# Patient Record
Sex: FEMALE | Race: WHITE | NOT HISPANIC OR LATINO | URBAN - METROPOLITAN AREA
[De-identification: names, ages, dates, MRNs, and addresses within clinical notes are randomized per-mention and may not be internally consistent; named-entity substitution may affect disease eponyms.]

---

## 2017-05-16 ENCOUNTER — OUTPATIENT (OUTPATIENT)
Dept: OUTPATIENT SERVICES | Facility: HOSPITAL | Age: 72
LOS: 1 days | Discharge: HOME | End: 2017-05-16

## 2017-06-28 DIAGNOSIS — K05.4 PERIODONTOSIS: ICD-10-CM

## 2018-03-17 ENCOUNTER — INPATIENT (INPATIENT)
Facility: HOSPITAL | Age: 73
LOS: 2 days | Discharge: HOME HEALTH | End: 2018-03-20
Attending: INTERNAL MEDICINE

## 2018-03-17 VITALS
DIASTOLIC BLOOD PRESSURE: 79 MMHG | HEART RATE: 72 BPM | RESPIRATION RATE: 20 BRPM | OXYGEN SATURATION: 98 % | SYSTOLIC BLOOD PRESSURE: 189 MMHG | TEMPERATURE: 97 F

## 2018-03-17 DIAGNOSIS — Z98.890 OTHER SPECIFIED POSTPROCEDURAL STATES: Chronic | ICD-10-CM

## 2018-03-17 LAB
ALBUMIN SERPL ELPH-MCNC: 4.1 G/DL — SIGNIFICANT CHANGE UP (ref 3.5–5.2)
ALP SERPL-CCNC: 87 U/L — SIGNIFICANT CHANGE UP (ref 30–115)
ALT FLD-CCNC: 16 U/L — SIGNIFICANT CHANGE UP (ref 0–41)
ANION GAP SERPL CALC-SCNC: 14 MMOL/L — SIGNIFICANT CHANGE UP (ref 7–14)
APPEARANCE UR: CLEAR — SIGNIFICANT CHANGE UP
AST SERPL-CCNC: 29 U/L — SIGNIFICANT CHANGE UP (ref 0–41)
BILIRUB SERPL-MCNC: 0.2 MG/DL — SIGNIFICANT CHANGE UP (ref 0.2–1.2)
BILIRUB UR-MCNC: NEGATIVE — SIGNIFICANT CHANGE UP
BUN SERPL-MCNC: 7 MG/DL — LOW (ref 10–20)
CALCIUM SERPL-MCNC: 9.2 MG/DL — SIGNIFICANT CHANGE UP (ref 8.5–10.1)
CHLORIDE SERPL-SCNC: 87 MMOL/L — LOW (ref 98–110)
CK SERPL-CCNC: 183 U/L — SIGNIFICANT CHANGE UP (ref 0–225)
CO2 SERPL-SCNC: 23 MMOL/L — SIGNIFICANT CHANGE UP (ref 17–32)
COLOR SPEC: YELLOW — SIGNIFICANT CHANGE UP
CREAT SERPL-MCNC: 0.5 MG/DL — LOW (ref 0.7–1.5)
DIFF PNL FLD: NEGATIVE — SIGNIFICANT CHANGE UP
GLUCOSE SERPL-MCNC: 109 MG/DL — SIGNIFICANT CHANGE UP (ref 70–110)
GLUCOSE UR QL: NEGATIVE MG/DL — SIGNIFICANT CHANGE UP
HCT VFR BLD CALC: 36.8 % — LOW (ref 37–47)
HGB BLD-MCNC: 12.9 G/DL — SIGNIFICANT CHANGE UP (ref 12–16)
INR BLD: 0.87 RATIO — SIGNIFICANT CHANGE UP (ref 0.65–1.3)
KETONES UR-MCNC: NEGATIVE — SIGNIFICANT CHANGE UP
LEUKOCYTE ESTERASE UR-ACNC: (no result)
MAGNESIUM SERPL-MCNC: 1.8 MG/DL — SIGNIFICANT CHANGE UP (ref 1.8–2.4)
MCHC RBC-ENTMCNC: 31.5 PG — HIGH (ref 27–31)
MCHC RBC-ENTMCNC: 35.1 G/DL — SIGNIFICANT CHANGE UP (ref 32–37)
MCV RBC AUTO: 89.8 FL — SIGNIFICANT CHANGE UP (ref 81–99)
NITRITE UR-MCNC: POSITIVE
NRBC # BLD: 0 /100 WBCS — SIGNIFICANT CHANGE UP (ref 0–0)
PH UR: 7 — SIGNIFICANT CHANGE UP (ref 5–8)
PLATELET # BLD AUTO: 218 K/UL — SIGNIFICANT CHANGE UP (ref 130–400)
POTASSIUM SERPL-MCNC: 5.6 MMOL/L — HIGH (ref 3.5–5)
POTASSIUM SERPL-SCNC: 5.6 MMOL/L — HIGH (ref 3.5–5)
PROT SERPL-MCNC: 6.9 G/DL — SIGNIFICANT CHANGE UP (ref 6–8)
PROT UR-MCNC: NEGATIVE MG/DL — SIGNIFICANT CHANGE UP
PROTHROM AB SERPL-ACNC: 9.4 SEC — LOW (ref 9.95–12.87)
RBC # BLD: 4.1 M/UL — LOW (ref 4.2–5.4)
RBC # FLD: 11.9 % — SIGNIFICANT CHANGE UP (ref 11.5–14.5)
SODIUM SERPL-SCNC: 124 MMOL/L — LOW (ref 135–146)
SP GR SPEC: 1.01 — SIGNIFICANT CHANGE UP (ref 1.01–1.03)
TROPONIN T SERPL-MCNC: <0.01 NG/ML — SIGNIFICANT CHANGE UP
UROBILINOGEN FLD QL: 0.2 MG/DL — SIGNIFICANT CHANGE UP (ref 0.2–0.2)
WBC # BLD: 6.36 K/UL — SIGNIFICANT CHANGE UP (ref 4.8–10.8)
WBC # FLD AUTO: 6.36 K/UL — SIGNIFICANT CHANGE UP (ref 4.8–10.8)
WBC UR QL: (no result) /HPF

## 2018-03-17 RX ORDER — VALSARTAN 80 MG/1
160 TABLET ORAL DAILY
Qty: 0 | Refills: 0 | Status: DISCONTINUED | OUTPATIENT
Start: 2018-03-17 | End: 2018-03-20

## 2018-03-17 RX ORDER — TRAZODONE HCL 50 MG
150 TABLET ORAL AT BEDTIME
Qty: 0 | Refills: 0 | Status: DISCONTINUED | OUTPATIENT
Start: 2018-03-17 | End: 2018-03-19

## 2018-03-17 RX ORDER — METOCLOPRAMIDE HCL 10 MG
10 TABLET ORAL ONCE
Qty: 0 | Refills: 0 | Status: COMPLETED | OUTPATIENT
Start: 2018-03-17 | End: 2018-03-17

## 2018-03-17 RX ORDER — ASPIRIN/CALCIUM CARB/MAGNESIUM 324 MG
81 TABLET ORAL DAILY
Qty: 0 | Refills: 0 | Status: DISCONTINUED | OUTPATIENT
Start: 2018-03-17 | End: 2018-03-20

## 2018-03-17 RX ORDER — KETOROLAC TROMETHAMINE 30 MG/ML
30 SYRINGE (ML) INJECTION ONCE
Qty: 0 | Refills: 0 | Status: DISCONTINUED | OUTPATIENT
Start: 2018-03-17 | End: 2018-03-17

## 2018-03-17 RX ORDER — CEVIMELINE 30 MG/1
1 CAPSULE ORAL
Qty: 0 | Refills: 0 | COMMUNITY

## 2018-03-17 RX ORDER — CEFTRIAXONE 500 MG/1
1 INJECTION, POWDER, FOR SOLUTION INTRAMUSCULAR; INTRAVENOUS EVERY 24 HOURS
Qty: 0 | Refills: 0 | Status: DISCONTINUED | OUTPATIENT
Start: 2018-03-17 | End: 2018-03-20

## 2018-03-17 RX ORDER — SULFASALAZINE 500 MG
500 TABLET ORAL
Qty: 0 | Refills: 0 | Status: DISCONTINUED | OUTPATIENT
Start: 2018-03-17 | End: 2018-03-20

## 2018-03-17 RX ORDER — AMLODIPINE BESYLATE 2.5 MG/1
10 TABLET ORAL DAILY
Qty: 0 | Refills: 0 | Status: DISCONTINUED | OUTPATIENT
Start: 2018-03-17 | End: 2018-03-20

## 2018-03-17 RX ORDER — ALPRAZOLAM 0.25 MG
1 TABLET ORAL
Qty: 0 | Refills: 0 | Status: DISCONTINUED | OUTPATIENT
Start: 2018-03-17 | End: 2018-03-19

## 2018-03-17 RX ORDER — ATORVASTATIN CALCIUM 80 MG/1
80 TABLET, FILM COATED ORAL AT BEDTIME
Qty: 0 | Refills: 0 | Status: DISCONTINUED | OUTPATIENT
Start: 2018-03-17 | End: 2018-03-20

## 2018-03-17 RX ORDER — TRAZODONE HCL 50 MG
0 TABLET ORAL
Qty: 0 | Refills: 0 | COMMUNITY

## 2018-03-17 RX ORDER — ACETAMINOPHEN 500 MG
650 TABLET ORAL ONCE
Qty: 0 | Refills: 0 | Status: DISCONTINUED | OUTPATIENT
Start: 2018-03-17 | End: 2018-03-17

## 2018-03-17 RX ORDER — MORPHINE SULFATE 50 MG/1
2 CAPSULE, EXTENDED RELEASE ORAL ONCE
Qty: 0 | Refills: 0 | Status: DISCONTINUED | OUTPATIENT
Start: 2018-03-17 | End: 2018-03-17

## 2018-03-17 RX ORDER — ENOXAPARIN SODIUM 100 MG/ML
40 INJECTION SUBCUTANEOUS EVERY 24 HOURS
Qty: 0 | Refills: 0 | Status: DISCONTINUED | OUTPATIENT
Start: 2018-03-17 | End: 2018-03-20

## 2018-03-17 RX ORDER — SODIUM CHLORIDE 9 MG/ML
1000 INJECTION INTRAMUSCULAR; INTRAVENOUS; SUBCUTANEOUS ONCE
Qty: 0 | Refills: 0 | Status: COMPLETED | OUTPATIENT
Start: 2018-03-17 | End: 2018-03-17

## 2018-03-17 RX ORDER — SULFASALAZINE 500 MG
0 TABLET ORAL
Qty: 0 | Refills: 0 | COMMUNITY

## 2018-03-17 RX ORDER — MONTELUKAST 4 MG/1
10 TABLET, CHEWABLE ORAL DAILY
Qty: 0 | Refills: 0 | Status: DISCONTINUED | OUTPATIENT
Start: 2018-03-17 | End: 2018-03-20

## 2018-03-17 RX ORDER — PANTOPRAZOLE SODIUM 20 MG/1
40 TABLET, DELAYED RELEASE ORAL
Qty: 0 | Refills: 0 | Status: DISCONTINUED | OUTPATIENT
Start: 2018-03-17 | End: 2018-03-20

## 2018-03-17 RX ORDER — CIPROFLOXACIN LACTATE 400MG/40ML
400 VIAL (ML) INTRAVENOUS ONCE
Qty: 0 | Refills: 0 | Status: COMPLETED | OUTPATIENT
Start: 2018-03-17 | End: 2018-03-17

## 2018-03-17 RX ORDER — TRAMADOL HYDROCHLORIDE 50 MG/1
50 TABLET ORAL
Qty: 0 | Refills: 0 | Status: DISCONTINUED | OUTPATIENT
Start: 2018-03-17 | End: 2018-03-20

## 2018-03-17 RX ORDER — HYDROXYCHLOROQUINE SULFATE 200 MG
200 TABLET ORAL
Qty: 0 | Refills: 0 | Status: DISCONTINUED | OUTPATIENT
Start: 2018-03-17 | End: 2018-03-20

## 2018-03-17 RX ADMIN — MORPHINE SULFATE 2 MILLIGRAM(S): 50 CAPSULE, EXTENDED RELEASE ORAL at 11:11

## 2018-03-17 RX ADMIN — Medication 200 MILLIGRAM(S): at 14:56

## 2018-03-17 RX ADMIN — Medication 150 MILLIGRAM(S): at 22:12

## 2018-03-17 RX ADMIN — PANTOPRAZOLE SODIUM 40 MILLIGRAM(S): 20 TABLET, DELAYED RELEASE ORAL at 18:17

## 2018-03-17 RX ADMIN — Medication 1 MILLIGRAM(S): at 18:36

## 2018-03-17 RX ADMIN — Medication 30 MILLIGRAM(S): at 14:56

## 2018-03-17 RX ADMIN — CEFTRIAXONE 100 GRAM(S): 500 INJECTION, POWDER, FOR SOLUTION INTRAMUSCULAR; INTRAVENOUS at 18:20

## 2018-03-17 RX ADMIN — Medication 81 MILLIGRAM(S): at 18:17

## 2018-03-17 RX ADMIN — ENOXAPARIN SODIUM 40 MILLIGRAM(S): 100 INJECTION SUBCUTANEOUS at 18:18

## 2018-03-17 RX ADMIN — Medication 200 MILLIGRAM(S): at 18:18

## 2018-03-17 RX ADMIN — AMLODIPINE BESYLATE 10 MILLIGRAM(S): 2.5 TABLET ORAL at 18:15

## 2018-03-17 RX ADMIN — MORPHINE SULFATE 2 MILLIGRAM(S): 50 CAPSULE, EXTENDED RELEASE ORAL at 10:37

## 2018-03-17 RX ADMIN — Medication 500 MILLIGRAM(S): at 18:14

## 2018-03-17 RX ADMIN — Medication 10 MILLIGRAM(S): at 14:56

## 2018-03-17 RX ADMIN — ATORVASTATIN CALCIUM 80 MILLIGRAM(S): 80 TABLET, FILM COATED ORAL at 22:11

## 2018-03-17 RX ADMIN — SODIUM CHLORIDE 1000 MILLILITER(S): 9 INJECTION INTRAMUSCULAR; INTRAVENOUS; SUBCUTANEOUS at 13:23

## 2018-03-17 NOTE — ED PROVIDER NOTE - MEDICAL DECISION MAKING DETAILS
pt found to be hyponatremia likely cause of dizziness, unsteady gait. Also w UTI, abx given. ivf given. CT scans negative. pt admitted, needs pt/rehab possible hha or walker at home.

## 2018-03-17 NOTE — H&P ADULT - HISTORY OF PRESENT ILLNESS
72 y f with pmh of sjogrens, lupus, trigeminal neuralgia,  breast cancer s/p b/l mastectomy, rectal prolapse s/p partial colectomy, htn presented with fall. As per patient, she has been complaining of dizziness since last 7 months. Today, she was walking to kitchen when she felt lightheadness along with headaache and fell on her back but denies any loc but she hit her rt side of head. Since then she has been complaining of headache along with neck pain and back pain. she denies any weakness, numbness, blurry vision, tinnitus. she also c/o polyuria and polydipsia. she denies any burning sensation during urination. she denies any other symptoms

## 2018-03-17 NOTE — ED PROVIDER NOTE - PLAN OF CARE
Correct electrolytes Diet limited to soft food. Patient given 1L NS in ED.  Treat UTI, f/u cultures.  Evaluate for cause of falls.

## 2018-03-17 NOTE — H&P ADULT - NSHPPHYSICALEXAM_GEN_ALL_CORE
PHYSICAL EXAM:  Constitutional: well developed and well nourished    Respiratory: lungs B/L clear on auscultation, b/l mastectomy    Cardiovascular: S1S2 normal, no murmur heard    Gastrointestinal: Abd soft, non distended, non tender, BS+, midline scar in lower abdomen    Extremities: No pedal edema    Neurological: AAOX3, No FND, fnt-nl, hst- nl, sensory- nl, cn 2-12 intact, gait -unable to assess due to dizziness

## 2018-03-17 NOTE — ED ADULT NURSE NOTE - NSELOPED_ED_ALL_ED
Physician notified/Patient's chart was referred to charge nurse/supervisor for disposition of prescription and/or discharge instructions.

## 2018-03-17 NOTE — ED PROVIDER NOTE - PMH
Cardiac abnormality    Constipation, unspecified constipation type    GERD without esophagitis    Hypertension, unspecified type    Malignant neoplasm of lower-outer quadrant of left breast of female, estrogen receptor positive    Other systemic lupus erythematosus with endocarditis    Rectal prolapse    Sjogren's syndrome, with unspecified organ involvement    Vitamin D deficiency

## 2018-03-17 NOTE — ED PROVIDER NOTE - CONSTITUTIONAL, MLM
normal... Mild distress. Well appearing, well nourished, awake, alert, oriented to person, place, time/situation.

## 2018-03-17 NOTE — ED ADULT NURSE NOTE - PMH
Cardiac abnormality    Hypertension, unspecified type    Malignant neoplasm of lower-outer quadrant of left breast of female, estrogen receptor positive    Other systemic lupus erythematosus with endocarditis

## 2018-03-17 NOTE — ED PROVIDER NOTE - ATTENDING CONTRIBUTION TO CARE
72F PMH Sjogren's, anx/dep, htn, gerd, breast ca s/p bilat mastectomy, lupus, s/p colectomy, p/w fall at home. states was standing in kitchen, felt "off balance", fell and hit R head and R flank/upper abd. No loc. +ha. no neck pain, bp. +flank pain R side. no cp, sob, abd pain, nvdc. no fever, cough. no dysuria, freq, hematuria. no cough, uri. stopped taking lupus and sjorgrens meds x 1 mo because thinks it made her fall and dizzy. +freq falls. lives home alone. takes asa daily. on exam, AFVSS, well isabel nad, ncat- no sign of trauma noted to head, no midline c/t/l spine ttp or step off, eomi, perrla, mmm, lctab, rrr nl s1s2 no mrg, abd soft ntnd well healed abd scars, R flank scratch like echymosis, skin intact, +RCVAT, no crepitus, aaox3, cn 2-12 intact, no facial droop or slurred speech, no nystagmus, no pronator drift, 5/5 motor x 4 ext, silt x 4 ext, normal rapid alt movement bilat, unable to test gait, too unsteady, no le edema or calf ttp, a/p; Freq falls, unsteady gait, CHI, R flank injury, will do labs, ekg/trop, cxr/urine r/o pna/uti, CT scans r/o ich, rib fx, acute traumatic injury, ivf, check cbc/lytes, re-eval

## 2018-03-17 NOTE — ED ADULT TRIAGE NOTE - CHIEF COMPLAINT QUOTE
Pt hrom home state was dizzy fall on her back hit head on the flloor no LOC ,C/O nec anmd back pain ,staste  she is taking ASA 81 mg po .

## 2018-03-17 NOTE — H&P ADULT - ATTENDING COMMENTS
Patient seen and examined independently. Agree with resident note with exceptions. Case discussed with housestaff, nursing and patient    T(F): , Max: 96.8 (03-18-18 @ 14:09)  HR: 71 (03-18-18 @ 14:09) (71 - 81)  BP: 128/58 (03-18-18 @ 14:09)  RR: 18 (03-18-18 @ 14:09)  SpO2: 96% (03-17-18 @ 23:40)  General: No apparent distress  Cardiovascular: S1, S2  Gastrointestinal: Soft, Non-tender, Non-distended  Respiratory: Good air entry bilaterally  Musculoskeletal: Moves all extremities  Lymphatic: No edema  Neurologic: No gross motor deficit, anxious appearing  Dermatologic: Skin dry  PT/INR - ( 17 Mar 2018 09:24 )   PT: 9.40 sec;   INR: 0.87 ratio                                 12.9   6.36  )-----------( 218      ( 17 Mar 2018 09:24 )             36.8     03-18    136  |  97<L>  |  8<L>  ----------------------------<  97  4.4   |  26  |  0.5<L>    Ca    8.7      18 Mar 2018 07:37  Mg     1.8     03-17    TPro  6.9  /  Alb  4.1  /  TBili  0.2  /  DBili  x   /  AST  29  /  ALT  16  /  AlkPhos  87  03-17      Culture - Urine (collected 17 Mar 2018 09:36)  Source: .Urine Clean Catch (Midstream)  Preliminary Report (18 Mar 2018 17:55):    >100,000 CFU/ml Citrobacter freundii  A/P    Fall/dizziness - possibly UTI related vs medication side effect    UTI - on rocephin, f/u sensitivities    Hyponatremia - SSRI held, f/u nephrology and psych    Anxiety - f/u psych, cont xanax. SSRI held    Sjogrens/lupus/trigeminal neuralgia - on home meds, will decrease lyrica for now, f/u neuro

## 2018-03-17 NOTE — H&P ADULT - NSHPLABSRESULTS_GEN_ALL_CORE
T(C): 36.2 (18 @ 16:24), Max: 36.2 (18 @ 16:24)  HR: 62 (18 @ 16:24) (62 - 72)  BP: 150/66 (18 @ 16:24) (150/66 - 189/79)  RR: 18 (18 @ 16:24) (18 - 20)  SpO2: 98% (18 @ 08:36) (98% - 98%)                        12.9   6.36  )-----------( 218      ( 17 Mar 2018 09:24 )             36.8     124<L>  |  87<L>  |  7<L>  ----------------------------<  109  5.6<H>   |  23  |  0.5<L>    Ca    9.2      17 Mar 2018 09:24  Mg     1.8         TPro  6.9  /  Alb  4.1  /  TBili  0.2  /  DBili  x   /  AST  29  /  ALT  16  /  AlkPhos  87      Urinalysis Basic - ( 17 Mar 2018 12:56 )  Color: Yellow / Appearance: Clear / S.010 / pH: x  Gluc: x / Ketone: Negative  / Bili: Negative / Urobili: 0.2 mg/dL   Blood: x / Protein: Negative mg/dL / Nitrite: Positive   Leuk Esterase: Small / RBC: x / WBC 6-10 /HPF   Sq Epi: x / Non Sq Epi: x / Bacteria: x    PT/INR - ( 17 Mar 2018 09:24 )   PT: 9.40 sec;   INR: 0.87 ratio      CARDIAC MARKERS ( 17 Mar 2018 09:36 )  x     / <0.01 ng/mL / 183 U/L / x     / 1.8 ng/mL    < from: CT Abdomen and Pelvis w/ IV Cont (18 @ 12:15) >    Since 2014:  1.  No evidence of acute intra-abdominal or pelvic pathology.  2.  Diffuse bilateral emphysematous changes.    < end of copied text >    < from: CT Cervical Spine No Cont (18 @ 12:04) >    IMPRESSION:    1.  No evidenceof acute cervical spine fracture or subluxation.    2.  Multilevel degenerative changes as described.    3.  Mild chronic compression deformity of the C7 vertebral body, stable   since an MRI from .    < end of copied text >    < from: CT Head No Cont (18 @ 11:54) >      IMPRESSION:      1.  No evidence of acute intracranial pathology. Stable exam since   14.    2.  Stable moderate chronic microvascular changes.    < end of copied text >    < from: Xray Pelvis AP only (18 @ 11:06) >    impression:    No evidence of acute displaced fracture or dislocation.    Mild to moderate degenerative changes are present in the bilateral hips.    Degenerative changes are seen in the bilateral sacral iliac joints and   imaged lumbar spine.    < end of copied text >    < from: Xray Chest 1 View AP/PA (18 @ 11:05) >      Impression:      Increased interstitial markings at the bilateral lung bases, unchanged.    < end of copied text >

## 2018-03-17 NOTE — H&P ADULT - ASSESSMENT
72 y f with pmh of sjogrens, lupus, breast cancer s/p mastectomy b/l  presented with  fall due to dizziness    1) fall- trauma workup neg  admit to medicine    2) dizziness- likely drug induced vs orthostatics   hold lyrica  neuro eval  check orthostatics  cth- neg    3) hyponatremia- likely 2/2 polydipsia due to sjogrens vs siadh    water restriction to 1.5 ltrs  repeat bmp  check fena  nephrology eval    4) uti- start rocephin  check ucx    5) sjogrens- c/w cevilemine, will let pt know to bring her own medications    6) lupus- c/w sulfasalazine and hydroxychloroquine    7) trigeminal neuralgia- hold lyrica  neuro eval    8) htn - c/w home meds except hctz    9) hyperkalemia- hemolyzed , will repeat bmp    10 ) diet- regular  dvt ppx/ gi ppx  dispo- needs pt rehab eval

## 2018-03-17 NOTE — H&P ADULT - NSHPREVIEWOFSYSTEMS_GEN_ALL_CORE
REVIEW OF SYSTEMS:    CONSTITUTIONAL: No weakness, fevers or chills  EYES/ENT: No visual changes;  No vertigo or throat pain   NECK: see hpi  RESPIRATORY:   CARDIOVASCULAR: No chest pain or palpitations  GASTROINTESTINAL: No abdominal or epigastric pain. No nausea, vomiting, or hematemesis; + constipation. No melena or hematochezia.  GENITOURINARY: No dysuria or hematuria  NEUROLOGICAL:see hpi  SKIN: No itching, rashes

## 2018-03-17 NOTE — ED PROVIDER NOTE - PROGRESS NOTE DETAILS
Patient received 2mg morphine for pain and reported improvement. Patient returned from imaging, prelim CT cervical spine reports no acute fractures or subluxation. Continues to endorse headache. C-spine collar removed. Fluids started.

## 2018-03-17 NOTE — H&P ADULT - PMH
Constipation, unspecified constipation type    GERD without esophagitis    Hypertension, unspecified type    Lupus (systemic lupus erythematosus)    Malignant neoplasm of lower-outer quadrant of left breast of female, estrogen receptor positive    Rectal prolapse    Sjogren's syndrome, with unspecified organ involvement    Trigeminal neuralgia    Vitamin D deficiency

## 2018-03-17 NOTE — ED PROVIDER NOTE - MUSCULOSKELETAL, MLM
Tender to palpation in right lumbar region. Spine appears normal, range of motion is not limited, no joint tenderness

## 2018-03-17 NOTE — ED PROVIDER NOTE - OBJECTIVE STATEMENT
73yo female with pmh of sjrogens, lupus, constipation, hemorrhoids, breast ca s/p dbl mastectomy, GERD, HTN, cogenital agammaglobulinemia, bipolar disorder and generalized anxiety disorder. presenting for head and neck pain s/p fall. Patient reports that she's been feeling off-balance for over 7 months, limiting her to her home. lost of balance associated with ongoing frontal pressure headaches that increased today after she fell. Patient reports that she fell and hit the right side of her head and back on the floor. Reports that now she has pain in the right temporal region, with pain in her neck. She reports that back pain in right lumbar region. Patient states that she hasn't taken her medications for lupus in over 7 months due to difficulty finding a new rheumatologist with her new insurance. She states she has been consistent with other medications. 73yo female with pmh of sjrogens, lupus, constipation, hemorrhoids, breast ca s/p dbl mastectomy, GERD, HTN, cogenital agammaglobulinemia, bipolar disorder and generalized anxiety disorder. presenting for head and neck pain s/p fall. Patient reports that she's been feeling off-balance for over 7 months, limiting her to her home. lost of balance associated with ongoing frontal pressure headaches that increased today after she fell. Patient reports that she fell and hit the right side of her head and back on the floor. Reports that now she has pain in the right temporal region, with pain in her neck. She reports that back pain in right lumbar region. Patient states that she hasn't taken her medications for lupus in over 7 months due to difficulty finding a new rheumatologist with her new insurance. She states she has been consistent with other medications. Pt also endorses frequent urination, with pelvic pain. 71yo female with pmh of sjrogens, lupus, constipation, hemorrhoids, breast ca s/p dbl mastectomy, GERD, HTN, cogenital agammaglobulinemia, bipolar disorder and generalized anxiety disorder. presenting for head and neck pain s/p fall. Patient reports that she's been feeling off-balance for over 7 months, limiting her to her home. lost of balance associated with ongoing frontal pressure headaches that increased today after she fell. Patient reports that she fell and hit the right side of her head and back on the floor. Reports that now she has pain in the right temporal region, with pain in her neck. She reports that back pain in right lumbar region. Patient states that she hasn't taken her medications for lupus in over 7 months due to difficulty finding a new rheumatologist with her new insurance. She states she has been consistent with other medications. Pt also endorses frequent urination, with pelvic pain.  PMD: Celia  Cards: Coco  Rheum: Shaun  Neuro: Jackie

## 2018-03-17 NOTE — ED PROVIDER NOTE - CARE PLAN
Principal Discharge DX:	Hyponatremia  Goal:	Correct electrolytes  Assessment and plan of treatment:	Diet limited to soft food. Patient given 1L NS in ED.  Treat UTI, f/u cultures.  Evaluate for cause of falls.  Secondary Diagnosis:	Urinary tract infection without hematuria, site unspecified  Secondary Diagnosis:	Falls, subsequent encounter

## 2018-03-18 DIAGNOSIS — F33.1 MAJOR DEPRESSIVE DISORDER, RECURRENT, MODERATE: ICD-10-CM

## 2018-03-18 LAB
ANION GAP SERPL CALC-SCNC: 13 MMOL/L — SIGNIFICANT CHANGE UP (ref 7–14)
ANION GAP SERPL CALC-SCNC: 15 MMOL/L — HIGH (ref 7–14)
BUN SERPL-MCNC: 10 MG/DL — SIGNIFICANT CHANGE UP (ref 10–20)
BUN SERPL-MCNC: 8 MG/DL — LOW (ref 10–20)
CALCIUM SERPL-MCNC: 8.5 MG/DL — SIGNIFICANT CHANGE UP (ref 8.5–10.1)
CALCIUM SERPL-MCNC: 8.7 MG/DL — SIGNIFICANT CHANGE UP (ref 8.5–10.1)
CHLORIDE SERPL-SCNC: 93 MMOL/L — LOW (ref 98–110)
CHLORIDE SERPL-SCNC: 97 MMOL/L — LOW (ref 98–110)
CO2 SERPL-SCNC: 24 MMOL/L — SIGNIFICANT CHANGE UP (ref 17–32)
CO2 SERPL-SCNC: 26 MMOL/L — SIGNIFICANT CHANGE UP (ref 17–32)
CREAT SERPL-MCNC: 0.5 MG/DL — LOW (ref 0.7–1.5)
CREAT SERPL-MCNC: 0.6 MG/DL — LOW (ref 0.7–1.5)
GLUCOSE SERPL-MCNC: 90 MG/DL — SIGNIFICANT CHANGE UP (ref 70–110)
GLUCOSE SERPL-MCNC: 97 MG/DL — SIGNIFICANT CHANGE UP (ref 70–110)
POTASSIUM SERPL-MCNC: 4 MMOL/L — SIGNIFICANT CHANGE UP (ref 3.5–5)
POTASSIUM SERPL-MCNC: 4.4 MMOL/L — SIGNIFICANT CHANGE UP (ref 3.5–5)
POTASSIUM SERPL-SCNC: 4 MMOL/L — SIGNIFICANT CHANGE UP (ref 3.5–5)
POTASSIUM SERPL-SCNC: 4.4 MMOL/L — SIGNIFICANT CHANGE UP (ref 3.5–5)
SODIUM SERPL-SCNC: 132 MMOL/L — LOW (ref 135–146)
SODIUM SERPL-SCNC: 136 MMOL/L — SIGNIFICANT CHANGE UP (ref 135–146)

## 2018-03-18 RX ADMIN — Medication 500 MILLIGRAM(S): at 17:35

## 2018-03-18 RX ADMIN — ATORVASTATIN CALCIUM 80 MILLIGRAM(S): 80 TABLET, FILM COATED ORAL at 22:18

## 2018-03-18 RX ADMIN — Medication 1 MILLIGRAM(S): at 05:51

## 2018-03-18 RX ADMIN — TRAMADOL HYDROCHLORIDE 50 MILLIGRAM(S): 50 TABLET ORAL at 14:30

## 2018-03-18 RX ADMIN — TRAMADOL HYDROCHLORIDE 50 MILLIGRAM(S): 50 TABLET ORAL at 18:41

## 2018-03-18 RX ADMIN — VALSARTAN 160 MILLIGRAM(S): 80 TABLET ORAL at 05:51

## 2018-03-18 RX ADMIN — Medication 25 MILLIGRAM(S): at 14:29

## 2018-03-18 RX ADMIN — Medication 81 MILLIGRAM(S): at 11:38

## 2018-03-18 RX ADMIN — Medication 150 MILLIGRAM(S): at 22:18

## 2018-03-18 RX ADMIN — Medication 200 MILLIGRAM(S): at 05:51

## 2018-03-18 RX ADMIN — TRAMADOL HYDROCHLORIDE 50 MILLIGRAM(S): 50 TABLET ORAL at 11:40

## 2018-03-18 RX ADMIN — CEFTRIAXONE 100 GRAM(S): 500 INJECTION, POWDER, FOR SOLUTION INTRAMUSCULAR; INTRAVENOUS at 17:38

## 2018-03-18 RX ADMIN — PANTOPRAZOLE SODIUM 40 MILLIGRAM(S): 20 TABLET, DELAYED RELEASE ORAL at 08:38

## 2018-03-18 RX ADMIN — Medication 1 MILLIGRAM(S): at 17:35

## 2018-03-18 RX ADMIN — MONTELUKAST 10 MILLIGRAM(S): 4 TABLET, CHEWABLE ORAL at 11:38

## 2018-03-18 RX ADMIN — Medication 500 MILLIGRAM(S): at 05:51

## 2018-03-18 RX ADMIN — Medication 1 DROP(S): at 14:25

## 2018-03-18 RX ADMIN — TRAMADOL HYDROCHLORIDE 50 MILLIGRAM(S): 50 TABLET ORAL at 18:08

## 2018-03-18 RX ADMIN — AMLODIPINE BESYLATE 10 MILLIGRAM(S): 2.5 TABLET ORAL at 05:51

## 2018-03-18 RX ADMIN — Medication 200 MILLIGRAM(S): at 17:36

## 2018-03-18 NOTE — BEHAVIORAL HEALTH ASSESSMENT NOTE - OTHER PAST PSYCHIATRIC HISTORY (INCLUDE DETAILS REGARDING ONSET, COURSE OF ILLNESS, INPATIENT/OUTPATIENT TREATMENT)
3 [facility] admissions "many years ago" for SI/SA during a time of relatioship conflict with spouse and children, overdosed on medictions, cut self

## 2018-03-18 NOTE — CONSULT NOTE ADULT - SUBJECTIVE AND OBJECTIVE BOX
NEPHROLOGY CONSULTATION NOTE    A 73 yo F with PMH listed below presented to hospital because she fell down. she has been complaining of dizziness since last 7 months. Today, she was walking to kitchen when she felt lightheadness along with headaache and fell on her back. Renal consult called for hyponatremia. Her serum Na was 132, her baseline Na was   72 y f with pmh of sjogrens, lupus, trigeminal neuralgia,  breast cancer s/p b/l mastectomy, rectal prolapse s/p partial colectomy, htn presented with fall. As per patient, she has been complaining of dizziness since last 7 months. Today, she was walking to kitchen when she felt lightheadness along with headaache and fell on her back but denies any loc but she hit her rt side of head. Since then she has been complaining of headache along with neck pain and back pain. she denies any weakness, numbness, blurry vision, tinnitus. she also c/o polyuria and polydipsia. she denies any burning sensation during urination. she denies any other symptoms  PAST MEDICAL & SURGICAL HISTORY:  Lupus (systemic lupus erythematosus)  Trigeminal neuralgia  Sjogren's syndrome, with unspecified organ involvement  GERD without esophagitis  Vitamin D deficiency  Constipation, unspecified constipation type  Rectal prolapse  Hypertension, unspecified type  Malignant neoplasm of lower-outer quadrant of left breast of female, estrogen receptor positive  H/O bilateral mastectomy  History of partial colectomy    Allergies:  Naprosyn (Stomach Upset)  Vicodin (Stomach Upset)    Home Medications:  ALPRAZolam 1 mg oral tablet: 1 tab(s) orally 2 times a day (17 Mar 2018 12:57)  amLODIPine 10 mg oral tablet: 1 tab(s) orally once a day (17 Mar 2018 12:57)  cevimeline 30 mg oral capsule: 3 cap(s) orally once a day (17 Mar 2018 12:57)  Fioricet oral capsule: 2 cap(s) orally once a day (17 Mar 2018 12:57)  hydroxychloroquine 200 mg oral tablet: 1 tab(s) orally 2 times a day (17 Mar 2018 12:57)  Low Dose ASA 81 mg oral tablet: 1 tab(s) orally once a day (17 Mar 2018 12:57)  Lyrica 150 mg oral capsule: 1 cap(s) orally 2 times a day (17 Mar 2018 12:57)  montelukast 10 mg oral tablet: 1 tab(s) orally once a day (17 Mar 2018 12:57)  omeprazole 40 mg oral delayed release capsule: 1 cap(s) orally once a day (17 Mar 2018 12:57)  rosuvastatin 40 mg oral tablet: 1 tab(s) orally once a day (at bedtime) (17 Mar 2018 12:57)  sulfaSALAzine 500 mg oral tablet: 3 tab(s) orally 2 times a day (17 Mar 2018 12:57)  traZODone 150 mg oral tablet: 1 tab(s) orally once a day (at bedtime) (17 Mar 2018 12:57)  valsartan-hydrochlorothiazide 160mg-12.5mg oral tablet: 1 tab(s) orally once a day (17 Mar 2018 12:57)    Hospital Medications:   MEDICATIONS  (STANDING):  ALPRAZolam 1 milliGRAM(s) Oral two times a day  amLODIPine   Tablet 10 milliGRAM(s) Oral daily  aspirin  chewable 81 milliGRAM(s) Oral daily  atorvastatin 80 milliGRAM(s) Oral at bedtime  cefTRIAXone   IVPB 1 Gram(s) IV Intermittent every 24 hours  enoxaparin Injectable 40 milliGRAM(s) SubCutaneous every 24 hours  hydroxychloroquine 200 milliGRAM(s) Oral two times a day  montelukast 10 milliGRAM(s) Oral daily  pantoprazole    Tablet 40 milliGRAM(s) Oral before breakfast  sulfaSALAzine 500 milliGRAM(s) Oral two times a day  traZODone 150 milliGRAM(s) Oral at bedtime  valsartan 160 milliGRAM(s) Oral daily      SOCIAL HISTORY:  Denies ETOH,Smoking,   FAMILY HISTORY:  No pertinent family history in first degree relatives        REVIEW OF SYSTEMS:  CONSTITUTIONAL: No weakness, fevers or chills  EYES/ENT: No visual changes;  No vertigo or throat pain   NECK: No pain or stiffness  RESPIRATORY: No cough, wheezing, hemoptysis; No shortness of breath  CARDIOVASCULAR: No chest pain or palpitations.  GASTROINTESTINAL: No abdominal or epigastric pain. No nausea, vomiting, or hematemesis; No diarrhea or constipation. No melena or hematochezia.  GENITOURINARY: No dysuria, frequency, foamy urine, urinary urgency, incontinence or hematuria  NEUROLOGICAL: No numbness or weakness  SKIN: No itching, burning, rashes, or lesions   VASCULAR: No bilateral lower extremity edema.   All other review of systems is negative unless indicated above.    VITALS:  T(F): 96.5 (18 @ 05:20), Max: 97.2 (18 @ 16:24)  HR: 73 (18 @ 05:20)  BP: 170/65 (18 @ 05:20)  RR: 18 (18 @ 05:20)  SpO2: 96% (18 @ 23:40)        I&O's Detail    Creatine Kinase, Serum: 183 U/L (18 @ 09:36)      PHYSICAL EXAM:  Constitutional: NAD  HEENT: anicteric sclera, oropharynx clear, MMM  Neck: No JVD  Respiratory: CTAB, no wheezes, rales or rhonchi  Cardiovascular: S1, S2, RRR  Gastrointestinal: BS+, soft, NT/ND  Extremities: No cyanosis or clubbing. No peripheral edema  Neurological: A/O x 3, no focal deficits  Psychiatric: Normal mood, normal affect  : No CVA tenderness. No matias.   Skin: No rashes  Vascular Access:    LABS:      132<L>  |  93<L>  |  10  ----------------------------<  90  4.0   |  24  |  0.6<L>    Ca    8.5      18 Mar 2018 00:53  Mg     1.8         TPro  6.9  /  Alb  4.1  /  TBili  0.2  /  DBili      /  AST  29  /  ALT  16  /  AlkPhos  87      Creatinine Trend: 0.6 <--, 0.5 <--                        12.9   6.36  )-----------( 218      ( 17 Mar 2018 09:24 )             36.8     Urine Studies:  Urinalysis Basic - ( 17 Mar 2018 12:56 )    Color: Yellow / Appearance: Clear / S.010 / pH:   Gluc:  / Ketone: Negative  / Bili: Negative / Urobili: 0.2 mg/dL   Blood:  / Protein: Negative mg/dL / Nitrite: Positive   Leuk Esterase: Small / RBC:  / WBC 6-10 /HPF   Sq Epi:  / Non Sq Epi:  / Bacteria:                 RADIOLOGY & ADDITIONAL STUDIES: NEPHROLOGY CONSULTATION NOTE    A 73 yo F with PMH listed below presented to hospital because she fell down. she has been complaining of dizziness since last 7 months. Today, she was walking to kitchen when she felt lightheadness along with headaache and fell on her back. Renal consult called for hyponatremia. Her serum Na was 132, her baseline Na was 133-134 last year.     no weakness, numbness, blurry vision, tinnitus  (+) polyuria and polydipsia   PAST MEDICAL & SURGICAL HISTORY:  Lupus (systemic lupus erythematosus)  Trigeminal neuralgia  Sjogren's syndrome, with unspecified organ involvement  GERD without esophagitis  Vitamin D deficiency  Constipation, unspecified constipation type  Rectal prolapse  Hypertension, unspecified type  Malignant neoplasm of lower-outer quadrant of left breast of female, estrogen receptor positive  H/O bilateral mastectomy  History of partial colectomy    Allergies:  Naprosyn (Stomach Upset)  Vicodin (Stomach Upset)    Home Medications:  ALPRAZolam 1 mg oral tablet: 1 tab(s) orally 2 times a day (17 Mar 2018 12:57)  amLODIPine 10 mg oral tablet: 1 tab(s) orally once a day (17 Mar 2018 12:57)  cevimeline 30 mg oral capsule: 3 cap(s) orally once a day (17 Mar 2018 12:57)  Fioricet oral capsule: 2 cap(s) orally once a day (17 Mar 2018 12:57)  hydroxychloroquine 200 mg oral tablet: 1 tab(s) orally 2 times a day (17 Mar 2018 12:57)  Low Dose ASA 81 mg oral tablet: 1 tab(s) orally once a day (17 Mar 2018 12:57)  Lyrica 150 mg oral capsule: 1 cap(s) orally 2 times a day (17 Mar 2018 12:57)  montelukast 10 mg oral tablet: 1 tab(s) orally once a day (17 Mar 2018 12:57)  omeprazole 40 mg oral delayed release capsule: 1 cap(s) orally once a day (17 Mar 2018 12:57)  rosuvastatin 40 mg oral tablet: 1 tab(s) orally once a day (at bedtime) (17 Mar 2018 12:57)  sulfaSALAzine 500 mg oral tablet: 3 tab(s) orally 2 times a day (17 Mar 2018 12:57)  traZODone 150 mg oral tablet: 1 tab(s) orally once a day (at bedtime) (17 Mar 2018 12:57)  valsartan-hydrochlorothiazide 160mg-12.5mg oral tablet: 1 tab(s) orally once a day (17 Mar 2018 12:57)    Hospital Medications:   MEDICATIONS  (STANDING):  ALPRAZolam 1 milliGRAM(s) Oral two times a day  amLODIPine   Tablet 10 milliGRAM(s) Oral daily  aspirin  chewable 81 milliGRAM(s) Oral daily  atorvastatin 80 milliGRAM(s) Oral at bedtime  cefTRIAXone   IVPB 1 Gram(s) IV Intermittent every 24 hours  enoxaparin Injectable 40 milliGRAM(s) SubCutaneous every 24 hours  hydroxychloroquine 200 milliGRAM(s) Oral two times a day  montelukast 10 milliGRAM(s) Oral daily  pantoprazole    Tablet 40 milliGRAM(s) Oral before breakfast  sulfaSALAzine 500 milliGRAM(s) Oral two times a day  traZODone 150 milliGRAM(s) Oral at bedtime  valsartan 160 milliGRAM(s) Oral daily      SOCIAL HISTORY:  Denies ETOH,Smoking,   FAMILY HISTORY:  No pertinent family history in first degree relatives      REVIEW OF SYSTEMS:  CONSTITUTIONAL: No weakness, fevers or chills  EYES/ENT: No visual changes;  No vertigo or throat pain   NECK: No pain or stiffness  RESPIRATORY: No cough, wheezing, hemoptysis; No shortness of breath  CARDIOVASCULAR: No chest pain or palpitations.  GASTROINTESTINAL: No abdominal or epigastric pain. No nausea, vomiting, or hematemesis; No diarrhea or constipation. No melena or hematochezia.  GENITOURINARY: No dysuria, frequency, foamy urine, urinary urgency, incontinence or hematuria  NEUROLOGICAL: No numbness or weakness  SKIN: No itching, burning, rashes, or lesions   VASCULAR: No bilateral lower extremity edema.   All other review of systems is negative unless indicated above.    VITALS:  T(F): 96.5 (18 @ 05:20), Max: 97.2 (18 @ 16:24)  HR: 73 (18 @ 05:20)  BP: 170/65 (18 @ 05:20)Vital Signs Last 24 Hrs  BP: 170/65 (18 Mar 2018 05:20) (121/58 - 178/68)  RR: 18 (18 @ 05:20)  SpO2: 96% (18 @ 23:40)    I&O's Detail    Creatine Kinase, Serum: 183 U/L (18 @ 09:36)      PHYSICAL EXAM:  Constitutional: NAD  HEENT: anicteric sclera, oropharynx clear, MMM  Neck: No JVD  Respiratory: CTAB, no wheezes, rales or rhonchi  Cardiovascular: S1, S2, RRR  Gastrointestinal: BS+, soft, NT/ND  Extremities: No cyanosis or clubbing. No peripheral edema  Neurological: A/O x 3, no focal deficits  Psychiatric: Normal mood, normal affect  : No CVA tenderness. No matias.   Skin: No rashes    LABS:      132<L>  |  93<L>  |  10  ----------------------------<  90  4.0   |  24  |  0.6<L>  Creatinine Trend: 0.6 <--, 0.5 <--  SODIUM TREND:  Sodium 132 [ @ 00:53]  Sodium 124 [ @ 09:24]    Ca    8.5      18 Mar 2018 00:53  Mg     1.8         TPro  6.9  /  Alb  4.1  /  TBili  0.2  /  DBili      /  AST  29  /  ALT  16  /  AlkPhos  87                  12.9   6.36  )-----------( 218      ( 17 Mar 2018 09:24 )             36.8     Urine Studies:  Urinalysis Basic - ( 17 Mar 2018 12:56 )    Color: Yellow / Appearance: Clear / S.010 / pH:   Gluc:  / Ketone: Negative  / Bili: Negative / Urobili: 0.2 mg/dL   Blood:  / Protein: Negative mg/dL / Nitrite: Positive   Leuk Esterase: Small / RBC:  / WBC 6-10 /HPF   Sq Epi:  / Non Sq Epi:  / Bacteria:     RADIOLOGY & ADDITIONAL STUDIES:    < from: CT Chest w/ IV Cont (18 @ 12:15) >  IMPRESSION:   Since 2014:  1.  No evidence of acute intra-abdominal or pelvic pathology.  2.  Diffuse bilateral emphysematous changes.    < end of copied text >  < from: CT Cervical Spine No Cont (03.17.18 @ 12:04) >  IMPRESSION:    1.  No evidenceof acute cervical spine fracture or subluxation.    2.  Multilevel degenerative changes as described.    3.  Mild chronic compression deformity of the C7 vertebral body, stable   since an MRI from .    < end of copied text > NEPHROLOGY CONSULTATION NOTE    A 73 yo F with PMH listed below presented to hospital because she fell down. she has been complaining of dizziness since last 7 months. Today, she was walking to kitchen when she felt lightheadness along with headaache and fell on her back. Renal consult called for hyponatremia. Her serum Na was 132, her baseline Na was 133-134 last year.     no weakness, numbness, blurry vision, tinnitus  (+) polyuria and polydipsia   PAST MEDICAL & SURGICAL HISTORY:  Lupus (systemic lupus erythematosus)  Trigeminal neuralgia  Sjogren's syndrome, with unspecified organ involvement  GERD without esophagitis  Vitamin D deficiency  Constipation, unspecified constipation type  Rectal prolapse  Hypertension, unspecified type  Malignant neoplasm of lower-outer quadrant of left breast of female, estrogen receptor positive  H/O bilateral mastectomy  History of partial colectomy  breast cancer s/p b/l mastectomy    Allergies:  Naprosyn (Stomach Upset)  Vicodin (Stomach Upset)    Home Medications:  ALPRAZolam 1 mg oral tablet: 1 tab(s) orally 2 times a day (17 Mar 2018 12:57)  amLODIPine 10 mg oral tablet: 1 tab(s) orally once a day (17 Mar 2018 12:57)  cevimeline 30 mg oral capsule: 3 cap(s) orally once a day (17 Mar 2018 12:57)  Fioricet oral capsule: 2 cap(s) orally once a day (17 Mar 2018 12:57)  hydroxychloroquine 200 mg oral tablet: 1 tab(s) orally 2 times a day (17 Mar 2018 12:57)  Low Dose ASA 81 mg oral tablet: 1 tab(s) orally once a day (17 Mar 2018 12:57)  Lyrica 150 mg oral capsule: 1 cap(s) orally 2 times a day (17 Mar 2018 12:57)  montelukast 10 mg oral tablet: 1 tab(s) orally once a day (17 Mar 2018 12:57)  omeprazole 40 mg oral delayed release capsule: 1 cap(s) orally once a day (17 Mar 2018 12:57)  rosuvastatin 40 mg oral tablet: 1 tab(s) orally once a day (at bedtime) (17 Mar 2018 12:57)  sulfaSALAzine 500 mg oral tablet: 3 tab(s) orally 2 times a day (17 Mar 2018 12:57)  traZODone 150 mg oral tablet: 1 tab(s) orally once a day (at bedtime) (17 Mar 2018 12:57)  valsartan-hydrochlorothiazide 160mg-12.5mg oral tablet: 1 tab(s) orally once a day (17 Mar 2018 12:57)    Hospital Medications:   MEDICATIONS  (STANDING):  ALPRAZolam 1 milliGRAM(s) Oral two times a day  amLODIPine   Tablet 10 milliGRAM(s) Oral daily  aspirin  chewable 81 milliGRAM(s) Oral daily  atorvastatin 80 milliGRAM(s) Oral at bedtime  cefTRIAXone   IVPB 1 Gram(s) IV Intermittent every 24 hours  enoxaparin Injectable 40 milliGRAM(s) SubCutaneous every 24 hours  hydroxychloroquine 200 milliGRAM(s) Oral two times a day  montelukast 10 milliGRAM(s) Oral daily  pantoprazole    Tablet 40 milliGRAM(s) Oral before breakfast  sulfaSALAzine 500 milliGRAM(s) Oral two times a day  traZODone 150 milliGRAM(s) Oral at bedtime  valsartan 160 milliGRAM(s) Oral daily      SOCIAL HISTORY:  Denies ETOH,Smoking,   FAMILY HISTORY:  No pertinent family history in first degree relatives      REVIEW OF SYSTEMS:  CONSTITUTIONAL: No weakness, fevers or chills  EYES/ENT: No visual changes;  No vertigo or throat pain   NECK: No pain or stiffness  RESPIRATORY: No cough, wheezing, hemoptysis; No shortness of breath  CARDIOVASCULAR: No chest pain or palpitations.  GASTROINTESTINAL: No abdominal or epigastric pain. No nausea, vomiting, or hematemesis; No diarrhea or constipation. No melena or hematochezia.  GENITOURINARY: No dysuria, frequency, foamy urine, urinary urgency, incontinence or hematuria  NEUROLOGICAL: No numbness or weakness  SKIN: No itching, burning, rashes, or lesions   VASCULAR: No bilateral lower extremity edema.   All other review of systems is negative unless indicated above.    VITALS:  T(F): 96.5 (18 @ 05:20), Max: 97.2 (18 @ 16:24)  HR: 73 (18 @ 05:20)  BP: 170/65 (18 @ 05:20)Vital Signs Last 24 Hrs  BP: 170/65 (18 Mar 2018 05:20) (121/58 - 178/68)  RR: 18 (18 @ 05:20)  SpO2: 96% (18 @ 23:40)    I&O's Detail    Creatine Kinase, Serum: 183 U/L (18 @ 09:36)      PHYSICAL EXAM:  Constitutional: NAD  HEENT: anicteric sclera, oropharynx clear, MMM  Neck: No JVD  Respiratory: CTAB, no wheezes, rales or rhonchi  Cardiovascular: S1, S2, RRR  Gastrointestinal: BS+, soft, NT/ND  Extremities: No cyanosis or clubbing. No peripheral edema  Neurological: A/O x 3, no focal deficits  Psychiatric: Normal mood, normal affect  : No CVA tenderness. No matias.   Skin: No rashes    LABS:      132<L>  |  93<L>  |  10  ----------------------------<  90  4.0   |  24  |  0.6<L>  Creatinine Trend: 0.6 <--, 0.5 <--  SODIUM TREND:  Sodium 132 [ @ 00:53]  Sodium 124 [ @ 09:24]    Ca    8.5      18 Mar 2018 00:53  Mg     1.8         TPro  6.9  /  Alb  4.1  /  TBili  0.2  /  DBili      /  AST  29  /  ALT  16  /  AlkPhos  87                  12.9   6.36  )-----------( 218      ( 17 Mar 2018 09:24 )             36.8     Urine Studies:  Urinalysis Basic - ( 17 Mar 2018 12:56 )    Color: Yellow / Appearance: Clear / S.010 / pH:   Gluc:  / Ketone: Negative  / Bili: Negative / Urobili: 0.2 mg/dL   Blood:  / Protein: Negative mg/dL / Nitrite: Positive   Leuk Esterase: Small / RBC:  / WBC 6-10 /HPF   Sq Epi:  / Non Sq Epi:  / Bacteria:     RADIOLOGY & ADDITIONAL STUDIES:    < from: CT Chest w/ IV Cont (18 @ 12:15) >  IMPRESSION:   Since 2014:  1.  No evidence of acute intra-abdominal or pelvic pathology.  2.  Diffuse bilateral emphysematous changes.    < end of copied text >  < from: CT Cervical Spine No Cont (18 @ 12:04) >  IMPRESSION:    1.  No evidenceof acute cervical spine fracture or subluxation.    2.  Multilevel degenerative changes as described.    3.  Mild chronic compression deformity of the C7 vertebral body, stable   since an MRI from .    < end of copied text > NEPHROLOGY CONSULTATION NOTE    A 71 yo F with PMH listed below presented to hospital because she fell down. she has been complaining of dizziness since last 7 months. Today, she was walking to kitchen when she felt lightheadness along with headaache and fell on her back. Renal consult called for hyponatremia. Her serum Na was 132, her baseline Na was 133-134 last year.     no weakness, numbness, blurry vision, tinnitus, headache  (+) polyuria and polydipsia   PAST MEDICAL & SURGICAL HISTORY:  Lupus (systemic lupus erythematosus)  Trigeminal neuralgia  Sjogren's syndrome, with unspecified organ involvement  GERD without esophagitis  Vitamin D deficiency  Constipation, unspecified constipation type  Rectal prolapse  Hypertension, unspecified type  Malignant neoplasm of lower-outer quadrant of left breast of female, estrogen receptor positive  H/O bilateral mastectomy  History of partial colectomy  breast cancer s/p b/l mastectomy    Allergies:  Naprosyn (Stomach Upset)  Vicodin (Stomach Upset)    Home Medications:  ALPRAZolam 1 mg oral tablet: 1 tab(s) orally 2 times a day (17 Mar 2018 12:57)  amLODIPine 10 mg oral tablet: 1 tab(s) orally once a day (17 Mar 2018 12:57)  cevimeline 30 mg oral capsule: 3 cap(s) orally once a day (17 Mar 2018 12:57)  Fioricet oral capsule: 2 cap(s) orally once a day (17 Mar 2018 12:57)  hydroxychloroquine 200 mg oral tablet: 1 tab(s) orally 2 times a day (17 Mar 2018 12:57)  Low Dose ASA 81 mg oral tablet: 1 tab(s) orally once a day (17 Mar 2018 12:57)  Lyrica 150 mg oral capsule: 1 cap(s) orally 2 times a day (17 Mar 2018 12:57)  montelukast 10 mg oral tablet: 1 tab(s) orally once a day (17 Mar 2018 12:57)  omeprazole 40 mg oral delayed release capsule: 1 cap(s) orally once a day (17 Mar 2018 12:57)  rosuvastatin 40 mg oral tablet: 1 tab(s) orally once a day (at bedtime) (17 Mar 2018 12:57)  sulfaSALAzine 500 mg oral tablet: 3 tab(s) orally 2 times a day (17 Mar 2018 12:57)  traZODone 150 mg oral tablet: 1 tab(s) orally once a day (at bedtime) (17 Mar 2018 12:57)  valsartan-hydrochlorothiazide 160mg-12.5mg oral tablet: 1 tab(s) orally once a day (17 Mar 2018 12:57)    Hospital Medications:   MEDICATIONS  (STANDING):  ALPRAZolam 1 milliGRAM(s) Oral two times a day  amLODIPine   Tablet 10 milliGRAM(s) Oral daily  aspirin  chewable 81 milliGRAM(s) Oral daily  atorvastatin 80 milliGRAM(s) Oral at bedtime  cefTRIAXone   IVPB 1 Gram(s) IV Intermittent every 24 hours  enoxaparin Injectable 40 milliGRAM(s) SubCutaneous every 24 hours  hydroxychloroquine 200 milliGRAM(s) Oral two times a day  montelukast 10 milliGRAM(s) Oral daily  pantoprazole    Tablet 40 milliGRAM(s) Oral before breakfast  sulfaSALAzine 500 milliGRAM(s) Oral two times a day  traZODone 150 milliGRAM(s) Oral at bedtime  valsartan 160 milliGRAM(s) Oral daily      SOCIAL HISTORY:  Denies ETOH,Smoking,   FAMILY HISTORY:  No pertinent family history in first degree relatives      REVIEW OF SYSTEMS:  CONSTITUTIONAL: No weakness, fevers or chills  EYES/ENT: No visual changes;  No vertigo or throat pain   NECK: No pain or stiffness  RESPIRATORY: No cough, wheezing, hemoptysis; No shortness of breath  CARDIOVASCULAR: No chest pain or palpitations.  GASTROINTESTINAL: No abdominal or epigastric pain. No nausea, vomiting, or hematemesis; No diarrhea or constipation. No melena or hematochezia.  GENITOURINARY: No dysuria, frequency, foamy urine, urinary urgency, incontinence or hematuria  NEUROLOGICAL: No numbness or weakness  SKIN: No itching, burning, rashes, or lesions   VASCULAR: No bilateral lower extremity edema.   All other review of systems is negative unless indicated above.    VITALS:  T(F): 96.5 (18 @ 05:20), Max: 97.2 (18 @ 16:24)  HR: 73 (18 @ 05:20)  BP: 170/65 (18 @ 05:20)Vital Signs Last 24 Hrs  BP: 170/65 (18 Mar 2018 05:20) (121/58 - 178/68)  RR: 18 (18 @ 05:20)  SpO2: 96% (18 @ 23:40)    I&O's Detail    Creatine Kinase, Serum: 183 U/L (18 @ 09:36)      PHYSICAL EXAM:  Constitutional: NAD  HEENT: anicteric sclera, oropharynx clear, MMM  Neck: No JVD  Respiratory: CTAB, no wheezes, rales or rhonchi  Cardiovascular: S1, S2, RRR  Gastrointestinal: BS+, soft, NT/ND  Extremities: No cyanosis or clubbing. No peripheral edema  Neurological: A/O x 3, no focal deficits  Psychiatric: Normal mood, normal affect  : No CVA tenderness. No matias.   Skin: No rashes    LABS:      132<L>  |  93<L>  |  10  ----------------------------<  90  4.0   |  24  |  0.6<L>  Creatinine Trend: 0.6 <--, 0.5 <--  SODIUM TREND:  Sodium 132 [ @ 00:53]  Sodium 124 [ @ 09:24]    Ca    8.5      18 Mar 2018 00:53  Mg     1.8         TPro  6.9  /  Alb  4.1  /  TBili  0.2  /  DBili      /  AST  29  /  ALT  16  /  AlkPhos  87                  12.9   6.36  )-----------( 218      ( 17 Mar 2018 09:24 )             36.8     Urine Studies:  Urinalysis Basic - ( 17 Mar 2018 12:56 )    Color: Yellow / Appearance: Clear / S.010 / pH:   Gluc:  / Ketone: Negative  / Bili: Negative / Urobili: 0.2 mg/dL   Blood:  / Protein: Negative mg/dL / Nitrite: Positive   Leuk Esterase: Small / RBC:  / WBC 6-10 /HPF   Sq Epi:  / Non Sq Epi:  / Bacteria:     RADIOLOGY & ADDITIONAL STUDIES:    < from: CT Chest w/ IV Cont (18 @ 12:15) >  IMPRESSION:   Since 2014:  1.  No evidence of acute intra-abdominal or pelvic pathology.  2.  Diffuse bilateral emphysematous changes.    < end of copied text >  < from: CT Cervical Spine No Cont (18 @ 12:04) >  IMPRESSION:    1.  No evidenceof acute cervical spine fracture or subluxation.    2.  Multilevel degenerative changes as described.    3.  Mild chronic compression deformity of the C7 vertebral body, stable   since an MRI from .    < end of copied text > NEPHROLOGY CONSULTATION NOTE    A 71 yo F with PMH listed below presented to hospital because she fell down. she has been complaining of dizziness since last 7 months. Today, she was walking to kitchen when she felt lightheadness along with headaache and fell on her back. Renal consult called for hyponatremia. Her serum Na was 132, her baseline Na was 133-134 last year.     no weakness, numbness, blurry vision, tinnitus, headache  (+) polyuria and polydipsia   PAST MEDICAL & SURGICAL HISTORY:  Lupus (systemic lupus erythematosus)  Trigeminal neuralgia  Sjogren's syndrome, with unspecified organ involvement  GERD without esophagitis  Vitamin D deficiency  Constipation, unspecified constipation type  Rectal prolapse  Hypertension, unspecified type  Malignant neoplasm of lower-outer quadrant of left breast of female, estrogen receptor positive  H/O bilateral mastectomy  History of partial colectomy      Allergies:  Naprosyn (Stomach Upset)  Vicodin (Stomach Upset)    Home Medications:  ALPRAZolam 1 mg oral tablet: 1 tab(s) orally 2 times a day (17 Mar 2018 12:57)  amLODIPine 10 mg oral tablet: 1 tab(s) orally once a day (17 Mar 2018 12:57)  cevimeline 30 mg oral capsule: 3 cap(s) orally once a day (17 Mar 2018 12:57)  Fioricet oral capsule: 2 cap(s) orally once a day (17 Mar 2018 12:57)  hydroxychloroquine 200 mg oral tablet: 1 tab(s) orally 2 times a day (17 Mar 2018 12:57)  Low Dose ASA 81 mg oral tablet: 1 tab(s) orally once a day (17 Mar 2018 12:57)  Lyrica 150 mg oral capsule: 1 cap(s) orally 2 times a day (17 Mar 2018 12:57)  montelukast 10 mg oral tablet: 1 tab(s) orally once a day (17 Mar 2018 12:57)  omeprazole 40 mg oral delayed release capsule: 1 cap(s) orally once a day (17 Mar 2018 12:57)  rosuvastatin 40 mg oral tablet: 1 tab(s) orally once a day (at bedtime) (17 Mar 2018 12:57)  sulfaSALAzine 500 mg oral tablet: 3 tab(s) orally 2 times a day (17 Mar 2018 12:57)  traZODone 150 mg oral tablet: 1 tab(s) orally once a day (at bedtime) (17 Mar 2018 12:57)  valsartan-hydrochlorothiazide 160mg-12.5mg oral tablet: 1 tab(s) orally once a day (17 Mar 2018 12:57)    Hospital Medications:   MEDICATIONS  (STANDING):  ALPRAZolam 1 milliGRAM(s) Oral two times a day  amLODIPine   Tablet 10 milliGRAM(s) Oral daily  aspirin  chewable 81 milliGRAM(s) Oral daily  atorvastatin 80 milliGRAM(s) Oral at bedtime  cefTRIAXone   IVPB 1 Gram(s) IV Intermittent every 24 hours  enoxaparin Injectable 40 milliGRAM(s) SubCutaneous every 24 hours  hydroxychloroquine 200 milliGRAM(s) Oral two times a day  montelukast 10 milliGRAM(s) Oral daily  pantoprazole    Tablet 40 milliGRAM(s) Oral before breakfast  sulfaSALAzine 500 milliGRAM(s) Oral two times a day  traZODone 150 milliGRAM(s) Oral at bedtime  valsartan 160 milliGRAM(s) Oral daily      SOCIAL HISTORY:  Denies ETOH,Smoking,   FAMILY HISTORY:  No pertinent family history in first degree relatives      REVIEW OF SYSTEMS:  CONSTITUTIONAL: No weakness, fevers or chills  EYES/ENT: No visual changes;  No vertigo or throat pain   NECK: No pain or stiffness  RESPIRATORY: No cough, wheezing, hemoptysis; No shortness of breath  CARDIOVASCULAR: No chest pain or palpitations.  GASTROINTESTINAL: No abdominal or epigastric pain. No nausea, vomiting, or hematemesis; No diarrhea or constipation. No melena or hematochezia.  GENITOURINARY: No dysuria, frequency, foamy urine, urinary urgency, incontinence or hematuria  NEUROLOGICAL: No numbness or weakness  SKIN: No itching, burning, rashes, or lesions   VASCULAR: No bilateral lower extremity edema.   All other review of systems is negative unless indicated above.    VITALS:  T(F): 96.5 (18 @ 05:20), Max: 97.2 (18 @ 16:24)  HR: 73 (18 @ 05:20)  BP: 170/65 (18 @ 05:20)Vital Signs Last 24 Hrs  BP: 170/65 (18 Mar 2018 05:20) (121/58 - 178/68)  RR: 18 (18 @ 05:20)  SpO2: 96% (18 @ 23:40)    I&O's Detail    Creatine Kinase, Serum: 183 U/L (18 @ 09:36)      PHYSICAL EXAM:  Constitutional: NAD  HEENT: anicteric sclera, oropharynx clear, MMM  Neck: No JVD  Respiratory: CTAB, no wheezes, rales or rhonchi  Cardiovascular: S1, S2, RRR  Gastrointestinal: BS+, soft, NT/ND  Extremities: No cyanosis or clubbing. No peripheral edema  Neurological: A/O x 3, no focal deficits  Psychiatric: Normal mood, normal affect  : No CVA tenderness. No matias.   Skin: No rashes    LABS:      132<L>  |  93<L>  |  10  ----------------------------<  90  4.0   |  24  |  0.6<L>  Creatinine Trend: 0.6 <--, 0.5 <--  SODIUM TREND:  Sodium 132 [ @ 00:53]  Sodium 124 [ @ 09:24]    Ca    8.5      18 Mar 2018 00:53  Mg     1.8         TPro  6.9  /  Alb  4.1  /  TBili  0.2  /  DBili      /  AST  29  /  ALT  16  /  AlkPhos  87                  12.9   6.36  )-----------( 218      ( 17 Mar 2018 09:24 )             36.8     Urine Studies:  Urinalysis Basic - ( 17 Mar 2018 12:56 )    Color: Yellow / Appearance: Clear / S.010 / pH:   Gluc:  / Ketone: Negative  / Bili: Negative / Urobili: 0.2 mg/dL   Blood:  / Protein: Negative mg/dL / Nitrite: Positive   Leuk Esterase: Small / RBC:  / WBC 6-10 /HPF   Sq Epi:  / Non Sq Epi:  / Bacteria:     RADIOLOGY & ADDITIONAL STUDIES:    < from: CT Chest w/ IV Cont (18 @ 12:15) >  IMPRESSION:   Since 2014:  1.  No evidence of acute intra-abdominal or pelvic pathology.  2.  Diffuse bilateral emphysematous changes.    < end of copied text >  < from: CT Cervical Spine No Cont (18 @ 12:04) >  IMPRESSION:    1.  No evidenceof acute cervical spine fracture or subluxation.    2.  Multilevel degenerative changes as described.    3.  Mild chronic compression deformity of the C7 vertebral body, stable   since an MRI from .    < end of copied text >

## 2018-03-18 NOTE — BEHAVIORAL HEALTH ASSESSMENT NOTE - SUMMARY
Patient is a 72 year old female with history of depression/anxiety who comes to the hospital s/p fall and was found with hyponatremia. Patient receives zoloft 150mg po q24, xanax 1mg po q12, trazodone 75mg po qHS (prescribed 150mg but patient states she takes 75mg) from outpatient PMD. SSRI medications have been found to increase risk of hyponatremia in the elderly. Her symptoms of depression/anxiety/insomnia can be more adquately treated with Remron 15mg po qHs with less risk of hyponatremia. In addition, benzodiazepines have been found to significantly increase the risk of fall in the elderly. This medication should be tapered and vistaril 25mg po q12 PRN can be used to treat anxiety. Recommend discontinue trazodone as patient states that she receives little benefit from this medication. Patient understands this information and agrees with this plan.

## 2018-03-18 NOTE — CONSULT NOTE ADULT - ASSESSMENT
A 73 yo F with PMH listed below presented to hospital because she fell down. she has been complaining of dizziness since last 7 months    # Hyponatremia   - check serum osm, urine osm, urine electrolytes.  - no IVF, check BMP tonight, if Na still increasing, will start D5W   # Dizziness appears to be related to Lyrica: neurology on board;     will follow A 71 yo F with PMH listed below presented to hospital because she fell down. she has been complaining of dizziness since last 7 months    # Hyponatremia   - check serum osm, urine osm, urine electrolytes, TSH  - no IVF, check BMP tonight, if Na still increasing, will start D5W   - free water restriction 1 L/day.  # Dizziness appears to be related to Lyrica: neurology on board;     will follow

## 2018-03-18 NOTE — CONSULT NOTE ADULT - SUBJECTIVE AND OBJECTIVE BOX
CLAYTON VELAZQUEZ     72y     Female    [id number]                                                           CC:Patient is a 72y old  Female who presents with a chief complaint of fall (17 Mar 2018 16:22)      HPI:  72 y f with pmh of sjogrens, lupus, trigeminal neuralgia,  breast cancer s/p b/l mastectomy, rectal prolapse s/p partial colectomy, htn presented with fall. As per patient, she has been complaining of dizziness since last 7 months. Today, she was walking to kitchen when she felt lightheadness along with headaache and fell on her back but denies any loc but she hit her rt side of head. Since then she has been complaining of headache along with neck pain and back pain. she denies any weakness, numbness, blurry vision, tinnitus. she also c/o polyuria and polydipsia. she denies any burning sensation during urination. she denies any other symptoms (17 Mar 2018 16:22)    Patient seen and examined and history reviewed by me.  She was diagnosed with trigeminal neuralgia in 2014 and underwent 5 surgeries trying to treat her TGN.  She also underwent many changes in medications to get it under control.  The medicine that finally worked was the lyrica however since starting it she has been getting dizzy.  The problem has been then when she misses the lyrica she begins to have the symptoms of TGN returning.  She got lyrica yesterday at 3am but hasnt received it today yet.  She has switched from lyrica to other medications and the dizziness improved but the TGN returned.      FAMILY HISTORY:  No pertinent family history in first degree relatives      Vital Signs Last 24 Hrs  T(C): 35.8 (18 Mar 2018 05:20), Max: 36.2 (17 Mar 2018 16:24)  T(F): 96.5 (18 Mar 2018 05:20), Max: 97.2 (17 Mar 2018 16:24)  HR: 73 (18 Mar 2018 05:20) (60 - 81)  BP: 170/65 (18 Mar 2018 05:20) (121/58 - 178/68)  BP(mean): --  RR: 18 (18 Mar 2018 05:20) (18 - 18)  SpO2: 96% (17 Mar 2018 23:40) (96% - 96%)    Physical Exam:  Constitutional: alert and in no acute distress.  Eyes: the sclera and conjunctiva were normal, pupils were equal in size, round, reactive to light, with normal accommodation and extraocular movements were intact.   Back: no costovertebral angle tenderness and no spinal tenderness.      Neuro Exam:  Orientation: oriented to person, oriented to place and oriented to time.   Attention: normal concentrating ability and visual attention was not decreased.   Language: no difficulty naming common objects, no difficulty repeating a phrase, no difficulty writing a sentence, fluency intact, comprehension intact   Fund of knowledge: displays adequate knowledge of personal past history.   Cranial Nerves: visual acuity intact bilaterally, visual fields full to confrontation, pupils equal round and reactive to light, extraocular motion intact, facial sensation slightly decreased on right, face symmetrical, hearing was intact bilaterally, tongue and palate midline, head turning and shoulder shrug symmetric and there was no tongue deviation with protrusion.   Motor: muscle tone was normal in all four extremities, muscle strength was normal in all four extremities and normal bulk in all four extremities.   Sensory exam: light touch was intact.   Coordination:. normal gait. balance was intact. there was no past-pointing. no tremor present.   Deep tendon reflexes:   Biceps right 2+. Biceps left 2+.    Triceps right 2+. Triceps left 2+.  LOC  Brachioradialis right 2+. Brachioradialis left 2+.    Patella right 1+. Patella left 1+.    Ankle jerk right 0. Ankle jerk left 0.   Plantar responses normal on the right, normal on the left.      NIHSS: 1    Allergies    Naprosyn (Stomach Upset)  Vicodin (Stomach Upset)    Intolerances       Home Medications:  ALPRAZolam 1 mg oral tablet: 1 tab(s) orally 2 times a day (17 Mar 2018 12:57)  amLODIPine 10 mg oral tablet: 1 tab(s) orally once a day (17 Mar 2018 12:57)  cevimeline 30 mg oral capsule: 3 cap(s) orally once a day (17 Mar 2018 12:57)  Fioricet oral capsule: 2 cap(s) orally once a day (17 Mar 2018 12:57)  hydroxychloroquine 200 mg oral tablet: 1 tab(s) orally 2 times a day (17 Mar 2018 12:57)  Low Dose ASA 81 mg oral tablet: 1 tab(s) orally once a day (17 Mar 2018 12:57)  Lyrica 150 mg oral capsule: 1 cap(s) orally 2 times a day (17 Mar 2018 12:57)  montelukast 10 mg oral tablet: 1 tab(s) orally once a day (17 Mar 2018 12:57)  omeprazole 40 mg oral delayed release capsule: 1 cap(s) orally once a day (17 Mar 2018 12:57)  rosuvastatin 40 mg oral tablet: 1 tab(s) orally once a day (at bedtime) (17 Mar 2018 12:57)  sulfaSALAzine 500 mg oral tablet: 3 tab(s) orally 2 times a day (17 Mar 2018 12:57)  traZODone 150 mg oral tablet: 1 tab(s) orally once a day (at bedtime) (17 Mar 2018 12:57)  valsartan-hydrochlorothiazide 160mg-12.5mg oral tablet: 1 tab(s) orally once a day (17 Mar 2018 12:57)      MEDICATIONS  (STANDING):  ALPRAZolam 1 milliGRAM(s) Oral two times a day  amLODIPine   Tablet 10 milliGRAM(s) Oral daily  aspirin  chewable 81 milliGRAM(s) Oral daily  atorvastatin 80 milliGRAM(s) Oral at bedtime  cefTRIAXone   IVPB 1 Gram(s) IV Intermittent every 24 hours  enoxaparin Injectable 40 milliGRAM(s) SubCutaneous every 24 hours  hydroxychloroquine 200 milliGRAM(s) Oral two times a day  montelukast 10 milliGRAM(s) Oral daily  pantoprazole    Tablet 40 milliGRAM(s) Oral before breakfast  sulfaSALAzine 500 milliGRAM(s) Oral two times a day  traZODone 150 milliGRAM(s) Oral at bedtime  valsartan 160 milliGRAM(s) Oral daily    MEDICATIONS  (PRN):  traMADol 50 milliGRAM(s) Oral four times a day PRN Moderate Pain (4 - 6)      LABS:                        12.9   6.36  )-----------( 218      ( 17 Mar 2018 09:24 )             36.8     03-18    132<L>  |  93<L>  |  10  ----------------------------<  90  4.0   |  24  |  0.6<L>    Ca    8.5      18 Mar 2018 00:53  Mg     1.8     03-17    TPro  6.9  /  Alb  4.1  /  TBili  0.2  /  DBili  x   /  AST  29  /  ALT  16  /  AlkPhos  87  03-17    PT/INR - ( 17 Mar 2018 09:24 )   PT: 9.40 sec;   INR: 0.87 ratio                 Neuro Imaging:  < from: CT Head No Cont (03.17.18 @ 11:54) >    Findings:    The ventricles and cortical sulci are within normal limits for age.     Evaluation of the brain parenchyma demonstrates normal gray-white   differentiation throughout.  There is no evidence for mass effect,   midline shift or intracranial hemorrhage.      There are scattered patchy and punctate foci of hypodensities in the   bilateral frontal and parietal periventricular and subcortical white   matter which are most consistent with chronic small vessel ischemic   changes in a patient of this stated age.     Intracranial vascular calcifications are noted.    There is no evidence of depressed skull fracture. Chronic right nasal   bone fracture present in 6/2014    The visualized paranasal sinuses are unremarkable.     IMPRESSION:      1.  No evidence of acute intracranial pathology. Stable exam since   11/23/14.    2.  Stable moderate chronic microvascular changes.        < end of copied text >  < from: CT Cervical Spine No Cont (03.17.18 @ 12:04) >  FINDINGS:    There is no evidence of acute fracture or facet subluxation.    Mild chronic compression deformity of the C7 vertebral body, stable when   correlated with the cervical spine MRI from 2013.    There is straightening of the normal cervical lordosis.    At C2-3 there is facet hypertrophy without significant canal or foraminal   stenosis.    At C3-4 there is small central disc osteophyte indenting the thecal sac   and uncinate and facet hypertrophy with minimal left foraminal narrowing.    At C4-5 there is no significant disc herniation, canal or foraminal   stenosis.    At C5-6 there is disc osteophyte indenting the ventral thecal sac and   uncinate and facet hypertrophy with moderate right and mild left   foraminal stenosis.    At C6-7 there is no significant disc herniation, canal or foraminal   stenosis.     IMPRESSION:    1.  No evidenceof acute cervical spine fracture or subluxation.    2.  Multilevel degenerative changes as described.    3.  Mild chronic compression deformity of the C7 vertebral body, stable   since an MRI from 2013.      < end of copied text >        Assessment / Plan:  Patient with known TGN and few months of dizziness.  Dizziness appears to be related to Lyrica use as it was tested before.  However stopping the Lyrica brings back severe trigeminal pain so it has been kept.  1. Can try lowering the nighttime dose of Lyrica to 100mg and continue 150mg in day  2. Followup with neurologist and surgeon as out patient  3. Call with further questions

## 2018-03-18 NOTE — BEHAVIORAL HEALTH ASSESSMENT NOTE - PROBLEM SELECTOR PLAN 1
1. Recommend start remron 15mg po qHs for depression  2. Recommend tapering xanax and starting vistaril 25mg po q12 PRN for anxiety  3. Refer to [facility] outpatient ([phone]) as patient has difficulty following with her outpatient psychiatrist
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
## 2018-03-19 ENCOUNTER — TRANSCRIPTION ENCOUNTER (OUTPATIENT)
Age: 73
End: 2018-03-19

## 2018-03-19 LAB
-  AMIKACIN: SIGNIFICANT CHANGE UP
-  AMPICILLIN/SULBACTAM: SIGNIFICANT CHANGE UP
-  AMPICILLIN: SIGNIFICANT CHANGE UP
-  AZTREONAM: SIGNIFICANT CHANGE UP
-  CEFAZOLIN: SIGNIFICANT CHANGE UP
-  CEFEPIME: SIGNIFICANT CHANGE UP
-  CEFOXITIN: SIGNIFICANT CHANGE UP
-  CEFTAZIDIME: SIGNIFICANT CHANGE UP
-  CEFTRIAXONE: SIGNIFICANT CHANGE UP
-  CIPROFLOXACIN: SIGNIFICANT CHANGE UP
-  ERTAPENEM: SIGNIFICANT CHANGE UP
-  GENTAMICIN: SIGNIFICANT CHANGE UP
-  IMIPENEM: SIGNIFICANT CHANGE UP
-  LEVOFLOXACIN: SIGNIFICANT CHANGE UP
-  MEROPENEM: SIGNIFICANT CHANGE UP
-  NITROFURANTOIN: SIGNIFICANT CHANGE UP
-  PIPERACILLIN/TAZOBACTAM: SIGNIFICANT CHANGE UP
-  TOBRAMYCIN: SIGNIFICANT CHANGE UP
-  TRIMETHOPRIM/SULFAMETHOXAZOLE: SIGNIFICANT CHANGE UP
ANION GAP SERPL CALC-SCNC: 15 MMOL/L — HIGH (ref 7–14)
BASOPHILS # BLD AUTO: 0.07 K/UL — SIGNIFICANT CHANGE UP (ref 0–0.2)
BASOPHILS NFR BLD AUTO: 1 % — SIGNIFICANT CHANGE UP (ref 0–1)
BUN SERPL-MCNC: 11 MG/DL — SIGNIFICANT CHANGE UP (ref 10–20)
CALCIUM SERPL-MCNC: 9.3 MG/DL — SIGNIFICANT CHANGE UP (ref 8.5–10.1)
CHLORIDE SERPL-SCNC: 92 MMOL/L — LOW (ref 98–110)
CO2 SERPL-SCNC: 25 MMOL/L — SIGNIFICANT CHANGE UP (ref 17–32)
CORTIS AM PEAK SERPL-MCNC: 13.7 UG/DL — SIGNIFICANT CHANGE UP (ref 6–18.4)
CREAT SERPL-MCNC: 0.6 MG/DL — LOW (ref 0.7–1.5)
CULTURE RESULTS: SIGNIFICANT CHANGE UP
EOSINOPHIL # BLD AUTO: 0.06 K/UL — SIGNIFICANT CHANGE UP (ref 0–0.7)
EOSINOPHIL NFR BLD AUTO: 0.8 % — SIGNIFICANT CHANGE UP (ref 0–8)
GLUCOSE SERPL-MCNC: 103 MG/DL — SIGNIFICANT CHANGE UP (ref 70–110)
HCT VFR BLD CALC: 38.3 % — SIGNIFICANT CHANGE UP (ref 37–47)
HGB BLD-MCNC: 13 G/DL — SIGNIFICANT CHANGE UP (ref 12–16)
IMM GRANULOCYTES NFR BLD AUTO: 0.4 % — HIGH (ref 0.1–0.3)
LYMPHOCYTES # BLD AUTO: 1.24 K/UL — SIGNIFICANT CHANGE UP (ref 1.2–3.4)
LYMPHOCYTES # BLD AUTO: 17.5 % — LOW (ref 20.5–51.1)
MCHC RBC-ENTMCNC: 31.4 PG — HIGH (ref 27–31)
MCHC RBC-ENTMCNC: 33.9 G/DL — SIGNIFICANT CHANGE UP (ref 32–37)
MCV RBC AUTO: 92.5 FL — SIGNIFICANT CHANGE UP (ref 81–99)
METHOD TYPE: SIGNIFICANT CHANGE UP
MONOCYTES # BLD AUTO: 0.7 K/UL — HIGH (ref 0.1–0.6)
MONOCYTES NFR BLD AUTO: 9.9 % — HIGH (ref 1.7–9.3)
NEUTROPHILS # BLD AUTO: 5 K/UL — SIGNIFICANT CHANGE UP (ref 1.4–6.5)
NEUTROPHILS NFR BLD AUTO: 70.4 % — SIGNIFICANT CHANGE UP (ref 42.2–75.2)
NRBC # BLD: 0 /100 WBCS — SIGNIFICANT CHANGE UP (ref 0–0)
ORGANISM # SPEC MICROSCOPIC CNT: SIGNIFICANT CHANGE UP
ORGANISM # SPEC MICROSCOPIC CNT: SIGNIFICANT CHANGE UP
OSMOLALITY SERPL: 283 MOS/KG — LOW (ref 289–308)
OSMOLALITY UR: 238 MOS/KG — SIGNIFICANT CHANGE UP (ref 50–1400)
PLATELET # BLD AUTO: 307 K/UL — SIGNIFICANT CHANGE UP (ref 130–400)
POTASSIUM SERPL-MCNC: 4.3 MMOL/L — SIGNIFICANT CHANGE UP (ref 3.5–5)
POTASSIUM SERPL-SCNC: 4.3 MMOL/L — SIGNIFICANT CHANGE UP (ref 3.5–5)
RBC # BLD: 4.14 M/UL — LOW (ref 4.2–5.4)
RBC # FLD: 12.2 % — SIGNIFICANT CHANGE UP (ref 11.5–14.5)
SODIUM SERPL-SCNC: 132 MMOL/L — LOW (ref 135–146)
SODIUM UR-SCNC: 56 MMOL/L — SIGNIFICANT CHANGE UP
SPECIMEN SOURCE: SIGNIFICANT CHANGE UP
TSH SERPL-MCNC: 0.72 UIU/ML — SIGNIFICANT CHANGE UP (ref 0.27–4.2)
WBC # BLD: 7.1 K/UL — SIGNIFICANT CHANGE UP (ref 4.8–10.8)
WBC # FLD AUTO: 7.1 K/UL — SIGNIFICANT CHANGE UP (ref 4.8–10.8)

## 2018-03-19 RX ORDER — LACTULOSE 10 G/15ML
10 SOLUTION ORAL ONCE
Qty: 0 | Refills: 0 | Status: COMPLETED | OUTPATIENT
Start: 2018-03-19 | End: 2018-03-19

## 2018-03-19 RX ORDER — DOCUSATE SODIUM 100 MG
100 CAPSULE ORAL
Qty: 0 | Refills: 0 | Status: DISCONTINUED | OUTPATIENT
Start: 2018-03-19 | End: 2018-03-20

## 2018-03-19 RX ORDER — HYDROXYZINE HCL 10 MG
50 TABLET ORAL EVERY 6 HOURS
Qty: 0 | Refills: 0 | Status: DISCONTINUED | OUTPATIENT
Start: 2018-03-19 | End: 2018-03-20

## 2018-03-19 RX ORDER — PETROLATUM,WHITE
1 JELLY (GRAM) TOPICAL EVERY 6 HOURS
Qty: 0 | Refills: 0 | Status: DISCONTINUED | OUTPATIENT
Start: 2018-03-19 | End: 2018-03-20

## 2018-03-19 RX ORDER — SENNA PLUS 8.6 MG/1
2 TABLET ORAL AT BEDTIME
Qty: 0 | Refills: 0 | Status: DISCONTINUED | OUTPATIENT
Start: 2018-03-19 | End: 2018-03-20

## 2018-03-19 RX ORDER — ALPRAZOLAM 0.25 MG
0.5 TABLET ORAL EVERY 12 HOURS
Qty: 0 | Refills: 0 | Status: DISCONTINUED | OUTPATIENT
Start: 2018-03-19 | End: 2018-03-20

## 2018-03-19 RX ADMIN — SENNA PLUS 2 TABLET(S): 8.6 TABLET ORAL at 21:25

## 2018-03-19 RX ADMIN — MONTELUKAST 10 MILLIGRAM(S): 4 TABLET, CHEWABLE ORAL at 11:28

## 2018-03-19 RX ADMIN — Medication 200 MILLIGRAM(S): at 17:14

## 2018-03-19 RX ADMIN — Medication 50 MILLIGRAM(S): at 16:26

## 2018-03-19 RX ADMIN — ATORVASTATIN CALCIUM 80 MILLIGRAM(S): 80 TABLET, FILM COATED ORAL at 21:25

## 2018-03-19 RX ADMIN — VALSARTAN 160 MILLIGRAM(S): 80 TABLET ORAL at 05:15

## 2018-03-19 RX ADMIN — Medication 500 MILLIGRAM(S): at 05:15

## 2018-03-19 RX ADMIN — Medication 25 MILLIGRAM(S): at 11:28

## 2018-03-19 RX ADMIN — Medication 81 MILLIGRAM(S): at 11:28

## 2018-03-19 RX ADMIN — AMLODIPINE BESYLATE 10 MILLIGRAM(S): 2.5 TABLET ORAL at 05:15

## 2018-03-19 RX ADMIN — CEFTRIAXONE 100 GRAM(S): 500 INJECTION, POWDER, FOR SOLUTION INTRAMUSCULAR; INTRAVENOUS at 17:13

## 2018-03-19 RX ADMIN — Medication 500 MILLIGRAM(S): at 17:14

## 2018-03-19 RX ADMIN — TRAMADOL HYDROCHLORIDE 50 MILLIGRAM(S): 50 TABLET ORAL at 09:01

## 2018-03-19 RX ADMIN — Medication 200 MILLIGRAM(S): at 05:15

## 2018-03-19 RX ADMIN — Medication 1 DROP(S): at 11:27

## 2018-03-19 RX ADMIN — Medication 0.5 MILLIGRAM(S): at 17:14

## 2018-03-19 RX ADMIN — Medication 100 MILLIGRAM(S): at 09:39

## 2018-03-19 RX ADMIN — Medication 100 MILLIGRAM(S): at 17:13

## 2018-03-19 RX ADMIN — LACTULOSE 10 GRAM(S): 10 SOLUTION ORAL at 11:29

## 2018-03-19 RX ADMIN — PANTOPRAZOLE SODIUM 40 MILLIGRAM(S): 20 TABLET, DELAYED RELEASE ORAL at 05:15

## 2018-03-19 RX ADMIN — Medication 1 MILLIGRAM(S): at 05:15

## 2018-03-19 NOTE — CONSULT NOTE ADULT - SUBJECTIVE AND OBJECTIVE BOX
Patient is a 72y old  Female who presents with a chief complaint of fall (19 Mar 2018 11:33)    HPI:  72 y f with pmh of sjogrens, lupus, trigeminal neuralgia,  breast cancer s/p b/l mastectomy, rectal prolapse s/p partial colectomy, htn presented with fall. As per patient, she has been complaining of dizziness since last 7 months. Today, she was walking to kitchen when she felt lightheadness along with headaache and fell on her back but denies any loc but she hit her rt side of head. Since then she has been complaining of headache along with neck pain and back pain. she denies any weakness, numbness, blurry vision, tinnitus. she also c/o polyuria and polydipsia. she denies any burning sensation during urination. she denies any other symptoms (17 Mar 2018 16:22)      PAST MEDICAL & SURGICAL HISTORY:  Lupus (systemic lupus erythematosus)  Trigeminal neuralgia  Sjogren's syndrome, with unspecified organ involvement  GERD without esophagitis  Vitamin D deficiency  Constipation, unspecified constipation type  Rectal prolapse  Hypertension, unspecified type  Malignant neoplasm of lower-outer quadrant of left breast of female, estrogen receptor positive  H/O bilateral mastectomy  History of partial colectomy      Hospital Course:Hyponatremia treated with fluid restriction- NA better, HCTZ held as well on lyrica @nd Trigeminal Neuralgoia- PM dose lowered? Head CT with Gen Microvasc ischemia  On IV antibotics secondary to UTI    TODAY'S SUBJECTIVE & REVIEW OF SYMPTOMS:     Constitutional WNL   Cardio WNL   Resp WNL   GI WNL  Heme WNL  Endo WNL  Skin WNL  MSK WNL  Neuro back pain, headaches  Cognitive WNL  Psych WNL  Chronic Facial Pain     MEDICATIONS  (STANDING):  ALPRAZolam 0.5 milliGRAM(s) Oral every 12 hours  amLODIPine   Tablet 10 milliGRAM(s) Oral daily  aspirin  chewable 81 milliGRAM(s) Oral daily  atorvastatin 80 milliGRAM(s) Oral at bedtime  cefTRIAXone   IVPB 1 Gram(s) IV Intermittent every 24 hours  docusate sodium 100 milliGRAM(s) Oral two times a day  enoxaparin Injectable 40 milliGRAM(s) SubCutaneous every 24 hours  hydroxychloroquine 200 milliGRAM(s) Oral two times a day  montelukast 10 milliGRAM(s) Oral daily  pantoprazole    Tablet 40 milliGRAM(s) Oral before breakfast  pregabalin 25 milliGRAM(s) Oral daily  senna 2 Tablet(s) Oral at bedtime  sulfaSALAzine 500 milliGRAM(s) Oral two times a day  valsartan 160 milliGRAM(s) Oral daily    MEDICATIONS  (PRN):  artificial  tears Solution 1 Drop(s) Both EYES every 2 hours PRN Dry Eyes  hydrOXYzine hydrochloride 50 milliGRAM(s) Oral every 6 hours PRN Anxiety  petrolatum white Ointment 1 Application(s) Topical every 6 hours PRN dry lips  traMADol 50 milliGRAM(s) Oral four times a day PRN Moderate Pain (4 - 6)      FAMILY HISTORY:  No pertinent family history in first degree relatives      Allergies    Naprosyn (Stomach Upset)  Vicodin (Stomach Upset)    Intolerances        SOCIAL HISTORY:    [    ] Etoh  [    ] Smoking  [    ] Substance abuse     Home Environment:  [ x   ] Home Alone  [    ] Lives with Family  [    ] Home Health Aid    Dwelling:  [ x   ] Apartment  [    ] Private House  [    ] Adult Home  [    ] Skilled Nursing Facility      [    ] Short Term  [    ] Long Term  [  x  ] Stairs     none                      [    ] Elevator     FUNCTIONAL STATUS PTA: (Check all that apply)  Ambulation: [  x   ]Independent    [    ] Dependent     [    ] Non-Ambulatory  Assistive Device: [    ] SA Cane  [    ]  Q Cane  [    ] Walker  [    ]  Wheelchair  ADL : [ x   ] Independent  [    ]  Dependent       Vital Signs Last 24 Hrs  T(C): 36.1 (19 Mar 2018 14:11), Max: 36.4 (19 Mar 2018 04:54)  T(F): 96.9 (19 Mar 2018 14:11), Max: 97.6 (19 Mar 2018 04:54)  HR: 70 (19 Mar 2018 14:11) (67 - 70)  BP: 145/63 (19 Mar 2018 14:11) (125/79 - 145/63)  BP(mean): --  RR: 17 (19 Mar 2018 14:11) (17 - 18)  SpO2: 98% (18 Mar 2018 21:00) (98% - 98%)      PHYSICAL EXAM: Alert & Oriented X3  GENERAL: NAD, well-groomed, well-developed  HEAD:  Atraumatic, Normocephalic  EYES: EOMI, PERRLA, conjunctiva and sclera clear  NECK: Supple, No JVD, Normal thyroid  CHEST/LUNG: Clear to percussion bilaterally; No rales, rhonchi, wheezing, or rubs  HEART: Regular rate and rhythm; No murmurs, rubs, or gallops  ABDOMEN: Soft, Nontender, Nondistended; Bowel sounds present  EXTREMITIES:  2+ Peripheral Pulses, No clubbing, cyanosis, or edema    NERVOUS SYSTEM:  Cranial Nerves 2-12 intact [  x  ] Abnormal  [    ]  ROM: WFL all extremities [  x  ]  Abnormal [     ]  Motor Strength: WFL all extremities  [ x   ]  Abnormal [    ]  Sensation: intact to light touch [ x   ] Abnormal [    ]      FUNCTIONAL STATUS:  Bed Mobility: [ x  ]  Independent [    ]  Supervision [    ]  Needs Assistance [  ]  N/A  Transfers: [ x   ]  Independent [    ]  Supervision [    ]  Needs Assistance [    ]  N/A    Ambulation:  [  x  ]  Independent [    ]  Supervision [    ]  Needs Assistance [    ]  N/A   ADL:  [x    ]   Independent [    ] Requires Assistance [    ] N/A       LABS:                        13.0   7.10  )-----------( 307      ( 19 Mar 2018 06:50 )             38.3     03-18    136  |  97<L>  |  8<L>  ----------------------------<  97  4.4   |  26  |  0.5<L>    Ca    8.7      18 Mar 2018 07:37            RADIOLOGY & ADDITIONAL STUDIES:

## 2018-03-19 NOTE — PROGRESS NOTE ADULT - ATTENDING COMMENTS
I was Physically Present for the key portions of the evaluation   I agree with the above History  , Physical examination Assessment and plan   I have Reviewed , Modified or appended where appropriate.  Please check A and P as above
Patient seen and examined independently. Agree with resident note with exceptions. Case discussed with housestaff, nursing and patient    Anxiety - f/u psych. Pt has had at least two admissions for possible SSRI induced hyponatremia. Would avoid SSRI.     UTI- F/u sensitivities

## 2018-03-19 NOTE — DISCHARGE NOTE ADULT - CARE PLAN
Principal Discharge DX:	Falls, subsequent encounter  Goal:	prevent future falls  Assessment and plan of treatment:	You were admitted for a fall. You did not sustain any lasting injuries from your fall but you should work with physical therapy and use a walker until cleared to walk without an assist device.  We believe you fell because you are unsteady on your feet. We decreased doses of your lyrica to see if your symptoms improved, you should continue to maintain the new dose of your lyrics and discuss medication changes with your doctors if your unsteadiness doesn't improve.  Secondary Diagnosis:	Hyponatremia  Goal:	recheck in 1-2 weeks  Assessment and plan of treatment:	Your low sodium could be related to your fall, lupus, sjogrens, or your infection. Please have your doctors check your sodium level in 1-2 weeks to confirm it has normalized.  Secondary Diagnosis:	Other depression  Assessment and plan of treatment:	You were seen by psychiatry while admitted here and recommend that you take the follow medications and to follow up with your psychiatrist  zoloft 50mg daily  trazadone 50mg at night  Xanax 0.5 mg BID  and Vistril 50mg po every 6 hours as needed for anxiety  Please follow up with Dr Lemus/ your psychiatrist  Secondary Diagnosis:	Sjogren's syndrome, with unspecified organ involvement  Assessment and plan of treatment:	Take you medications as prescribed and follow up with your doctors. If you experience symptoms that concern you seek medical attention immediately.  Please limit your water intake to 1.5L daily. if you are having symptoms of dry mouth that make you want to drink water excessively please have your doctors recommend a mouthwash as your excess water intake can drop your sodium  Secondary Diagnosis:	Lupus (systemic lupus erythematosus)  Assessment and plan of treatment:	Take you medications as prescribed and follow up with your doctors. If you experience symptoms that concern you seek medical attention immediately.  Secondary Diagnosis:	Urinary tract infection without hematuria, site unspecified  Assessment and plan of treatment:	Take you medications as prescribed

## 2018-03-19 NOTE — PROGRESS NOTE ADULT - SUBJECTIVE AND OBJECTIVE BOX
SUBJECTIVE:  Today is hospital day 2d.    Overnight Events: Pt c/o neck pain and head pain from the fall. Otherwise not dizzy when standing and can ambulate. + BM this am. No pain with urination. no SOB, Chest Pain.    PAST MEDICAL & SURGICAL HISTORY  Lupus (systemic lupus erythematosus)  Trigeminal neuralgia  Sjogren's syndrome, with unspecified organ involvement  GERD without esophagitis  Vitamin D deficiency  Constipation, unspecified constipation type  Rectal prolapse  Hypertension, unspecified type  Malignant neoplasm of lower-outer quadrant of left breast of female, estrogen receptor positive  H/O bilateral mastectomy  History of partial colectomy    SOCIAL HISTORY:    [ ] Smoker    ALLERGIES:  Naprosyn (Stomach Upset)  Vicodin (Stomach Upset)      MEDICATIONS:  STANDING MEDICATIONS  ALPRAZolam 1 milliGRAM(s) Oral two times a day  amLODIPine   Tablet 10 milliGRAM(s) Oral daily  aspirin  chewable 81 milliGRAM(s) Oral daily  atorvastatin 80 milliGRAM(s) Oral at bedtime  cefTRIAXone   IVPB 1 Gram(s) IV Intermittent every 24 hours  docusate sodium 100 milliGRAM(s) Oral two times a day  enoxaparin Injectable 40 milliGRAM(s) SubCutaneous every 24 hours  hydroxychloroquine 200 milliGRAM(s) Oral two times a day  montelukast 10 milliGRAM(s) Oral daily  pantoprazole    Tablet 40 milliGRAM(s) Oral before breakfast  pregabalin 25 milliGRAM(s) Oral daily  senna 2 Tablet(s) Oral at bedtime  sulfaSALAzine 500 milliGRAM(s) Oral two times a day  traZODone 150 milliGRAM(s) Oral at bedtime  valsartan 160 milliGRAM(s) Oral daily      PRN MEDICATIONS  artificial  tears Solution 1 Drop(s) Both EYES every 2 hours PRN  petrolatum white Ointment 1 Application(s) Topical every 6 hours PRN  traMADol 50 milliGRAM(s) Oral four times a day PRN    VITALS:   T(F): , Max: 97.6 (18 @ 04:54)  HR:  (67 - 71)  BP:  (125/79 - 140/59)  SpO2:  (98% - 98%)      PHYSICAL EXAM:  GEN: NAD  LUNGS: Clear   HEART: S1 S2 regular  ABD: non tender non distended, + BS  EXT: b/l antecubital ecchymosis, no LE edema.   NEURO: AAOX3 moves all extremities, strength equal b/l. ambulates with minor assistance.    LABS:      WBC Count: 7.10 K/uL ( @ 06:50)  WBC Count: 6.36 K/uL ( @ 09:24)             13.0   7.10  )-----------( 307      ( 19 Mar 2018 06:50 )             38.3         136  |  97<L>  |  8<L>  ----------------------------<  97  4.4   |  26  |  0.5<L>    Ca    8.7      18 Mar 2018 07:37    Urinalysis Basic - ( 17 Mar 2018 12:56 )    Color: Yellow / Appearance: Clear / S.010 / pH: x  Gluc: x / Ketone: Negative  / Bili: Negative / Urobili: 0.2 mg/dL   Blood: x / Protein: Negative mg/dL / Nitrite: Positive   Leuk Esterase: Small / RBC: x / WBC 6-10 /HPF   Sq Epi: x / Non Sq Epi: x / Bacteria: x    Culture - Urine (collected 17 Mar 2018 09:36)  Source: .Urine Clean Catch (Midstream)  Preliminary Report (18 Mar 2018 17:55):    >100,000 CFU/ml Citrobacter freundii    RADIOLOGY: SUBJECTIVE:  Today is hospital day 2d.    Overnight Events: Pt c/o neck pain and head pain from the fall. Otherwise not dizzy when standing and can ambulate. + BM this am. No pain with urination. no SOB, Chest Pain.    PAST MEDICAL & SURGICAL HISTORY  Lupus (systemic lupus erythematosus)  Trigeminal neuralgia  Sjogren's syndrome, with unspecified organ involvement  GERD without esophagitis  Vitamin D deficiency  Constipation, unspecified constipation type  Rectal prolapse  Hypertension, unspecified type  Malignant neoplasm of lower-outer quadrant of left breast of female, estrogen receptor positive  H/O bilateral mastectomy  History of partial colectomy    SOCIAL HISTORY:    [ ] Smoker    ALLERGIES:  Naprosyn (Stomach Upset)  Vicodin (Stomach Upset)      MEDICATIONS:  STANDING MEDICATIONS  ALPRAZolam 1 milliGRAM(s) Oral two times a day  amLODIPine   Tablet 10 milliGRAM(s) Oral daily  aspirin  chewable 81 milliGRAM(s) Oral daily  atorvastatin 80 milliGRAM(s) Oral at bedtime  cefTRIAXone   IVPB 1 Gram(s) IV Intermittent every 24 hours  docusate sodium 100 milliGRAM(s) Oral two times a day  enoxaparin Injectable 40 milliGRAM(s) SubCutaneous every 24 hours  hydroxychloroquine 200 milliGRAM(s) Oral two times a day  montelukast 10 milliGRAM(s) Oral daily  pantoprazole    Tablet 40 milliGRAM(s) Oral before breakfast  pregabalin 25 milliGRAM(s) Oral daily  senna 2 Tablet(s) Oral at bedtime  sulfaSALAzine 500 milliGRAM(s) Oral two times a day  traZODone 150 milliGRAM(s) Oral at bedtime  valsartan 160 milliGRAM(s) Oral daily      PRN MEDICATIONS  artificial  tears Solution 1 Drop(s) Both EYES every 2 hours PRN  petrolatum white Ointment 1 Application(s) Topical every 6 hours PRN  traMADol 50 milliGRAM(s) Oral four times a day PRN    VITALS:   T(F): , Max: 97.6 (18 @ 04:54)  HR:  (67 - 71)  BP:  (125/79 - 140/59)  SpO2:  (98% - 98%)      PHYSICAL EXAM:  GEN: NAD  LUNGS: Clear   HEART: S1 S2 regular  ABD: non tender non distended, + BS  EXT: b/l antecubital ecchymosis, no LE edema.   NEURO: AAOX3 moves all extremities, strength equal b/l. ambulates with minor assistance. Finger to nose/ heel shin normal.    LABS:      WBC Count: 7.10 K/uL ( @ 06:50)  WBC Count: 6.36 K/uL ( @ 09:24)             13.0   7.10  )-----------( 307      ( 19 Mar 2018 06:50 )             38.3         136  |  97<L>  |  8<L>  ----------------------------<  97  4.4   |  26  |  0.5<L>    Ca    8.7      18 Mar 2018 07:37    Urinalysis Basic - ( 17 Mar 2018 12:56 )    Color: Yellow / Appearance: Clear / S.010 / pH: x  Gluc: x / Ketone: Negative  / Bili: Negative / Urobili: 0.2 mg/dL   Blood: x / Protein: Negative mg/dL / Nitrite: Positive   Leuk Esterase: Small / RBC: x / WBC 6-10 /HPF   Sq Epi: x / Non Sq Epi: x / Bacteria: x    Culture - Urine (collected 17 Mar 2018 09:36)  Source: .Urine Clean Catch (Midstream)  Preliminary Report (18 Mar 2018 17:55):    >100,000 CFU/ml Citrobacter freundii    RADIOLOGY: SUBJECTIVE:  Today is hospital day 2d.    Overnight Events: Pt c/o neck pain and head pain from the fall. Otherwise not dizzy when standing and can ambulate. + BM this am. No pain with urination. no SOB, Chest Pain.    PAST MEDICAL & SURGICAL HISTORY  Lupus (systemic lupus erythematosus)  Trigeminal neuralgia  Sjogren's syndrome, with unspecified organ involvement  GERD without esophagitis  Vitamin D deficiency  Constipation, unspecified constipation type  Rectal prolapse  Hypertension, unspecified type  Malignant neoplasm of lower-outer quadrant of left breast of female, estrogen receptor positive  H/O bilateral mastectomy  History of partial colectomy    SOCIAL HISTORY  [ ] Smoker    ALLERGIES:  Naprosyn (Stomach Upset)  Vicodin (Stomach Upset)      MEDICATIONS:  STANDING MEDICATIONS  ALPRAZolam 1 milliGRAM(s) Oral two times a day  amLODIPine   Tablet 10 milliGRAM(s) Oral daily  aspirin  chewable 81 milliGRAM(s) Oral daily  atorvastatin 80 milliGRAM(s) Oral at bedtime  cefTRIAXone   IVPB 1 Gram(s) IV Intermittent every 24 hours  docusate sodium 100 milliGRAM(s) Oral two times a day  enoxaparin Injectable 40 milliGRAM(s) SubCutaneous every 24 hours  hydroxychloroquine 200 milliGRAM(s) Oral two times a day  montelukast 10 milliGRAM(s) Oral daily  pantoprazole    Tablet 40 milliGRAM(s) Oral before breakfast  pregabalin 25 milliGRAM(s) Oral daily  senna 2 Tablet(s) Oral at bedtime  sulfaSALAzine 500 milliGRAM(s) Oral two times a day  traZODone 150 milliGRAM(s) Oral at bedtime  valsartan 160 milliGRAM(s) Oral daily      PRN MEDICATIONS  artificial  tears Solution 1 Drop(s) Both EYES every 2 hours PRN  petrolatum white Ointment 1 Application(s) Topical every 6 hours PRN  traMADol 50 milliGRAM(s) Oral four times a day PRN    VITALS:   T(F): , Max: 97.6 (18 @ 04:54)  HR:  (67 - 71)  BP:  (125/79 - 140/59)pregab  SpO2:  (98% - 98%)      PHYSICAL EXAM:  GEN: NAD  LUNGS: Clear   HEART: S1 S2 regular  ABD: non tender non distended, + BS  EXT: b/l antecubital ecchymosis, no LE edema.   NEURO: AAOX3 moves all extremities, strength equal b/l. ambulates with minor assistance. Finger to nose/ heel shin normal.    LABS:      WBC Count: 7.10 K/uL ( @ 06:50)  WBC Count: 6.36 K/uL ( @ 09:24)             13.0   7.10  )-----------( 307      ( 19 Mar 2018 06:50 )             38.3         136  |  97<L>  |  8<L>  ----------------------------<  97  4.4   |  26  |  0.5<L>    Ca    8.7      18 Mar 2018 07:37    Urinalysis Basic - ( 17 Mar 2018 12:56 )    Color: Yellow / Appearance: Clear / S.010 / pH: x  Gluc: x / Ketone: Negative  / Bili: Negative / Urobili: 0.2 mg/dL   Blood: x / Protein: Negative mg/dL / Nitrite: Positive   Leuk Esterase: Small / RBC: x / WBC 6-10 /HPF   Sq Epi: x / Non Sq Epi: x / Bacteria: x    Culture - Urine (collected 17 Mar 2018 09:36)  Source: .Urine Clean Catch (Midstream)  Preliminary Report (18 Mar 2018 17:55):    >100,000 CFU/ml Citrobacter freundii    RADIOLOGY:

## 2018-03-19 NOTE — DISCHARGE NOTE ADULT - SECONDARY DIAGNOSIS.
Hyponatremia Other depression Sjogren's syndrome, with unspecified organ involvement Lupus (systemic lupus erythematosus) Urinary tract infection without hematuria, site unspecified

## 2018-03-19 NOTE — PROGRESS NOTE ADULT - ASSESSMENT
Hyponatremia/fall 2/2 unsteadiness/ lupus/ sjogren' s syndrome / UTI    - hyponatremia: in nl range; urine electrolytes pending; free water restriction 1L/day  - lupus: c/w sulfasalazine and hydroxychloroquine  - sjogren's syndrome:  c/w cevilemine, ordered non forumlary artificial tears, saliva prn  - UTI: on ceftriaxone.    will sign off, call back if needed Hyponatremia/fall 2/2 unsteadiness/ lupus/ sjogren' s syndrome / UTI    - hyponatremia: in nl range free water restriction 1L/day  - lupus: c/w sulfasalazine and hydroxychloroquine  - sjogren's syndrome:  c/w cevilemine, ordered non forumlary artificial tears, saliva prn  - UTI: on ceftriaxone.    will sign off, call back if needed

## 2018-03-19 NOTE — PROGRESS NOTE BEHAVIORAL HEALTH - NSBHFUPINTERVALHXFT_PSY_A_CORE
Patient is tearful on interview. She reports that she feels anxious and depressed. She expresses frustrations of feeling sick in the hospital and feeling like she isn' t listened to.   Jenny reports poor sleep due to an uncomfortable bed and normal appetite. Patient is tearful on interview. She reports that she feels anxious and depressed. She expresses frustrations of feeling sick in the hospital and feeling like she isn't listened to.  She is upset that her zoloft was discontinued but is willing to try a new medication, given zoloft's propensity for hyponatremia. She reports poor sleep due to an uncomfortable bed and normal appetite. She denies SI and is future oriented. She denies AH, VH. Patient is tearful on interview. She reports that she feels anxious and depressed. She expresses frustrations of feeling sick in the hospital and feeling like she isn't listened to.  She is upset that her zoloft was discontinued but understand the reasoning, given zoloft's propensity for hyponatremia. She reports poor sleep due to an uncomfortable bed and normal appetite. She denies SI and is future oriented. She denies AH, VH.

## 2018-03-19 NOTE — PROGRESS NOTE ADULT - ASSESSMENT
72 yf with pmh of sjogrens, lupus, trigeminal neuralgia, depression/anxiety, breast cancer s/p b/l mastectomy, rectal prolapse s/p partial colectomy, htn presented with fall in the context of months of unsteadiness at home. Pt makes a point to say "I am not dizzy." Pt also hyponatremic on admission 124, but corrected without intervention.      # Fall 2/2 unsteadiness at home ; unsteadiness 2/2 orthostasis vs drug induced - trauma workup neg - CT head, CT chest, cxr, pelvis,   > + orthostatics, normal exam  - ordered walker for home  - likely home PT  - Decreased lyrica since admission    # hyponatremia- likely 2/2 polydipsia due to sjogrens vs siadh    water restriction to 1.5 ltrs  repeat bmp  check fena  nephrology eval    4) uti- start rocephin  check ucx    5) sjogrens- c/w cevilemine, will let pt know to bring her own medications    6) lupus- c/w sulfasalazine and hydroxychloroquine    7) trigeminal neuralgia- hold lyrica  neuro eval    8) htn - c/w home meds except hctz    9) hyperkalemia- hemolyzed , will repeat bmp    10 ) diet- regular  dvt ppx/ gi ppx  dispo- needs pt rehab eval 72 yf with pmh of sjogrens, lupus, trigeminal neuralgia, depression/anxiety, breast cancer s/p b/l mastectomy, rectal prolapse s/p partial colectomy, htn presented with fall in the context of months of unsteadiness at home. Pt makes a point to say "I am not dizzy." Pt also hyponatremic on admission 124, but corrected without intervention.      # Fall 2/2 unsteadiness at home ; unsteadiness 2/2 orthostasis vs drug induced - trauma workup neg - CT head, CT chest, cxr, pelvis,   > + orthostatics, normal exam  - ordered walker for home  - likely home PT  - Decreased lyrica since admission    # hyponatremia- likely 2/2 polydipsia from sjorens vs siadh vs medication induced  - SSRI discontinued on admission  - sodium corrected quickly without intervention  - fluid restriction never placed on diet so will continue without  - Urine electrolytes still peding.    # uti- start rocephin - Urine grew >100,000 CFU/ml Citrobacter freundii (03.17.18 @ 09:36)  - Will follow up sensitivities though patient without symptoms    5) sjogrens- c/w cevilemine, ordered non forumlary  - artificial tears, saliva prn    6) lupus- c/w sulfasalazine and hydroxychloroquine    7) trigeminal neuralgia-   - Pregabalin initially held now   neuro eval    8) htn - c/w home meds except hctz    9) hyperkalemia- hemolyzed , will repeat bmp    10 ) diet- regular  dvt ppx/ gi ppx  dispo- needs pt rehab eval 72 yf with pmh of sjogrens, lupus, trigeminal neuralgia, depression/anxiety, breast cancer s/p b/l mastectomy, rectal prolapse s/p partial colectomy, htn presented with fall in the context of months of unsteadiness at home. Pt makes a point to say "I am not dizzy." Pt also hyponatremic on admission 124, but corrected without intervention.    # Fall 2/2 unsteadiness at home ; unsteadiness 2/2 orthostasis vs drug induced - trauma workup neg - CT head, CT chest, cxr, pelvis,   > + orthostatics, normal exam  - ordered walker for home  - likely home PT  - Decreased lyrica since admission    # hyponatremia- likely 2/2 polydipsia from sjorens vs siadh vs medication induced  AM labs not drawn though were appropriately ordered, reordered for 4pm  - SSRI discontinued on admission  - sodium corrected quickly without intervention  - fluid restriction never placed on diet so will continue without  - Urine electrolytes still peding.     #:Depression/ Anxiety: Previously on trazadoneand zoloft  - seen by psychiatry: holding zoloft, holding trazadone, decreaseing xanax to 0.5mg BID from 1mg BID.  - adding vistril 50mg q6hrs prn (though monitor for anticholinergic symptoms)  - Reeval tomorrow     # uti- start rocephin - Urine grew >100,000 CFU/ml Citrobacter freundii (03.17.18 @ 09:36)  - Will follow up sensitivities though patient without symptoms    # sjogrens- c/w cevilemine, ordered non forumlary  - artificial tears, saliva prn    # lupus- c/w sulfasalazine and hydroxychloroquine    # trigeminal neuralgia-   - Pregabalin initially held now   neuro eval    # htn - c/w home meds except hctz  Vital Signs Last 24 Hrs  BP: 140/59 (19 Mar 2018 04:54) (125/79 - 140/59)    # hyperkalemia- hemolyzed , will repeat bmp    #FENP:  Fluids: no fluids for now  Electrolytes: hyponatremia (resolved) and hyperkalemia likely hemolyzed  Nutrition: Diet, Regular (03-17-18 @ 17:24)  Prophylaxis: enoxaparin Injectable 40 milliGRAM(s) SubCutaneous every 24 hours    #Dispo: Home with walker and home care    #Code Status: Full Code

## 2018-03-19 NOTE — DISCHARGE NOTE ADULT - PATIENT PORTAL LINK FT
You can access the [facility] Patient Portal, offered by [facility], by registering with the following website: [email or web address]

## 2018-03-19 NOTE — CONSULT NOTE ADULT - ASSESSMENT
IMPRESSION: Rehab of Debilitation , sp fall , UTI  hyponatremia    PRECAUTIONS: [    ] Cardiac  [    ] Respiratory  [    ] Seizures [    ] Contact Isolation  [    ] Droplet Isolation  [    ] Other    Weight Bearing Status:     RECOMMENDATION:    Out of Bed to Chair     DVT/Decubiti Prophylaxis    REHAB PLAN:     [     ] Bedside P/T 3-5 times a week   [     ] Bedside O/T  2-3 times a week   [    x ] No Rehab Therapy Indicated   [     ]  Speech Therapy   Conditioning/ROM                                 ADL  Bed Mobility                                            Conditioning/ROM  Transfers                                                  Bed Mobility  Sitting /Standing Balance                      Transfers                                        Gait Training                                            Sitting/Standing Balance  Stair Training [   ]Applicable                 Home equipment Eval                                                                     Splinting  [   ] Only      GOALS:   ADL   [  x  ]   Independent         Transfers  [x    ] Independent            Ambulation  [  x   ] Independent     [     ] With device                            [    ]  CG                                               [    ]  CG                                                    [     ] CG                            [    ] Min A                                          [    ] Min A                                                [     ] Min  A          DISCHARGE PLAN:   [     ]  Good candidate for Intensive Rehabilitation/Hospital based-4A [facility]                                             Will tolerate 3hrs Intensive Rehab Daily                                       [      ]  Short Term Rehab in Skilled Nursing Facility                                       [   x   ]  Home with Outpatient or VN services  RW ON DC FOR SAFETY- FEW HRS HHA ON DC                                         [      ]  Possible Candidate for Intensive Hospital based Rehab

## 2018-03-19 NOTE — DISCHARGE NOTE ADULT - MEDICATION SUMMARY - MEDICATIONS TO CHANGE
I will SWITCH the dose or number of times a day I take the medications listed below when I get home from the hospital:    Lyrica 150 mg oral capsule  -- 1 cap(s) by mouth 2 times a day    ALPRAZolam 1 mg oral tablet  -- 1 tab(s) by mouth 2 times a day    traZODone 150 mg oral tablet  -- 1 tab(s) by mouth once a day (at bedtime)

## 2018-03-19 NOTE — PROGRESS NOTE ADULT - SUBJECTIVE AND OBJECTIVE BOX
Nephrology progress note    Patient is seen and examined, events over the last 24 h noted .  feels fine. ni dizziness, headache, weakness, numbness, blurry vision.    Allergies:  Naprosyn (Stomach Upset)  Vicodin (Stomach Upset)    Hospital Medications:   MEDICATIONS  (STANDING):  ALPRAZolam 0.5 milliGRAM(s) Oral every 12 hours  amLODIPine   Tablet 10 milliGRAM(s) Oral daily  aspirin  chewable 81 milliGRAM(s) Oral daily  atorvastatin 80 milliGRAM(s) Oral at bedtime  cefTRIAXone   IVPB 1 Gram(s) IV Intermittent every 24 hours  docusate sodium 100 milliGRAM(s) Oral two times a day  enoxaparin Injectable 40 milliGRAM(s) SubCutaneous every 24 hours  hydroxychloroquine 200 milliGRAM(s) Oral two times a day  montelukast 10 milliGRAM(s) Oral daily  pantoprazole    Tablet 40 milliGRAM(s) Oral before breakfast  pregabalin 25 milliGRAM(s) Oral daily  senna 2 Tablet(s) Oral at bedtime  sulfaSALAzine 500 milliGRAM(s) Oral two times a day  valsartan 160 milliGRAM(s) Oral daily        VITALS:  T(F): 97.6 (18 @ 04:54), Max: 97.6 (18 @ 04:54)  HR: 67 (18 @ 04:54)  BP: 140/59 (18 @ 04:54)Vital Signs Last 24 Hrs  BP: 140/59 (19 Mar 2018 04:54) (125/79 - 140/59)  RR: 17 (18 @ 04:54)  SpO2: 98% (18 @ 21:00)    PHYSICAL EXAM:  Constitutional: NAD  Neck: No JVD  Respiratory: CTAB, no wheezes, rales or rhonchi  Cardiovascular: S1, S2, RRR  Gastrointestinal: BS+, soft, NT/ND  Extremities: No cyanosis or clubbing. No peripheral edema    LABS:      136  |  97<L>  |  8<L>  ----------------------------<  97  4.4   |  26  |  0.5<L>  Creatinine Trend: 0.5<--, 0.6<--, 0.5<--  Ca    8.7      18 Mar 2018 07:37  SODIUM TREND:  Sodium 136 [ @ 07:37]  Sodium 132 [ @ 00:53]  Sodium 124 [ @ 09:24]                        13.0   7.10  )-----------( 307      ( 19 Mar 2018 06:50 )             38.3     Urine Studies:  Urinalysis Basic - ( 17 Mar 2018 12:56 )    Color: Yellow / Appearance: Clear / S.010 / pH:   Gluc:  / Ketone: Negative  / Bili: Negative / Urobili: 0.2 mg/dL   Blood:  / Protein: Negative mg/dL / Nitrite: Positive   Leuk Esterase: Small / RBC:  / WBC 6-10 /HPF   Sq Epi:  / Non Sq Epi:  / Bacteria:     RADIOLOGY & ADDITIONAL STUDIES: Nephrology progress note    Patient is seen and examined, events over the last 24 h noted .  feels fine. no dizziness, headache, weakness, numbness, blurry vision.    Allergies:  Naprosyn (Stomach Upset)  Vicodin (Stomach Upset)    Hospital Medications:   MEDICATIONS  (STANDING):  ALPRAZolam 0.5 milliGRAM(s) Oral every 12 hours  amLODIPine   Tablet 10 milliGRAM(s) Oral daily  aspirin  chewable 81 milliGRAM(s) Oral daily  atorvastatin 80 milliGRAM(s) Oral at bedtime  cefTRIAXone   IVPB 1 Gram(s) IV Intermittent every 24 hours  docusate sodium 100 milliGRAM(s) Oral two times a day  enoxaparin Injectable 40 milliGRAM(s) SubCutaneous every 24 hours  hydroxychloroquine 200 milliGRAM(s) Oral two times a day  montelukast 10 milliGRAM(s) Oral daily  pantoprazole    Tablet 40 milliGRAM(s) Oral before breakfast  pregabalin 25 milliGRAM(s) Oral daily  senna 2 Tablet(s) Oral at bedtime  sulfaSALAzine 500 milliGRAM(s) Oral two times a day  valsartan 160 milliGRAM(s) Oral daily        VITALS:  T(F): 97.6 (18 @ 04:54), Max: 97.6 (18 @ 04:54)  HR: 67 (18 @ 04:54)  BP: 140/59 (18 @ 04:54)Vital Signs Last 24 Hrs  BP: 140/59 (19 Mar 2018 04:54) (125/79 - 140/59)  RR: 17 (18 @ 04:54)  SpO2: 98% (18 @ 21:00)    PHYSICAL EXAM:  Constitutional: NAD  Neck: No JVD  Respiratory: CTAB, no wheezes, rales or rhonchi  Cardiovascular: S1, S2, RRR  Gastrointestinal: BS+, soft, NT/ND  Extremities: No cyanosis or clubbing. No peripheral edema    LABS:      136  |  97<L>  |  8<L>  ----------------------------<  97  4.4   |  26  |  0.5<L>  Creatinine Trend: 0.5<--, 0.6<--, 0.5<--  Ca    8.7      18 Mar 2018 07:37  SODIUM TREND:  Sodium 136 [ @ 07:37]  Sodium 132 [ @ 00:53]  Sodium 124 [ @ 09:24]                        13.0   7.10  )-----------( 307      ( 19 Mar 2018 06:50 )             38.3     Urine Studies:  Urinalysis Basic - ( 17 Mar 2018 12:56 )    Color: Yellow / Appearance: Clear / S.010 / pH:   Gluc:  / Ketone: Negative  / Bili: Negative / Urobili: 0.2 mg/dL   Blood:  / Protein: Negative mg/dL / Nitrite: Positive   Leuk Esterase: Small / RBC:  / WBC 6-10 /HPF   Sq Epi:  / Non Sq Epi:  / Bacteria:     RADIOLOGY & ADDITIONAL STUDIES:

## 2018-03-19 NOTE — DISCHARGE NOTE ADULT - HOSPITAL COURSE
72 yf with pmh of sjogrens, lupus, trigeminal neuralgia, depression/anxiety, breast cancer s/p b/l mastectomy, rectal prolapse s/p partial colectomy, htn presented with fall in the context of months of unsteadiness at home. Lyrica was decreased as possible cause per neurology and dose maintained. Pt ambulating unassisted here but ordered a walker for balance. Pt also found to have a urinary tract infection with Citrobacter freundii treated with IV abx and swicthed to po per sensitivities. Also hyponatremic on admission resolved without intervention.

## 2018-03-19 NOTE — DISCHARGE NOTE ADULT - MEDICATION SUMMARY - MEDICATIONS TO TAKE
I will START or STAY ON the medications listed below when I get home from the hospital:    sulfaSALAzine 500 mg oral tablet  -- 3 tab(s) by mouth 2 times a day  -- Indication: For Lupus/Sjogrens    Fioricet oral capsule  -- 2 cap(s) by mouth once a day  -- Indication: For Headaches    Low Dose ASA 81 mg oral tablet  -- 1 tab(s) by mouth once a day  -- Indication: For Heart health    pregabalin 25 mg oral capsule  -- 1 cap(s) by mouth once a day  -- Indication: For pain    Zoloft 50 mg oral tablet  -- 1 tab(s) by mouth once a day  -- Indication: For depression    traZODone 50 mg oral tablet  -- 1 tab(s) by mouth once a day (at bedtime)  -- Indication: For depression    rosuvastatin 40 mg oral tablet  -- 1 tab(s) by mouth once a day (at bedtime)  -- Indication: For Cholesterol    valsartan-hydrochlorothiazide 160mg-12.5mg oral tablet  -- 1 tab(s) by mouth once a day  -- Indication: For High blood pressure    hydroxychloroquine 200 mg oral tablet  -- 1 tab(s) by mouth 2 times a day  -- Indication: For Lupus (systemic lupus erythematosus)    ALPRAZolam 0.5 mg oral tablet  -- 1 tab(s) by mouth every 12 hours MDD:two pills  -- Indication: For anxiety    hydrOXYzine hydrochloride 50 mg oral tablet  -- 1 tab(s) by mouth every 6 hours, As Needed -Anxiety - for anxiety   -- Indication: For anxity    amLODIPine 10 mg oral tablet  -- 1 tab(s) by mouth once a day  -- Indication: For blood pressure    cevimeline 30 mg oral capsule  -- 3 cap(s) by mouth once a day  -- Indication: For Sjogren's syndrome, with unspecified organ involvement    docusate sodium 100 mg oral capsule  -- 1 cap(s) by mouth 2 times a day  -- Indication: For Constipation, unspecified constipation type    senna oral tablet  -- 2 tab(s) by mouth once a day (at bedtime)  -- Indication: For Constipation, unspecified constipation type    montelukast 10 mg oral tablet  -- 1 tab(s) by mouth once a day  -- Indication: For allergies    phenazopyridine 100 mg oral tablet  -- 1 tab(s) by mouth 2 times a day, As Needed for burning with urination  -- Indication: For burning when urinating    sucralfate 1 g oral tablet  -- 1 tab(s) by mouth 2 times a day  -- Indication: For acid reflux    ocular lubricant ophthalmic solution  -- 1 drop(s) to each affected eye every 2 hours, As needed, Dry Eyes  -- Indication: For Sjogren's syndrome, with unspecified organ involvement    omeprazole 40 mg oral delayed release capsule  -- 1 cap(s) by mouth once a day  -- Indication: For acid reflux    ciprofloxacin 250 mg oral tablet  -- 1 tab(s) by mouth 2 times a day  -- Indication: For Urinary tract infection without hematuria, site unspecified I will START or STAY ON the medications listed below when I get home from the hospital:    sulfaSALAzine 500 mg oral tablet  -- 3 tab(s) by mouth 2 times a day  -- Indication: For Lupus/Sjogrens    Fioricet oral capsule  -- 2 cap(s) by mouth once a day  -- Indication: For Headaches    Low Dose ASA 81 mg oral tablet  -- 1 tab(s) by mouth once a day  -- Indication: For Heart health    pregabalin 25 mg oral capsule  -- 1 cap(s) by mouth once a day MDD:one daily  -- Indication: For Trigeminal neuralgia    traZODone 50 mg oral tablet  -- 1 tab(s) by mouth once a day (at bedtime)  -- Indication: For depression    Zoloft 50 mg oral tablet  -- 1 tab(s) by mouth once a day  -- Indication: For depression    rosuvastatin 40 mg oral tablet  -- 1 tab(s) by mouth once a day (at bedtime)  -- Indication: For Cholesterol    valsartan-hydrochlorothiazide 160mg-12.5mg oral tablet  -- 1 tab(s) by mouth once a day  -- Indication: For High blood pressure    hydroxychloroquine 200 mg oral tablet  -- 1 tab(s) by mouth 2 times a day  -- Indication: For Lupus (systemic lupus erythematosus)    hydrOXYzine hydrochloride 50 mg oral tablet  -- 1 tab(s) by mouth every 6 hours, As Needed -Anxiety - for anxiety   -- Indication: For anxity    ALPRAZolam 0.5 mg oral tablet  -- 1 tab(s) by mouth every 12 hours MDD:two pills  -- Indication: For anxiety    amLODIPine 10 mg oral tablet  -- 1 tab(s) by mouth once a day  -- Indication: For blood pressure    cevimeline 30 mg oral capsule  -- 3 cap(s) by mouth once a day  -- Indication: For Sjogren's syndrome, with unspecified organ involvement    docusate sodium 100 mg oral capsule  -- 1 cap(s) by mouth 2 times a day  -- Indication: For Constipation, unspecified constipation type    senna oral tablet  -- 2 tab(s) by mouth once a day (at bedtime)  -- Indication: For Constipation, unspecified constipation type    montelukast 10 mg oral tablet  -- 1 tab(s) by mouth once a day  -- Indication: For allergies    phenazopyridine 100 mg oral tablet  -- 1 tab(s) by mouth 2 times a day, As Needed for burning with urination  -- Indication: For burning when urinating    sucralfate 1 g oral tablet  -- 1 tab(s) by mouth 2 times a day  -- Indication: For acid reflux    ocular lubricant ophthalmic solution  -- 1 drop(s) to each affected eye every 2 hours, As needed, Dry Eyes  -- Indication: For Sjogren's syndrome, with unspecified organ involvement    omeprazole 40 mg oral delayed release capsule  -- 1 cap(s) by mouth once a day  -- Indication: For acid reflux    ciprofloxacin 250 mg oral tablet  -- 1 tab(s) by mouth 2 times a day  -- Indication: For Urinary tract infection without hematuria, site unspecified

## 2018-03-19 NOTE — DISCHARGE NOTE ADULT - CARE PROVIDER_API CALL
Rheumatologist,   If need a new rheumatologist we recommend:  Bambi Carpenter (MD), [address] [facility] [facility]  [address] [address]  Phone: [phone]  Fax: [phone]  Phone: (   )    -  Fax: (   )    -    neurology,   Dr Jackie Helton [facility]  [phone]  Phone: (   )    -  Fax: (   )    -    Primary Care,   Patient has number  Phone: (   )    -  Fax: (   )    -    Psychiatry,   Dr Lemus  pt has number  Phone: (   )    -  Fax: (   )    -

## 2018-03-19 NOTE — DISCHARGE NOTE ADULT - PROVIDER TOKENS
FREE:[LAST:[Rheumatologist],PHONE:[(   )    -],FAX:[(   )    -],ADDRESS:[If need a new rheumatologist we recommend:  Bambi Carpenter (MD), [address] [facility]  [address] [facility] [address] [place] [address]  Phone: [phone]  Fax: [phone]]],FREE:[LAST:[neurology],PHONE:[(   )    -],FAX:[(   )    -],ADDRESS:[Dr Jackie Helton [facility]  [phone]]],FREE:[LAST:[Primary Care],PHONE:[(   )    -],FAX:[(   )    -],ADDRESS:[Patient has number]],FREE:[LAST:[Psychiatry],PHONE:[(   )    -],FAX:[(   )    -],ADDRESS:[Dr Lemus  pt has number]]

## 2018-03-20 VITALS
DIASTOLIC BLOOD PRESSURE: 76 MMHG | SYSTOLIC BLOOD PRESSURE: 156 MMHG | TEMPERATURE: 97 F | RESPIRATION RATE: 18 BRPM | HEART RATE: 80 BPM

## 2018-03-20 DIAGNOSIS — F32.89 OTHER SPECIFIED DEPRESSIVE EPISODES: ICD-10-CM

## 2018-03-20 RX ORDER — ALPRAZOLAM 0.25 MG
2 TABLET ORAL
Qty: 0 | Refills: 0 | DISCHARGE
Start: 2018-03-20 | End: 2018-04-02

## 2018-03-20 RX ORDER — TRAZODONE HCL 50 MG
1 TABLET ORAL
Qty: 0 | Refills: 0 | COMMUNITY
Start: 2018-03-20

## 2018-03-20 RX ORDER — SERTRALINE 25 MG/1
50 TABLET, FILM COATED ORAL DAILY
Qty: 0 | Refills: 0 | Status: DISCONTINUED | OUTPATIENT
Start: 2018-03-20 | End: 2018-03-20

## 2018-03-20 RX ORDER — SUCRALFATE 1 G
1 TABLET ORAL
Qty: 60 | Refills: 0
Start: 2018-03-20 | End: 2018-04-18

## 2018-03-20 RX ORDER — CIPROFLOXACIN LACTATE 400MG/40ML
1 VIAL (ML) INTRAVENOUS
Qty: 14 | Refills: 0
Start: 2018-03-20 | End: 2018-03-26

## 2018-03-20 RX ORDER — SENNA PLUS 8.6 MG/1
2 TABLET ORAL
Qty: 0 | Refills: 0 | DISCHARGE
Start: 2018-03-20

## 2018-03-20 RX ORDER — PHENAZOPYRIDINE HCL 100 MG
100 TABLET ORAL ONCE
Qty: 0 | Refills: 0 | Status: COMPLETED | OUTPATIENT
Start: 2018-03-20 | End: 2018-03-20

## 2018-03-20 RX ORDER — SERTRALINE 25 MG/1
1 TABLET, FILM COATED ORAL
Qty: 0 | Refills: 0 | COMMUNITY
Start: 2018-03-20

## 2018-03-20 RX ORDER — TRAZODONE HCL 50 MG
1 TABLET ORAL
Qty: 0 | Refills: 0 | COMMUNITY

## 2018-03-20 RX ORDER — SERTRALINE 25 MG/1
1 TABLET, FILM COATED ORAL
Qty: 14 | Refills: 0
Start: 2018-03-20 | End: 2018-04-02

## 2018-03-20 RX ORDER — PHENAZOPYRIDINE HCL 100 MG
1 TABLET ORAL
Qty: 14 | Refills: 0
Start: 2018-03-20 | End: 2018-03-26

## 2018-03-20 RX ORDER — ALPRAZOLAM 0.25 MG
1 TABLET ORAL
Qty: 0 | Refills: 0 | COMMUNITY

## 2018-03-20 RX ORDER — DOCUSATE SODIUM 100 MG
1 CAPSULE ORAL
Qty: 0 | Refills: 0 | DISCHARGE
Start: 2018-03-20

## 2018-03-20 RX ORDER — HYDROXYZINE HCL 10 MG
1 TABLET ORAL
Qty: 120 | Refills: 0
Start: 2018-03-20 | End: 2018-04-18

## 2018-03-20 RX ORDER — CIPROFLOXACIN LACTATE 400MG/40ML
250 VIAL (ML) INTRAVENOUS
Qty: 0 | Refills: 0 | Status: DISCONTINUED | OUTPATIENT
Start: 2018-03-20 | End: 2018-03-20

## 2018-03-20 RX ORDER — TRAZODONE HCL 50 MG
1 TABLET ORAL
Qty: 14 | Refills: 0
Start: 2018-03-20 | End: 2018-04-02

## 2018-03-20 RX ORDER — SUCRALFATE 1 G
1 TABLET ORAL
Qty: 0 | Refills: 0 | Status: DISCONTINUED | OUTPATIENT
Start: 2018-03-20 | End: 2018-03-20

## 2018-03-20 RX ORDER — TRAZODONE HCL 50 MG
50 TABLET ORAL AT BEDTIME
Qty: 0 | Refills: 0 | Status: DISCONTINUED | OUTPATIENT
Start: 2018-03-20 | End: 2018-03-20

## 2018-03-20 RX ORDER — ALPRAZOLAM 0.25 MG
1 TABLET ORAL
Qty: 28 | Refills: 0
Start: 2018-03-20 | End: 2018-04-02

## 2018-03-20 RX ADMIN — Medication 25 MILLIGRAM(S): at 11:10

## 2018-03-20 RX ADMIN — Medication 100 MILLIGRAM(S): at 05:11

## 2018-03-20 RX ADMIN — Medication 50 MILLIGRAM(S): at 11:12

## 2018-03-20 RX ADMIN — VALSARTAN 160 MILLIGRAM(S): 80 TABLET ORAL at 05:11

## 2018-03-20 RX ADMIN — Medication 100 MILLIGRAM(S): at 10:44

## 2018-03-20 RX ADMIN — Medication 200 MILLIGRAM(S): at 05:11

## 2018-03-20 RX ADMIN — PANTOPRAZOLE SODIUM 40 MILLIGRAM(S): 20 TABLET, DELAYED RELEASE ORAL at 05:13

## 2018-03-20 RX ADMIN — MONTELUKAST 10 MILLIGRAM(S): 4 TABLET, CHEWABLE ORAL at 11:10

## 2018-03-20 RX ADMIN — SERTRALINE 50 MILLIGRAM(S): 25 TABLET, FILM COATED ORAL at 14:45

## 2018-03-20 RX ADMIN — Medication 0.5 MILLIGRAM(S): at 05:10

## 2018-03-20 RX ADMIN — Medication 500 MILLIGRAM(S): at 05:11

## 2018-03-20 RX ADMIN — AMLODIPINE BESYLATE 10 MILLIGRAM(S): 2.5 TABLET ORAL at 05:10

## 2018-03-20 RX ADMIN — Medication 81 MILLIGRAM(S): at 11:10

## 2018-03-20 RX ADMIN — Medication 250 MILLIGRAM(S): at 10:41

## 2018-03-20 NOTE — PROGRESS NOTE ADULT - SUBJECTIVE AND OBJECTIVE BOX
Pt seen and examined. Pt stated Vistaril helped her yesterday    T(F): , Max: 97.1 (03-20-18 @ 04:27)  HR: 80 (03-20-18 @ 14:22) (65 - 80)  BP: 156/76 (03-20-18 @ 14:22)  RR: 18 (03-20-18 @ 14:22)  SpO2: 97% (03-19-18 @ 20:21)  IN: 0 mL / OUT: 2 mL / NET: -2 mL      General: No apparent distress  Cardiovascular: S1, S2  Gastrointestinal: Soft, Non-tender, Non-distended  Respiratory: Good air entry bilaterally  Musculoskeletal: Moves all extremities  Lymphatic: No edema  Neurologic: No gross motor deficit  Dermatologic: Skin dry                          13.0   7.10  )-----------( 307      ( 19 Mar 2018 06:50 )             38.3     03-19    132<L>  |  92<L>  |  11  ----------------------------<  103  4.3   |  25  |  0.6<L>    Ca    9.3      19 Mar 2018 19:00

## 2018-03-20 NOTE — PROGRESS NOTE ADULT - ASSESSMENT
Hyponatremia - corrected after stopping HCTZ and SSRI.     Depression and Anxiety - controlled on Vistaril. Will restart Zoloft as per psych     UTI - D/C on oral abx    sjogrens-  cevilemine    lupus-  sulfasalazine and hydroxychloroquine    trigeminal neuralgia - controlled on low dose lyrica    HTN - controlled    Discharge planning >30 mins spent coordinating discharge planning and direct patient encounter

## 2018-03-20 NOTE — PROGRESS NOTE BEHAVIORAL HEALTH - CASE SUMMARY
The patient's recent hyponatremia may be secondary to sertraline, but the patient has also tolereated this medication for many years.  The sertraline will be restarted at a lower dosage.  Alprazolam has been tapered.  The patient will continue treatment with her outpatient psychiatrist who has been contacted with the plan and hospital course.

## 2018-03-20 NOTE — PROGRESS NOTE BEHAVIORAL HEALTH - RISK ASSESSMENT
patient is at low risk for harm to self or others at this time
patient is depressed and anxious but denies SI.

## 2018-03-20 NOTE — PROGRESS NOTE BEHAVIORAL HEALTH - AXIS III
sjrogrens, CAD, HTN, trigeminal neuralgia, h/o breast cancer,
sjrogrens, CAD, HTN, trigeminal neuralgia, h/o breast cancer,

## 2018-03-20 NOTE — PROGRESS NOTE BEHAVIORAL HEALTH - SUMMARY
Patient is a 72 year old female with history of depression/anxiety who comes to the hospital s/p fall and was found with hyponatremia. She currently has good mood, bright affect, is future oriented and denies SI. While SSRIs and trazodone have a risk of hyponatremia patient has been on these medications for two years, and previous labs from last year show a mild hyponatremia of 134, 133, not the amount of hyponatremia she had on the unit (124). Therefore, I suspect the hyponatremia is not from the SSRI and trazodone. Furthermore, patient has a history of MDD and remote suicide attempts, and has been stable on her psychiatric regimen for years. Therefore, while there is a risk of hyponatremia with SSRI and Trazodone, the risks outweigh the benefits. We recommend restarting zoloft at 50mg daily, trazodone at 50mg daily, continue xanax at 0.5mg BID, and vistaril at 50mg q6 prn for breakthrough anxiety. Patient agrees to follow up with outpatient psychiatrist to titrate up her medications and to repeat BMP in 1 week.
72 Y female with history of MDD admitted to medicine for fall and dizziness, zoloft was held due to hyponatremia (124). Patient reports depressed mood but denies neurovegetative symptoms and SI. Given that she is not an acute risk to self, recommend holding zoloft and trazodone for now while electrolyte imbalances resolve. Also recommend lowering xanax to 0.5mg BID given her fall, and starting prn vistaril 50mg q6. will follow up tomorrow to restart zoloft.

## 2018-03-20 NOTE — PROGRESS NOTE BEHAVIORAL HEALTH - NSBHCHARTREVIEWVS_PSY_A_CORE FT
Vital Signs Last 24 Hrs  T(C): 36.2 (20 Mar 2018 04:27), Max: 36.2 (20 Mar 2018 04:27)  T(F): 97.1 (20 Mar 2018 04:27), Max: 97.1 (20 Mar 2018 04:27)  HR: 65 (20 Mar 2018 04:27) (65 - 72)  BP: 166/71 (20 Mar 2018 04:27) (134/64 - 166/71)  BP(mean): --  RR: 17 (20 Mar 2018 04:27) (17 - 18)  SpO2: 97% (19 Mar 2018 20:21) (97% - 97%)
Vital Signs Last 24 Hrs  T(C): 36.4 (19 Mar 2018 04:54), Max: 36.4 (19 Mar 2018 04:54)  T(F): 97.6 (19 Mar 2018 04:54), Max: 97.6 (19 Mar 2018 04:54)  HR: 67 (19 Mar 2018 04:54) (67 - 71)  BP: 140/59 (19 Mar 2018 04:54) (125/79 - 140/59)  BP(mean): --  RR: 17 (19 Mar 2018 04:54) (17 - 18)  SpO2: 98% (18 Mar 2018 21:00) (98% - 98%)

## 2018-03-20 NOTE — PROGRESS NOTE BEHAVIORAL HEALTH - NSBHCHARTREVIEWLAB_PSY_A_CORE FT
CBC Full  -  ( 19 Mar 2018 06:50 )  WBC Count : 7.10 K/uL  Hemoglobin : 13.0 g/dL  Hematocrit : 38.3 %  Platelet Count - Automated : 307 K/uL  Mean Cell Volume : 92.5 fL  Mean Cell Hemoglobin : 31.4 pg  Mean Cell Hemoglobin Concentration : 33.9 g/dL  Auto Neutrophil # : 5.00 K/uL  Auto Lymphocyte # : 1.24 K/uL  Auto Monocyte # : 0.70 K/uL  Auto Eosinophil # : 0.06 K/uL  Auto Basophil # : 0.07 K/uL  Auto Neutrophil % : 70.4 %  Auto Lymphocyte % : 17.5 %  Auto Monocyte % : 9.9 %  Auto Eosinophil % : 0.8 %  Auto Basophil % : 1.0 %    03-19    132<L>  |  92<L>  |  11  ----------------------------<  103  4.3   |  25  |  0.6<L>    Ca    9.3      19 Mar 2018 19:00
CBC Full  -  ( 19 Mar 2018 06:50 )  WBC Count : 7.10 K/uL  Hemoglobin : 13.0 g/dL  Hematocrit : 38.3 %  Platelet Count - Automated : 307 K/uL  Mean Cell Volume : 92.5 fL  Mean Cell Hemoglobin : 31.4 pg  Mean Cell Hemoglobin Concentration : 33.9 g/dL  Auto Neutrophil # : 5.00 K/uL  Auto Lymphocyte # : 1.24 K/uL  Auto Monocyte # : 0.70 K/uL  Auto Eosinophil # : 0.06 K/uL  Auto Basophil # : 0.07 K/uL  Auto Neutrophil % : 70.4 %  Auto Lymphocyte % : 17.5 %  Auto Monocyte % : 9.9 %  Auto Eosinophil % : 0.8 %  Auto Basophil % : 1.0 %    03-18    136  |  97<L>  |  8<L>  ----------------------------<  97  4.4   |  26  |  0.5<L>    Ca    8.7      18 Mar 2018 07:37

## 2018-03-20 NOTE — PROGRESS NOTE BEHAVIORAL HEALTH - NSBHFUPINTERVALHXFT_PSY_A_CORE
Patient reports improved mood from yesterday, she is no longer tearful on interview and affect is bright. She looks forward to going home. Her xanax was decreased to 0.5mg BID from 1mg BID last night, she took a vistaril yesterday and denies current anxiety. She was explained that she will be restarted on zoloft 50mg, trazodone 50mg and continue xanax at 0.5mg BID (she agrees to lower dose after psychoeducation about increased risk of falls in elderly) with vistaril for anxiety, and she understands. She agrees to follow up with Dr. Lemsu to retitrate up her zoloft and trazodone as appropriate and to recheck her sodium in 1 week. Voicemail left for Dr. Lemus,  says he will call the resident back to discuss med changes. Patient denies SI.
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
## 2018-03-21 DIAGNOSIS — S50.12XA CONTUSION OF LEFT FOREARM, INITIAL ENCOUNTER: ICD-10-CM

## 2018-03-21 DIAGNOSIS — Z90.49 ACQUIRED ABSENCE OF OTHER SPECIFIED PARTS OF DIGESTIVE TRACT: ICD-10-CM

## 2018-03-21 DIAGNOSIS — R26.81 UNSTEADINESS ON FEET: ICD-10-CM

## 2018-03-21 DIAGNOSIS — D80.0 HEREDITARY HYPOGAMMAGLOBULINEMIA: ICD-10-CM
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
## 2018-03-21 DIAGNOSIS — F17.210 NICOTINE DEPENDENCE, CIGARETTES, UNCOMPLICATED: ICD-10-CM

## 2018-03-21 DIAGNOSIS — R29.6 REPEATED FALLS: ICD-10-CM

## 2018-03-21 DIAGNOSIS — K59.00 CONSTIPATION, UNSPECIFIED: ICD-10-CM

## 2018-03-21 DIAGNOSIS — Z90.13 ACQUIRED ABSENCE OF BILATERAL BREASTS AND NIPPLES: ICD-10-CM

## 2018-03-21 DIAGNOSIS — K64.9 UNSPECIFIED HEMORRHOIDS: ICD-10-CM

## 2018-03-21 DIAGNOSIS — R42 DIZZINESS AND GIDDINESS: ICD-10-CM
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
## 2018-03-21 DIAGNOSIS — G50.0 TRIGEMINAL NEURALGIA: ICD-10-CM

## 2018-03-21 DIAGNOSIS — B96.89 OTHER SPECIFIED BACTERIAL AGENTS AS THE CAUSE OF DISEASES CLASSIFIED ELSEWHERE: ICD-10-CM

## 2018-03-21 DIAGNOSIS — T42.6X5A ADVERSE EFFECT OF OTHER ANTIEPILEPTIC AND SEDATIVE-HYPNOTIC DRUGS, INITIAL ENCOUNTER: ICD-10-CM

## 2018-03-21 DIAGNOSIS — S50.11XA CONTUSION OF RIGHT FOREARM, INITIAL ENCOUNTER: ICD-10-CM

## 2018-03-21 DIAGNOSIS — Z04.3 ENCOUNTER FOR EXAMINATION AND OBSERVATION FOLLOWING OTHER ACCIDENT: ICD-10-CM
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
## 2018-03-21 DIAGNOSIS — E87.1 HYPO-OSMOLALITY AND HYPONATREMIA: ICD-10-CM

## 2018-03-21 DIAGNOSIS — F32.9 MAJOR DEPRESSIVE DISORDER, SINGLE EPISODE, UNSPECIFIED: ICD-10-CM

## 2018-03-21 DIAGNOSIS — M35.00 SJOGREN SYNDROME, UNSPECIFIED: ICD-10-CM

## 2018-03-21 DIAGNOSIS — M32.9 SYSTEMIC LUPUS ERYTHEMATOSUS, UNSPECIFIED: ICD-10-CM

## 2018-03-21 DIAGNOSIS — Z85.3 PERSONAL HISTORY OF MALIGNANT NEOPLASM OF BREAST: ICD-10-CM

## 2018-03-21 DIAGNOSIS — Z91.14 PATIENT'S OTHER NONCOMPLIANCE WITH MEDICATION REGIMEN: ICD-10-CM

## 2018-03-21 DIAGNOSIS — N39.0 URINARY TRACT INFECTION, SITE NOT SPECIFIED: ICD-10-CM

## 2018-03-21 DIAGNOSIS — K21.9 GASTRO-ESOPHAGEAL REFLUX DISEASE WITHOUT ESOPHAGITIS: ICD-10-CM

## 2018-03-21 DIAGNOSIS — F41.1 GENERALIZED ANXIETY DISORDER: ICD-10-CM

## 2018-03-21 DIAGNOSIS — E55.9 VITAMIN D DEFICIENCY, UNSPECIFIED: ICD-10-CM

## 2018-03-21 DIAGNOSIS — I10 ESSENTIAL (PRIMARY) HYPERTENSION: ICD-10-CM

## 2018-04-25 ENCOUNTER — RESULT REVIEW (OUTPATIENT)
Age: 73
End: 2018-04-25

## 2018-04-25 ENCOUNTER — OUTPATIENT (OUTPATIENT)
Dept: OUTPATIENT SERVICES | Facility: HOSPITAL | Age: 73
LOS: 1 days | Discharge: HOME | End: 2018-04-25

## 2018-04-25 DIAGNOSIS — Z98.890 OTHER SPECIFIED POSTPROCEDURAL STATES: Chronic | ICD-10-CM

## 2018-04-26 DIAGNOSIS — R76.12 NONSPECIFIC REACTION TO CELL MEDIATED IMMUNITY MEASUREMENT OF GAMMA INTERFERON ANTIGEN RESPONSE WITHOUT ACTIVE TUBERCULOSIS: ICD-10-CM

## 2018-04-26 DIAGNOSIS — M32.10 SYSTEMIC LUPUS ERYTHEMATOSUS, ORGAN OR SYSTEM INVOLVEMENT UNSPECIFIED: ICD-10-CM

## 2018-04-26 DIAGNOSIS — D84.1 DEFECTS IN THE COMPLEMENT SYSTEM: ICD-10-CM

## 2018-04-26 DIAGNOSIS — R79.89 OTHER SPECIFIED ABNORMAL FINDINGS OF BLOOD CHEMISTRY: ICD-10-CM

## 2018-04-26 DIAGNOSIS — M06.4 INFLAMMATORY POLYARTHROPATHY: ICD-10-CM

## 2018-04-26 PROBLEM — I10 ESSENTIAL (PRIMARY) HYPERTENSION: Chronic | Status: ACTIVE | Noted: 2018-03-17

## 2018-04-26 PROBLEM — K21.9 GASTRO-ESOPHAGEAL REFLUX DISEASE WITHOUT ESOPHAGITIS: Chronic | Status: ACTIVE | Noted: 2018-03-17

## 2018-04-26 PROBLEM — E55.9 VITAMIN D DEFICIENCY, UNSPECIFIED: Chronic | Status: ACTIVE | Noted: 2018-03-17

## 2018-04-26 PROBLEM — C50.512 MALIGNANT NEOPLASM OF LOWER-OUTER QUADRANT OF LEFT FEMALE BREAST: Chronic | Status: ACTIVE | Noted: 2018-03-17

## 2018-04-26 PROBLEM — K59.00 CONSTIPATION, UNSPECIFIED: Chronic | Status: ACTIVE | Noted: 2018-03-17

## 2018-04-26 PROBLEM — M35.00 SJOGREN SYNDROME, UNSPECIFIED: Chronic | Status: ACTIVE | Noted: 2018-03-17

## 2018-04-26 PROBLEM — K62.3 RECTAL PROLAPSE: Chronic | Status: ACTIVE | Noted: 2018-03-17

## 2018-05-02 ENCOUNTER — OUTPATIENT (OUTPATIENT)
Dept: OUTPATIENT SERVICES | Facility: HOSPITAL | Age: 73
LOS: 1 days | Discharge: HOME | End: 2018-05-02

## 2018-05-02 DIAGNOSIS — Z98.890 OTHER SPECIFIED POSTPROCEDURAL STATES: Chronic | ICD-10-CM

## 2018-05-03 DIAGNOSIS — Z78.0 ASYMPTOMATIC MENOPAUSAL STATE: ICD-10-CM

## 2018-05-03 DIAGNOSIS — Z13.820 ENCOUNTER FOR SCREENING FOR OSTEOPOROSIS: ICD-10-CM

## 2018-05-03 DIAGNOSIS — M81.0 AGE-RELATED OSTEOPOROSIS WITHOUT CURRENT PATHOLOGICAL FRACTURE: ICD-10-CM

## 2018-05-03 DIAGNOSIS — F17.200 NICOTINE DEPENDENCE, UNSPECIFIED, UNCOMPLICATED: ICD-10-CM

## 2018-05-29 ENCOUNTER — APPOINTMENT (OUTPATIENT)
Dept: NEUROSURGERY | Facility: CLINIC | Age: 73
End: 2018-05-29
Payer: MEDICARE

## 2018-05-29 PROCEDURE — 99205 OFFICE O/P NEW HI 60 MIN: CPT

## 2019-07-11 ENCOUNTER — OUTPATIENT (OUTPATIENT)
Dept: OUTPATIENT SERVICES | Facility: HOSPITAL | Age: 74
LOS: 1 days | Discharge: HOME | End: 2019-07-11

## 2019-07-11 DIAGNOSIS — D64.9 ANEMIA, UNSPECIFIED: ICD-10-CM

## 2019-07-11 DIAGNOSIS — E05.90 THYROTOXICOSIS, UNSPECIFIED WITHOUT THYROTOXIC CRISIS OR STORM: ICD-10-CM

## 2019-07-11 DIAGNOSIS — Z98.890 OTHER SPECIFIED POSTPROCEDURAL STATES: Chronic | ICD-10-CM

## 2019-07-11 DIAGNOSIS — E53.8 DEFICIENCY OF OTHER SPECIFIED B GROUP VITAMINS: ICD-10-CM

## 2019-07-11 DIAGNOSIS — D50.9 IRON DEFICIENCY ANEMIA, UNSPECIFIED: ICD-10-CM

## 2019-07-11 DIAGNOSIS — N39.0 URINARY TRACT INFECTION, SITE NOT SPECIFIED: ICD-10-CM

## 2019-07-11 DIAGNOSIS — E11.9 TYPE 2 DIABETES MELLITUS WITHOUT COMPLICATIONS: ICD-10-CM

## 2019-07-11 DIAGNOSIS — E78.5 HYPERLIPIDEMIA, UNSPECIFIED: ICD-10-CM

## 2019-07-11 PROBLEM — M32.9 SYSTEMIC LUPUS ERYTHEMATOSUS, UNSPECIFIED: Chronic | Status: ACTIVE | Noted: 2018-03-17

## 2019-07-11 PROBLEM — G50.0 TRIGEMINAL NEURALGIA: Chronic | Status: ACTIVE | Noted: 2018-03-17

## 2019-12-21 ENCOUNTER — INPATIENT (INPATIENT)
Facility: HOSPITAL | Age: 74
LOS: 2 days | Discharge: HOME HEALTH | End: 2019-12-24
Attending: INTERNAL MEDICINE | Admitting: INTERNAL MEDICINE
Payer: MEDICARE

## 2019-12-21 VITALS
OXYGEN SATURATION: 97 % | RESPIRATION RATE: 18 BRPM | SYSTOLIC BLOOD PRESSURE: 118 MMHG | HEART RATE: 91 BPM | DIASTOLIC BLOOD PRESSURE: 59 MMHG | TEMPERATURE: 97 F

## 2019-12-21 DIAGNOSIS — Z98.890 OTHER SPECIFIED POSTPROCEDURAL STATES: Chronic | ICD-10-CM

## 2019-12-21 LAB
ALBUMIN SERPL ELPH-MCNC: 4 G/DL — SIGNIFICANT CHANGE UP (ref 3.5–5.2)
ALP SERPL-CCNC: 98 U/L — SIGNIFICANT CHANGE UP (ref 30–115)
ALT FLD-CCNC: 11 U/L — SIGNIFICANT CHANGE UP (ref 0–41)
ANION GAP SERPL CALC-SCNC: 15 MMOL/L — HIGH (ref 7–14)
AST SERPL-CCNC: 19 U/L — SIGNIFICANT CHANGE UP (ref 0–41)
BASOPHILS # BLD AUTO: 0.06 K/UL — SIGNIFICANT CHANGE UP (ref 0–0.2)
BASOPHILS NFR BLD AUTO: 0.6 % — SIGNIFICANT CHANGE UP (ref 0–1)
BILIRUB SERPL-MCNC: 0.3 MG/DL — SIGNIFICANT CHANGE UP (ref 0.2–1.2)
BUN SERPL-MCNC: 16 MG/DL — SIGNIFICANT CHANGE UP (ref 10–20)
CALCIUM SERPL-MCNC: 8.6 MG/DL — SIGNIFICANT CHANGE UP (ref 8.5–10.1)
CHLORIDE SERPL-SCNC: 87 MMOL/L — LOW (ref 98–110)
CO2 SERPL-SCNC: 24 MMOL/L — SIGNIFICANT CHANGE UP (ref 17–32)
CREAT SERPL-MCNC: 0.9 MG/DL — SIGNIFICANT CHANGE UP (ref 0.7–1.5)
EOSINOPHIL # BLD AUTO: 0.07 K/UL — SIGNIFICANT CHANGE UP (ref 0–0.7)
EOSINOPHIL NFR BLD AUTO: 0.7 % — SIGNIFICANT CHANGE UP (ref 0–8)
FLU A RESULT: NEGATIVE — SIGNIFICANT CHANGE UP
FLU A RESULT: NEGATIVE — SIGNIFICANT CHANGE UP
FLUAV AG NPH QL: NEGATIVE — SIGNIFICANT CHANGE UP
FLUBV AG NPH QL: NEGATIVE — SIGNIFICANT CHANGE UP
GLUCOSE SERPL-MCNC: 94 MG/DL — SIGNIFICANT CHANGE UP (ref 70–99)
HCT VFR BLD CALC: 31.4 % — LOW (ref 37–47)
HGB BLD-MCNC: 10.3 G/DL — LOW (ref 12–16)
IMM GRANULOCYTES NFR BLD AUTO: 0.6 % — HIGH (ref 0.1–0.3)
LYMPHOCYTES # BLD AUTO: 2.09 K/UL — SIGNIFICANT CHANGE UP (ref 1.2–3.4)
LYMPHOCYTES # BLD AUTO: 20.6 % — SIGNIFICANT CHANGE UP (ref 20.5–51.1)
MCHC RBC-ENTMCNC: 28.7 PG — SIGNIFICANT CHANGE UP (ref 27–31)
MCHC RBC-ENTMCNC: 32.8 G/DL — SIGNIFICANT CHANGE UP (ref 32–37)
MCV RBC AUTO: 87.5 FL — SIGNIFICANT CHANGE UP (ref 81–99)
MONOCYTES # BLD AUTO: 1.35 K/UL — HIGH (ref 0.1–0.6)
MONOCYTES NFR BLD AUTO: 13.3 % — HIGH (ref 1.7–9.3)
NEUTROPHILS # BLD AUTO: 6.53 K/UL — HIGH (ref 1.4–6.5)
NEUTROPHILS NFR BLD AUTO: 64.2 % — SIGNIFICANT CHANGE UP (ref 42.2–75.2)
NRBC # BLD: 0 /100 WBCS — SIGNIFICANT CHANGE UP (ref 0–0)
NT-PROBNP SERPL-SCNC: 997 PG/ML — HIGH (ref 0–300)
PLATELET # BLD AUTO: 302 K/UL — SIGNIFICANT CHANGE UP (ref 130–400)
POTASSIUM SERPL-MCNC: 4.8 MMOL/L — SIGNIFICANT CHANGE UP (ref 3.5–5)
POTASSIUM SERPL-SCNC: 4.8 MMOL/L — SIGNIFICANT CHANGE UP (ref 3.5–5)
PROT SERPL-MCNC: 6.8 G/DL — SIGNIFICANT CHANGE UP (ref 6–8)
RBC # BLD: 3.59 M/UL — LOW (ref 4.2–5.4)
RBC # FLD: 14.1 % — SIGNIFICANT CHANGE UP (ref 11.5–14.5)
RSV RESULT: NEGATIVE — SIGNIFICANT CHANGE UP
RSV RNA RESP QL NAA+PROBE: NEGATIVE — SIGNIFICANT CHANGE UP
SODIUM SERPL-SCNC: 126 MMOL/L — LOW (ref 135–146)
TROPONIN T SERPL-MCNC: <0.01 NG/ML — SIGNIFICANT CHANGE UP
WBC # BLD: 10.16 K/UL — SIGNIFICANT CHANGE UP (ref 4.8–10.8)
WBC # FLD AUTO: 10.16 K/UL — SIGNIFICANT CHANGE UP (ref 4.8–10.8)

## 2019-12-21 PROCEDURE — 71045 X-RAY EXAM CHEST 1 VIEW: CPT | Mod: 26

## 2019-12-21 PROCEDURE — 93010 ELECTROCARDIOGRAM REPORT: CPT

## 2019-12-21 PROCEDURE — 93970 EXTREMITY STUDY: CPT | Mod: 26

## 2019-12-21 PROCEDURE — 99285 EMERGENCY DEPT VISIT HI MDM: CPT

## 2019-12-21 RX ORDER — FAMOTIDINE 10 MG/ML
20 INJECTION INTRAVENOUS ONCE
Refills: 0 | Status: COMPLETED | OUTPATIENT
Start: 2019-12-21 | End: 2019-12-21

## 2019-12-21 RX ORDER — AZITHROMYCIN 500 MG/1
500 TABLET, FILM COATED ORAL ONCE
Refills: 0 | Status: COMPLETED | OUTPATIENT
Start: 2019-12-21 | End: 2019-12-21

## 2019-12-21 RX ORDER — FUROSEMIDE 40 MG
20 TABLET ORAL ONCE
Refills: 0 | Status: COMPLETED | OUTPATIENT
Start: 2019-12-21 | End: 2019-12-21

## 2019-12-21 RX ORDER — IPRATROPIUM/ALBUTEROL SULFATE 18-103MCG
3 AEROSOL WITH ADAPTER (GRAM) INHALATION ONCE
Refills: 0 | Status: COMPLETED | OUTPATIENT
Start: 2019-12-21 | End: 2019-12-21

## 2019-12-21 RX ORDER — ASPIRIN/CALCIUM CARB/MAGNESIUM 324 MG
162 TABLET ORAL ONCE
Refills: 0 | Status: COMPLETED | OUTPATIENT
Start: 2019-12-21 | End: 2019-12-21

## 2019-12-21 RX ADMIN — Medication 3 MILLILITER(S): at 21:51

## 2019-12-21 RX ADMIN — Medication 3 MILLILITER(S): at 21:01

## 2019-12-21 RX ADMIN — Medication 125 MILLIGRAM(S): at 22:09

## 2019-12-21 RX ADMIN — AZITHROMYCIN 500 MILLIGRAM(S): 500 TABLET, FILM COATED ORAL at 23:06

## 2019-12-21 RX ADMIN — FAMOTIDINE 20 MILLIGRAM(S): 10 INJECTION INTRAVENOUS at 20:30

## 2019-12-21 RX ADMIN — Medication 162 MILLIGRAM(S): at 20:30

## 2019-12-21 RX ADMIN — Medication 30 MILLILITER(S): at 21:02

## 2019-12-21 RX ADMIN — Medication 3 MILLILITER(S): at 20:30

## 2019-12-21 RX ADMIN — Medication 20 MILLIGRAM(S): at 22:09

## 2019-12-21 NOTE — ED PROVIDER NOTE - PROGRESS NOTE DETAILS
Negative prelim duplex read. Dr. Flores - patient needed IV access, has double mastectomy, IV placed left under regular standard care.

## 2019-12-21 NOTE — H&P ADULT - NSICDXPASTMEDICALHX_GEN_ALL_CORE_FT
PAST MEDICAL HISTORY:  Constipation, unspecified constipation type     GERD without esophagitis     Hypertension, unspecified type     Lupus (systemic lupus erythematosus)     Malignant neoplasm of lower-outer quadrant of left breast of female, estrogen receptor positive     Rectal prolapse     Sjogren's syndrome, with unspecified organ involvement     Trigeminal neuralgia     Vitamin D deficiency

## 2019-12-21 NOTE — H&P ADULT - ASSESSMENT
72 y f with pmh of sjogrens, lupus, breast cancer s/p mastectomy b/l  presented with  fall due to dizziness    # Chest pain    # hyponatremia- likely 2/2 polydipsia due to sjogrens vs siadh    water restriction to 1.5 ltrs  repeat bmp  check fena  nephrology eval    # sjogrens- c/w cevilemine, will let pt know to bring her own medications    # lupus- c/w sulfasalazine and hydroxychloroquine    #  trigeminal neuralgia- hold lyrica    # htn - c/w home meds except hctz    # hyperkalemia- hemolyzed , will repeat bmp    #  diet- regular  # dvt ppx:  # gi ppx  # dispo  # Code status 72 y f with pmh of sjogrens, lupus, breast cancer s/p mastectomy b/l  admitted for sob on exertion    ** Verify medications in am. Patient has some medications with her but does not have the list, she left it at home. Pharmacy is [facility] at [address] [place] [address]. She probably takes sulfasalazine for lupus as well.**    # SOB on exertion:  - Patient has component of fluid overload ( b/l pleural effusions and LE edema) and COPD exacerbation ( expiratory wheezes)  - Will treat for COPD exacerbation with solumedrol 60 mg IV q12h ; duonebs q6h and q4h PRN, symbicort q12h  - F/up Pulm consult ( service)  - Patient also has signs and symptoms of fluid overload ( LE edema and congested CXR), will give lasix 60 mg IV q12h for now and monitor urine output.  - Keep I<O, daily weights, fluid restriction 1L/d  - F/up Troponin in am ; BNP 1000  - F/up 2D echo, hold off on cardiology consult for now. Patient also has systolic heart murmur which might be consistent with MVR.  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  - F/up Nephrology consult ( Dr. Donis)  - Get Osmolality urine and serum ; Eduardo, Cru, Ku, Clu    # Dysuria with suprapubic tenderness:  - F/up UA, patient has hx of Citrobacter in the urine  - Treat empirically with meropenem if UA positive and f/up urine culture    # Sjogren's disease:  - c/w cevilemine qd ; patient to have somebody bring home meds tomorrow morning and fill non formulary form    # SLE:  - c/w hydroxychloroquine and complete med rec ( patient on sulfasalazine in 2018)    #  Trigeminal neuralgia:  - Will hold lyrica ( patient takes it at home) for concerns of SIADH    # HTN:  - C/w HCTZ and losartan  - Monitor BMP daily    # Diet: DASH with 1.2L fluid restriction  # DVT PPX: Lovenox 40 mg qd   # GI ppx: Protonix 40 mg qd PO  # Activity: OOBTC, f/up PT/rehab   # Dispo: From home, lives alone, f/up social work consult  # Code status: FULL

## 2019-12-21 NOTE — H&P ADULT - ATTENDING COMMENTS
72 y F with PMHx of Sjogren's disease, SLE, trigeminal neuralgia, breast cancer s/p b/l mastectomy, rectal prolapse s/p partial colectomy, questionable hx of heart failure, COPD not on home O2, HTN presents with chest pain and sob on exertion.- was noted to be somnolent, but admitted to taking xanax and drinking wine    Pt seen and examined in F9-9    spoke with RN    currently asymptomatic at rest    chart reviewed- agree with above    uncertain etiology of symptoms- being treated for CHF and COPD    O2 as needed    pulm eval    cardio eval    echo

## 2019-12-21 NOTE — ED PROVIDER NOTE - NS ED ROS FT
Constitutional: No fevers. +chills.   Eyes:  No visual changes, eye pain or discharge.  ENMT:  No sore throat or runny nose.  Cardiac:  +Chest pain. +sob.   Respiratory:  No cough or respiratory distress. No hemoptysis. hx of copd.   GI:  No nausea, vomiting, diarrhea or abdominal pain.  :  No dysuria, frequency or burning.  MS:  +le edema and pain.  Neuro:  No headache or weakness.  No LOC.  Skin:  No skin rash.   Endocrine: No history of thyroid disease or diabetes.

## 2019-12-21 NOTE — H&P ADULT - HISTORY OF PRESENT ILLNESS
72 y f with pmh of sjogrens, lupus, trigeminal neuralgia,  breast cancer s/p b/l mastectomy, rectal prolapse s/p partial colectomy, htn presents with chest pain 72 y F with PMHx of Sjogren's disease, SLE, trigeminal neuralgia, breast cancer s/p b/l mastectomy, rectal prolapse s/p partial colectomy, questionable hx of heart failure, COPD not on home O2, HTN presents with chest pain and sob on exertion. Upon interview, patient was somnolent sleeping back after the question but she mentioned that she has been short of breath recently and it has worsened with exertion. She was also feeling exertional chest pain that started along with the SOB, non radiating, mild in intensity. She also noted worsening LE edema. She was advised by her friends to come to the ED. She admits for lower abdominal pain and dysuria but no headache, dizziness or paresthesias.  In ED, VS wnl except SaO2 87% on room air, she was put on 4 L O2 via NC and SaO2 went up to 93%.

## 2019-12-21 NOTE — ED PROVIDER NOTE - CARE PLAN
Principal Discharge DX:	Exertional shortness of breath  Secondary Diagnosis:	Chest pain  Secondary Diagnosis:	Lower extremity edema  Secondary Diagnosis:	Abnormal breath sounds

## 2019-12-21 NOTE — ED ADULT NURSE NOTE - OBJECTIVE STATEMENT
patient complaints of bilateral lower extremity leg swelling and cough.  Patient denies chest pain, n.v.  No SOB noted.

## 2019-12-21 NOTE — H&P ADULT - NSHPPHYSICALEXAM_GEN_ALL_CORE
GEN: No acute distress, NC/AT, anicteric, on O2 via NC  LUNGS: B/l expiratory wheezing, crackles on both bases.  HEART: S1/S2 present. RRR. systolic murmur radiating to left axillae  ABD: Soft, tender in the suprapubic area, non distended  EXT: LE edema bilaterally 2+ with erythema and tender to palpation  NEURO: AAOX3, lethargic, somnolent, moves all extremities, no focal deficits. responds to questions and commands    Intravenous access: Peripheral access  NG tube: None  Jimenez Catheter:  None

## 2019-12-21 NOTE — PATIENT PROFILE ADULT - VISION (WITH CORRECTIVE LENSES IF THE PATIENT USUALLY WEARS THEM):
Pt has eye glasses/Normal vision: sees adequately in most situations; can see medication labels, newsprint

## 2019-12-21 NOTE — ED ADULT NURSE NOTE - CHPI ED NUR SYMPTOMS NEG
no decreased eating/drinking/no tingling/no vomiting/no pain/no weakness/no chills/no dizziness/no nausea/no fever

## 2019-12-21 NOTE — ED PROVIDER NOTE - PHYSICAL EXAMINATION
CONSTITUTIONAL: Well-developed; well-nourished; in no acute distress.   SKIN: warm, dry.  HEAD: Normocephalic; atraumatic.  EYES: PERRL, EOMI, no conjunctival erythema. arcus senilis.   ENT: No nasal discharge; airway clear.  NECK: Supple; non tender.  CARD: S1, S2 normal; no murmurs, gallops, or rubs. Regular rate and rhythm.   RESP: normal work of breathing; bilateral expiratory rhonchi.   ABD: soft ntnd.   Rectal: external hemorrhoids; no melena, pink tinged mucous on exam.   EXT: Normal ROM.  2+ pitting edema bilaterally; L>R. DP pulses 2+ bilaterally.   NEURO: Alert, oriented, grossly unremarkable.  PSYCH: Cooperative, appropriate.

## 2019-12-21 NOTE — H&P ADULT - NSHPLABSRESULTS_GEN_ALL_CORE
10.3   10.16 )-----------( 302      ( 21 Dec 2019 20:20 )             31.4       12-21    126<L>  |  87<L>  |  16  ----------------------------<  94  4.8   |  24  |  0.9    Ca    8.6      21 Dec 2019 20:20    TPro  6.8  /  Alb  4.0  /  TBili  0.3  /  DBili  x   /  AST  19  /  ALT  11  /  AlkPhos  98  12-21                      Lactate Trend      CARDIAC MARKERS ( 21 Dec 2019 20:20 )  x     / <0.01 ng/mL / x     / x     / x            CAPILLARY BLOOD GLUCOSE

## 2019-12-21 NOTE — ED PROVIDER NOTE - OBJECTIVE STATEMENT
Patient is a 75 yo F w/ hx of Patient is a 75 yo F w/ hx of lupus, sjogrens, trigeminal neuralgia, breast cancer s/p bilateral mastectomy in remission, HTN, COPD, rectal prolapse p/w chest pain. Patient endorses mild exertional chest pain described as a tightening sensation, substernal, radiating to her back x few months worse in the last few days associated with exertional SOB; cannot walk more than 20 feet without feeling SOB. Denies fevers; endorses chills. Also endorsing bilateral LE edema worse on left leg x few months. no dysuria. no abdominal pain, no N/V.

## 2019-12-21 NOTE — H&P ADULT - NSHPREVIEWOFSYSTEMS_GEN_ALL_CORE
CONSTITUTIONAL: No weakness, fevers or chills  EYES/ENT: No visual changes;  No vertigo or throat pain   RESPIRATORY: SOB on exertion  CARDIOVASCULAR: No chest pain or palpitations  GASTROINTESTINAL: Lower abdominal pain  GENITOURINARY: Dysuria  NEUROLOGICAL: No numbness or weakness  SKIN: No itching, rashes

## 2019-12-21 NOTE — ED PROVIDER NOTE - CLINICAL SUMMARY MEDICAL DECISION MAKING FREE TEXT BOX
74 female here for CHF symptoms. No formal prior workup. Had screening labs imaging and reevaluation, found to be hyponatremic with abnormal interstitial pattern on CXR in setting of multiple autoimmune diseases and polypharmacy . Cardiac biomarkers unremarkable, will treat for atypical pneumonia and admit for CHF workup.

## 2019-12-21 NOTE — ED PROVIDER NOTE - ATTENDING CONTRIBUTION TO CARE
I personally evaluated the patient. I reviewed the Resident’s or Physician Assistant’s note (as assigned above), and agree with the findings and plan except as documented in my note.     74 female here for evaluation of multiple complaints. Has significant disease burden at baseline with multiple autoimmune diseases and prior breast cancer with treatment in remission. Admits to VALENZUELA and chest pain with worsening exercise intolerance and lower extremity edema. Has no formal recent cardiac evaluation.     ROS otherwise unremarkable    GEN: female in no distress.   HEENT: non icteric conjunctiva pink. EOMI PERRLA  CHEST: CTA bilateral. normal work of breathing at rest.   NECK: normal ROM   CV: pulses intact S1S2  ABD: soft, non rigid, no guarding noted, non distended, no rebound  EXT: FROM x 4 NVI bilateral lower extremity edema noted L > R   NEURO: AAO 3 no focal deficits. Memory speech cognition and coordination grossly intact.   SKIN: no pallor no diaphoresis  PSYCH: normal mood and mentation     Impression: dyspnea on exertion likely CHF    Plan: IV labs imaging supportive care and reevaluation

## 2019-12-22 LAB
ANION GAP SERPL CALC-SCNC: 17 MMOL/L — HIGH (ref 7–14)
APPEARANCE UR: ABNORMAL
BACTERIA # UR AUTO: ABNORMAL
BASOPHILS # BLD AUTO: 0.01 K/UL — SIGNIFICANT CHANGE UP (ref 0–0.2)
BASOPHILS NFR BLD AUTO: 0.2 % — SIGNIFICANT CHANGE UP (ref 0–1)
BILIRUB UR-MCNC: NEGATIVE — SIGNIFICANT CHANGE UP
BUN SERPL-MCNC: 15 MG/DL — SIGNIFICANT CHANGE UP (ref 10–20)
CALCIUM SERPL-MCNC: 8.1 MG/DL — LOW (ref 8.5–10.1)
CHLORIDE SERPL-SCNC: 90 MMOL/L — LOW (ref 98–110)
CHLORIDE UR-SCNC: 44 — SIGNIFICANT CHANGE UP
CO2 SERPL-SCNC: 23 MMOL/L — SIGNIFICANT CHANGE UP (ref 17–32)
COLOR SPEC: YELLOW — SIGNIFICANT CHANGE UP
CREAT ?TM UR-MCNC: 61 MG/DL — SIGNIFICANT CHANGE UP
CREAT SERPL-MCNC: 0.8 MG/DL — SIGNIFICANT CHANGE UP (ref 0.7–1.5)
DIFF PNL FLD: ABNORMAL
EOSINOPHIL # BLD AUTO: 0 K/UL — SIGNIFICANT CHANGE UP (ref 0–0.7)
EOSINOPHIL NFR BLD AUTO: 0 % — SIGNIFICANT CHANGE UP (ref 0–8)
EPI CELLS # UR: 1 /HPF — SIGNIFICANT CHANGE UP (ref 0–5)
GLUCOSE BLDC GLUCOMTR-MCNC: 165 MG/DL — HIGH (ref 70–99)
GLUCOSE SERPL-MCNC: 162 MG/DL — HIGH (ref 70–99)
GLUCOSE UR QL: NEGATIVE — SIGNIFICANT CHANGE UP
HCT VFR BLD CALC: 31.6 % — LOW (ref 37–47)
HGB BLD-MCNC: 10 G/DL — LOW (ref 12–16)
HYALINE CASTS # UR AUTO: 2 /LPF — SIGNIFICANT CHANGE UP (ref 0–7)
IMM GRANULOCYTES NFR BLD AUTO: 0.6 % — HIGH (ref 0.1–0.3)
KETONES UR-MCNC: NEGATIVE — SIGNIFICANT CHANGE UP
LEUKOCYTE ESTERASE UR-ACNC: ABNORMAL
LYMPHOCYTES # BLD AUTO: 0.46 K/UL — LOW (ref 1.2–3.4)
LYMPHOCYTES # BLD AUTO: 9 % — LOW (ref 20.5–51.1)
MAGNESIUM SERPL-MCNC: 2.3 MG/DL — SIGNIFICANT CHANGE UP (ref 1.8–2.4)
MCHC RBC-ENTMCNC: 27.7 PG — SIGNIFICANT CHANGE UP (ref 27–31)
MCHC RBC-ENTMCNC: 31.6 G/DL — LOW (ref 32–37)
MCV RBC AUTO: 87.5 FL — SIGNIFICANT CHANGE UP (ref 81–99)
MONOCYTES # BLD AUTO: 0.09 K/UL — LOW (ref 0.1–0.6)
MONOCYTES NFR BLD AUTO: 1.8 % — SIGNIFICANT CHANGE UP (ref 1.7–9.3)
NEUTROPHILS # BLD AUTO: 4.52 K/UL — SIGNIFICANT CHANGE UP (ref 1.4–6.5)
NEUTROPHILS NFR BLD AUTO: 88.4 % — HIGH (ref 42.2–75.2)
NITRITE UR-MCNC: POSITIVE
NRBC # BLD: 0 /100 WBCS — SIGNIFICANT CHANGE UP (ref 0–0)
OSMOLALITY UR: 352 MOS/KG — SIGNIFICANT CHANGE UP (ref 50–1400)
PH UR: 8 — SIGNIFICANT CHANGE UP (ref 5–8)
PLATELET # BLD AUTO: 305 K/UL — SIGNIFICANT CHANGE UP (ref 130–400)
POTASSIUM SERPL-MCNC: 4.1 MMOL/L — SIGNIFICANT CHANGE UP (ref 3.5–5)
POTASSIUM SERPL-SCNC: 4.1 MMOL/L — SIGNIFICANT CHANGE UP (ref 3.5–5)
POTASSIUM UR-SCNC: 67 MMOL/L — SIGNIFICANT CHANGE UP
PROT UR-MCNC: SIGNIFICANT CHANGE UP
RBC # BLD: 3.61 M/UL — LOW (ref 4.2–5.4)
RBC # FLD: 14 % — SIGNIFICANT CHANGE UP (ref 11.5–14.5)
RBC CASTS # UR COMP ASSIST: 3 /HPF — SIGNIFICANT CHANGE UP (ref 0–4)
SODIUM SERPL-SCNC: 130 MMOL/L — LOW (ref 135–146)
SODIUM UR-SCNC: 39 MMOL/L — SIGNIFICANT CHANGE UP
SP GR SPEC: 1.01 — SIGNIFICANT CHANGE UP (ref 1.01–1.02)
TROPONIN T SERPL-MCNC: <0.01 NG/ML — SIGNIFICANT CHANGE UP
UROBILINOGEN FLD QL: SIGNIFICANT CHANGE UP
WBC # BLD: 5.11 K/UL — SIGNIFICANT CHANGE UP (ref 4.8–10.8)
WBC # FLD AUTO: 5.11 K/UL — SIGNIFICANT CHANGE UP (ref 4.8–10.8)
WBC UR QL: 99 /HPF — HIGH (ref 0–5)

## 2019-12-22 PROCEDURE — 93306 TTE W/DOPPLER COMPLETE: CPT | Mod: 26

## 2019-12-22 RX ORDER — HYDROCHLOROTHIAZIDE 25 MG
12.5 TABLET ORAL DAILY
Refills: 0 | Status: DISCONTINUED | OUTPATIENT
Start: 2019-12-22 | End: 2019-12-22

## 2019-12-22 RX ORDER — ALBUTEROL 90 UG/1
2 AEROSOL, METERED ORAL EVERY 4 HOURS
Refills: 0 | Status: DISCONTINUED | OUTPATIENT
Start: 2019-12-22 | End: 2019-12-24

## 2019-12-22 RX ORDER — SENNA PLUS 8.6 MG/1
2 TABLET ORAL AT BEDTIME
Refills: 0 | Status: DISCONTINUED | OUTPATIENT
Start: 2019-12-22 | End: 2019-12-24

## 2019-12-22 RX ORDER — ATORVASTATIN CALCIUM 80 MG/1
40 TABLET, FILM COATED ORAL AT BEDTIME
Refills: 0 | Status: DISCONTINUED | OUTPATIENT
Start: 2019-12-22 | End: 2019-12-24

## 2019-12-22 RX ORDER — FUROSEMIDE 40 MG
60 TABLET ORAL EVERY 12 HOURS
Refills: 0 | Status: DISCONTINUED | OUTPATIENT
Start: 2019-12-22 | End: 2019-12-23

## 2019-12-22 RX ORDER — SERTRALINE 25 MG/1
50 TABLET, FILM COATED ORAL DAILY
Refills: 0 | Status: DISCONTINUED | OUTPATIENT
Start: 2019-12-22 | End: 2019-12-24

## 2019-12-22 RX ORDER — LOSARTAN POTASSIUM 100 MG/1
100 TABLET, FILM COATED ORAL DAILY
Refills: 0 | Status: DISCONTINUED | OUTPATIENT
Start: 2019-12-22 | End: 2019-12-24

## 2019-12-22 RX ORDER — IPRATROPIUM/ALBUTEROL SULFATE 18-103MCG
3 AEROSOL WITH ADAPTER (GRAM) INHALATION EVERY 6 HOURS
Refills: 0 | Status: DISCONTINUED | OUTPATIENT
Start: 2019-12-22 | End: 2019-12-24

## 2019-12-22 RX ORDER — MONTELUKAST 4 MG/1
10 TABLET, CHEWABLE ORAL DAILY
Refills: 0 | Status: DISCONTINUED | OUTPATIENT
Start: 2019-12-22 | End: 2019-12-24

## 2019-12-22 RX ORDER — BUDESONIDE AND FORMOTEROL FUMARATE DIHYDRATE 160; 4.5 UG/1; UG/1
2 AEROSOL RESPIRATORY (INHALATION)
Refills: 0 | Status: DISCONTINUED | OUTPATIENT
Start: 2019-12-22 | End: 2019-12-24

## 2019-12-22 RX ORDER — IPRATROPIUM/ALBUTEROL SULFATE 18-103MCG
3 AEROSOL WITH ADAPTER (GRAM) INHALATION EVERY 4 HOURS
Refills: 0 | Status: DISCONTINUED | OUTPATIENT
Start: 2019-12-22 | End: 2019-12-24

## 2019-12-22 RX ORDER — AMLODIPINE BESYLATE 2.5 MG/1
10 TABLET ORAL DAILY
Refills: 0 | Status: DISCONTINUED | OUTPATIENT
Start: 2019-12-22 | End: 2019-12-24

## 2019-12-22 RX ORDER — ALPRAZOLAM 0.25 MG
1 TABLET ORAL AT BEDTIME
Refills: 0 | Status: DISCONTINUED | OUTPATIENT
Start: 2019-12-22 | End: 2019-12-24

## 2019-12-22 RX ORDER — ENOXAPARIN SODIUM 100 MG/ML
40 INJECTION SUBCUTANEOUS DAILY
Refills: 0 | Status: DISCONTINUED | OUTPATIENT
Start: 2019-12-22 | End: 2019-12-24

## 2019-12-22 RX ORDER — CHLORHEXIDINE GLUCONATE 213 G/1000ML
1 SOLUTION TOPICAL
Refills: 0 | Status: DISCONTINUED | OUTPATIENT
Start: 2019-12-22 | End: 2019-12-24

## 2019-12-22 RX ORDER — PANTOPRAZOLE SODIUM 20 MG/1
40 TABLET, DELAYED RELEASE ORAL
Refills: 0 | Status: DISCONTINUED | OUTPATIENT
Start: 2019-12-22 | End: 2019-12-24

## 2019-12-22 RX ORDER — ASPIRIN/CALCIUM CARB/MAGNESIUM 324 MG
81 TABLET ORAL DAILY
Refills: 0 | Status: DISCONTINUED | OUTPATIENT
Start: 2019-12-22 | End: 2019-12-24

## 2019-12-22 RX ORDER — HYDROXYCHLOROQUINE SULFATE 200 MG
200 TABLET ORAL
Refills: 0 | Status: DISCONTINUED | OUTPATIENT
Start: 2019-12-22 | End: 2019-12-24

## 2019-12-22 RX ADMIN — SERTRALINE 50 MILLIGRAM(S): 25 TABLET, FILM COATED ORAL at 11:44

## 2019-12-22 RX ADMIN — MONTELUKAST 10 MILLIGRAM(S): 4 TABLET, CHEWABLE ORAL at 11:45

## 2019-12-22 RX ADMIN — Medication 60 MILLIGRAM(S): at 18:01

## 2019-12-22 RX ADMIN — AMLODIPINE BESYLATE 10 MILLIGRAM(S): 2.5 TABLET ORAL at 06:20

## 2019-12-22 RX ADMIN — Medication 200 MILLIGRAM(S): at 06:19

## 2019-12-22 RX ADMIN — ATORVASTATIN CALCIUM 40 MILLIGRAM(S): 80 TABLET, FILM COATED ORAL at 21:17

## 2019-12-22 RX ADMIN — ENOXAPARIN SODIUM 40 MILLIGRAM(S): 100 INJECTION SUBCUTANEOUS at 11:43

## 2019-12-22 RX ADMIN — Medication 12.5 MILLIGRAM(S): at 06:20

## 2019-12-22 RX ADMIN — Medication 60 MILLIGRAM(S): at 18:00

## 2019-12-22 RX ADMIN — Medication 81 MILLIGRAM(S): at 11:42

## 2019-12-22 RX ADMIN — Medication 200 MILLIGRAM(S): at 18:02

## 2019-12-22 RX ADMIN — BUDESONIDE AND FORMOTEROL FUMARATE DIHYDRATE 2 PUFF(S): 160; 4.5 AEROSOL RESPIRATORY (INHALATION) at 08:46

## 2019-12-22 RX ADMIN — BUDESONIDE AND FORMOTEROL FUMARATE DIHYDRATE 2 PUFF(S): 160; 4.5 AEROSOL RESPIRATORY (INHALATION) at 21:22

## 2019-12-22 RX ADMIN — Medication 60 MILLIGRAM(S): at 06:23

## 2019-12-22 RX ADMIN — PANTOPRAZOLE SODIUM 40 MILLIGRAM(S): 20 TABLET, DELAYED RELEASE ORAL at 06:19

## 2019-12-22 RX ADMIN — Medication 60 MILLIGRAM(S): at 06:24

## 2019-12-22 NOTE — PROGRESS NOTE ADULT - ASSESSMENT
72 y f with pmh of sjogrens, lupus, breast cancer s/p mastectomy b/l  admitted for sob on exertion         # SOB on exertion:  - Patient has component of fluid overload ( b/l pleural effusions and LE edema) and COPD exacerbation ( expiratory wheezes)  -   treat for COPD exacerbation with solumedrol 60 mg IV q12h ; duonebs q6h and q4h PRN, symbicort q12h  - F/up Pulm consult ( service)  - Patient also has signs and symptoms of fluid overload ( LE edema and congested CXR), will give lasix 60 mg IV q12h for now and monitor urine output.  - Keep I<O, daily weights, fluid restriction 1L/d  - F/up Troponin in am ; BNP 1000  - F/up 2D echo, hold off on cardiology consult for now. Patient also has systolic heart murmur which might be consistent with MVR.       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  - F/up Nephrology consult ( Dr. Donis)       # Dysuria with suprapubic tenderness:  - F/up UA, patient has hx of Citrobacter in the urine  - Treat empirically with meropenem if UA positive and f/up urine culture    # Sjogren's disease:  - c/w cevilemine qd ; patient to have somebody bring home meds tomorrow morning and fill non formulary form    # SLE:  - c/w hydroxychloroquine and complete med rec ( patient on sulfasalazine in 2018)    #  Trigeminal neuralgia:  - Will hold lyrica ( patient takes it at home) for concerns of SIADH    # HTN:  - C/w HCTZ and losartan  - Monitor BMP daily    # Diet: DASH with 1.2L fluid restriction  # DVT PPX: Lovenox 40 mg qd   # GI ppx: Protonix 40 mg qd PO  # Activity: OOBTC, f/up PT/rehab   # Dispo: From home, lives alone, f/up social work consult  # Code status: FULL

## 2019-12-22 NOTE — CONSULT NOTE ADULT - ASSESSMENT
hyponatremia   - due to HCTZ   - improving  dyspnea   - copd exac  CHF  SLE / Sjogren's disease    plan:    dc hctz  change to po lasix tomorrow  steroids / nebs  O2  cont other bp meds  check echo  1.2L fluid restriciton  regular salt diet  check tsh, uric acid, am cortisol  PPI  check ucx  will follow

## 2019-12-22 NOTE — CONSULT NOTE ADULT - SUBJECTIVE AND OBJECTIVE BOX
NEPHROLOGY CONSULTATION NOTE    72 y F with PMHx of Sjogren's disease, SLE, trigeminal neuralgia, breast cancer s/p b/l mastectomy, rectal prolapse s/p partial colectomy, questionable hx of heart failure, COPD not on home O2, HTN presents with chest pain and sob on exertion. Upon interview, patient was somnolent sleeping back after the question but she mentioned that she has been short of breath recently and it has worsened with exertion. She was also feeling exertional chest pain that started along with the SOB, non radiating, mild in intensity. She also noted worsening LE edema. She was advised by her friends to come to the ED. She admits for lower abdominal pain and dysuria but no headache, dizziness or paresthesias.  In ED, VS wnl except SaO2 87% on room air, she was put on 4 L O2 via NC and SaO2 went up to 93%.    PAST MEDICAL & SURGICAL HISTORY:  Lupus (systemic lupus erythematosus)  Trigeminal neuralgia  Sjogren's syndrome, with unspecified organ involvement  GERD without esophagitis  Vitamin D deficiency  Constipation, unspecified constipation type  Rectal prolapse  Hypertension, unspecified type  Malignant neoplasm of lower-outer quadrant of left breast of female, estrogen receptor positive  H/O bilateral mastectomy  History of partial colectomy    Allergies:  Naprosyn (Stomach Upset)  Vicodin (Stomach Upset)    Home Medications Reviewed    SOCIAL HISTORY:  Denies ETOH,Smoking,   FAMILY HISTORY:  No pertinent family history in first degree relatives        REVIEW OF SYSTEMS:  CONSTITUTIONAL: No weakness, fevers or chills  EYES/ENT: No visual changes;  No vertigo or throat pain   NECK: No pain or stiffness  RESPIRATORY: No cough, wheezing, hemoptysis; No shortness of breath  CARDIOVASCULAR: No chest pain or palpitations.  GASTROINTESTINAL: No abdominal or epigastric pain. No nausea, vomiting, or hematemesis; No diarrhea or constipation. No melena or hematochezia.  GENITOURINARY: No dysuria, frequency, foamy urine, urinary urgency, incontinence or hematuria  NEUROLOGICAL: No numbness or weakness  SKIN: No itching, burning, rashes, or lesions   VASCULAR: No bilateral lower extremity edema.   All other review of systems is negative unless indicated above.    PHYSICAL EXAM:  Constitutional: NAD  HEENT: anicteric sclera, oropharynx clear, MMM  Neck: No JVD  Respiratory: decreased bs b/l with rhonchi  Cardiovascular: S1, S2, RRR  Gastrointestinal: BS+, soft, NT/ND  Extremities: No cyanosis or clubbing. + peripheral edema  Neurological: A/O x 3, no focal deficits  Psychiatric: Normal mood, normal affect  : No CVA tenderness. No matias.   Skin: No rashes      Hospital Medications:   MEDICATIONS  (STANDING):  albuterol/ipratropium for Nebulization 3 milliLiter(s) Nebulizer every 6 hours  amLODIPine   Tablet 10 milliGRAM(s) Oral daily  aspirin  chewable 81 milliGRAM(s) Oral daily  atorvastatin 40 milliGRAM(s) Oral at bedtime  budesonide 160 MICROgram(s)/formoterol 4.5 MICROgram(s) Inhaler 2 Puff(s) Inhalation two times a day  chlorhexidine 4% Liquid 1 Application(s) Topical <User Schedule>  enoxaparin Injectable 40 milliGRAM(s) SubCutaneous daily  furosemide   Injectable 60 milliGRAM(s) IV Push every 12 hours  hydroxychloroquine 200 milliGRAM(s) Oral two times a day  losartan 100 milliGRAM(s) Oral daily  methylPREDNISolone sodium succinate Injectable 60 milliGRAM(s) IV Push every 12 hours  montelukast 10 milliGRAM(s) Oral daily  pantoprazole    Tablet 40 milliGRAM(s) Oral before breakfast  senna 2 Tablet(s) Oral at bedtime  sertraline 50 milliGRAM(s) Oral daily        VITALS:  T(F): 98.1 (19 @ 05:43), Max: 99.6 (19 @ 22:04)  HR: 94 (19 @ 05:43)  BP: 102/53 (19 @ 05:43)  RR: 19 (19 @ 05:43)  SpO2: 94% (19 @ 23:32)  Wt(kg): --     @ 07:01  -   @ 07:00  --------------------------------------------------------  IN: 160 mL / OUT: 400 mL / NET: -240 mL     @ 07:01  -   @ 14:36  --------------------------------------------------------  IN: 0 mL / OUT: 240 mL / NET: -240 mL        Weight (kg): 89.8 ( @ 00:23)    LABS:      130<L>  |  90<L>  |  15  ----------------------------<  162<H>  4.1   |  23  |  0.8    Ca    8.1<L>      22 Dec 2019 07:18  Mg     2.3         TPro  6.8  /  Alb  4.0  /  TBili  0.3  /  DBili      /  AST  19  /  ALT  11  /  AlkPhos  98                            10.0   5.11  )-----------( 305      ( 22 Dec 2019 07:18 )             31.6       Urine Studies:  Urinalysis Basic - ( 22 Dec 2019 04:10 )    Color: Yellow / Appearance: Slightly Turbid / S.012 / pH:   Gluc:  / Ketone: Negative  / Bili: Negative / Urobili: <2 mg/dL   Blood:  / Protein: Trace / Nitrite: Positive   Leuk Esterase: Large / RBC: 3 /HPF / WBC 99 /HPF   Sq Epi:  / Non Sq Epi: 1 /HPF / Bacteria: Many      Osmolality, Random Urine: 352 mos/kg ( @ 04:10)  Sodium, Random Urine: 39.0 mmoL/L ( @ 04:10)  Potassium, Random Urine: 67 mmol/L ( @ 04:10)  Creatinine, Random Urine: 61 mg/dL ( @ 04:10)  Chloride, Random Urine: 44 ( @ 04:10)      RADIOLOGY & ADDITIONAL STUDIES:

## 2019-12-23 ENCOUNTER — TRANSCRIPTION ENCOUNTER (OUTPATIENT)
Age: 74
End: 2019-12-23

## 2019-12-23 LAB
ANION GAP SERPL CALC-SCNC: 15 MMOL/L — HIGH (ref 7–14)
BUN SERPL-MCNC: 15 MG/DL — SIGNIFICANT CHANGE UP (ref 10–20)
CALCIUM SERPL-MCNC: 8 MG/DL — LOW (ref 8.5–10.1)
CHLORIDE SERPL-SCNC: 90 MMOL/L — LOW (ref 98–110)
CO2 SERPL-SCNC: 28 MMOL/L — SIGNIFICANT CHANGE UP (ref 17–32)
CREAT SERPL-MCNC: 0.6 MG/DL — LOW (ref 0.7–1.5)
GAS PNL BLDA: SIGNIFICANT CHANGE UP
GLUCOSE SERPL-MCNC: 149 MG/DL — HIGH (ref 70–99)
HCT VFR BLD CALC: 32.4 % — LOW (ref 37–47)
HCV AB S/CO SERPL IA: 0.21 S/CO — SIGNIFICANT CHANGE UP (ref 0–0.99)
HCV AB SERPL-IMP: SIGNIFICANT CHANGE UP
HGB BLD-MCNC: 10.5 G/DL — LOW (ref 12–16)
MCHC RBC-ENTMCNC: 28.1 PG — SIGNIFICANT CHANGE UP (ref 27–31)
MCHC RBC-ENTMCNC: 32.4 G/DL — SIGNIFICANT CHANGE UP (ref 32–37)
MCV RBC AUTO: 86.6 FL — SIGNIFICANT CHANGE UP (ref 81–99)
NRBC # BLD: 0 /100 WBCS — SIGNIFICANT CHANGE UP (ref 0–0)
PLATELET # BLD AUTO: 329 K/UL — SIGNIFICANT CHANGE UP (ref 130–400)
POTASSIUM SERPL-MCNC: 4.1 MMOL/L — SIGNIFICANT CHANGE UP (ref 3.5–5)
POTASSIUM SERPL-SCNC: 4.1 MMOL/L — SIGNIFICANT CHANGE UP (ref 3.5–5)
RBC # BLD: 3.74 M/UL — LOW (ref 4.2–5.4)
RBC # FLD: 14.2 % — SIGNIFICANT CHANGE UP (ref 11.5–14.5)
SODIUM SERPL-SCNC: 133 MMOL/L — LOW (ref 135–146)
TSH SERPL-MCNC: 0.41 UIU/ML — SIGNIFICANT CHANGE UP (ref 0.27–4.2)
WBC # BLD: 9.72 K/UL — SIGNIFICANT CHANGE UP (ref 4.8–10.8)
WBC # FLD AUTO: 9.72 K/UL — SIGNIFICANT CHANGE UP (ref 4.8–10.8)

## 2019-12-23 PROCEDURE — 71045 X-RAY EXAM CHEST 1 VIEW: CPT | Mod: 26

## 2019-12-23 PROCEDURE — 99222 1ST HOSP IP/OBS MODERATE 55: CPT

## 2019-12-23 RX ORDER — TIOTROPIUM BROMIDE 18 UG/1
1 CAPSULE ORAL; RESPIRATORY (INHALATION) DAILY
Refills: 0 | Status: DISCONTINUED | OUTPATIENT
Start: 2019-12-23 | End: 2019-12-24

## 2019-12-23 RX ORDER — ACETAMINOPHEN 500 MG
650 TABLET ORAL EVERY 6 HOURS
Refills: 0 | Status: DISCONTINUED | OUTPATIENT
Start: 2019-12-23 | End: 2019-12-24

## 2019-12-23 RX ORDER — FUROSEMIDE 40 MG
60 TABLET ORAL
Refills: 0 | Status: DISCONTINUED | OUTPATIENT
Start: 2019-12-23 | End: 2019-12-24

## 2019-12-23 RX ADMIN — Medication 60 MILLIGRAM(S): at 06:00

## 2019-12-23 RX ADMIN — Medication 60 MILLIGRAM(S): at 17:29

## 2019-12-23 RX ADMIN — Medication 3 MILLILITER(S): at 14:56

## 2019-12-23 RX ADMIN — Medication 81 MILLIGRAM(S): at 12:03

## 2019-12-23 RX ADMIN — SENNA PLUS 2 TABLET(S): 8.6 TABLET ORAL at 21:22

## 2019-12-23 RX ADMIN — ATORVASTATIN CALCIUM 40 MILLIGRAM(S): 80 TABLET, FILM COATED ORAL at 21:22

## 2019-12-23 RX ADMIN — Medication 200 MILLIGRAM(S): at 05:59

## 2019-12-23 RX ADMIN — SERTRALINE 50 MILLIGRAM(S): 25 TABLET, FILM COATED ORAL at 12:03

## 2019-12-23 RX ADMIN — Medication 3 MILLILITER(S): at 08:07

## 2019-12-23 RX ADMIN — Medication 650 MILLIGRAM(S): at 10:32

## 2019-12-23 RX ADMIN — Medication 200 MILLIGRAM(S): at 17:25

## 2019-12-23 RX ADMIN — PANTOPRAZOLE SODIUM 40 MILLIGRAM(S): 20 TABLET, DELAYED RELEASE ORAL at 06:01

## 2019-12-23 RX ADMIN — BUDESONIDE AND FORMOTEROL FUMARATE DIHYDRATE 2 PUFF(S): 160; 4.5 AEROSOL RESPIRATORY (INHALATION) at 21:22

## 2019-12-23 RX ADMIN — ENOXAPARIN SODIUM 40 MILLIGRAM(S): 100 INJECTION SUBCUTANEOUS at 12:04

## 2019-12-23 RX ADMIN — MONTELUKAST 10 MILLIGRAM(S): 4 TABLET, CHEWABLE ORAL at 12:03

## 2019-12-23 RX ADMIN — Medication 3 MILLILITER(S): at 20:49

## 2019-12-23 RX ADMIN — Medication 650 MILLIGRAM(S): at 11:56

## 2019-12-23 RX ADMIN — AMLODIPINE BESYLATE 10 MILLIGRAM(S): 2.5 TABLET ORAL at 06:00

## 2019-12-23 RX ADMIN — LOSARTAN POTASSIUM 100 MILLIGRAM(S): 100 TABLET, FILM COATED ORAL at 05:59

## 2019-12-23 NOTE — CONSULT NOTE ADULT - SUBJECTIVE AND OBJECTIVE BOX
Patient is a 74y old  Female who presents with a chief complaint of Chest pain (22 Dec 2019 14:34)      HPI:  72 y F with PMHx of Sjogren's disease, SLE, trigeminal neuralgia, breast cancer s/p b/l mastectomy, rectal prolapse s/p partial colectomy, questionable hx of heart failure, COPD not on home O2, HTN presents with chest pain and sob on exertion. Patient reported dyspnea on exertion for the last few weeks. She was also feeling exertional chest pain that started along with the SOB, non radiating, mild in intensity. She also noted worsening LE edema. She was advised by her friends to come to the ED. She admits for lower abdominal pain and dysuria but no headache, dizziness or paresthesias. Patient denied any PND, orthopnea, palpitations, wheezing, night time awakenings. patient reported the 8 weeks back she had a bronchitis that was treated with inhalers and prednisone by  PMD.  Patient is not compliant with ANORO.  In ED, VS wnl except SaO2 87% on room air, she was put on 4 L O2 via NC and SaO2 went up to 93%. (21 Dec 2019 23:51)      PAST MEDICAL & SURGICAL HISTORY:  Lupus (systemic lupus erythematosus)  Trigeminal neuralgia  Sjogren's syndrome, with unspecified organ involvement  GERD without esophagitis  Vitamin D deficiency  Constipation, unspecified constipation type  Rectal prolapse  Hypertension, unspecified type  Malignant neoplasm of lower-outer quadrant of left breast of female, estrogen receptor positive  H/O bilateral mastectomy  History of partial colectomy      SOCIAL HX:   Smoking   1ppd for 50 years                      ETOH    occasional                        Other denied    FAMILY HISTORY:  No pertinent family history in first degree relatives  .  No cardiovascular or pulmonary family history     Review of System:  See HPI    Allergies    Naprosyn (Stomach Upset)  Vicodin (Stomach Upset)    Intolerances          PHYSICAL EXAM  Vital Signs Last 24 Hrs  T(C): 35.6 (23 Dec 2019 05:51), Max: 36.2 (22 Dec 2019 15:53)  T(F): 96.1 (23 Dec 2019 05:51), Max: 97.2 (22 Dec 2019 15:53)  HR: 78 (23 Dec 2019 05:51) (78 - 104)  BP: 103/53 (23 Dec 2019 05:51) (103/53 - 131/63)  BP(mean): --  RR: 18 (23 Dec 2019 05:51) (18 - 18)  SpO2: 92% on 3L NC(23 Dec 2019 08:01) (88% - 92%)    General: In NARD, able to speak in full sentences.  HEENT: EKTA             Lymphatic system: No cervical LN     Lungs: Prince BS, crackles at the bases  Cardiovascular: Regular, +ve murmur  Gastrointestinal: Soft.  + BS   Musculoskeletal: +1 LE edema  Skin: Warm.  Intact  Neurological: No motor or sensory deficit       LABS:                          10.5   9.72  )-----------( 329      ( 23 Dec 2019 07:20 )             32.4                                               12-    130<L>  |  90<L>  |  15  ----------------------------<  162<H>  4.1   |  23  |  0.8    Ca    8.1<L>      22 Dec 2019 07:18  Mg     2.3     -    TPro  6.8  /  Alb  4.0  /  TBili  0.3  /  DBili  x   /  AST  19  /  ALT  11  /  AlkPhos  98  12                                             Urinalysis Basic - ( 22 Dec 2019 04:10 )    Color: Yellow / Appearance: Slightly Turbid / S.012 / pH: x  Gluc: x / Ketone: Negative  / Bili: Negative / Urobili: <2 mg/dL   Blood: x / Protein: Trace / Nitrite: Positive   Leuk Esterase: Large / RBC: 3 /HPF / WBC 99 /HPF   Sq Epi: x / Non Sq Epi: 1 /HPF / Bacteria: Many        CARDIAC MARKERS ( 22 Dec 2019 07:18 )  x     / <0.01 ng/mL / x     / x     / x      CARDIAC MARKERS ( 21 Dec 2019 20:20 )  x     / <0.01 ng/mL / x     / x     / x                                                LIVER FUNCTIONS - ( 21 Dec 2019 20:20 )  Alb: 4.0 g/dL / Pro: 6.8 g/dL / ALK PHOS: 98 U/L / ALT: 11 U/L / AST: 19 U/L / GGT: x                                                                                                MEDICATIONS  (STANDING):  albuterol/ipratropium for Nebulization 3 milliLiter(s) Nebulizer every 6 hours  amLODIPine   Tablet 10 milliGRAM(s) Oral daily  aspirin  chewable 81 milliGRAM(s) Oral daily  atorvastatin 40 milliGRAM(s) Oral at bedtime  budesonide 160 MICROgram(s)/formoterol 4.5 MICROgram(s) Inhaler 2 Puff(s) Inhalation two times a day  chlorhexidine 4% Liquid 1 Application(s) Topical <User Schedule>  enoxaparin Injectable 40 milliGRAM(s) SubCutaneous daily  furosemide    Tablet 60 milliGRAM(s) Oral two times a day  hydroxychloroquine 200 milliGRAM(s) Oral two times a day  losartan 100 milliGRAM(s) Oral daily  montelukast 10 milliGRAM(s) Oral daily  pantoprazole    Tablet 40 milliGRAM(s) Oral before breakfast  predniSONE   Tablet 40 milliGRAM(s) Oral daily  senna 2 Tablet(s) Oral at bedtime  sertraline 50 milliGRAM(s) Oral daily    MEDICATIONS  (PRN):  acetaminophen   Tablet .. 650 milliGRAM(s) Oral every 6 hours PRN Temp greater or equal to 38.5C (101.3F), Moderate Pain (4 - 6)  ALBUTerol    90 MICROgram(s) HFA Inhaler 2 Puff(s) Inhalation every 4 hours PRN Shortness of Breath  albuterol/ipratropium for Nebulization. 3 milliLiter(s) Nebulizer every 4 hours PRN Shortness of Breath and/or Wheezing  ALPRAZolam 1 milliGRAM(s) Oral at bedtime PRN anxiety    Ct chest(trauma wkup) in 2018 was reviewed and showed centrilobular emphysema Patient is a 74y old  Female who presents with a chief complaint of Chest pain (22 Dec 2019 14:34)      HPI:  72 y F with PMHx of Sjogren's disease, SLE, trigeminal neuralgia, breast cancer s/p b/l mastectomy, rectal prolapse s/p partial colectomy, questionable hx of heart failure, COPD not on home O2, HTN presents with chest pain and sob on exertion. Patient reported dyspnea on exertion for the last few weeks. She was also feeling exertional chest pain that started along with the SOB, non radiating, mild in intensity. She also noted worsening LE edema. She was advised by her friends to come to the ED. She admits for lower abdominal pain and dysuria but no headache, dizziness or paresthesias. Patient denied any PND, orthopnea, palpitations, wheezing, night time awakenings. patient reported the 8 weeks back she had a bronchitis that was treated with inhalers and prednisone by  PMD.  Patient is not compliant with ANORO.  In ED, VS wnl except SaO2 87% on room air, she was put on 4 L O2 via NC and SaO2 went up to 93%. (21 Dec 2019 23:51), admitted to floor, echo done severe AS, was given lasix this am feels better want to go home, occ cough m had chest ct in 3/18      PAST MEDICAL & SURGICAL HISTORY:  Lupus (systemic lupus erythematosus)  Trigeminal neuralgia  Sjogren's syndrome, with unspecified organ involvement  GERD without esophagitis  Vitamin D deficiency  Constipation, unspecified constipation type  Rectal prolapse  Hypertension, unspecified type  Malignant neoplasm of lower-outer quadrant of left breast of female, estrogen receptor positive  H/O bilateral mastectomy  History of partial colectomy      SOCIAL HX:   Smoking   1ppd for 50 years                      ETOH    occasional                        Other denied    FAMILY HISTORY:  No pertinent family history in first degree relatives  .  No cardiovascular or pulmonary family history     Review of System:  See HPI    Allergies    Naprosyn (Stomach Upset)  Vicodin (Stomach Upset)    Intolerances          PHYSICAL EXAM  Vital Signs Last 24 Hrs  T(C): 35.6 (23 Dec 2019 05:51), Max: 36.2 (22 Dec 2019 15:53)  T(F): 96.1 (23 Dec 2019 05:51), Max: 97.2 (22 Dec 2019 15:53)  HR: 78 (23 Dec 2019 05:51) (78 - 104)  BP: 103/53 (23 Dec 2019 05:51) (103/53 - 131/63)  RR: 18 (23 Dec 2019 05:51) (18 - 18)  SpO2: 92% on RA    General: In NARD, able to speak in full sentences.  HEENT: EKTA             Lymphatic system: No cervical LN     Lungs: Prince BS, crackles at the bases  Cardiovascular: Regular, +ve murmur  Gastrointestinal: Soft.  + BS   Musculoskeletal: +1 LE edema  Skin: Warm.  Intact  Neurological: No motor or sensory deficit       LABS:                          10.5   9.72  )-----------( 329      ( 23 Dec 2019 07:20 )             32.4                                               12-22    130<L>  |  90<L>  |  15  ----------------------------<  162<H>  4.1   |  23  |  0.8    Ca    8.1<L>      22 Dec 2019 07:18  Mg     2.3     12-    TPro  6.8  /  Alb  4.0  /  TBili  0.3  /  DBili  x   /  AST  19  /  ALT  11  /  AlkPhos  98  12-                                             Urinalysis Basic - ( 22 Dec 2019 04:10 )    Color: Yellow / Appearance: Slightly Turbid / S.012 / pH: x  Gluc: x / Ketone: Negative  / Bili: Negative / Urobili: <2 mg/dL   Blood: x / Protein: Trace / Nitrite: Positive   Leuk Esterase: Large / RBC: 3 /HPF / WBC 99 /HPF   Sq Epi: x / Non Sq Epi: 1 /HPF / Bacteria: Many        CARDIAC MARKERS ( 22 Dec 2019 07:18 )  x     / <0.01 ng/mL / x     / x     / x      CARDIAC MARKERS ( 21 Dec 2019 20:20 )  x     / <0.01 ng/mL / x     / x     / x                                                LIVER FUNCTIONS - ( 21 Dec 2019 20:20 )  Alb: 4.0 g/dL / Pro: 6.8 g/dL / ALK PHOS: 98 U/L / ALT: 11 U/L / AST: 19 U/L / GGT: x                                                                                                MEDICATIONS  (STANDING):  albuterol/ipratropium for Nebulization 3 milliLiter(s) Nebulizer every 6 hours  amLODIPine   Tablet 10 milliGRAM(s) Oral daily  aspirin  chewable 81 milliGRAM(s) Oral daily  atorvastatin 40 milliGRAM(s) Oral at bedtime  budesonide 160 MICROgram(s)/formoterol 4.5 MICROgram(s) Inhaler 2 Puff(s) Inhalation two times a day  chlorhexidine 4% Liquid 1 Application(s) Topical <User Schedule>  enoxaparin Injectable 40 milliGRAM(s) SubCutaneous daily  furosemide    Tablet 60 milliGRAM(s) Oral two times a day  hydroxychloroquine 200 milliGRAM(s) Oral two times a day  losartan 100 milliGRAM(s) Oral daily  montelukast 10 milliGRAM(s) Oral daily  pantoprazole    Tablet 40 milliGRAM(s) Oral before breakfast  predniSONE   Tablet 40 milliGRAM(s) Oral daily  senna 2 Tablet(s) Oral at bedtime  sertraline 50 milliGRAM(s) Oral daily    MEDICATIONS  (PRN):  acetaminophen   Tablet .. 650 milliGRAM(s) Oral every 6 hours PRN Temp greater or equal to 38.5C (101.3F), Moderate Pain (4 - 6)  ALBUTerol    90 MICROgram(s) HFA Inhaler 2 Puff(s) Inhalation every 4 hours PRN Shortness of Breath  albuterol/ipratropium for Nebulization. 3 milliLiter(s) Nebulizer every 4 hours PRN Shortness of Breath and/or Wheezing  ALPRAZolam 1 milliGRAM(s) Oral at bedtime PRN anxiety    Ct chest(trauma wkup) in 2018 was reviewed and showed centrilobular emphysema

## 2019-12-23 NOTE — PROGRESS NOTE ADULT - ASSESSMENT
72 y f with pmh of sjogrens, lupus, breast cancer s/p mastectomy b/l  admitted for sob on exertion    # SOB on exertion:  - treated for COPD and volume overload; symptoms also related to AS   improved now on PO prednisone and Po lasix   - fu pulmonary rcds   -echo revealed Pulmonary HTN and severe AS   - cardio consult Dr morales pending      # Hyponatremia - likely 2/2 SIADH -due to HCTZ   improved after stopping lasix   fu nephro c/s     # Dysuria with suprapubic tenderness:  - F/up UA, patient has hx of Citrobacter in the urine  - Treat empirically with meropenem if UA positive and f/up urine culture    # Sjogren's disease:  - c/w cevilemine qd ; patient to have somebody bring home meds tomorrow morning and fill non formulary form    # SLE:  - c/w hydroxychloroquine and complete med rec ( patient on sulfasalazine in 2018)    #  Trigeminal neuralgia:  - Will hold lyrica ( patient takes it at home) for concerns of SIADH    # HTN:  - C/w HCTZ and losartan  - Monitor BMP daily    # Diet: DASH with 1.2L fluid restriction  # DVT PPX: Lovenox 40 mg qd   # GI ppx: Protonix 40 mg qd PO  # Activity: OOBTC, f/up PT/rehab   # Dispo: From home, lives alone, f/up social work consult  # Code status: FULL 72 y f with pmh of sjogrens, lupus, breast cancer s/p mastectomy b/l  admitted for sob on exertion    # SOB on exertion:  - treated for COPD and volume overload; symptoms also related to AS   improved now on PO prednisone and Po lasix   - fu pulmonary rcds   -echo revealed Pulmonary HTN and severe AS   - cardio consult Dr morales pending    patient needs home O2   Home O2 script- Oxygen concentrator with portable tanks _____ LPM via NC continuously or prn    O2 sat at rest on room air <88%   GISELLE 99 months, Ht, Wt, ICD10 codes    Rx has to be signed by the Attending MD with NPI number on script    In addition to the Rx, the progress note for same date Rx is written must include the following:    Patients O2 sat is ______ RA at rest. Patient has (Diagnosis) and therefore needs home oxygen.    # Hyponatremia - likely 2/2 SIADH -due to HCTZ   improved after stopping lasix   fu nephro c/s     # Dysuria with suprapubic tenderness:  - F/up UA, patient has hx of Citrobacter in the urine  - Treat empirically with meropenem if UA positive and f/up urine culture    # Sjogren's disease:  - c/w cevilemine qd ; patient to have somebody bring home meds tomorrow morning and fill non formulary form    # SLE:  - c/w hydroxychloroquine and complete med rec ( patient on sulfasalazine in 2018)    #  Trigeminal neuralgia:  - Will hold lyrica ( patient takes it at home) for concerns of SIADH    # HTN:  - C/w HCTZ and losartan  - Monitor BMP daily    # Diet: DASH with 1.2L fluid restriction  # DVT PPX: Lovenox 40 mg qd   # GI ppx: Protonix 40 mg qd PO  # Activity: OOBTC, f/up PT/rehab   # Dispo: From home, lives alone, f/up social work consult  # Code status: FULL 72 y f with pmh of sjogrens, lupus, breast cancer s/p mastectomy b/l  admitted for sob on exertion    # SOB on exertion:  - treated for COPD and volume overload; symptoms also related to AS   improved now on PO prednisone and Po lasix   - fu pulmonary rcds   -echo revealed Pulmonary HTN and severe AS   - cardio consult Dr morales pending    patient needs home O2   Home O2 script- Oxygen concentrator with portable tanks 3 LPM via NC continuously or prn    O2 sat at rest on room air is 87%   GISELLE 99 months, Ht, Wt, ICD10 codes  Patients O2 sat is 87% RA at rest. Patient has (Diagnosis) and therefore needs home oxygen.    # Hyponatremia - likely 2/2 SIADH -due to HCTZ   improved after stopping lasix   fu nephro c/s     # Dysuria with suprapubic tenderness:  - F/up UA, patient has hx of Citrobacter in the urine  - Treat empirically with meropenem if UA positive and f/up urine culture    # Sjogren's disease:  - c/w cevilemine qd ; patient to have somebody bring home meds tomorrow morning and fill non formulary form    # SLE:  - c/w hydroxychloroquine and complete med rec ( patient on sulfasalazine in 2018)    #  Trigeminal neuralgia:  - Will hold lyrica ( patient takes it at home) for concerns of SIADH    # HTN:  - C/w HCTZ and losartan  - Monitor BMP daily    # Diet: DASH with 1.2L fluid restriction  # DVT PPX: Lovenox 40 mg qd   # GI ppx: Protonix 40 mg qd PO  # Activity: OOBTC, f/up PT/rehab   # Dispo: From home, lives alone, f/up social work consult  # Code status: FULL 72 y f with pmh of sjogrens, lupus, breast cancer s/p mastectomy b/l  admitted for sob on exertion    # SOB on exertion:  - treated for COPD and volume overload; symptoms also related to AS   improved now on PO prednisone and Po lasix   - fu pulmonary rcds   -echo revealed Pulmonary HTN and severe AS   - cardio consult Dr morales pending    patient needs home O2   Home O2 script- Oxygen concentrator with portable tanks 3 LPM via NC continuously  O2 sat at rest on room air is 87%   Patients O2 sat is 87% RA at rest. Patient has COPD  and therefore needs home oxygen.  she is aware that she is going on home O2  she was tested in a chronic stable state, after treatment with nebulizers; prednisone and lasix     # Hyponatremia - likely 2/2 SIADH -due to HCTZ   improved after stopping lasix   fu nephro c/s     # Dysuria with suprapubic tenderness:  - F/up UA, patient has hx of Citrobacter in the urine  - Treat empirically with meropenem if UA positive and f/up urine culture    # Sjogren's disease:  - c/w cevilemine qd ; patient to have somebody bring home meds tomorrow morning and fill non formulary form    # SLE:  - c/w hydroxychloroquine and complete med rec ( patient on sulfasalazine in 2018)    #  Trigeminal neuralgia:  - Will hold lyrica ( patient takes it at home) for concerns of SIADH    # HTN:  - C/w HCTZ and losartan  - Monitor BMP daily    # Diet: DASH with 1.2L fluid restriction  # DVT PPX: Lovenox 40 mg qd   # GI ppx: Protonix 40 mg qd PO  # Activity: OOBTC, f/up PT/rehab   # Dispo: From home, lives alone, f/up social work consult  # Code status: FULL

## 2019-12-23 NOTE — CONSULT NOTE ADULT - ASSESSMENT
IMPRESSION: Rehab of   debility, CHF, COPD, severe AS, on O2    PRECAUTIONS: [x  ] Cardiac  [ x ] Respiratory  [  ] Seizures [  ] Contact Isolation  [  ] Droplet Isolation  [  ] Other    Weight Bearing Status:     RECOMMENDATION:    Out of Bed to Chair     DVT/Decubiti Prophylaxis    REHAB PLAN:     [ x  ] Bedside P/T 3-5 times a week   [   ]   Bedside O/T  2-3 times a week             [   ] No Rehab Therapy Indicated                   [   ]  Speech Therapy   Conditioning/ROM                                    ADL  Bed Mobility                                               Conditioning/ROM  Transfers                                                     Bed Mobility  Sitting /Standing Balance                         Transfers                                        Gait Training                                               Sitting/Standing Balance  Stair Training [   ]Applicable                    Home equipment Eval                                                                        Splinting  [   ] Only      GOALS:   ADL   [x   ]   Independent                    Transfers  [ x  ] Independent                          Ambulation  [ x  ] Independent     [  x  ] With device                            [   ]  CG                                                         [   ]  CG                                                                  [   ] CG                            [    ] Min A                                                   [   ] Min A                                                              [   ] Min  A          DISCHARGE PLAN:   [   ]  Good candidate for Intensive Rehabilitation/Hospital based-4A [facility]                                             Will tolerate 3hrs Intensive Rehab Daily                                       [ x   ]  Short Term Rehab in Skilled Nursing Facility is suggested.                                       [ x   ]  Home with Outpatient or  services. she may insist on D/C home, although this is not prudent. she is aware of risks including fall and fracture. Hiring help in the home may necessary for optimal d/c home.                                         [    ]  Possible Candidate for Intensive Hospital based Rehab IMPRESSION: Rehab of   debility, CHF, COPD, severe AS, on O2    PRECAUTIONS: [x  ] Cardiac  [ x ] Respiratory  [  ] Seizures [  ] Contact Isolation  [  ] Droplet Isolation  [  ] Other    Weight Bearing Status:     RECOMMENDATION:    Out of Bed to Chair     DVT/Decubiti Prophylaxis    REHAB PLAN:     [ x  ] Bedside P/T 3-5 times a week   [   ]   Bedside O/T  2-3 times a week             [   ] No Rehab Therapy Indicated                   [   ]  Speech Therapy   Conditioning/ROM                                    ADL  Bed Mobility                                               Conditioning/ROM  Transfers                                                     Bed Mobility  Sitting /Standing Balance                         Transfers                                        Gait Training                                               Sitting/Standing Balance  Stair Training [   ]Applicable                    Home equipment Eval                                                                        Splinting  [   ] Only      GOALS:   ADL   [x   ]   Independent                    Transfers  [ x  ] Independent                          Ambulation  [ x  ] Independent     [  x  ] With device                            [   ]  CG                                                         [   ]  CG                                                                  [   ] CG                            [    ] Min A                                                   [   ] Min A                                                              [   ] Min  A          DISCHARGE PLAN:   [   ]  Good candidate for Intensive Rehabilitation/Hospital based-4A [facility]                                             Will tolerate 3hrs Intensive Rehab Daily                                       [ x   ]  Short Term Rehab in Skilled Nursing Facility is suggested.                                       [ x   ]  Home with Outpatient or  services. she may insist on D/C home, although this is not prudent. she is aware of risks including fall and fracture. Home help  may necessary for optimal d/c home. She is improving. she is anxious about being in hospital.                                         [    ]  Possible Candidate for Intensive Hospital based Rehab

## 2019-12-23 NOTE — CONSULT NOTE ADULT - ASSESSMENT
impression    HFpEF exacerbation  Valvular heart failure(severe AS)  COPD(not compliant)  Pulmonary HTN(WHO 2/3)    Plan:  Check ABGs(if PaCo2>52 will likely need NIV at home)  will need oxygen at home  Diuresis(avoid aggressive diuresis)   LAMA/LABA/ICS upon discharge(add spiriva)  albuterol as rescue.  HOB elevation  DVT ppx  OOB to chair impression    HFpEF exacerbation  Valvular heart failure(severe AS)  COPD(not compliant)  Pulmonary HTN(WHO 2/3)    Plan:    Check ABGs(if PaCo2>52 will likely need NIV at home)  will need oxygen at home OF POX less than 89%  Diuresis(avoid aggressive diuresis) / cardio eval  LAMA/LABA/ICS upon discharge(add spiriva)  albuterol as rescue.  smoking counseling  HOB elevation  DVT ppx  OOB to chair  OP chest ct/ PFT/ Sleep study

## 2019-12-23 NOTE — PHYSICAL THERAPY INITIAL EVALUATION ADULT - PERTINENT HX OF CURRENT PROBLEM, REHAB EVAL
72 y F with PMHx of Sjogren's disease, SLE, trigeminal neuralgia, breast cancer s/p b/l mastectomy, rectal prolapse s/p partial colectomy, questionable hx of heart failure, COPD not on home O2, HTN presents with chest pain and sob on exertion.

## 2019-12-23 NOTE — PHYSICAL THERAPY INITIAL EVALUATION ADULT - GENERAL OBSERVATIONS, REHAB EVAL
15:00-15:30. chart reviewed. Pt received semi-diaz at B/S, alert, oriented, able to follow multi-step instructions and agreeable to PT evaluation.  + IV, + O2 3 L via NC, denies pain or discomfort, initial vitals 109/53 HR 90. SPO2 89-94% (pre/post ambulation on O2)

## 2019-12-23 NOTE — CONSULT NOTE ADULT - ATTENDING COMMENTS
patient seen and examined , agree with above, Severe AS, Fluid overload, COPD, keep diuresis, cxr, chest ct. PFT, sleep study OP, if pox less 89% need home oxygen

## 2019-12-23 NOTE — CONSULT NOTE ADULT - SUBJECTIVE AND OBJECTIVE BOX
HPI:  72 y F with PMHx of Sjogren's disease, SLE, trigeminal neuralgia, breast cancer s/p b/l mastectomy, rectal prolapse s/p partial colectomy, questionable hx of heart failure, COPD not on home O2, HTN presents for sob on exertion and MARLEN since 2 months.  She was also feeling exertional SOB, non radiating. She also noted worsening LE edema. She was advised by her friends to come to the ED. She admits for lower abdominal pain and dysuria but no headache, dizziness or paresthesias.  pt was treated 2 months ago for URI.  In ED, VS wnl except SaO2 87% on room air, she was put on 4 L O2 via NC and SaO2 went up to 93%. (21 Dec 2019 23:51)    pt was admitted with SOB ,was treated for COPD exacerbation and mild CHF , started on IV diuresis , and switched to PO.  2d echo revealed severe AS.  cardioloy called for further evaluation.    PAST MEDICAL & SURGICAL HISTORY  Lupus (systemic lupus erythematosus)  Trigeminal neuralgia  Sjogren's syndrome, with unspecified organ involvement  GERD without esophagitis  Vitamin D deficiency  Constipation, unspecified constipation type  Rectal prolapse  Hypertension, unspecified type  Malignant neoplasm of lower-outer quadrant of left breast of female, estrogen receptor positive  H/O bilateral mastectomy  History of partial colectomy      FAMILY HISTORY:  FAMILY HISTORY:  No pertinent family history in first degree relatives      SOCIAL HISTORY:  [+]smoker  [-]Alcohol  [-]Drug    ALLERGIES:  Naprosyn (Stomach Upset)  Vicodin (Stomach Upset)      MEDICATIONS:  MEDICATIONS  (STANDING):  albuterol/ipratropium for Nebulization 3 milliLiter(s) Nebulizer every 6 hours  amLODIPine   Tablet 10 milliGRAM(s) Oral daily  aspirin  chewable 81 milliGRAM(s) Oral daily  atorvastatin 40 milliGRAM(s) Oral at bedtime  budesonide 160 MICROgram(s)/formoterol 4.5 MICROgram(s) Inhaler 2 Puff(s) Inhalation two times a day  chlorhexidine 4% Liquid 1 Application(s) Topical <User Schedule>  enoxaparin Injectable 40 milliGRAM(s) SubCutaneous daily  furosemide    Tablet 60 milliGRAM(s) Oral two times a day  hydroxychloroquine 200 milliGRAM(s) Oral two times a day  losartan 100 milliGRAM(s) Oral daily  montelukast 10 milliGRAM(s) Oral daily  pantoprazole    Tablet 40 milliGRAM(s) Oral before breakfast  predniSONE   Tablet 40 milliGRAM(s) Oral daily  senna 2 Tablet(s) Oral at bedtime  sertraline 50 milliGRAM(s) Oral daily  tiotropium 18 MICROgram(s) Capsule 1 Capsule(s) Inhalation daily    MEDICATIONS  (PRN):  acetaminophen   Tablet .. 650 milliGRAM(s) Oral every 6 hours PRN Temp greater or equal to 38.5C (101.3F), Moderate Pain (4 - 6)  ALBUTerol    90 MICROgram(s) HFA Inhaler 2 Puff(s) Inhalation every 4 hours PRN Shortness of Breath  albuterol/ipratropium for Nebulization. 3 milliLiter(s) Nebulizer every 4 hours PRN Shortness of Breath and/or Wheezing  ALPRAZolam 1 milliGRAM(s) Oral at bedtime PRN anxiety      HOME MEDICATIONS:  Home Medications:  ALPRAZolam 0.5 mg oral tablet: 2 tab(s) orally once a day (at bedtime), As Needed (22 Dec 2019 01:04)  amLODIPine 10 mg oral tablet: 1 tab(s) orally once a day (17 Mar 2018 12:57)  cevimeline 30 mg oral capsule: 3 cap(s) orally once a day (17 Mar 2018 12:57)  docusate sodium 100 mg oral capsule: 1 cap(s) orally 2 times a day (20 Mar 2018 08:42)  Fioricet oral capsule: 2 cap(s) orally once a day (17 Mar 2018 12:57)  hydroxychloroquine 200 mg oral tablet: 1 tab(s) orally 2 times a day (17 Mar 2018 12:57)  Low Dose ASA 81 mg oral tablet: 1 tab(s) orally once a day (17 Mar 2018 12:57)  montelukast 10 mg oral tablet: 1 tab(s) orally once a day (17 Mar 2018 12:57)  ocular lubricant ophthalmic solution: 1 drop(s) to each affected eye every 2 hours, As needed, Dry Eyes (20 Mar 2018 08:42)  omeprazole 40 mg oral delayed release capsule: 1 cap(s) orally once a day (17 Mar 2018 12:57)  rosuvastatin 40 mg oral tablet: 1 tab(s) orally once a day (at bedtime) (17 Mar 2018 12:57)  senna oral tablet: 2 tab(s) orally once a day (at bedtime) (20 Mar 2018 08:42)  sulfaSALAzine 500 mg oral tablet: 3 tab(s) orally 2 times a day (17 Mar 2018 12:57)  valsartan-hydrochlorothiazide 160mg-12.5mg oral tablet: 1 tab(s) orally once a day (17 Mar 2018 12:57)      VITALS:   T(F): 96.1 (12-23 @ 05:51), Max: 99.6 (12-21 @ 22:04)  HR: 78 (12-23 @ 05:51) (78 - 104)  BP: 103/53 (12-23 @ 05:51) (102/53 - 131/63)  BP(mean): --  RR: 18 (12-23 @ 05:51) (18 - 20)  SpO2: 92% (12-23 @ 08:01) (88% - 97%)    I&O's Summary    22 Dec 2019 07:01  -  23 Dec 2019 07:00  --------------------------------------------------------  IN: 180 mL / OUT: 240 mL / NET: -60 mL        REVIEW OF SYSTEMS:  CONSTITUTIONAL: No weakness, fevers or chills  EYES: No visual changes  ENT: No vertigo or throat pain   NECK: No pain or stiffness  RESPIRATORY: see HPI  CARDIOVASCULAR: see HPI  GASTROINTESTINAL: No abdominal or epigastric pain. No nausea, vomiting, or hematemesis; No diarrhea or constipation. No melena or hematochezia.  GENITOURINARY: No dysuria, frequency or hematuria  NEUROLOGICAL: No numbness or weakness  SKIN: No itching, no rashes  MSK: no active joint pain    PHYSICAL EXAM:  NEURO: patient is awake , alert and oriented  GEN: Not in acute distress  NECK: no thyroid enlargement, no JVD  LUNGS: bilateral basal crackles  CARDIOVASCULAR: S1/S2 present, RRR , + sys murmur  ABD: Soft, non-tender, non-distended, +BS  EXT: No MARLEN  SKIN: Intact    LABS:                        10.5   9.72  )-----------( 329      ( 23 Dec 2019 07:20 )             32.4     12-23    133<L>  |  90<L>  |  15  ----------------------------<  149<H>  4.1   |  28  |  0.6<L>    Ca    8.0<L>      23 Dec 2019 07:20  Mg     2.3     12-22    TPro  6.8  /  Alb  4.0  /  TBili  0.3  /  DBili  x   /  AST  19  /  ALT  11  /  AlkPhos  98  12-21        CARDIAC MARKERS ( 22 Dec 2019 07:18 )  x     / <0.01 ng/mL / x     / x     / x      CARDIAC MARKERS ( 21 Dec 2019 20:20 )  x     / <0.01 ng/mL / x     / x     / x          Serum Pro-Brain Natriuretic Peptide: 997 pg/mL (12-21-19 @ 20:20)      RADIOLOGY:  -CXR: < from: Xray Chest 1 View- PORTABLE-Routine (12.23.19 @ 08:09) >    Impression:      Bilateral interstitial opacities, increased on the left.     < end of copied text >    -TTE: < from: Transthoracic Echocardiogram (12.22.19 @ 15:08) >   1. LV Ejection Fraction by Samson's Method with a biplane EF of 75 %.   2. Elevated left atrial and left ventricular end-diastolic pressures.   3. Normal left ventricular size and wall thicknesses, with normal systolic and diastolic function.   4. Spectral Doppler shows pseudonormal pattern of left ventricular myocardial filling (Grade II diastolic dysfunction).   5. A sigmoid septum is noted to be present. The mean global longitudinal peak systolic strain by speckle tracking is -26.1% which is normal.   6. Mildly enlarged right atrium.   7. Mild mitral valve regurgitation.   8. Mild-moderate tricuspid regurgitation.   9. Severe aortic valve stenosis.  10. Estimated pulmonary artery systolic pressure is 63.5 mmHg assuming a right atrial pressure of 8 mmHg, which is consistent with severe pulmonary hypertension.  11. Pulmonary hypertension is present.  12. LA volume Index is 32.8 ml/m² ml/m2.  13. Mitral valve mean gradient is 7.5 mmHg consistent with moderate mitral stenosis.  14. Peak transaortic gradient equals 89.3 mmHg, mean transaortic gradient equals 52.5 mmHg, the calculated aortic valve area equals 0.85 cm² by the continuity equation consistent with severe aortic stenosis.  15. The aortic valve mean gradient is 52.5 mmHg consistent with severe aortic stenosis.    < end of copied text >    -CCTA:  -STRESS TEST:  -CATHETERIZATION:    ECG:  < from: 12 Lead ECG (12.21.19 @ 19:18) >  Diagnosis Line Normal sinus rhythm  Nonspecific ST abnormality  Abnormal ECG    < end of copied text > HPI:  72 y F with PMHx of Sjogren's disease, SLE, trigeminal neuralgia, breast cancer s/p b/l mastectomy, rectal prolapse s/p partial colectomy, questionable hx of heart failure, COPD not on home O2, HTN presents for sob on exertion and MARLEN since 2 months.  She was also feeling exertional SOB, non radiating. She also noted worsening LE edema. She was advised by her friends to come to the ED. She admits for lower abdominal pain and dysuria but no headache, dizziness or paresthesias.  pt was treated 2 months ago for URI.  In ED, VS wnl except SaO2 87% on room air, she was put on 4 L O2 via NC and SaO2 went up to 93%. (21 Dec 2019 23:51)    pt was admitted with SOB ,was treated for COPD exacerbation and mild CHF , started on IV diuresis , and switched to PO.  2d echo revealed severe AS.  cardioloy called for further evaluation.    PAST MEDICAL & SURGICAL HISTORY  Lupus (systemic lupus erythematosus)  Trigeminal neuralgia  Sjogren's syndrome, with unspecified organ involvement  GERD without esophagitis  Vitamin D deficiency  Constipation, unspecified constipation type  Rectal prolapse  Hypertension, unspecified type  Malignant neoplasm of lower-outer quadrant of left breast of female, estrogen receptor positive  H/O bilateral mastectomy  History of partial colectomy      FAMILY HISTORY:  FAMILY HISTORY:  No pertinent family history in first degree relatives      SOCIAL HISTORY:  [+]smoker  [-]Alcohol  [-]Drug    ALLERGIES:  Naprosyn (Stomach Upset)  Vicodin (Stomach Upset)      MEDICATIONS:  MEDICATIONS  (STANDING):  albuterol/ipratropium for Nebulization 3 milliLiter(s) Nebulizer every 6 hours  amLODIPine   Tablet 10 milliGRAM(s) Oral daily  aspirin  chewable 81 milliGRAM(s) Oral daily  atorvastatin 40 milliGRAM(s) Oral at bedtime  budesonide 160 MICROgram(s)/formoterol 4.5 MICROgram(s) Inhaler 2 Puff(s) Inhalation two times a day  chlorhexidine 4% Liquid 1 Application(s) Topical <User Schedule>  enoxaparin Injectable 40 milliGRAM(s) SubCutaneous daily  furosemide    Tablet 60 milliGRAM(s) Oral two times a day  hydroxychloroquine 200 milliGRAM(s) Oral two times a day  losartan 100 milliGRAM(s) Oral daily  montelukast 10 milliGRAM(s) Oral daily  pantoprazole    Tablet 40 milliGRAM(s) Oral before breakfast  predniSONE   Tablet 40 milliGRAM(s) Oral daily  senna 2 Tablet(s) Oral at bedtime  sertraline 50 milliGRAM(s) Oral daily  tiotropium 18 MICROgram(s) Capsule 1 Capsule(s) Inhalation daily    MEDICATIONS  (PRN):  acetaminophen   Tablet .. 650 milliGRAM(s) Oral every 6 hours PRN Temp greater or equal to 38.5C (101.3F), Moderate Pain (4 - 6)  ALBUTerol    90 MICROgram(s) HFA Inhaler 2 Puff(s) Inhalation every 4 hours PRN Shortness of Breath  albuterol/ipratropium for Nebulization. 3 milliLiter(s) Nebulizer every 4 hours PRN Shortness of Breath and/or Wheezing  ALPRAZolam 1 milliGRAM(s) Oral at bedtime PRN anxiety      HOME MEDICATIONS:  Home Medications:  ALPRAZolam 0.5 mg oral tablet: 2 tab(s) orally once a day (at bedtime), As Needed (22 Dec 2019 01:04)  amLODIPine 10 mg oral tablet: 1 tab(s) orally once a day (17 Mar 2018 12:57)  cevimeline 30 mg oral capsule: 3 cap(s) orally once a day (17 Mar 2018 12:57)  docusate sodium 100 mg oral capsule: 1 cap(s) orally 2 times a day (20 Mar 2018 08:42)  Fioricet oral capsule: 2 cap(s) orally once a day (17 Mar 2018 12:57)  hydroxychloroquine 200 mg oral tablet: 1 tab(s) orally 2 times a day (17 Mar 2018 12:57)  Low Dose ASA 81 mg oral tablet: 1 tab(s) orally once a day (17 Mar 2018 12:57)  montelukast 10 mg oral tablet: 1 tab(s) orally once a day (17 Mar 2018 12:57)  ocular lubricant ophthalmic solution: 1 drop(s) to each affected eye every 2 hours, As needed, Dry Eyes (20 Mar 2018 08:42)  omeprazole 40 mg oral delayed release capsule: 1 cap(s) orally once a day (17 Mar 2018 12:57)  rosuvastatin 40 mg oral tablet: 1 tab(s) orally once a day (at bedtime) (17 Mar 2018 12:57)  senna oral tablet: 2 tab(s) orally once a day (at bedtime) (20 Mar 2018 08:42)  sulfaSALAzine 500 mg oral tablet: 3 tab(s) orally 2 times a day (17 Mar 2018 12:57)  valsartan-hydrochlorothiazide 160mg-12.5mg oral tablet: 1 tab(s) orally once a day (17 Mar 2018 12:57)      VITALS:   T(F): 96.1 (12-23 @ 05:51), Max: 99.6 (12-21 @ 22:04)  HR: 78 (12-23 @ 05:51) (78 - 104)  BP: 103/53 (12-23 @ 05:51) (102/53 - 131/63)  BP(mean): --  RR: 18 (12-23 @ 05:51) (18 - 20)  SpO2: 92% (12-23 @ 08:01) (88% - 97%)    I&O's Summary    22 Dec 2019 07:01  -  23 Dec 2019 07:00  --------------------------------------------------------  IN: 180 mL / OUT: 240 mL / NET: -60 mL        REVIEW OF SYSTEMS:  CONSTITUTIONAL: No weakness, fevers or chills  EYES: No visual changes  ENT: No vertigo or throat pain   NECK: No pain or stiffness  RESPIRATORY: see HPI  CARDIOVASCULAR: see HPI  GASTROINTESTINAL: No abdominal or epigastric pain. No nausea, vomiting, or hematemesis; No diarrhea or constipation. No melena or hematochezia.  GENITOURINARY: No dysuria, frequency or hematuria  NEUROLOGICAL: No numbness or weakness  SKIN: No itching, no rashes  MSK: no active joint pain    PHYSICAL EXAM:  NEURO: patient is awake , alert and oriented  GEN: Not in acute distress  NECK: no thyroid enlargement, no JVD  LUNGS: bilateral basal crackles  CARDIOVASCULAR: S1/S2 present, RRR , + sys murmur  ABD: Soft, non-tender, non-distended, +BS  EXT/MS: No MARLEN  SKIN: Intact    LABS:                        10.5   9.72  )-----------( 329      ( 23 Dec 2019 07:20 )             32.4     12-23    133<L>  |  90<L>  |  15  ----------------------------<  149<H>  4.1   |  28  |  0.6<L>    Ca    8.0<L>      23 Dec 2019 07:20  Mg     2.3     12-22    TPro  6.8  /  Alb  4.0  /  TBili  0.3  /  DBili  x   /  AST  19  /  ALT  11  /  AlkPhos  98  12-21        CARDIAC MARKERS ( 22 Dec 2019 07:18 )  x     / <0.01 ng/mL / x     / x     / x      CARDIAC MARKERS ( 21 Dec 2019 20:20 )  x     / <0.01 ng/mL / x     / x     / x          Serum Pro-Brain Natriuretic Peptide: 997 pg/mL (12-21-19 @ 20:20)      RADIOLOGY:  -CXR: < from: Xray Chest 1 View- PORTABLE-Routine (12.23.19 @ 08:09) >    Impression:      Bilateral interstitial opacities, increased on the left.     < end of copied text >    -TTE: < from: Transthoracic Echocardiogram (12.22.19 @ 15:08) >   1. LV Ejection Fraction by Samson's Method with a biplane EF of 75 %.   2. Elevated left atrial and left ventricular end-diastolic pressures.   3. Normal left ventricular size and wall thicknesses, with normal systolic and diastolic function.   4. Spectral Doppler shows pseudonormal pattern of left ventricular myocardial filling (Grade II diastolic dysfunction).   5. A sigmoid septum is noted to be present. The mean global longitudinal peak systolic strain by speckle tracking is -26.1% which is normal.   6. Mildly enlarged right atrium.   7. Mild mitral valve regurgitation.   8. Mild-moderate tricuspid regurgitation.   9. Severe aortic valve stenosis.  10. Estimated pulmonary artery systolic pressure is 63.5 mmHg assuming a right atrial pressure of 8 mmHg, which is consistent with severe pulmonary hypertension.  11. Pulmonary hypertension is present.  12. LA volume Index is 32.8 ml/m² ml/m2.  13. Mitral valve mean gradient is 7.5 mmHg consistent with moderate mitral stenosis.  14. Peak transaortic gradient equals 89.3 mmHg, mean transaortic gradient equals 52.5 mmHg, the calculated aortic valve area equals 0.85 cm² by the continuity equation consistent with severe aortic stenosis.  15. The aortic valve mean gradient is 52.5 mmHg consistent with severe aortic stenosis.    < end of copied text >    -CCTA:  -STRESS TEST:  -CATHETERIZATION:    ECG:  < from: 12 Lead ECG (12.21.19 @ 19:18) >  Diagnosis Line Normal sinus rhythm  Nonspecific ST abnormality  Abnormal ECG    < end of copied text >

## 2019-12-23 NOTE — PROGRESS NOTE ADULT - ASSESSMENT
hyponatremia   - due to HCTZ   - improving  dyspnea   - copd exac  CHF  pulm HTN  severe AS  SLE / Sjogren's disease    plan:    off hctz  po lasix   steroids / nebs  O2 via NC  arrangements  for home o2  cont other bp meds  1.2L fluid restriciton  regular salt diet  f/u tsh, uric acid, am cortisol  replete lytes prn  f/u cardio  f/u pulm  d/w resident   full code

## 2019-12-23 NOTE — CONSULT NOTE ADULT - SUBJECTIVE AND OBJECTIVE BOX
HPI:  72 y F with PMHx of Sjogren's disease, SLE, trigeminal neuralgia, breast cancer s/p b/l mastectomy, rectal prolapse s/p partial colectomy, questionable hx of heart failure, COPD not on home O2, HTN presents with chest pain and sob on exertion. Upon interview, patient was somnolent sleeping back after the question but she mentioned that she has been short of breath recently and it has worsened with exertion. She was also feeling exertional chest pain that started along with the SOB, non radiating, mild in intensity. She also noted worsening LE edema. She was advised by her friends to come to the ED. She admits for lower abdominal pain and dysuria but no headache, dizziness or paresthesias.  In ED, VS wnl except SaO2 87% on room air, she was put on 4 L O2 via NC and SaO2 went up to 93%. (21 Dec 2019 23:51)      PAST MEDICAL & SURGICAL HISTORY:  Lupus (systemic lupus erythematosus)  Trigeminal neuralgia  Sjogren's syndrome, with unspecified organ involvement  GERD without esophagitis  Vitamin D deficiency  Constipation, unspecified constipation type  Rectal prolapse  Hypertension, unspecified type  Malignant neoplasm of lower-outer quadrant of left breast of female, estrogen receptor positive  H/O bilateral mastectomy  History of partial colectomy      Hospital Course:  She is deconditioned and weak. she is cg assist for amb per nurses aid and PT with a walker. She lives alone and has a cat. She is emotional.  TODAY'S SUBJECTIVE & REVIEW OF SYMPTOMS:     Constitutional WNL   Cardio WNL   Resp WNL   GI WNL  Heme WNL  Endo WNL  Skin WNL  MSK WNL  Neuro WNL  Cognitive WNL  Psych WNL      MEDICATIONS  (STANDING):  albuterol/ipratropium for Nebulization 3 milliLiter(s) Nebulizer every 6 hours  amLODIPine   Tablet 10 milliGRAM(s) Oral daily  aspirin  chewable 81 milliGRAM(s) Oral daily  atorvastatin 40 milliGRAM(s) Oral at bedtime  budesonide 160 MICROgram(s)/formoterol 4.5 MICROgram(s) Inhaler 2 Puff(s) Inhalation two times a day  chlorhexidine 4% Liquid 1 Application(s) Topical <User Schedule>  enoxaparin Injectable 40 milliGRAM(s) SubCutaneous daily  furosemide    Tablet 60 milliGRAM(s) Oral two times a day  hydroxychloroquine 200 milliGRAM(s) Oral two times a day  losartan 100 milliGRAM(s) Oral daily  montelukast 10 milliGRAM(s) Oral daily  pantoprazole    Tablet 40 milliGRAM(s) Oral before breakfast  predniSONE   Tablet 40 milliGRAM(s) Oral daily  senna 2 Tablet(s) Oral at bedtime  sertraline 50 milliGRAM(s) Oral daily  tiotropium 18 MICROgram(s) Capsule 1 Capsule(s) Inhalation daily    MEDICATIONS  (PRN):  acetaminophen   Tablet .. 650 milliGRAM(s) Oral every 6 hours PRN Temp greater or equal to 38.5C (101.3F), Moderate Pain (4 - 6)  ALBUTerol    90 MICROgram(s) HFA Inhaler 2 Puff(s) Inhalation every 4 hours PRN Shortness of Breath  albuterol/ipratropium for Nebulization. 3 milliLiter(s) Nebulizer every 4 hours PRN Shortness of Breath and/or Wheezing  ALPRAZolam 1 milliGRAM(s) Oral at bedtime PRN anxiety      FAMILY HISTORY:  No pertinent family history in first degree relatives      Allergies    Naprosyn (Stomach Upset)  Vicodin (Stomach Upset)    Intolerances        SOCIAL HISTORY:    [  ] Etoh  [  ] Smoking  [  ] Substance abuse     Home Environment:  [x  ] Home Alone  [  ] Lives with Family  [  ] Home Health Aid    Dwelling:  [  ] Apartment  [  ] Private House  [  ] Adult Home  [  ] Skilled Nursing Facility      [  ] Short Term  [  ] Long Term  [  ] [facility]       Elevator [  ]    FUNCTIONAL STATUS PTA: (Check all that apply)  Ambulation: [ x  ]Independent    [  ] Dependent     [  ] Non-Ambulatory  Assistive Device: [  ] SA Cane  [  ]  Q Cane  [  ] Walker  [  ]  Wheelchair  ADL : [  ] Independent  [  ]  Dependent       Vital Signs Last 24 Hrs  T(C): 35.9 (23 Dec 2019 14:06), Max: 36.2 (22 Dec 2019 15:53)  T(F): 96.6 (23 Dec 2019 14:06), Max: 97.2 (22 Dec 2019 15:53)  HR: 80 (23 Dec 2019 14:06) (78 - 104)  BP: 106/51 (23 Dec 2019 14:06) (103/53 - 131/63)  BP(mean): --  RR: 18 (23 Dec 2019 14:06) (18 - 18)  SpO2: 92% (23 Dec 2019 08:01) (88% - 92%)      PHYSICAL EXAM: Alert & Oriented X3  GENERAL: NAD, well-groomed, well-developed  HEAD:  Atraumatic, Normocephalic  EYES: EOMI, PERRLA, conjunctiva and sclera clear  NECK: Supple, No JVD, Normal thyroid  CHEST/LUNG:  decreased bs  HEART: Regular rate and rhythm; No murmurs, rubs, or gallops  ABDOMEN: Soft, Nontender, Nondistended; Bowel sounds present  EXTREMITIES:  2+ Peripheral Pulses, No clubbing, cyanosis, or edema    NERVOUS SYSTEM:  Cranial Nerves 2-12 intact [  ] Abnormal  [  ]  ROM: WFL all extremities [  ]  Abnormal [  ]  Motor Strength: WFL all extremities  [x  ]  Abnormal [  ]  Sensation: intact to light touch [  ] Abnormal [  ]  Reflexes: Symmetric [  ]  Abnormal [  ]    FUNCTIONAL STATUS:  Bed Mobility: Independent [  ]  Supervision [  ]  Needs Assistance [  ]  N/A [  ]  Transfers: Independent [  ]  Supervision [  ]  Needs Assistance [  ]  N/A [  ]   Ambulation: Independent [  ]  Supervision [  ]  Needs Assistance [  ]  N/A [  ]  ADL: Independent [  ] Requires Assistance [  ] N/A [  ]      LABS:                        10.5   9.72  )-----------( 329      ( 23 Dec 2019 07:20 )             32.4     12-23    133<L>  |  90<L>  |  15  ----------------------------<  149<H>  4.1   |  28  |  0.6<L>    Ca    8.0<L>      23 Dec 2019 07:20  Mg     2.3     12-22    TPro  6.8  /  Alb  4.0  /  TBili  0.3  /  DBili  x   /  AST  19  /  ALT  11  /  AlkPhos  98  12-21      Urinalysis Basic - ( 22 Dec 2019 04:10 )    Color: Yellow / Appearance: Slightly Turbid / S.012 / pH: x  Gluc: x / Ketone: Negative  / Bili: Negative / Urobili: <2 mg/dL   Blood: x / Protein: Trace / Nitrite: Positive   Leuk Esterase: Large / RBC: 3 /HPF / WBC 99 /HPF   Sq Epi: x / Non Sq Epi: 1 /HPF / Bacteria: Many        RADIOLOGY & ADDITIONAL STUDIES:    Assesment:

## 2019-12-23 NOTE — DISCHARGE NOTE NURSING/CASE MANAGEMENT/SOCIAL WORK - PATIENT PORTAL LINK FT
You can access the FollowMyHealth Patient Portal offered by [facility] by registering at the following website: [email or web address]. By joining [employer]’s FollowMyHealth portal, you will also be able to view your health information using other applications (apps) compatible with our system.

## 2019-12-23 NOTE — PHYSICAL THERAPY INITIAL EVALUATION ADULT - ORIENTATION, REHAB EVAL
"[address] [place] [address] Drive  [phone]    PROGRESS NOTE    DATE:  2/12/2019  NAME:  Gilford Poet OF BIRTH:  1970  AGE:  57year old  PRIMARY CARE PROVIDER:  Alisha Steven MD    Total time spent: 21    [facility] prescribed. Chadwick Avendano D.O. Treatment oriented to person, place, time and situation 

## 2019-12-23 NOTE — CONSULT NOTE ADULT - ASSESSMENT
72 y F with PMHx of Sjogren's disease, SLE, trigeminal neuralgia, breast cancer s/p b/l mastectomy, rectal prolapse s/p partial colectomy, questionable hx of heart failure, COPD not on home O2, HTN presents for 2months Hx of SOB and MARLEN.  pt was admitted and treated for COPD exacerbation with mil CHF.    SOB : multifactorial : COPD exacerbation/PHTN with mild volume overload secondary to valvular HF ( severe AS)          started on IV lasix, switched back to PO , currently euvolemic          has persistent hypoxemia , currently on O2          2d echo revealed severe AS JEET 0.85cm2,  MG 52 , and severe Pulm HTN SPAP 63    recommendation:  pulm eval for COPD and PHTN ( likely combination of grp 2/3)  pt is currently euvolemic , continue with po lasix and avoid overdiuresis  BP control  outpt eval for AVR   pt needs O2 for outpt for persistent hypoxemia  f/u with Dr Sepulveda (has an appointment next month) 72 y F with PMHx of Sjogren's disease, SLE, trigeminal neuralgia, breast cancer s/p b/l mastectomy, rectal prolapse s/p partial colectomy, questionable hx of heart failure, COPD not on home O2, HTN presents for 2months Hx of SOB and MARLEN.  pt was admitted and treated for COPD exacerbation with mil CHF.    SOB : multifactorial : COPD exacerbation/PHTN with mild volume overload secondary to valvular HF ( severe AS)          started on IV lasix, switched back to PO , currently euvolemic          has persistent hypoxemia , currently on O2          2d echo revealed severe AS JEET 0.85cm2,  MG 52 , and severe Pulm HTN SPAP 63    recommendation:  pulm eval for COPD and PHTN ( likely combination of grp 2/3)  pt is currently euvolemic , continue with po lasix, decrease to 40mg once daily and avoid overdiuresis  BP control  outpt eval for AVR   pt needs O2 for outpt for persistent hypoxemia  f/u with Dr Sepulveda (has an appointment next month)

## 2019-12-24 ENCOUNTER — TRANSCRIPTION ENCOUNTER (OUTPATIENT)
Age: 74
End: 2019-12-24

## 2019-12-24 VITALS
SYSTOLIC BLOOD PRESSURE: 119 MMHG | HEART RATE: 91 BPM | DIASTOLIC BLOOD PRESSURE: 60 MMHG | TEMPERATURE: 91 F | RESPIRATION RATE: 18 BRPM

## 2019-12-24 LAB
-  AMIKACIN: SIGNIFICANT CHANGE UP
-  AMPICILLIN/SULBACTAM: SIGNIFICANT CHANGE UP
-  AMPICILLIN: SIGNIFICANT CHANGE UP
-  AZTREONAM: SIGNIFICANT CHANGE UP
-  CEFAZOLIN: SIGNIFICANT CHANGE UP
-  CEFEPIME: SIGNIFICANT CHANGE UP
-  CEFOXITIN: SIGNIFICANT CHANGE UP
-  CEFTRIAXONE: SIGNIFICANT CHANGE UP
-  CIPROFLOXACIN: SIGNIFICANT CHANGE UP
-  GENTAMICIN: SIGNIFICANT CHANGE UP
-  IMIPENEM: SIGNIFICANT CHANGE UP
-  LEVOFLOXACIN: SIGNIFICANT CHANGE UP
-  MEROPENEM: SIGNIFICANT CHANGE UP
-  NITROFURANTOIN: SIGNIFICANT CHANGE UP
-  PIPERACILLIN/TAZOBACTAM: SIGNIFICANT CHANGE UP
-  TIGECYCLINE: SIGNIFICANT CHANGE UP
-  TOBRAMYCIN: SIGNIFICANT CHANGE UP
-  TRIMETHOPRIM/SULFAMETHOXAZOLE: SIGNIFICANT CHANGE UP
ANION GAP SERPL CALC-SCNC: 13 MMOL/L — SIGNIFICANT CHANGE UP (ref 7–14)
BASOPHILS # BLD AUTO: 0.01 K/UL — SIGNIFICANT CHANGE UP (ref 0–0.2)
BASOPHILS NFR BLD AUTO: 0.1 % — SIGNIFICANT CHANGE UP (ref 0–1)
BUN SERPL-MCNC: 19 MG/DL — SIGNIFICANT CHANGE UP (ref 10–20)
CALCIUM SERPL-MCNC: 8 MG/DL — LOW (ref 8.5–10.1)
CHLORIDE SERPL-SCNC: 91 MMOL/L — LOW (ref 98–110)
CO2 SERPL-SCNC: 32 MMOL/L — SIGNIFICANT CHANGE UP (ref 17–32)
CORTIS AM PEAK SERPL-MCNC: 11.2 UG/DL — SIGNIFICANT CHANGE UP (ref 6–18.4)
CREAT SERPL-MCNC: 0.7 MG/DL — SIGNIFICANT CHANGE UP (ref 0.7–1.5)
CULTURE RESULTS: SIGNIFICANT CHANGE UP
EOSINOPHIL # BLD AUTO: 0 K/UL — SIGNIFICANT CHANGE UP (ref 0–0.7)
EOSINOPHIL NFR BLD AUTO: 0 % — SIGNIFICANT CHANGE UP (ref 0–8)
GLUCOSE SERPL-MCNC: 100 MG/DL — HIGH (ref 70–99)
HCT VFR BLD CALC: 32.8 % — LOW (ref 37–47)
HGB BLD-MCNC: 10.5 G/DL — LOW (ref 12–16)
IMM GRANULOCYTES NFR BLD AUTO: 0.5 % — HIGH (ref 0.1–0.3)
LEGIONELLA AG UR QL: NEGATIVE — SIGNIFICANT CHANGE UP
LYMPHOCYTES # BLD AUTO: 1.47 K/UL — SIGNIFICANT CHANGE UP (ref 1.2–3.4)
LYMPHOCYTES # BLD AUTO: 13.1 % — LOW (ref 20.5–51.1)
MAGNESIUM SERPL-MCNC: 1.9 MG/DL — SIGNIFICANT CHANGE UP (ref 1.8–2.4)
MCHC RBC-ENTMCNC: 28.1 PG — SIGNIFICANT CHANGE UP (ref 27–31)
MCHC RBC-ENTMCNC: 32 G/DL — SIGNIFICANT CHANGE UP (ref 32–37)
MCV RBC AUTO: 87.7 FL — SIGNIFICANT CHANGE UP (ref 81–99)
METHOD TYPE: SIGNIFICANT CHANGE UP
MONOCYTES # BLD AUTO: 0.9 K/UL — HIGH (ref 0.1–0.6)
MONOCYTES NFR BLD AUTO: 8 % — SIGNIFICANT CHANGE UP (ref 1.7–9.3)
NEUTROPHILS # BLD AUTO: 8.82 K/UL — HIGH (ref 1.4–6.5)
NEUTROPHILS NFR BLD AUTO: 78.3 % — HIGH (ref 42.2–75.2)
NRBC # BLD: 0 /100 WBCS — SIGNIFICANT CHANGE UP (ref 0–0)
ORGANISM # SPEC MICROSCOPIC CNT: SIGNIFICANT CHANGE UP
ORGANISM # SPEC MICROSCOPIC CNT: SIGNIFICANT CHANGE UP
PLATELET # BLD AUTO: 323 K/UL — SIGNIFICANT CHANGE UP (ref 130–400)
POTASSIUM SERPL-MCNC: 3.2 MMOL/L — LOW (ref 3.5–5)
POTASSIUM SERPL-SCNC: 3.2 MMOL/L — LOW (ref 3.5–5)
RBC # BLD: 3.74 M/UL — LOW (ref 4.2–5.4)
RBC # FLD: 14.1 % — SIGNIFICANT CHANGE UP (ref 11.5–14.5)
SODIUM SERPL-SCNC: 136 MMOL/L — SIGNIFICANT CHANGE UP (ref 135–146)
SPECIMEN SOURCE: SIGNIFICANT CHANGE UP
URATE SERPL-MCNC: 5 MG/DL — SIGNIFICANT CHANGE UP (ref 2.5–7)
WBC # BLD: 11.26 K/UL — HIGH (ref 4.8–10.8)
WBC # FLD AUTO: 11.26 K/UL — HIGH (ref 4.8–10.8)

## 2019-12-24 RX ORDER — FUROSEMIDE 40 MG
60 TABLET ORAL DAILY
Refills: 0 | Status: DISCONTINUED | OUTPATIENT
Start: 2019-12-24 | End: 2019-12-24

## 2019-12-24 RX ORDER — POTASSIUM CHLORIDE 20 MEQ
40 PACKET (EA) ORAL ONCE
Refills: 0 | Status: COMPLETED | OUTPATIENT
Start: 2019-12-24 | End: 2019-12-24

## 2019-12-24 RX ORDER — LOSARTAN POTASSIUM 100 MG/1
1 TABLET, FILM COATED ORAL
Qty: 0 | Refills: 0 | DISCHARGE
Start: 2019-12-24

## 2019-12-24 RX ORDER — TIOTROPIUM BROMIDE 18 UG/1
2 CAPSULE ORAL; RESPIRATORY (INHALATION)
Qty: 1 | Refills: 0
Start: 2019-12-24

## 2019-12-24 RX ORDER — FUROSEMIDE 40 MG
2 TABLET ORAL
Qty: 60 | Refills: 0
Start: 2019-12-24 | End: 2020-01-22

## 2019-12-24 RX ORDER — BUDESONIDE AND FORMOTEROL FUMARATE DIHYDRATE 160; 4.5 UG/1; UG/1
2 AEROSOL RESPIRATORY (INHALATION)
Qty: 3 | Refills: 0
Start: 2019-12-24 | End: 2020-01-22

## 2019-12-24 RX ORDER — ALBUTEROL 90 UG/1
2 AEROSOL, METERED ORAL
Qty: 2 | Refills: 0
Start: 2019-12-24 | End: 2020-01-04

## 2019-12-24 RX ADMIN — Medication 40 MILLIEQUIVALENT(S): at 13:39

## 2019-12-24 RX ADMIN — Medication 3 MILLILITER(S): at 22:10

## 2019-12-24 RX ADMIN — AMLODIPINE BESYLATE 10 MILLIGRAM(S): 2.5 TABLET ORAL at 06:11

## 2019-12-24 RX ADMIN — ENOXAPARIN SODIUM 40 MILLIGRAM(S): 100 INJECTION SUBCUTANEOUS at 12:47

## 2019-12-24 RX ADMIN — Medication 81 MILLIGRAM(S): at 12:47

## 2019-12-24 RX ADMIN — Medication 3 MILLILITER(S): at 08:08

## 2019-12-24 RX ADMIN — MONTELUKAST 10 MILLIGRAM(S): 4 TABLET, CHEWABLE ORAL at 12:47

## 2019-12-24 RX ADMIN — SERTRALINE 50 MILLIGRAM(S): 25 TABLET, FILM COATED ORAL at 12:47

## 2019-12-24 RX ADMIN — Medication 60 MILLIGRAM(S): at 06:12

## 2019-12-24 RX ADMIN — Medication 40 MILLIGRAM(S): at 06:11

## 2019-12-24 RX ADMIN — BUDESONIDE AND FORMOTEROL FUMARATE DIHYDRATE 2 PUFF(S): 160; 4.5 AEROSOL RESPIRATORY (INHALATION) at 09:34

## 2019-12-24 RX ADMIN — Medication 3 MILLILITER(S): at 15:40

## 2019-12-24 RX ADMIN — Medication 200 MILLIGRAM(S): at 06:12

## 2019-12-24 RX ADMIN — LOSARTAN POTASSIUM 100 MILLIGRAM(S): 100 TABLET, FILM COATED ORAL at 06:11

## 2019-12-24 RX ADMIN — PANTOPRAZOLE SODIUM 40 MILLIGRAM(S): 20 TABLET, DELAYED RELEASE ORAL at 06:11

## 2019-12-24 NOTE — DISCHARGE NOTE PROVIDER - NSDCMRMEDTOKEN_GEN_ALL_CORE_FT
ALPRAZolam 0.5 mg oral tablet: 2 tab(s) orally once a day (at bedtime), As Needed  amLODIPine 10 mg oral tablet: 1 tab(s) orally once a day  cevimeline 30 mg oral capsule: 3 cap(s) orally once a day  docusate sodium 100 mg oral capsule: 1 cap(s) orally 2 times a day  Fioricet oral capsule: 2 cap(s) orally once a day  furosemide 20 mg oral tablet: 2 tab(s) orally once a day   hydroxychloroquine 200 mg oral tablet: 1 tab(s) orally 2 times a day  hydrOXYzine hydrochloride 50 mg oral tablet: 1 tab(s) orally every 6 hours, As Needed -Anxiety - for anxiety   losartan 100 mg oral tablet: 1 tab(s) orally once a day  Low Dose ASA 81 mg oral tablet: 1 tab(s) orally once a day  montelukast 10 mg oral tablet: 1 tab(s) orally once a day  ocular lubricant ophthalmic solution: 1 drop(s) to each affected eye every 2 hours, As needed, Dry Eyes  omeprazole 40 mg oral delayed release capsule: 1 cap(s) orally once a day  phenazopyridine 100 mg oral tablet: 1 tab(s) orally 2 times a day, As Needed for burning with urination  predniSONE 20 mg oral tablet: 2 tab(s) orally once a day  pregabalin 25 mg oral capsule: 1 cap(s) orally once a day MDD:one daily  rosuvastatin 40 mg oral tablet: 1 tab(s) orally once a day (at bedtime)  senna oral tablet: 2 tab(s) orally once a day (at bedtime)  Spiriva Respimat 1.25 mcg/inh inhalation aerosol: 2 puff(s) inhaled once a day   sucralfate 1 g oral tablet: 1 tab(s) orally 2 times a day  sulfaSALAzine 500 mg oral tablet: 3 tab(s) orally 2 times a day  Symbicort 160 mcg-4.5 mcg/inh inhalation aerosol: 2 puff(s) inhaled 2 times a day   traZODone 50 mg oral tablet: 1 tab(s) orally once a day (at bedtime)  Zoloft 50 mg oral tablet: 1 tab(s) orally once a day

## 2019-12-24 NOTE — PROGRESS NOTE ADULT - ASSESSMENT
impression    HFpEF exacerbation  Valvular heart failure(severe AS)  COPD(not compliant)/ highly NETTE  Pulmonary HTN(WHO 2/3)    Plan:      will need oxygen at home OF POX less than 89%  PO lasix  LAMA/LABA/ICS  prednisone 40 daily for 5 days  albuterol as rescue.  smoking counseling  HOB elevation  DVT ppx  OOB to chair  OP chest ct/ PFT/ Sleep study

## 2019-12-24 NOTE — DISCHARGE NOTE PROVIDER - NSDCCPCAREPLAN_GEN_ALL_CORE_FT
PRINCIPAL DISCHARGE DIAGNOSIS  Diagnosis: Exertional shortness of breath  Assessment and Plan of Treatment: you have SOB because you have COPD and heart failure and aortic stenosis   you need to stop smoking - you have low Oxygen ; you are being discharged on oxygen   you need to follow up with pulmonary for outpatient CT chest and Pulmonar y function tests and sleep tests  you have to take lasix 40 mg   and fu with cardiology because you have severe AS and need evaluation for TAVR

## 2019-12-24 NOTE — DISCHARGE NOTE PROVIDER - PROVIDER TOKENS
PROVIDER:[TOKEN:[[id number]]],PROVIDER:[TOKEN:[[id number]]],PROVIDER:[TOKEN:[[id number]]]

## 2019-12-24 NOTE — DISCHARGE NOTE PROVIDER - CARE PROVIDER_API CALL
Uziel Diane)  Critical Care Medicine; Internal Medicine; Pulmonary Disease; Sleep Medicine  [address] [place] [address]  Phone: [phone]  Fax: [phone]  Follow Up Time:     Alexys Sepulveda)  Cardiovascular Disease; Interventional Cardiology  [address] [place] [address]  Phone: [phone]  Fax: [phone]  Follow Up Time:     Mohan Donis)  Internal Medicine; Nephrology  [address] [place] [address]  Phone: [phone]  Fax: [phone]  Follow Up Time:

## 2019-12-24 NOTE — PROGRESS NOTE ADULT - ASSESSMENT
hyponatremia   - due to HCTZ   - improving  hypokalemia  dyspnea   - copd exacerbation  CHF acute on chronic HFpEF  pulm HTN  severe AS  SLE / Sjogren's disease  benzo and ETOH use    plan:    keep off hctz  cont po lasix   KCl supplementation   cont losartan  steroids / nebs  O2 via NC  arrangements  for home o2  1.2L fluid restriction  regular salt diet  f/u cardio  f/u pulm  d/w resident   etoh cessation  physical therapy  DVT prophylaxis  d/w team

## 2019-12-24 NOTE — DISCHARGE NOTE PROVIDER - HOSPITAL COURSE
72 y f with pmh of sjogrens, lupus, breast cancer s/p mastectomy , COPD with hypoxeb/l and HFpEF and severe AS admitted for sob on exertion        # SOB on exertion:    - treated for COPD and volume overload; symptoms also related to AS     improved now on PO prednisone and Po lasix     - fu pulmonary outpatient needs CT chest     -echo revealed Pulmonary HTN and severe AS     - cardio consult Dr morales needs outpatient TAVR eval     patient needs home O2     she is treated with prednisone course and lasix and advised excessively to stop smoking         # Hyponatremia - likely 2/2 SIADH -due to HCTZ     improved after stopping HCTZ        # Sjogren's disease:    - c/w home meds and fu PMD        # SLE:     c/w hpme meds and fu PMD        #  Trigeminal neuralgia:    - Will hold lyrica ( patient takes it at home) for concerns of SIADH        # HTN:    - C/w  losartan    - Monitor BMP daily

## 2019-12-31 DIAGNOSIS — G50.0 TRIGEMINAL NEURALGIA: ICD-10-CM

## 2019-12-31 DIAGNOSIS — E22.2 SYNDROME OF INAPPROPRIATE SECRETION OF ANTIDIURETIC HORMONE: ICD-10-CM

## 2019-12-31 DIAGNOSIS — Z91.14 PATIENT'S OTHER NONCOMPLIANCE WITH MEDICATION REGIMEN: ICD-10-CM

## 2019-12-31 DIAGNOSIS — M35.00 SJOGREN SYNDROME, UNSPECIFIED: ICD-10-CM

## 2019-12-31 DIAGNOSIS — I50.33 ACUTE ON CHRONIC DIASTOLIC (CONGESTIVE) HEART FAILURE: ICD-10-CM

## 2019-12-31 DIAGNOSIS — J44.1 CHRONIC OBSTRUCTIVE PULMONARY DISEASE WITH (ACUTE) EXACERBATION: ICD-10-CM

## 2019-12-31 DIAGNOSIS — R09.02 HYPOXEMIA: ICD-10-CM

## 2019-12-31 DIAGNOSIS — Z85.3 PERSONAL HISTORY OF MALIGNANT NEOPLASM OF BREAST: ICD-10-CM

## 2019-12-31 DIAGNOSIS — I11.0 HYPERTENSIVE HEART DISEASE WITH HEART FAILURE: ICD-10-CM

## 2019-12-31 DIAGNOSIS — I27.29 OTHER SECONDARY PULMONARY HYPERTENSION: ICD-10-CM

## 2019-12-31 DIAGNOSIS — M32.9 SYSTEMIC LUPUS ERYTHEMATOSUS, UNSPECIFIED: ICD-10-CM

## 2019-12-31 DIAGNOSIS — R06.02 SHORTNESS OF BREATH: ICD-10-CM

## 2019-12-31 DIAGNOSIS — I35.0 NONRHEUMATIC AORTIC (VALVE) STENOSIS: ICD-10-CM

## 2020-01-01 ENCOUNTER — APPOINTMENT (OUTPATIENT)
Dept: CARDIOLOGY | Facility: CLINIC | Age: 75
End: 2020-01-01
Payer: MEDICARE

## 2020-01-01 ENCOUNTER — NON-APPOINTMENT (OUTPATIENT)
Age: 75
End: 2020-01-01

## 2020-01-01 ENCOUNTER — RX RENEWAL (OUTPATIENT)
Age: 75
End: 2020-01-01

## 2020-01-01 VITALS
SYSTOLIC BLOOD PRESSURE: 156 MMHG | HEIGHT: 66 IN | BODY MASS INDEX: 26.52 KG/M2 | TEMPERATURE: 97.3 F | WEIGHT: 165 LBS | HEART RATE: 84 BPM | DIASTOLIC BLOOD PRESSURE: 65 MMHG

## 2020-01-01 DIAGNOSIS — Z95.2 PRESENCE OF PROSTHETIC HEART VALVE: ICD-10-CM

## 2020-01-01 PROCEDURE — 93296 REM INTERROG EVL PM/IDS: CPT

## 2020-01-01 PROCEDURE — 93294 REM INTERROG EVL PM/LDLS PM: CPT

## 2020-01-01 PROCEDURE — 99215 OFFICE O/P EST HI 40 MIN: CPT

## 2020-01-01 PROCEDURE — 93280 PM DEVICE PROGR EVAL DUAL: CPT

## 2020-01-01 RX ORDER — PREGABALIN 25 MG/1
25 CAPSULE ORAL DAILY
Refills: 0 | Status: COMPLETED | COMMUNITY
Start: 2020-07-27 | End: 2020-01-01

## 2020-01-01 RX ORDER — OXYCODONE HYDROCHLORIDE 15 MG/1
15 TABLET ORAL
Refills: 0 | Status: ACTIVE | COMMUNITY

## 2020-01-01 RX ORDER — SENNOSIDES 8.6 MG/1
8.6 TABLET ORAL
Refills: 0 | Status: COMPLETED | COMMUNITY
Start: 2020-07-27 | End: 2020-01-01

## 2020-01-01 RX ORDER — AMLODIPINE BESYLATE 5 MG/1
5 TABLET ORAL
Qty: 30 | Refills: 0 | Status: ACTIVE | COMMUNITY
Start: 2020-08-05

## 2020-01-01 RX ORDER — DOCUSATE SODIUM 100 MG/1
100 CAPSULE ORAL
Refills: 0 | Status: COMPLETED | COMMUNITY
Start: 2020-07-27 | End: 2020-01-01

## 2020-01-01 RX ORDER — BUDESONIDE AND FORMOTEROL FUMARATE DIHYDRATE 160; 4.5 UG/1; UG/1
160-4.5 AEROSOL RESPIRATORY (INHALATION) TWICE DAILY
Qty: 1 | Refills: 0 | Status: COMPLETED | COMMUNITY
Start: 2020-07-27 | End: 2020-01-01

## 2020-01-01 RX ORDER — ROSUVASTATIN CALCIUM 10 MG/1
10 TABLET, FILM COATED ORAL
Qty: 90 | Refills: 0 | Status: ACTIVE | COMMUNITY
Start: 2020-01-01

## 2020-01-01 RX ORDER — APIXABAN 5 MG/1
5 TABLET, FILM COATED ORAL
Qty: 60 | Refills: 1 | Status: ACTIVE | COMMUNITY
Start: 2020-07-27

## 2020-01-01 RX ORDER — TIOTROPIUM BROMIDE INHALATION SPRAY 1.56 UG/1
1.25 SPRAY, METERED RESPIRATORY (INHALATION) DAILY
Qty: 30 | Refills: 0 | Status: COMPLETED | COMMUNITY
Start: 2020-07-27 | End: 2020-01-01

## 2020-01-27 ENCOUNTER — EMERGENCY (EMERGENCY)
Facility: HOSPITAL | Age: 75
LOS: 0 days | Discharge: HOME | End: 2020-01-28
Attending: EMERGENCY MEDICINE | Admitting: EMERGENCY MEDICINE
Payer: MEDICARE

## 2020-01-27 VITALS
RESPIRATION RATE: 18 BRPM | SYSTOLIC BLOOD PRESSURE: 135 MMHG | HEART RATE: 78 BPM | DIASTOLIC BLOOD PRESSURE: 70 MMHG | OXYGEN SATURATION: 99 % | TEMPERATURE: 98 F

## 2020-01-27 DIAGNOSIS — F41.9 ANXIETY DISORDER, UNSPECIFIED: ICD-10-CM

## 2020-01-27 DIAGNOSIS — Z88.5 ALLERGY STATUS TO NARCOTIC AGENT: ICD-10-CM

## 2020-01-27 DIAGNOSIS — Z98.890 OTHER SPECIFIED POSTPROCEDURAL STATES: Chronic | ICD-10-CM

## 2020-01-27 DIAGNOSIS — Z88.8 ALLERGY STATUS TO OTHER DRUGS, MEDICAMENTS AND BIOLOGICAL SUBSTANCES STATUS: ICD-10-CM

## 2020-01-27 DIAGNOSIS — R63.0 ANOREXIA: ICD-10-CM

## 2020-01-27 DIAGNOSIS — R11.2 NAUSEA WITH VOMITING, UNSPECIFIED: ICD-10-CM

## 2020-01-27 LAB
ALBUMIN SERPL ELPH-MCNC: 4.3 G/DL — SIGNIFICANT CHANGE UP (ref 3.5–5.2)
ALP SERPL-CCNC: 74 U/L — SIGNIFICANT CHANGE UP (ref 30–115)
ALT FLD-CCNC: 10 U/L — SIGNIFICANT CHANGE UP (ref 0–41)
ANION GAP SERPL CALC-SCNC: 14 MMOL/L — SIGNIFICANT CHANGE UP (ref 7–14)
APPEARANCE UR: CLEAR — SIGNIFICANT CHANGE UP
AST SERPL-CCNC: 16 U/L — SIGNIFICANT CHANGE UP (ref 0–41)
BASOPHILS # BLD AUTO: 0.06 K/UL — SIGNIFICANT CHANGE UP (ref 0–0.2)
BASOPHILS NFR BLD AUTO: 0.7 % — SIGNIFICANT CHANGE UP (ref 0–1)
BILIRUB SERPL-MCNC: 0.3 MG/DL — SIGNIFICANT CHANGE UP (ref 0.2–1.2)
BILIRUB UR-MCNC: NEGATIVE — SIGNIFICANT CHANGE UP
BUN SERPL-MCNC: 17 MG/DL — SIGNIFICANT CHANGE UP (ref 10–20)
CALCIUM SERPL-MCNC: 10.3 MG/DL — HIGH (ref 8.5–10.1)
CHLORIDE SERPL-SCNC: 97 MMOL/L — LOW (ref 98–110)
CO2 SERPL-SCNC: 25 MMOL/L — SIGNIFICANT CHANGE UP (ref 17–32)
COLOR SPEC: YELLOW — SIGNIFICANT CHANGE UP
CREAT SERPL-MCNC: 0.8 MG/DL — SIGNIFICANT CHANGE UP (ref 0.7–1.5)
DIFF PNL FLD: NEGATIVE — SIGNIFICANT CHANGE UP
EOSINOPHIL # BLD AUTO: 0.03 K/UL — SIGNIFICANT CHANGE UP (ref 0–0.7)
EOSINOPHIL NFR BLD AUTO: 0.3 % — SIGNIFICANT CHANGE UP (ref 0–8)
GLUCOSE SERPL-MCNC: 128 MG/DL — HIGH (ref 70–99)
GLUCOSE UR QL: NEGATIVE — SIGNIFICANT CHANGE UP
HCT VFR BLD CALC: 41.7 % — SIGNIFICANT CHANGE UP (ref 37–47)
HGB BLD-MCNC: 13.7 G/DL — SIGNIFICANT CHANGE UP (ref 12–16)
IMM GRANULOCYTES NFR BLD AUTO: 0.4 % — HIGH (ref 0.1–0.3)
KETONES UR-MCNC: NEGATIVE — SIGNIFICANT CHANGE UP
LACTATE SERPL-SCNC: 1.5 MMOL/L — SIGNIFICANT CHANGE UP (ref 0.7–2)
LEUKOCYTE ESTERASE UR-ACNC: NEGATIVE — SIGNIFICANT CHANGE UP
LIDOCAIN IGE QN: 5 U/L — LOW (ref 7–60)
LYMPHOCYTES # BLD AUTO: 1.92 K/UL — SIGNIFICANT CHANGE UP (ref 1.2–3.4)
LYMPHOCYTES # BLD AUTO: 21.5 % — SIGNIFICANT CHANGE UP (ref 20.5–51.1)
MCHC RBC-ENTMCNC: 27.9 PG — SIGNIFICANT CHANGE UP (ref 27–31)
MCHC RBC-ENTMCNC: 32.9 G/DL — SIGNIFICANT CHANGE UP (ref 32–37)
MCV RBC AUTO: 84.9 FL — SIGNIFICANT CHANGE UP (ref 81–99)
MONOCYTES # BLD AUTO: 0.8 K/UL — HIGH (ref 0.1–0.6)
MONOCYTES NFR BLD AUTO: 9 % — SIGNIFICANT CHANGE UP (ref 1.7–9.3)
NEUTROPHILS # BLD AUTO: 6.08 K/UL — SIGNIFICANT CHANGE UP (ref 1.4–6.5)
NEUTROPHILS NFR BLD AUTO: 68.1 % — SIGNIFICANT CHANGE UP (ref 42.2–75.2)
NITRITE UR-MCNC: NEGATIVE — SIGNIFICANT CHANGE UP
NRBC # BLD: 0 /100 WBCS — SIGNIFICANT CHANGE UP (ref 0–0)
PH UR: 6.5 — SIGNIFICANT CHANGE UP (ref 5–8)
PLATELET # BLD AUTO: 292 K/UL — SIGNIFICANT CHANGE UP (ref 130–400)
POTASSIUM SERPL-MCNC: 4.2 MMOL/L — SIGNIFICANT CHANGE UP (ref 3.5–5)
POTASSIUM SERPL-SCNC: 4.2 MMOL/L — SIGNIFICANT CHANGE UP (ref 3.5–5)
PROT SERPL-MCNC: 7.1 G/DL — SIGNIFICANT CHANGE UP (ref 6–8)
PROT UR-MCNC: SIGNIFICANT CHANGE UP
RBC # BLD: 4.91 M/UL — SIGNIFICANT CHANGE UP (ref 4.2–5.4)
RBC # FLD: 15.1 % — HIGH (ref 11.5–14.5)
SODIUM SERPL-SCNC: 136 MMOL/L — SIGNIFICANT CHANGE UP (ref 135–146)
SP GR SPEC: 1.01 — SIGNIFICANT CHANGE UP (ref 1.01–1.02)
TROPONIN T SERPL-MCNC: <0.01 NG/ML — SIGNIFICANT CHANGE UP
UROBILINOGEN FLD QL: SIGNIFICANT CHANGE UP
WBC # BLD: 8.93 K/UL — SIGNIFICANT CHANGE UP (ref 4.8–10.8)
WBC # FLD AUTO: 8.93 K/UL — SIGNIFICANT CHANGE UP (ref 4.8–10.8)

## 2020-01-27 PROCEDURE — 99285 EMERGENCY DEPT VISIT HI MDM: CPT

## 2020-01-27 PROCEDURE — 93010 ELECTROCARDIOGRAM REPORT: CPT

## 2020-01-27 PROCEDURE — 71045 X-RAY EXAM CHEST 1 VIEW: CPT | Mod: 26

## 2020-01-27 RX ORDER — ONDANSETRON 8 MG/1
1 TABLET, FILM COATED ORAL
Qty: 3 | Refills: 0
Start: 2020-01-27 | End: 2020-01-27

## 2020-01-27 RX ORDER — ONDANSETRON 8 MG/1
4 TABLET, FILM COATED ORAL ONCE
Refills: 0 | Status: COMPLETED | OUTPATIENT
Start: 2020-01-27 | End: 2020-01-27

## 2020-01-27 RX ORDER — FAMOTIDINE 10 MG/ML
20 INJECTION INTRAVENOUS ONCE
Refills: 0 | Status: COMPLETED | OUTPATIENT
Start: 2020-01-27 | End: 2020-01-27

## 2020-01-27 RX ADMIN — ONDANSETRON 4 MILLIGRAM(S): 8 TABLET, FILM COATED ORAL at 22:11

## 2020-01-27 RX ADMIN — FAMOTIDINE 20 MILLIGRAM(S): 10 INJECTION INTRAVENOUS at 22:11

## 2020-01-27 NOTE — ED ADULT NURSE NOTE - OBJECTIVE STATEMENT
pt biba for not eating for 2 weeks as per pt pt states she feels anxious because she has not had her xanax in four dAYS

## 2020-01-27 NOTE — ED PROVIDER NOTE - PHYSICAL EXAMINATION
VITAL SIGNS: I have reviewed nursing notes and confirm.  CONSTITUTIONAL: Well-developed; well-nourished; in no acute distress.  SKIN: Skin exam is warm and dry, no acute rash.  HEAD: Normocephalic; atraumatic.  EYES: PERRL, EOM intact; conjunctiva and sclera clear.  ENT: No nasal discharge; airway clear.  NECK: Supple; non tender.  CARD: S1, S2 normal; no murmurs, gallops, or rubs. Regular rate and rhythm.  RESP: No wheezes, rales or rhonchi.  ABD: Normal bowel sounds; soft; non-distended; non-tender; no hepatosplenomegaly; no rgr.  BACK: non-tender, no CVAT  EXT: Normal ROM. No clubbing, cyanosis or edema. DPI.  NEURO: Alert, oriented. Grossly unremarkable. No focal deficits. No tremors.  PSYCH: Cooperative, appropriate.

## 2020-01-27 NOTE — ED ADULT NURSE NOTE - NSIMPLEMENTINTERV_GEN_ALL_ED
Implemented All Fall with Harm Risk Interventions:  [place] to call system. Call bell, personal items and telephone within reach. Instruct patient to call for assistance. Room bathroom lighting operational. Non-slip footwear when patient is off stretcher. Physically safe environment: no spills, clutter or unnecessary equipment. Stretcher in lowest position, wheels locked, appropriate side rails in place. Provide visual cue, wrist band, yellow gown, etc. Monitor gait and stability. Monitor for mental status changes and reorient to person, place, and time. Review medications for side effects contributing to fall risk. Reinforce activity limits and safety measures with patient and family. Provide visual clues: red socks.

## 2020-01-27 NOTE — ED PROVIDER NOTE - NSFOLLOWUPINSTRUCTIONS_ED_ALL_ED_FT
Nausea / Vomiting    Nausea is the feeling that you have an upset stomach or have to vomit. As nausea gets worse, it can lead to vomiting. Vomiting occurs when stomach contents are thrown up and out of the mouth which puts you at an increased risk for dehydration. Older adults and people with other diseases or a weak immune system are at higher risk for dehydration. Drink clear fluids in small but frequent amounts as tolerated. Eat bland, easy-to-digest foods in small amounts as tolerated.     SEEK IMMEDIATE MEDICAL CARE IF YOU HAVE THE FOLLOWING SYMPTOMS: fever, inability to keep fluids down, black or bloody vomitus, black or bloody stools, lightheadedness/dizziness, chest pain, severe headache, rash, shortness of breath, cold or clammy skin, confusion, pain with urination, or any signs of dehydration.     Anxiety    Generalized anxiety disorder (JESSIKA) is a mental disorder. It is defined as anxiety that is not necessarily related to specific events or activities or is out of proportion to said events. Symptoms include restlessness, fatigue, difficulty concentrations, irritability and difficulty concentrating. It interferes with life functions, including relationships, work, and school. If you were started on a medication make sure to take exactly as prescribed and follow up with a psychiatrist.    SEEK IMMEDIATE MEDICAL CARE IF YOU HAVE THE FOLLOWING SYMPTOMS: thoughts about hurting killing yourself, thoughts about hurting or killing somebody else, hallucinations, or worsening depression.

## 2020-01-27 NOTE — ED PROVIDER NOTE - PATIENT PORTAL LINK FT
You can access the FollowMyHealth Patient Portal offered by Health system by registering at the following website: [email or web address]. By joining [employer]’s FollowMyHealth portal, you will also be able to view your health information using other applications (apps) compatible with our system.

## 2020-01-27 NOTE — ED PROVIDER NOTE - CARE PROVIDER_API CALL
Lita Dubois (DO)  Internal Medicine  [address] [place] [address]  Phone: [phone]  Fax: [phone]  Established Patient  Follow Up Time: 1-3 Days

## 2020-01-27 NOTE — ED PROVIDER NOTE - CLINICAL SUMMARY MEDICAL DECISION MAKING FREE TEXT BOX
decr appetite, NV after abx for pna dx as outpt - ED w/u incl ekg, cxr, labs w/o acute findings - s/w Dr. Merritt, a/w plan for discharge & outpt f/u - pt reassessed, tolerating po, all results d/w pt & copies given, strict return precautions discussed, rec outpt PMD f/u, advised to call office [facility] after 9am to schedule f/u appt this week

## 2020-01-27 NOTE — ED PROVIDER NOTE - PROGRESS NOTE DETAILS
Authored by Dr. Jenni Ross: d/w PMDs assoc Dr. Merritt, a/w plan for discharge, pt may call office [facility] after 9am to sched f/u appt this week

## 2020-01-27 NOTE — ED PROVIDER NOTE - OBJECTIVE STATEMENT
74y f h/o chf, severe AS pending eval for tavr, copd on home O2 prn, lupus, remote h/o breast CA s/p bl mastectomy, htn, gerd, sjogren syndrome, trigem neuralgia p/w decr appetite x 2 weeks. Recent hosp admission for copd/chf, was discharged to home and f/u with her PMD Dr. Dubois who she states dx her with pna and started her on abx x 10d, unsure of name, stopped taking after 7d bc she began to have nausea & few episodes nbnb vomiting. Pt also c/o incr anxiety sx, states she ran out of her Rx appx 1 week ago bc she was taking extra bc she was nervous about needing TAVR. Pt denies f/c, cp/sob, abd pain, diarrhea, flank pain, urinary sx, rash, edema. PMD Silvino / Cardio Coco / Pulm [place]eta / Renal Gagandeep.

## 2020-01-28 VITALS
RESPIRATION RATE: 18 BRPM | TEMPERATURE: 98 F | HEART RATE: 90 BPM | OXYGEN SATURATION: 92 % | SYSTOLIC BLOOD PRESSURE: 130 MMHG | DIASTOLIC BLOOD PRESSURE: 70 MMHG

## 2020-01-29 LAB
CULTURE RESULTS: SIGNIFICANT CHANGE UP
SPECIMEN SOURCE: SIGNIFICANT CHANGE UP

## 2020-02-14 ENCOUNTER — APPOINTMENT (OUTPATIENT)
Dept: CARDIOLOGY | Facility: CLINIC | Age: 75
End: 2020-02-14
Payer: MEDICARE

## 2020-02-14 ENCOUNTER — APPOINTMENT (OUTPATIENT)
Dept: CARDIOTHORACIC SURGERY | Facility: CLINIC | Age: 75
End: 2020-02-14
Payer: MEDICARE

## 2020-02-14 VITALS
OXYGEN SATURATION: 97 % | HEART RATE: 63 BPM | DIASTOLIC BLOOD PRESSURE: 60 MMHG | TEMPERATURE: 97.6 F | RESPIRATION RATE: 14 BRPM | SYSTOLIC BLOOD PRESSURE: 122 MMHG

## 2020-02-14 VITALS
OXYGEN SATURATION: 97 % | RESPIRATION RATE: 14 BRPM | DIASTOLIC BLOOD PRESSURE: 60 MMHG | SYSTOLIC BLOOD PRESSURE: 122 MMHG | HEART RATE: 63 BPM | TEMPERATURE: 97.6 F

## 2020-02-14 DIAGNOSIS — M32.9 SYSTEMIC LUPUS ERYTHEMATOSUS, UNSPECIFIED: ICD-10-CM

## 2020-02-14 DIAGNOSIS — Z87.39 PERSONAL HISTORY OF OTHER DISEASES OF THE MUSCULOSKELETAL SYSTEM AND CONNECTIVE TISSUE: ICD-10-CM

## 2020-02-14 DIAGNOSIS — Z86.69 PERSONAL HISTORY OF OTHER DISEASES OF THE NERVOUS SYSTEM AND SENSE ORGANS: ICD-10-CM

## 2020-02-14 DIAGNOSIS — F17.200 NICOTINE DEPENDENCE, UNSPECIFIED, UNCOMPLICATED: ICD-10-CM

## 2020-02-14 DIAGNOSIS — Z82.49 FAMILY HISTORY OF ISCHEMIC HEART DISEASE AND OTHER DISEASES OF THE CIRCULATORY SYSTEM: ICD-10-CM

## 2020-02-14 DIAGNOSIS — Z86.59 PERSONAL HISTORY OF OTHER MENTAL AND BEHAVIORAL DISORDERS: ICD-10-CM

## 2020-02-14 DIAGNOSIS — M35.00 SICCA SYNDROME, UNSPECIFIED: ICD-10-CM

## 2020-02-14 DIAGNOSIS — R01.1 CARDIAC MURMUR, UNSPECIFIED: ICD-10-CM

## 2020-02-14 DIAGNOSIS — Z63.4 DISAPPEARANCE AND DEATH OF FAMILY MEMBER: ICD-10-CM

## 2020-02-14 DIAGNOSIS — Z87.01 PERSONAL HISTORY OF PNEUMONIA (RECURRENT): ICD-10-CM

## 2020-02-14 DIAGNOSIS — Z87.19 PERSONAL HISTORY OF OTHER DISEASES OF THE DIGESTIVE SYSTEM: ICD-10-CM

## 2020-02-14 DIAGNOSIS — R06.09 OTHER FORMS OF DYSPNEA: ICD-10-CM

## 2020-02-14 DIAGNOSIS — Z85.3 PERSONAL HISTORY OF MALIGNANT NEOPLASM OF BREAST: ICD-10-CM

## 2020-02-14 PROCEDURE — 99204 OFFICE O/P NEW MOD 45 MIN: CPT

## 2020-02-14 PROCEDURE — 99214 OFFICE O/P EST MOD 30 MIN: CPT

## 2020-02-14 RX ORDER — IRON HEME POLYPEPTIDE/FOLIC AC 12-1MG
125 MCG TABLET ORAL
Refills: 0 | Status: ACTIVE | COMMUNITY

## 2020-02-14 SDOH — SOCIAL STABILITY - SOCIAL INSECURITY: DISSAPEARANCE AND DEATH OF FAMILY MEMBER: Z63.4

## 2020-02-14 NOTE — REASON FOR VISIT
[Consultation] : a consultation visit [FreeTextEntry1] : Aortic Valvular Disease of Aortic Stenosis - referred by Dr. Sepulveda

## 2020-02-14 NOTE — ASSESSMENT
[FreeTextEntry1] : has symptomatic AS. agree that TAVR is best. explained all the risks. will start the CURIEL including CT scan.

## 2020-02-14 NOTE — HISTORY OF PRESENT ILLNESS
[FreeTextEntry1] : Pt is a 75 y/o, female, with valvular disease of aortic stenosis, indicated on echo 12/2019 with a PV 4.72 / JEET 0.8 / MG 52.5 with EF 75%, mild MR, mid-mod TR, severe AS - stage  D1, chronic diastolic HF, NYHA II, sev pHTN 63.5 mmHg, with a PMHx of Sjoren syndrome (autoimmune Dx 5-6 years ago), lupus, breast CA s/p b/l mastectomy 2010 in [facility]- no chemo, no radiation ,  TIAs ( 2007, 2012, 2013), Trigeminal neuralgia, smoker 1 pack day/50 years.  \par Pt was hospitalized last on 12/21/20 for CP - tx for CHF /COPD.

## 2020-02-16 NOTE — REVIEW OF SYSTEMS
[Dyspnea on exertion] : dyspnea during exertion [Feeling Fatigued] : feeling fatigued [Joint Pain] : joint pain [Chest  Pressure] : no chest pressure [Chest Pain] : no chest pain [Lower Ext Edema] : no extremity edema [Cough] : no cough [Wheezing] : no wheezing [Dizziness] : no dizziness [Coughing Up Blood] : no hemoptysis [Confusion] : no confusion was observed [Anxiety] : no anxiety

## 2020-02-16 NOTE — REASON FOR VISIT
[Heart Failure] : congestive heart failure [Aortic Stenosis] : aortic stenosis [Consultation] : a consultation visit [FreeTextEntry1] : Aortic Valvular Disease of Aortic Stenosis - referred by Dr. Sepulveda

## 2020-02-16 NOTE — ASSESSMENT
[FreeTextEntry1] : Pt is a 73 y/o, female, with valvular disease of aortic stenosis, indicated on echo 12/2019 with a PV 4.72 / JEET 0.8 / MG 52.5 with EF 75%, mild MR, mid-mod TR, severe AS - stage  D1, chronic diastolic HF, NYHA II, sev pHTN 63.5 mmHg, with a PMHx of Sjoren syndrome (autoimmune Dx 5-6 years ago), lupus, breast CA s/p b/l mastectomy 2010 in [facility]- no chemo, no radiation ,  TIAs ( 2007, 2012, 2013), Trigeminal neuralgia, smoker 1 pack day/50 years.   \par \par Symptomatic AS, independent with ADLs with family support, clinically stable.  Discussed and explained the process in planning for TAVR.Pt educated on the MCOT device post procedure for 30 Days.   Ms. Díaz  agrees to plan of care.    All questions answered.

## 2020-02-16 NOTE — HISTORY OF PRESENT ILLNESS
[FreeTextEntry1] : Pt is a 75 y/o, female, with valvular disease of aortic stenosis, indicated on echo 12/2019 with a PV 4.72 / JEET 0.8 / MG 52.5 with EF 75%, mild MR, mid-mod TR, severe AS - stage  D1, chronic diastolic HF, NYHA II, sev pHTN 63.5 mmHg, with a PMHx of Sjoren syndrome (autoimmune Dx 5-6 years ago), lupus, breast CA s/p b/l mastectomy 2010 in [facility]- no chemo, no radiation ,  TIAs ( 2007, 2012, 2013), Trigeminal neuralgia, smoker 1 pack day/50 years.  \par Pt was hospitalized last on 12/21/20 for CP - tx for CHF /COPD.  \par \par Pt reports of shortness of breath with 1 FOS, feeling more fatigue than usual.  Pt is independent, has 3 children, and lives alone with her cat.

## 2020-02-16 NOTE — END OF VISIT
[FreeTextEntry3] : Seen / examined and above reviewed.\par \par Severe symptomatic aortic stenosis.\par Comorbidities as above.\par Independent with family support.\par \par TAVR discussed.  Will obtain pre-op studies.\par CTA +/- Cath.

## 2020-02-16 NOTE — PHYSICAL EXAM
[Normal Appearance] : normal appearance [No Deformities] : no deformities [Normal Conjunctiva] : the conjunctiva exhibited no abnormalities [General Appearance - In No Acute Distress] : no acute distress [Systolic grade ___/6] : A grade [unfilled]/6 systolic murmur was heard. [Edema] : no peripheral edema present [Heart Rate And Rhythm] : heart rate and rhythm were normal [Exaggerated Use Of Accessory Muscles For Inspiration] : no accessory muscle use [Respiration, Rhythm And Depth] : normal respiratory rhythm and effort [] : no respiratory distress [Abnormal Walk] : normal gait [Auscultation Breath Sounds / Voice Sounds] : lungs were clear to auscultation bilaterally [Abdomen Soft] : soft [Nail Clubbing] : no clubbing of the fingernails [Skin Color & Pigmentation] : normal skin color and pigmentation [Impaired Insight] : insight and judgment were intact [Affect] : the affect was normal [Oriented To Time, Place, And Person] : oriented to person, place, and time [No Anxiety] : not feeling anxious [Mood] : the mood was normal [FreeTextEntry1] : Norm S1 decreased S2

## 2020-02-24 ENCOUNTER — FORM ENCOUNTER (OUTPATIENT)
Age: 75
End: 2020-02-24

## 2020-02-25 ENCOUNTER — OUTPATIENT (OUTPATIENT)
Dept: OUTPATIENT SERVICES | Facility: HOSPITAL | Age: 75
LOS: 1 days | Discharge: HOME | End: 2020-02-25

## 2020-02-25 ENCOUNTER — OUTPATIENT (OUTPATIENT)
Dept: OUTPATIENT SERVICES | Facility: HOSPITAL | Age: 75
LOS: 1 days | Discharge: HOME | End: 2020-02-25
Payer: MEDICARE

## 2020-02-25 DIAGNOSIS — I50.22 CHRONIC SYSTOLIC (CONGESTIVE) HEART FAILURE: ICD-10-CM

## 2020-02-25 DIAGNOSIS — I35.0 NONRHEUMATIC AORTIC (VALVE) STENOSIS: ICD-10-CM

## 2020-02-25 DIAGNOSIS — Z98.890 OTHER SPECIFIED POSTPROCEDURAL STATES: Chronic | ICD-10-CM

## 2020-02-25 PROCEDURE — 74174 CTA ABD&PLVS W/CONTRAST: CPT | Mod: 26

## 2020-02-25 PROCEDURE — 93880 EXTRACRANIAL BILAT STUDY: CPT | Mod: 26

## 2020-02-25 PROCEDURE — 75574 CT ANGIO HRT W/3D IMAGE: CPT | Mod: 26

## 2020-03-06 ENCOUNTER — APPOINTMENT (OUTPATIENT)
Dept: CARDIOLOGY | Facility: CLINIC | Age: 75
End: 2020-03-06
Payer: MEDICARE

## 2020-03-06 PROCEDURE — 99214 OFFICE O/P EST MOD 30 MIN: CPT

## 2020-03-06 NOTE — PHYSICAL EXAM
[No Deformities] : no deformities [General Appearance - In No Acute Distress] : no acute distress [] : no respiratory distress [Exaggerated Use Of Accessory Muscles For Inspiration] : no accessory muscle use [Abdomen Soft] : soft [Abnormal Walk] : normal gait [Nail Clubbing] : no clubbing of the fingernails [Cyanosis, Localized] : no localized cyanosis [Oriented To Time, Place, And Person] : oriented to person, place, and time [Impaired Insight] : insight and judgment were intact [Affect] : the affect was normal [Mood] : the mood was normal [No Anxiety] : not feeling anxious

## 2020-03-08 NOTE — ASSESSMENT
[FreeTextEntry1] : Pt is a 75 y/o, female, with valvular disease of aortic stenosis, indicated on echo 12/2019 with a PV 4.72 / JEET 0.8 / MG 52.5 with EF 75%, mild MR, mid-mod TR, severe AS - stage D1, chronic diastolic HF, NYHA II, sev pHTN 63.5 mmHg, with a PMHx of Sjoren syndrome (autoimmune Dx 5-6 years ago), lupus, breast CA s/p b/l mastectomy 2010 in [facility]- no chemo, no radiation , TIAs ( 2007, 2012, 2013), Trigeminal neuralgia, smoker 1 pack day/50 years. \par Pt was hospitalized last on 12/21/20 for CP - tx for CHF /COPD. \par \par Pt presents to discuss results of TAVR work-up in planning for TAVR.\par \par Af[place] Antonio and  discussed results and plan with pt.  Ms. Borges will need heart catheter to assess coronary.  Pt agrees to plan.  Will discuss with Dr. Sepulveda.

## 2020-03-08 NOTE — END OF VISIT
[FreeTextEntry3] : Seen / examined and above reviewed.\par Stable symptoms.\par BP controlled.  Euvolemic.\par \par CTA demonstrated extensive CAD.  Will need cath prior to TAVR.  Discussed with Dr. Sepulveda.  He would like to perform the cath and will schedule.\par \par 20-minute conversation with patient.  Risk / benefit of TAVR discussed.  Informed consent obtained.

## 2020-03-08 NOTE — REVIEW OF SYSTEMS
[Feeling Fatigued] : feeling fatigued [Dyspnea on exertion] : dyspnea during exertion [Chest Pain] : no chest pain [Palpitations] : no palpitations [Cough] : no cough [Dizziness] : no dizziness [Confusion] : no confusion was observed

## 2020-03-09 DIAGNOSIS — Q23.1 CONGENITAL INSUFFICIENCY OF AORTIC VALVE: ICD-10-CM

## 2020-03-09 DIAGNOSIS — R09.89 OTHER SPECIFIED SYMPTOMS AND SIGNS INVOLVING THE CIRCULATORY AND RESPIRATORY SYSTEMS: ICD-10-CM

## 2020-06-12 ENCOUNTER — APPOINTMENT (OUTPATIENT)
Dept: CARDIOTHORACIC SURGERY | Facility: CLINIC | Age: 75
End: 2020-06-12
Payer: MEDICARE

## 2020-06-12 ENCOUNTER — LABORATORY RESULT (OUTPATIENT)
Age: 75
End: 2020-06-12

## 2020-06-12 LAB
INR PPP: 0.9 RATIO
PT BLD: 10.3 SEC

## 2020-06-12 PROCEDURE — 99000 SPECIMEN HANDLING OFFICE-LAB: CPT

## 2020-06-14 LAB — ABO + RH PNL BLD: NORMAL

## 2020-06-15 ENCOUNTER — INPATIENT (INPATIENT)
Facility: HOSPITAL | Age: 75
LOS: 0 days | Discharge: HOME | End: 2020-06-16
Attending: INTERNAL MEDICINE | Admitting: INTERNAL MEDICINE
Payer: MEDICARE

## 2020-06-15 VITALS — HEIGHT: 66 IN | WEIGHT: 179.9 LBS

## 2020-06-15 DIAGNOSIS — Z90.49 ACQUIRED ABSENCE OF OTHER SPECIFIED PARTS OF DIGESTIVE TRACT: Chronic | ICD-10-CM

## 2020-06-15 DIAGNOSIS — Z98.890 OTHER SPECIFIED POSTPROCEDURAL STATES: Chronic | ICD-10-CM

## 2020-06-15 DIAGNOSIS — M35.00 SJOGREN SYNDROME, UNSPECIFIED: ICD-10-CM

## 2020-06-15 LAB
ANION GAP SERPL CALC-SCNC: 13 MMOL/L — SIGNIFICANT CHANGE UP (ref 7–14)
BLD GP AB SCN SERPL QL: SIGNIFICANT CHANGE UP
BUN SERPL-MCNC: 11 MG/DL — SIGNIFICANT CHANGE UP (ref 10–20)
CALCIUM SERPL-MCNC: 8.5 MG/DL — SIGNIFICANT CHANGE UP (ref 8.5–10.1)
CHLORIDE SERPL-SCNC: 102 MMOL/L — SIGNIFICANT CHANGE UP (ref 98–110)
CO2 SERPL-SCNC: 23 MMOL/L — SIGNIFICANT CHANGE UP (ref 17–32)
CREAT SERPL-MCNC: 0.7 MG/DL — SIGNIFICANT CHANGE UP (ref 0.7–1.5)
GAS PNL BLDA: SIGNIFICANT CHANGE UP
GLUCOSE BLDC GLUCOMTR-MCNC: 151 MG/DL — HIGH (ref 70–99)
GLUCOSE SERPL-MCNC: 104 MG/DL — HIGH (ref 70–99)
HCT VFR BLD CALC: 35.1 % — LOW (ref 37–47)
HGB BLD-MCNC: 11 G/DL — LOW (ref 12–16)
MCHC RBC-ENTMCNC: 27.5 PG — SIGNIFICANT CHANGE UP (ref 27–31)
MCHC RBC-ENTMCNC: 31.3 G/DL — LOW (ref 32–37)
MCV RBC AUTO: 87.8 FL — SIGNIFICANT CHANGE UP (ref 81–99)
NRBC # BLD: 0 /100 WBCS — SIGNIFICANT CHANGE UP (ref 0–0)
PLATELET # BLD AUTO: 274 K/UL — SIGNIFICANT CHANGE UP (ref 130–400)
POTASSIUM SERPL-MCNC: 4.5 MMOL/L — SIGNIFICANT CHANGE UP (ref 3.5–5)
POTASSIUM SERPL-SCNC: 4.5 MMOL/L — SIGNIFICANT CHANGE UP (ref 3.5–5)
RBC # BLD: 4 M/UL — LOW (ref 4.2–5.4)
RBC # FLD: 14.3 % — SIGNIFICANT CHANGE UP (ref 11.5–14.5)
SODIUM SERPL-SCNC: 138 MMOL/L — SIGNIFICANT CHANGE UP (ref 135–146)
WBC # BLD: 8.81 K/UL — SIGNIFICANT CHANGE UP (ref 4.8–10.8)
WBC # FLD AUTO: 8.81 K/UL — SIGNIFICANT CHANGE UP (ref 4.8–10.8)

## 2020-06-15 PROCEDURE — 93454 CORONARY ARTERY ANGIO S&I: CPT | Mod: 26,XP

## 2020-06-15 PROCEDURE — 92928 PRQ TCAT PLMT NTRAC ST 1 LES: CPT | Mod: XP,LD

## 2020-06-15 PROCEDURE — 92986 REVISION OF AORTIC VALVE: CPT

## 2020-06-15 RX ORDER — HYDROXYCHLOROQUINE SULFATE 200 MG
200 TABLET ORAL
Refills: 0 | Status: DISCONTINUED | OUTPATIENT
Start: 2020-06-15 | End: 2020-06-16

## 2020-06-15 RX ORDER — CHLORHEXIDINE GLUCONATE 213 G/1000ML
1 SOLUTION TOPICAL
Refills: 0 | Status: DISCONTINUED | OUTPATIENT
Start: 2020-06-15 | End: 2020-06-16

## 2020-06-15 RX ORDER — IPRATROPIUM/ALBUTEROL SULFATE 18-103MCG
3 AEROSOL WITH ADAPTER (GRAM) INHALATION EVERY 6 HOURS
Refills: 0 | Status: DISCONTINUED | OUTPATIENT
Start: 2020-06-15 | End: 2020-06-16

## 2020-06-15 RX ORDER — ASPIRIN/CALCIUM CARB/MAGNESIUM 324 MG
81 TABLET ORAL DAILY
Refills: 0 | Status: DISCONTINUED | OUTPATIENT
Start: 2020-06-15 | End: 2020-06-16

## 2020-06-15 RX ORDER — ALBUTEROL 90 UG/1
1 AEROSOL, METERED ORAL EVERY 4 HOURS
Refills: 0 | Status: DISCONTINUED | OUTPATIENT
Start: 2020-06-15 | End: 2020-06-16

## 2020-06-15 RX ORDER — PANTOPRAZOLE SODIUM 20 MG/1
40 TABLET, DELAYED RELEASE ORAL
Refills: 0 | Status: DISCONTINUED | OUTPATIENT
Start: 2020-06-15 | End: 2020-06-16

## 2020-06-15 RX ORDER — TIOTROPIUM BROMIDE 18 UG/1
1 CAPSULE ORAL; RESPIRATORY (INHALATION) DAILY
Refills: 0 | Status: DISCONTINUED | OUTPATIENT
Start: 2020-06-15 | End: 2020-06-16

## 2020-06-15 RX ORDER — ALPRAZOLAM 0.25 MG
0.5 TABLET ORAL ONCE
Refills: 0 | Status: DISCONTINUED | OUTPATIENT
Start: 2020-06-15 | End: 2020-06-15

## 2020-06-15 RX ORDER — ALPRAZOLAM 0.25 MG
0.25 TABLET ORAL ONCE
Refills: 0 | Status: DISCONTINUED | OUTPATIENT
Start: 2020-06-15 | End: 2020-06-15

## 2020-06-15 RX ORDER — ATORVASTATIN CALCIUM 80 MG/1
80 TABLET, FILM COATED ORAL AT BEDTIME
Refills: 0 | Status: DISCONTINUED | OUTPATIENT
Start: 2020-06-15 | End: 2020-06-16

## 2020-06-15 RX ORDER — TICAGRELOR 90 MG/1
90 TABLET ORAL EVERY 12 HOURS
Refills: 0 | Status: DISCONTINUED | OUTPATIENT
Start: 2020-06-16 | End: 2020-06-16

## 2020-06-15 RX ORDER — MORPHINE SULFATE 50 MG/1
2 CAPSULE, EXTENDED RELEASE ORAL ONCE
Refills: 0 | Status: DISCONTINUED | OUTPATIENT
Start: 2020-06-15 | End: 2020-06-15

## 2020-06-15 RX ORDER — HYDROXYZINE HCL 10 MG
50 TABLET ORAL ONCE
Refills: 0 | Status: COMPLETED | OUTPATIENT
Start: 2020-06-15 | End: 2020-06-15

## 2020-06-15 RX ADMIN — ATORVASTATIN CALCIUM 80 MILLIGRAM(S): 80 TABLET, FILM COATED ORAL at 21:50

## 2020-06-15 RX ADMIN — Medication 75 MILLIGRAM(S): at 23:19

## 2020-06-15 RX ADMIN — Medication 200 MILLIGRAM(S): at 23:37

## 2020-06-15 RX ADMIN — PANTOPRAZOLE SODIUM 40 MILLIGRAM(S): 20 TABLET, DELAYED RELEASE ORAL at 23:46

## 2020-06-15 RX ADMIN — Medication 25 MILLIGRAM(S): at 22:00

## 2020-06-15 RX ADMIN — Medication 0.5 MILLIGRAM(S): at 22:33

## 2020-06-15 RX ADMIN — MORPHINE SULFATE 2 MILLIGRAM(S): 50 CAPSULE, EXTENDED RELEASE ORAL at 22:34

## 2020-06-15 RX ADMIN — Medication 0.25 MILLIGRAM(S): at 21:50

## 2020-06-15 NOTE — CHART NOTE - NSCHARTNOTEFT_GEN_A_CORE
PRE-OP DIAGNOSIS: severe AS, abnoraml CCTA, HTN, DL , Lupus,     PROCEDURE: [facility] with coronary angiography    Physician: Dr Johnson, Dr Alejandre  Assistant: Enrique    ANESTHESIA TYPE:  [  ]General Anesthesia  [  ] Sedation  [ x ] Local/Regional    ESTIMATED BLOOD LOSS:    10   mL    CONDITION  [  ] Critical  [  ] Serious  [  ]Fair  [x  ]Good      SPECIMENS REMOVED (IF APPLICABLE): N/A      IV CONTRAST:     300       mL      IMPLANTS (IF APPLICABLE)      FINDINGS    Left Heart Catheterization:    LVEDP: elevated   [ ] Normal Coronary Arteries  [ ] Luminal Irregularities  [ ] Non-obstructive CAD      LEFT HEART CATHETERIZATION                                    Left main short normal     LAD:   heavy calcified lesion     Prox moderate disease, Mid 90% lesion       Distal mild disease           Diag: patent     Left Circumflex: mild disease   OM1: large vessel , mild disease     Right Coronary Artery: moderate disease, distal 505 lesion   RPDA patent  RPL patent      DOMINANCE: Right    ACCESS: right femoral artery 7Fr sheath, left femoral artery ( 9Fr sheath) , right femoral vein (6Fr sheath)  CLOSURE: per close for right and left femoral arteries, right femoral vein sutured in place     INTERVENTION  BAV   PCI to Mid LAD lesion, SYNERGY 2.75 x 12mm via JL1 guide launcher with the help of guideliner, post stent deployment, contrast injection showed  showed dissection at level  of guideliner in LAD that spread to LM and LCx  with transient loss of flow in LCx, s/p successful PCI to LM, Prox LAD and LCX with excellent final result , TIMI3 flow in LM, LAD and LCx with 0% residual stenosis and no further dissection.   OM--> SYNERGY 2.5 x 38 mm  Prox LCx --> SYNERGY 3 x 24 mm  LM/PROx LAD --> SYNERGY 3.5 x 38 mm                PLAN OF CARE  CCU monitoring  asa, brilinta ,   Statin therapy  monitor kidney function  Try to maintain patient euvolemic, lasix prn as needed  bronchodilators  continue treatment for lupus (5 asa and hydroxychloroquine)  patient will be planed for TAVR in 4-6 weeks    Results of procedure/ plan of care discussed with patient/  in detail. PRE-OP DIAGNOSIS:  Severe Aortic Stenosis, CAD, CHF    POST-OP DIAGNOSIS:  Same    PROCEDURE:  Coronary Angiogram, TVP, Balloon Aortic Valvuloplasty, PCI    PHYSICIAN: Dario Johnson MD; Oj Alejandre MD  ASSISTANT: Fellow    ANESTHESIA TYPE:  [ X] Sedation  [ X] Local/Regional  [   ]General Anesthesia    ESTIMATED BLOOD LOSS: < 10 mL    IV CONTRAST: 200mL    COMPLICATIONS: None    POST-OP CONDITION:  [X ] Good  [   ] Fair  [   ] Serious  [   ] Critical    ACCESS:  7F Right FA --> Perclose.  9F Left FA --> Perclose.  6F Right FV --> Manual    FINDINGS:  Dominance: Right    Severe AS  95% calcified mid LAD stenosis.    INTERVENTION:  BAV  PCI of LAD  PCI of LM / CX (treated Guideliner dissection)    (see report for details)    IMPLANTS (if applicable):  2.5 x 12mm Synergy  2.5 x 28mm Synergy  3.0 x 24mm Synergy  3.5 x 38mm Synergy    SPECIMENS REMOVED (if applicable): None    IMPRESSION:  Successful BAV  Successful PCI of LAD  Successful PCI of LM / CX (treated Guideliner dissection)    PLAN:  ASA / Ticagrelor.  Medical therapy and risk factor modification.  Routine post-BAV care.  TAVR 30-days

## 2020-06-15 NOTE — PRE-ANESTHESIA EVALUATION ADULT - NSANTHOSAYNRD_GEN_A_CORE
No. NETTE screening performed.  STOP BANG Legend: 0-2 = LOW Risk; 3-4 = INTERMEDIATE Risk; 5-8 = HIGH Risk

## 2020-06-15 NOTE — PACU DISCHARGE NOTE - COMMENTS
S/p BAV, PCI, c/b perforation of left main and circumflex arteries, PCI to left main, LAD, and circumflex. post-op vitals 120/54, hr 81, rr 18, temp 36, O2 95%

## 2020-06-15 NOTE — PACU DISCHARGE NOTE - HYDRATION STATUS:
Satisfactory Composite Graft Text: The defect edges were debeveled with a #15 scalpel blade.  Given the location of the defect, shape of the defect, the proximity to free margins and the fact the defect was full thickness a composite graft was deemed most appropriate.  The defect was outline and then transferred to the donor site.  A full thickness graft was then excised from the donor site. The graft was then placed in the primary defect, oriented appropriately and then sutured into place.  The secondary defect was then repaired using a primary closure.

## 2020-06-15 NOTE — H&P CARDIOLOGY - HISTORY OF PRESENT ILLNESS
75 y/o F with PMH of Severe AS, HFpEF 75%, severe pulmonary HTN, Sjogren syndrome, lupus, breast cancer s/p b/l mastectomy with no chemo or radiation, h/o TIAS (1007, 2012, 2013), COPD presented with dyspnea for exertion and had abnormal CTA showing extensive CAD for LHC with possible PCI and BAV for aortic stenosis

## 2020-06-15 NOTE — ASU PATIENT PROFILE, ADULT - PMH
Aortic valve stenosis, etiology of cardiac valve disease unspecified    Constipation, unspecified constipation type    GERD without esophagitis    Hypertension, unspecified type    Lupus (systemic lupus erythematosus)    Malignant neoplasm of lower-outer quadrant of left breast of female, estrogen receptor positive    Rectal prolapse    Sjogren's syndrome, with unspecified organ involvement    Trigeminal neuralgia    Vitamin D deficiency

## 2020-06-16 ENCOUNTER — TRANSCRIPTION ENCOUNTER (OUTPATIENT)
Age: 75
End: 2020-06-16

## 2020-06-16 VITALS
SYSTOLIC BLOOD PRESSURE: 150 MMHG | OXYGEN SATURATION: 95 % | RESPIRATION RATE: 25 BRPM | HEART RATE: 85 BPM | DIASTOLIC BLOOD PRESSURE: 79 MMHG | TEMPERATURE: 98 F

## 2020-06-16 LAB
ANION GAP SERPL CALC-SCNC: 18 MMOL/L — HIGH (ref 7–14)
BASOPHILS # BLD AUTO: 0.03 K/UL — SIGNIFICANT CHANGE UP (ref 0–0.2)
BASOPHILS NFR BLD AUTO: 0.2 % — SIGNIFICANT CHANGE UP (ref 0–1)
BUN SERPL-MCNC: 11 MG/DL — SIGNIFICANT CHANGE UP (ref 10–20)
CALCIUM SERPL-MCNC: 8.6 MG/DL — SIGNIFICANT CHANGE UP (ref 8.5–10.1)
CHLORIDE SERPL-SCNC: 102 MMOL/L — SIGNIFICANT CHANGE UP (ref 98–110)
CO2 SERPL-SCNC: 23 MMOL/L — SIGNIFICANT CHANGE UP (ref 17–32)
CREAT SERPL-MCNC: 0.6 MG/DL — LOW (ref 0.7–1.5)
EOSINOPHIL # BLD AUTO: 0 K/UL — SIGNIFICANT CHANGE UP (ref 0–0.7)
EOSINOPHIL NFR BLD AUTO: 0 % — SIGNIFICANT CHANGE UP (ref 0–8)
GLUCOSE SERPL-MCNC: 140 MG/DL — HIGH (ref 70–99)
HCT VFR BLD CALC: 34.1 % — LOW (ref 37–47)
HCT VFR BLD CALC: 36.6 % — LOW (ref 37–47)
HGB BLD-MCNC: 11.2 G/DL — LOW (ref 12–16)
HGB BLD-MCNC: 11.3 G/DL — LOW (ref 12–16)
IMM GRANULOCYTES NFR BLD AUTO: 0.4 % — HIGH (ref 0.1–0.3)
LYMPHOCYTES # BLD AUTO: 0.7 K/UL — LOW (ref 1.2–3.4)
LYMPHOCYTES # BLD AUTO: 5.3 % — LOW (ref 20.5–51.1)
MCHC RBC-ENTMCNC: 26.5 PG — LOW (ref 27–31)
MCHC RBC-ENTMCNC: 28.1 PG — SIGNIFICANT CHANGE UP (ref 27–31)
MCHC RBC-ENTMCNC: 30.9 G/DL — LOW (ref 32–37)
MCHC RBC-ENTMCNC: 32.8 G/DL — SIGNIFICANT CHANGE UP (ref 32–37)
MCV RBC AUTO: 85.5 FL — SIGNIFICANT CHANGE UP (ref 81–99)
MCV RBC AUTO: 85.9 FL — SIGNIFICANT CHANGE UP (ref 81–99)
MONOCYTES # BLD AUTO: 0.68 K/UL — HIGH (ref 0.1–0.6)
MONOCYTES NFR BLD AUTO: 5.1 % — SIGNIFICANT CHANGE UP (ref 1.7–9.3)
NEUTROPHILS # BLD AUTO: 11.83 K/UL — HIGH (ref 1.4–6.5)
NEUTROPHILS NFR BLD AUTO: 89 % — HIGH (ref 42.2–75.2)
NRBC # BLD: 0 /100 WBCS — SIGNIFICANT CHANGE UP (ref 0–0)
NRBC # BLD: 0 /100 WBCS — SIGNIFICANT CHANGE UP (ref 0–0)
PLATELET # BLD AUTO: 296 K/UL — SIGNIFICANT CHANGE UP (ref 130–400)
PLATELET # BLD AUTO: 327 K/UL — SIGNIFICANT CHANGE UP (ref 130–400)
POTASSIUM SERPL-MCNC: 4 MMOL/L — SIGNIFICANT CHANGE UP (ref 3.5–5)
POTASSIUM SERPL-SCNC: 4 MMOL/L — SIGNIFICANT CHANGE UP (ref 3.5–5)
RBC # BLD: 3.99 M/UL — LOW (ref 4.2–5.4)
RBC # BLD: 4.26 M/UL — SIGNIFICANT CHANGE UP (ref 4.2–5.4)
RBC # FLD: 14.4 % — SIGNIFICANT CHANGE UP (ref 11.5–14.5)
RBC # FLD: 14.5 % — SIGNIFICANT CHANGE UP (ref 11.5–14.5)
SODIUM SERPL-SCNC: 143 MMOL/L — SIGNIFICANT CHANGE UP (ref 135–146)
TROPONIN T SERPL-MCNC: 2.47 NG/ML — CRITICAL HIGH
WBC # BLD: 12.79 K/UL — HIGH (ref 4.8–10.8)
WBC # BLD: 13.29 K/UL — HIGH (ref 4.8–10.8)
WBC # FLD AUTO: 12.79 K/UL — HIGH (ref 4.8–10.8)
WBC # FLD AUTO: 13.29 K/UL — HIGH (ref 4.8–10.8)

## 2020-06-16 PROCEDURE — 93306 TTE W/DOPPLER COMPLETE: CPT | Mod: 26

## 2020-06-16 PROCEDURE — 93010 ELECTROCARDIOGRAM REPORT: CPT

## 2020-06-16 RX ORDER — ALPRAZOLAM 0.25 MG
1 TABLET ORAL ONCE
Refills: 0 | Status: DISCONTINUED | OUTPATIENT
Start: 2020-06-16 | End: 2020-06-16

## 2020-06-16 RX ORDER — METOPROLOL TARTRATE 50 MG
1 TABLET ORAL
Qty: 60 | Refills: 0
Start: 2020-06-16 | End: 2020-07-15

## 2020-06-16 RX ORDER — METOPROLOL TARTRATE 50 MG
25 TABLET ORAL
Refills: 0 | Status: DISCONTINUED | OUTPATIENT
Start: 2020-06-16 | End: 2020-06-16

## 2020-06-16 RX ORDER — ATORVASTATIN CALCIUM 80 MG/1
1 TABLET, FILM COATED ORAL
Qty: 30 | Refills: 0
Start: 2020-06-16 | End: 2020-07-15

## 2020-06-16 RX ORDER — ESCITALOPRAM OXALATE 10 MG/1
10 TABLET, FILM COATED ORAL ONCE
Refills: 0 | Status: COMPLETED | OUTPATIENT
Start: 2020-06-16 | End: 2020-06-16

## 2020-06-16 RX ORDER — MIRTAZAPINE 45 MG/1
30 TABLET, ORALLY DISINTEGRATING ORAL ONCE
Refills: 0 | Status: COMPLETED | OUTPATIENT
Start: 2020-06-16 | End: 2020-06-16

## 2020-06-16 RX ORDER — TICAGRELOR 90 MG/1
1 TABLET ORAL
Qty: 60 | Refills: 0
Start: 2020-06-16 | End: 2020-07-15

## 2020-06-16 RX ORDER — ROSUVASTATIN CALCIUM 5 MG/1
1 TABLET ORAL
Qty: 0 | Refills: 0 | DISCHARGE

## 2020-06-16 RX ORDER — METOPROLOL TARTRATE 50 MG
1 TABLET ORAL
Qty: 30 | Refills: 0
Start: 2020-06-16 | End: 2020-07-15

## 2020-06-16 RX ORDER — ACETAMINOPHEN 500 MG
650 TABLET ORAL EVERY 6 HOURS
Refills: 0 | Status: DISCONTINUED | OUTPATIENT
Start: 2020-06-16 | End: 2020-06-16

## 2020-06-16 RX ORDER — ONDANSETRON 8 MG/1
4 TABLET, FILM COATED ORAL ONCE
Refills: 0 | Status: COMPLETED | OUTPATIENT
Start: 2020-06-16 | End: 2020-06-16

## 2020-06-16 RX ORDER — ALPRAZOLAM 0.25 MG
0.25 TABLET ORAL ONCE
Refills: 0 | Status: DISCONTINUED | OUTPATIENT
Start: 2020-06-16 | End: 2020-06-16

## 2020-06-16 RX ADMIN — Medication 0.25 MILLIGRAM(S): at 07:52

## 2020-06-16 RX ADMIN — Medication 81 MILLIGRAM(S): at 11:24

## 2020-06-16 RX ADMIN — CHLORHEXIDINE GLUCONATE 1 APPLICATION(S): 213 SOLUTION TOPICAL at 05:06

## 2020-06-16 RX ADMIN — Medication 25 MILLIGRAM(S): at 11:24

## 2020-06-16 RX ADMIN — TICAGRELOR 90 MILLIGRAM(S): 90 TABLET ORAL at 05:43

## 2020-06-16 RX ADMIN — Medication 650 MILLIGRAM(S): at 10:15

## 2020-06-16 RX ADMIN — PANTOPRAZOLE SODIUM 40 MILLIGRAM(S): 20 TABLET, DELAYED RELEASE ORAL at 06:05

## 2020-06-16 RX ADMIN — MIRTAZAPINE 30 MILLIGRAM(S): 45 TABLET, ORALLY DISINTEGRATING ORAL at 14:52

## 2020-06-16 RX ADMIN — ESCITALOPRAM OXALATE 10 MILLIGRAM(S): 10 TABLET, FILM COATED ORAL at 14:52

## 2020-06-16 RX ADMIN — Medication 50 MILLIGRAM(S): at 00:09

## 2020-06-16 RX ADMIN — ONDANSETRON 4 MILLIGRAM(S): 8 TABLET, FILM COATED ORAL at 12:12

## 2020-06-16 NOTE — DISCHARGE NOTE NURSING/CASE MANAGEMENT/SOCIAL WORK - PATIENT PORTAL LINK FT
You can access the FollowMyHealth Patient Portal offered by [facility] by registering at the following website: [email or web address]. By joining [employer]’s FollowMyHealth portal, you will also be able to view your health information using other applications (apps) compatible with our system.

## 2020-06-16 NOTE — DISCHARGE NOTE PROVIDER - NSDCMRMEDTOKEN_GEN_ALL_CORE_FT
ALPRAZolam 0.5 mg oral tablet: 2 tab(s) orally once a day (at bedtime), As Needed  amLODIPine 10 mg oral tablet: 1 tab(s) orally once a day  Brilinta (ticagrelor) 90 mg oral tablet: 1 tab(s) orally 2 times a day MDD:2  cevimeline 30 mg oral capsule: 3 cap(s) orally once a day  docusate sodium 100 mg oral capsule: 1 cap(s) orally 2 times a day  Fioricet oral capsule: 2 cap(s) orally once a day  furosemide 20 mg oral tablet: 2 tab(s) orally once a day   hydroxychloroquine 200 mg oral tablet: 1 tab(s) orally 2 times a day  hydrOXYzine hydrochloride 50 mg oral tablet: 1 tab(s) orally every 6 hours, As Needed -Anxiety - for anxiety   Low Dose ASA 81 mg oral tablet: 1 tab(s) orally once a day  montelukast 10 mg oral tablet: 1 tab(s) orally once a day  ocular lubricant ophthalmic solution: 1 drop(s) to each affected eye every 2 hours, As needed, Dry Eyes  omeprazole 40 mg oral delayed release capsule: 1 cap(s) orally once a day  pregabalin 25 mg oral capsule: 1 cap(s) orally once a day MDD:one daily  rosuvastatin 40 mg oral tablet: 1 tab(s) orally once a day (at bedtime)  senna oral tablet: 2 tab(s) orally once a day (at bedtime)  Singulair: 2  orally once a day  Spiriva Respimat 1.25 mcg/inh inhalation aerosol: 2 puff(s) inhaled once a day   sulfaSALAzine 500 mg oral tablet: 3 tab(s) orally 2 times a day  Symbicort 160 mcg-4.5 mcg/inh inhalation aerosol: 2 puff(s) inhaled 2 times a day   traZODone 50 mg oral tablet: 1 tab(s) orally once a day (at bedtime)  Ventolin HFA 90 mcg/inh inhalation aerosol: 2 puff(s) inhaled prn MDD:maximum 6 puffs a day  Zoloft 50 mg oral tablet: 1 tab(s) orally once a day

## 2020-06-16 NOTE — DISCHARGE NOTE PROVIDER - HOSPITAL COURSE
73 y/o F with PMH of Severe AS, HFpEF 75%, severe pulmonary HTN, Sjogren syndrome, lupus, breast cancer s/p b/l mastectomy with no chemo or radiation, h/o TIAS (1007, 2012, 2013), COPD presented with dyspnea for exertion and had abnormal CTA showing extensive CAD for LHC with possible PCI and BAV for aortic stenosis                LEFT HEART CATHETERIZATION                                        Left main short normal         LAD:   heavy calcified lesion     Prox moderate disease, Mid 90% lesion       Distal mild disease             Diag: patent         Left Circumflex: mild disease     OM1: large vessel , mild disease         Right Coronary Artery: moderate disease, distal 505 lesion     RPDA patent    RPL patent        DOMINANCE: Right        ACCESS: right femoral artery 7Fr sheath, left femoral artery ( 9Fr sheath) , right femoral vein (6Fr sheath)    CLOSURE: per close for right and left femoral arteries, right femoral vein sutured in place         INTERVENTION    BAV     PCI to Mid LAD lesion, SYNERGY 2.75 x 12mm via JL1 guide launcher with the help of guideliner, post stent deployment, contrast injection showed  showed dissection at level  of guideliner in LAD that spread to LM and LCx  with transient loss of flow in LCx, s/p successful PCI to LM, Prox LAD and LCX with excellent final result , TIMI3 flow in LM, LAD and LCx with 0% residual stenosis and no further dissection.     OM--> SYNERGY 2.5 x 38 mm    Prox LCx --> SYNERGY 3 x 24 mm    LM/PROx LAD --> SYNERGY 3.5 x 38 mm                < from: Transthoracic Echocardiogram (06.16.20 @ 09:02) >    Summary:     1. LV Ejection Fraction by Samson's Method with a biplane EF of 63 %.     2. Normal left ventricular size and wall thicknesses, with normal systolic and diastolic function.     3. The left ventricular diastolic function could not be assessed in this study.     4. The mean global longitudinal peak strain by speckle tracking is -20.6% which is normal.     5. Severe aortic valve stenosis.     6. Severe calcific aortic stenosis with a peakAV velocity of 4.11 m/s, peak/mean AV gradient of 67.7/41.1 mm. Hg and a calculated AV area of 0.9 cm2.     7. Estimated pulmonary artery systolic pressure is 39.9 mmHg assuming a right atrial pressure of 8 mmHg, which is consistent with borderline pulmonary hypertension.     8. LA volume Index is 36.7 ml/m² ml/m2.        PHYSICIAN INTERPRETATION:    Left Ventricle: Normal left ventricular size and wall thicknesses, with normal systolic and diastolic function. The left ventricular diastolic function could not be assessed in this study.    The mean global longitudinal peak strain by speckle tracking is -20.6% which is normal.         LV Wall Scoring:    All segments are normal.        Right Ventricle: Normal right ventricular size and function.    Left Atrium: Mildly enlarged left atrium. LA volume Index is 36.7 ml/m² ml/m2.    Right Atrium: Normal right atrial size.    Pericardium: There is no evidence of pericardial effusion.    Mitral Valve: Structurally normal mitral valve, with normal leaflet excursion. The mitral valve is normal in structure. Trace mitral valve regurgitation is seen.    Tricuspid Valve: Structurally normal tricuspid valve, with normal leaflet excursion. The tricuspid valve is normal in structure. Mild tricuspid regurgitation is visualized. Estimated pulmonary artery systolic pressure is 39.9 mmHg assuming a right atrial pressure of 8 mmHg, which is consistent with borderline pulmonary hypertension.    Aortic Valve: The aortic valve is trileaflet. Severe aortic stenosis is present. Trivial aortic valve regurgitation is seen. Severe calcific aortic stenosis with a peak AV velocity of 4.11 m/s, peak/mean AV gradient of 67.7/41.1 mm. Hg and a calculated AV area of 0.9 cm2.    Pulmonic Valve: Structurally normal pulmonic valve, with normal leaflet excursion. The pulmonic valve is normal. No indication of pulmonic valve regurgitation.    Aorta: The aortic root and ascending aorta are structurally normal, with no evidence of dilitation.    Pulmonary Artery: The main pulmonary artery is normal insize.    Venous: The inferior vena cava was normal sized, with respiratory size variation less than 50%.    < end of copied text >            Continue DAPT ( Aspirin 81 mg PO Daily and Brilinta 90 mg PO q12 hr ), B-Blocker, Statin Therapy    Patient pharmacy called in for Brilinta prescription and it is  47$ per month    Patient given 2 pills of Brilinta 90 mg PO to take it home, her medication will be available in [facility] tomorrow afternoon         Patient agreeing to take DAPT for at least one year or as directed by cardiologist     Pt given instructions on importance of taking antiplatelet medication or risk acute stent thrombosis/death    Post cath instructions, access site care and activity restrictions reviewed with patient      Discussed with patient to return to hospital if experience chest pain, shortness breath, dizziness and site bleeding    Aggressive risk factor modification, diet counseling, smoking cessation discussed with patient        Patient will need TAVR in 4-6 weeks    Follow up with Cardiology Dr. Johnson in two weeks.  Instructed to call and make an appointment

## 2020-06-16 NOTE — PROGRESS NOTE ADULT - SUBJECTIVE AND OBJECTIVE BOX
Cardiology Follow up    CLAYTON VELAZQUEZ   75y Female  PAST MEDICAL & SURGICAL HISTORY:  Aortic valve stenosis, etiology of cardiac valve disease unspecified  Lupus (systemic lupus erythematosus)  Trigeminal neuralgia  Sjogren's syndrome, with unspecified organ involvement  GERD without esophagitis  Vitamin D deficiency  Constipation, unspecified constipation type  Rectal prolapse  Hypertension, unspecified type  Malignant neoplasm of lower-outer quadrant of left breast of female, estrogen receptor positive  History of appendectomy  H/O bilateral mastectomy  History of partial colectomy     HPI:  75 y/o F with PMH of Severe AS, HFpEF 75%, severe pulmonary HTN, Sjogren syndrome, lupus, breast cancer s/p b/l mastectomy with no chemo or radiation, h/o TIAS (1007, 2012, 2013), COPD presented with dyspnea for exertion and had abnormal CTA showing extensive CAD for LHC with possible PCI and BAV for aortic stenosis (15 Olivier 2020 14:55)    Allergies    Naprosyn (Stomach Upset)  Vicodin (Stomach Upset)    Intolerances    Patient without complaints. Pt ambulated to commode without issues/symptoms  Denies CP, SOB, palpitations, or dizziness  No events on telemetry overnight    Vital Signs Last 24 Hrs  T(C): 37 (16 Jun 2020 08:00), Max: 37 (16 Jun 2020 08:00)  T(F): 98.6 (16 Jun 2020 08:00), Max: 98.6 (16 Jun 2020 08:00)  HR: 85 (16 Jun 2020 10:00) (78 - 98)  BP: 170/72 (16 Jun 2020 10:00) (117/56 - 170/72)  BP(mean): 99 (16 Jun 2020 10:00) (68 - 99)  RR: 20 (16 Jun 2020 10:00) (19 - 26)  SpO2: 99% (16 Jun 2020 10:00) (90% - 99%)    MEDICATIONS  (STANDING):  ALBUTerol    90 MICROgram(s) HFA Inhaler 1 Puff(s) Inhalation every 4 hours  albuterol/ipratropium for Nebulization 3 milliLiter(s) Nebulizer every 6 hours  aspirin  chewable 81 milliGRAM(s) Oral daily  atorvastatin 80 milliGRAM(s) Oral at bedtime  chlorhexidine 4% Liquid 1 Application(s) Topical <User Schedule>  hydroxychloroquine 200 milliGRAM(s) Oral two times a day  metoprolol tartrate 25 milliGRAM(s) Oral two times a day  pantoprazole    Tablet 40 milliGRAM(s) Oral before breakfast  pregabalin 25 milliGRAM(s) Oral daily  ticagrelor 90 milliGRAM(s) Oral every 12 hours  tiotropium 18 MICROgram(s) Capsule 1 Capsule(s) Inhalation daily    MEDICATIONS  (PRN):  acetaminophen   Tablet .. 650 milliGRAM(s) Oral every 6 hours PRN Moderate Pain (4 - 6)    REVIEW OF SYSTEMS:          CONSTITUTIONAL: No weakness, fevers or chills          EYES/ENT: No visual changes;  No vertigo or throat pain           NECK: No pain or stiffness          RESPIRATORY: No cough, wheezing, hemoptysis          CARDIOVASCULAR: no pain, no VALENZUELA, no palpitations           GASTROINTESTINAL: No abdominal or epigastric pain. No nausea, vomiting, or hematemesis;           GENITOURINARY: No dysuria, frequency or hematuria          NEUROLOGICAL: No numbness or weakness          SKIN: No itching, rashes    PHYSICAL EXAM:           CONSTITUTIONAL: Well-developed; well-nourished; in no acute distress  	SKIN: warm, dry  	HEAD: Normocephalic; atraumatic  	EYES: PERRL.  	ENT: No nasal discharge, airway clear, mucous membranes moist  	NECK: Supple; non tender.  	CARD: +S1, +S2, no murmurs, gallops, or rubs. Regular rate and rhythm    	RESP: No wheezes, rales or rhonchi. CTA B/L  	ABD: soft ntnd, + BS x 4 quadrants  	EXT: moves all extremities,  no clubbing, cyanosis or edema  	NEURO: Alert and oriented x3, no focal deficits          PSYCH: Cooperative, appropriate          VASCULAR:  + Rad / + PTs / + DPs          EXTREMITY:              Right Groin: dressing removed, access site soft, small area of ecchymosis noted, no hematoma, no pain, + pulses, no sign of infection, no numbness              Left Groin: dressing removed, access site soft, small area of ecchymosis noted, no hematoma, no pain, + pulses, no sign of infection, no numbness              ECG:   P                                                                                                               2D ECHO:  6/16/2020  report P    LABS:                        11.3   13.29 )-----------( 327      ( 16 Jun 2020 05:03 )             36.6     06-16    143  |  102  |  11  ----------------------------<  140<H>  4.0   |  23  |  0.6<L>    Ca    8.6      16 Jun 2020 05:03    A/P:  I discussed the case with Cardiologist Dr. Johnson and recommend the following:    s/p BAV / PCI LINDA                     f/u ECHO  	     Continue DAPT ( Aspirin 81 mg PO Daily and Brilinta 90 mg PO q12 hr ), B-Blocker, Statin Therapy                   Patient pharmacy called in for Brilinta prescription and it is  47$ per month                   Patient given 2 pills of Brilinta 90 mg PO to take it home, her medication will be available in [facility] tomorrow afternoon                      Patient agreeing to take DAPT for at least one year or as directed by cardiologist                    Pt given instructions on importance of taking antiplatelet medication or risk acute stent thrombosis/death                   Post cath instructions, access site care and activity restrictions reviewed with patient                     Discussed with patient to return to hospital if experience chest pain, shortness breath, dizziness and site bleeding                   Aggressive risk factor modification, diet counseling, smoking cessation discussed with patient                       Can discharge patient from cardiac standpoint after ambulating without symptoms and access site wnl and ECG reviewed                    Patient will need TAVR in 4-6 weeks                   Follow up with Cardiology Dr. Johnson in two weeks.  Instructed to call and make an appointment

## 2020-06-16 NOTE — DISCHARGE NOTE PROVIDER - CARE PROVIDER_API CALL
Dario Johnson A  CARDIOVASCULAR DISEASE  [address] [place] [address]  Phone: [phone]  Fax: [phone]  Follow Up Time: 2 weeks

## 2020-06-16 NOTE — DISCHARGE NOTE PROVIDER - NSDCCPCAREPLAN_GEN_ALL_CORE_FT
PRINCIPAL DISCHARGE DIAGNOSIS  Diagnosis: Aortic stenosis  Assessment and Plan of Treatment: You were admitted to the hospital for severe Aortic Stenosis. While in the hospital you were taken for cardiac catheterization. You had a balloon valvuloplasty and multiple stents placed.  -  Please continue Dual Antiplatelet Therapy (Aspirin and Brilinta), Beta Blocker Therapy (Metoprolol), and Statin Therapy (Lipitor)  -  Please take your medication as prescribed and follow up with Dr. Johnson in 2 weeks after discharge.

## 2020-06-16 NOTE — DISCHARGE NOTE PROVIDER - NSDCQMSTROKE_NEU_ALL_CORE
Patient: Daisha STOCKTON  : 1990  MRN: [id number]    Maternal Fetal Medicine Consult  Date of visit: 2019    Reason for visit:  Chief Complaint   Patient presents with   • Consultation     History of  delivery at 34 5/7 weeks gestation. LMP: 18 EDC: 10-25-19 (us) 12 4/7 weeks Referring Provider: Dr. SHASHI Sosa Fax: [phone]   per ultrasound       History of Present Illness:  Patient with prior spontaenous labor at 34 5/7 weeks - presented at 5 cm in labor and quickly progressed. Pt reports that pregnanct was complicated by first trimester alcohol and smoking use.      Due date: 10/25/2019    OB History    Para Term  AB Living   2 1 0 1 0 1   SAB TAB Ectopic Molar Multiple Live Births   0 0 0 0 0 1     PMH: denies  PSH: denies:  All: denies.      Social History     Socioeconomic History   • Marital status: Single   Occupational History   Social Needs   Tobacco Use   • Smoking status: Former Smoker     Packs/day: 0.25     Types: Cigarettes     Start date:      Last attempt to quit: 2015     Years since quittin.2   • Smokeless tobacco: Never Used   Substance and Sexual Activity   • Alcohol use: Not Currently     Comment: Prior to preg       Family History   Problem Relation Age of Onset   • Diabetes Mother    • Crohn's Disease Mother        Genetic history Yes No   Are you above 35 years of age  X   Thalassemia (Italian, Mediterranean, or  background)  X   Neural tube defect (Meningomyelocele, Spina Bifida, or Anencephaly)  X   Congenital heart defect  X   Down Syndrome  X   Luis Carlos-Sachs Disease  X   Sickle Cell Disease or trait  X   Hemophilia  X   Muscular Dystrophy  X   Cystic Fibrosis  X   [place] Chorea  X   Mental Retardation/Autism  X   Inherited genetic, chromosomal disorder or birth defects   X   Maternal Metabolic Disorder (Diabetic, PKU)  X   Recurrent pregnancy loss or stillbirth  X     Visit Vitals  /65 (BP Location: RUE, Patient  Position: Sitting, Cuff Size: Regular)   Pulse 80   Ht 5' 6\" (1.676 m)   Wt 67.3 kg (148 lb 6.4 oz)   LMP 2018 (Exact Date)   BMI 23.95 kg/m²       Physical Exam deferred    Results: 35 weeks delivery note reviewed  1st trimester US today with NT 1.6mm    Assessment and Plan:  28 year old  at 12w4d with CYNTHIA 10/25/2019   History of PTD-   Women with a prior  birth are at high risk for recurrence. Although most women who experience a  birth will deliver at term in subsequent pregnancies, the recurrence risk for spontaneous  birth is twofold higher. This risk of recurrence ranges from 15 to 20%. The most recent birth is most predictive.   Progesterone supplementation, when used between 16 and 36 weeks gestation,  reduces the risk of  birth by about one-third in women with a kaur pregnancy who have had a previous spontaneous kaur  birth and in women with a short cervix on ultrasound examination in the current pregnancy.     These patients with prior pregnancy losses/ deliveries who develop cervical length ?25 mm may have cervical insufficiency. These women are initially administered progesterone (hydroxyprogesterone caproate by intramuscular injection) based on their past pregnancy history and then offered the addition of a cerclage upon identification of a short cervix. The diagnosis of short cervix is defined by cervical length ?25 mm at 16 to 24 weeks of gestation. We recommend cervical length screening at 14 to 16 weeks and perform serial examinations.   In women with a short cervix (?25 mm) and prior  birth, cerclage placement reduces the frequency of recurrent  birth; an indirect comparison meta-analysis suggested that daily treatment with vaginal progesterone is a reasonable alternative to the combination of cerclage and 17-OH progesterone caproate.     Recommend ultrasound for cervical length at 16 weeks every 2 weeks till 23 weeks.  Ultrasound for anatomy is recommended at 18-20 weeks.   Patient is open to taking progesterone injections as directed above. She is not interested in pursuing cervical lengths as she does not want a cerclage even if indicated. Patient ok with cervical length at the time of anatomy scan and then reassessment at that time in case short cervix is noted.    Thank you for allowing me to participate in the care of your patient. Please call if there are further questions.  A copy of this consult will be faxed to your office.  Time spent:  40mins (>50% was in direct patient counseling)  Cristina Galaviz MD     No

## 2020-06-22 DIAGNOSIS — I27.20 PULMONARY HYPERTENSION, UNSPECIFIED: ICD-10-CM

## 2020-06-22 DIAGNOSIS — E55.9 VITAMIN D DEFICIENCY, UNSPECIFIED: ICD-10-CM

## 2020-06-22 DIAGNOSIS — Z85.3 PERSONAL HISTORY OF MALIGNANT NEOPLASM OF BREAST: ICD-10-CM

## 2020-06-22 DIAGNOSIS — I25.10 ATHEROSCLEROTIC HEART DISEASE OF NATIVE CORONARY ARTERY WITHOUT ANGINA PECTORIS: ICD-10-CM

## 2020-06-22 DIAGNOSIS — Z86.73 PERSONAL HISTORY OF TRANSIENT ISCHEMIC ATTACK (TIA), AND CEREBRAL INFARCTION WITHOUT RESIDUAL DEFICITS: ICD-10-CM

## 2020-06-22 DIAGNOSIS — M32.9 SYSTEMIC LUPUS ERYTHEMATOSUS, UNSPECIFIED: ICD-10-CM

## 2020-06-22 DIAGNOSIS — I50.30 UNSPECIFIED DIASTOLIC (CONGESTIVE) HEART FAILURE: ICD-10-CM

## 2020-06-22 DIAGNOSIS — Z90.49 ACQUIRED ABSENCE OF OTHER SPECIFIED PARTS OF DIGESTIVE TRACT: ICD-10-CM

## 2020-06-22 DIAGNOSIS — F17.210 NICOTINE DEPENDENCE, CIGARETTES, UNCOMPLICATED: ICD-10-CM

## 2020-06-22 DIAGNOSIS — K21.9 GASTRO-ESOPHAGEAL REFLUX DISEASE WITHOUT ESOPHAGITIS: ICD-10-CM

## 2020-06-22 DIAGNOSIS — J44.9 CHRONIC OBSTRUCTIVE PULMONARY DISEASE, UNSPECIFIED: ICD-10-CM

## 2020-06-22 DIAGNOSIS — I11.0 HYPERTENSIVE HEART DISEASE WITH HEART FAILURE: ICD-10-CM

## 2020-06-22 DIAGNOSIS — Z82.49 FAMILY HISTORY OF ISCHEMIC HEART DISEASE AND OTHER DISEASES OF THE CIRCULATORY SYSTEM: ICD-10-CM

## 2020-06-22 DIAGNOSIS — K59.00 CONSTIPATION, UNSPECIFIED: ICD-10-CM

## 2020-06-22 DIAGNOSIS — Z90.13 ACQUIRED ABSENCE OF BILATERAL BREASTS AND NIPPLES: ICD-10-CM

## 2020-06-22 DIAGNOSIS — G50.0 TRIGEMINAL NEURALGIA: ICD-10-CM

## 2020-06-22 DIAGNOSIS — M35.00 SJOGREN SYNDROME, UNSPECIFIED: ICD-10-CM

## 2020-06-22 DIAGNOSIS — I35.0 NONRHEUMATIC AORTIC (VALVE) STENOSIS: ICD-10-CM

## 2020-06-22 DIAGNOSIS — I25.84 CORONARY ATHEROSCLEROSIS DUE TO CALCIFIED CORONARY LESION: ICD-10-CM

## 2020-06-29 PROBLEM — I35.0 NONRHEUMATIC AORTIC (VALVE) STENOSIS: Chronic | Status: ACTIVE | Noted: 2020-06-15

## 2020-07-01 ENCOUNTER — LABORATORY RESULT (OUTPATIENT)
Age: 75
End: 2020-07-01

## 2020-07-02 ENCOUNTER — APPOINTMENT (OUTPATIENT)
Dept: CARDIOLOGY | Facility: CLINIC | Age: 75
End: 2020-07-02
Payer: MEDICARE

## 2020-07-02 VITALS
HEIGHT: 66 IN | RESPIRATION RATE: 14 BRPM | BODY MASS INDEX: 27.97 KG/M2 | TEMPERATURE: 98 F | WEIGHT: 174 LBS | OXYGEN SATURATION: 92 % | DIASTOLIC BLOOD PRESSURE: 65 MMHG | SYSTOLIC BLOOD PRESSURE: 110 MMHG | HEART RATE: 62 BPM

## 2020-07-02 DIAGNOSIS — Z95.5 PRESENCE OF CORONARY ANGIOPLASTY IMPLANT AND GRAFT: ICD-10-CM

## 2020-07-02 PROCEDURE — 99214 OFFICE O/P EST MOD 30 MIN: CPT

## 2020-07-02 NOTE — PHYSICAL EXAM
[General Appearance - In No Acute Distress] : no acute distress [No Deformities] : no deformities [Exaggerated Use Of Accessory Muscles For Inspiration] : no accessory muscle use [] : no respiratory distress [Abdomen Soft] : soft [Abnormal Walk] : normal gait [Nail Clubbing] : no clubbing of the fingernails [Cyanosis, Localized] : no localized cyanosis [Oriented To Time, Place, And Person] : oriented to person, place, and time [Impaired Insight] : insight and judgment were intact [Mood] : the mood was normal [Affect] : the affect was normal [No Anxiety] : not feeling anxious

## 2020-07-11 ENCOUNTER — INPATIENT (INPATIENT)
Facility: HOSPITAL | Age: 75
LOS: 2 days | Discharge: HOME HEALTH | End: 2020-07-14
Attending: INTERNAL MEDICINE | Admitting: INTERNAL MEDICINE
Payer: MEDICARE

## 2020-07-11 VITALS
SYSTOLIC BLOOD PRESSURE: 152 MMHG | DIASTOLIC BLOOD PRESSURE: 66 MMHG | OXYGEN SATURATION: 96 % | RESPIRATION RATE: 18 BRPM | HEART RATE: 94 BPM | TEMPERATURE: 98 F

## 2020-07-11 DIAGNOSIS — Z98.890 OTHER SPECIFIED POSTPROCEDURAL STATES: Chronic | ICD-10-CM

## 2020-07-11 DIAGNOSIS — Z90.49 ACQUIRED ABSENCE OF OTHER SPECIFIED PARTS OF DIGESTIVE TRACT: Chronic | ICD-10-CM

## 2020-07-11 LAB
ALBUMIN SERPL ELPH-MCNC: 4.8 G/DL — SIGNIFICANT CHANGE UP (ref 3.5–5.2)
ALP SERPL-CCNC: 86 U/L — SIGNIFICANT CHANGE UP (ref 30–115)
ALT FLD-CCNC: 12 U/L — SIGNIFICANT CHANGE UP (ref 0–41)
ANION GAP SERPL CALC-SCNC: 16 MMOL/L — HIGH (ref 7–14)
AST SERPL-CCNC: 20 U/L — SIGNIFICANT CHANGE UP (ref 0–41)
BASOPHILS # BLD AUTO: 0.07 K/UL — SIGNIFICANT CHANGE UP (ref 0–0.2)
BASOPHILS NFR BLD AUTO: 0.8 % — SIGNIFICANT CHANGE UP (ref 0–1)
BILIRUB SERPL-MCNC: 0.4 MG/DL — SIGNIFICANT CHANGE UP (ref 0.2–1.2)
BUN SERPL-MCNC: 8 MG/DL — LOW (ref 10–20)
CALCIUM SERPL-MCNC: 9.6 MG/DL — SIGNIFICANT CHANGE UP (ref 8.5–10.1)
CHLORIDE SERPL-SCNC: 100 MMOL/L — SIGNIFICANT CHANGE UP (ref 98–110)
CO2 SERPL-SCNC: 21 MMOL/L — SIGNIFICANT CHANGE UP (ref 17–32)
CREAT SERPL-MCNC: 0.7 MG/DL — SIGNIFICANT CHANGE UP (ref 0.7–1.5)
EOSINOPHIL # BLD AUTO: 0.03 K/UL — SIGNIFICANT CHANGE UP (ref 0–0.7)
EOSINOPHIL NFR BLD AUTO: 0.4 % — SIGNIFICANT CHANGE UP (ref 0–8)
GLUCOSE SERPL-MCNC: 112 MG/DL — HIGH (ref 70–99)
HCT VFR BLD CALC: 38.3 % — SIGNIFICANT CHANGE UP (ref 37–47)
HGB BLD-MCNC: 12.1 G/DL — SIGNIFICANT CHANGE UP (ref 12–16)
IMM GRANULOCYTES NFR BLD AUTO: 0.2 % — SIGNIFICANT CHANGE UP (ref 0.1–0.3)
LIDOCAIN IGE QN: 12 U/L — SIGNIFICANT CHANGE UP (ref 7–60)
LYMPHOCYTES # BLD AUTO: 1.35 K/UL — SIGNIFICANT CHANGE UP (ref 1.2–3.4)
LYMPHOCYTES # BLD AUTO: 16.1 % — LOW (ref 20.5–51.1)
MCHC RBC-ENTMCNC: 26.5 PG — LOW (ref 27–31)
MCHC RBC-ENTMCNC: 31.6 G/DL — LOW (ref 32–37)
MCV RBC AUTO: 84 FL — SIGNIFICANT CHANGE UP (ref 81–99)
MONOCYTES # BLD AUTO: 0.61 K/UL — HIGH (ref 0.1–0.6)
MONOCYTES NFR BLD AUTO: 7.3 % — SIGNIFICANT CHANGE UP (ref 1.7–9.3)
NEUTROPHILS # BLD AUTO: 6.29 K/UL — SIGNIFICANT CHANGE UP (ref 1.4–6.5)
NEUTROPHILS NFR BLD AUTO: 75.2 % — SIGNIFICANT CHANGE UP (ref 42.2–75.2)
NRBC # BLD: 0 /100 WBCS — SIGNIFICANT CHANGE UP (ref 0–0)
NT-PROBNP SERPL-SCNC: 2061 PG/ML — HIGH (ref 0–300)
PLATELET # BLD AUTO: 322 K/UL — SIGNIFICANT CHANGE UP (ref 130–400)
POTASSIUM SERPL-MCNC: 4.1 MMOL/L — SIGNIFICANT CHANGE UP (ref 3.5–5)
POTASSIUM SERPL-SCNC: 4.1 MMOL/L — SIGNIFICANT CHANGE UP (ref 3.5–5)
PROT SERPL-MCNC: 8 G/DL — SIGNIFICANT CHANGE UP (ref 6–8)
RBC # BLD: 4.56 M/UL — SIGNIFICANT CHANGE UP (ref 4.2–5.4)
RBC # FLD: 14.7 % — HIGH (ref 11.5–14.5)
SODIUM SERPL-SCNC: 137 MMOL/L — SIGNIFICANT CHANGE UP (ref 135–146)
TROPONIN T SERPL-MCNC: <0.01 NG/ML — SIGNIFICANT CHANGE UP
WBC # BLD: 8.37 K/UL — SIGNIFICANT CHANGE UP (ref 4.8–10.8)
WBC # FLD AUTO: 8.37 K/UL — SIGNIFICANT CHANGE UP (ref 4.8–10.8)

## 2020-07-11 PROCEDURE — 99218: CPT

## 2020-07-11 PROCEDURE — 93010 ELECTROCARDIOGRAM REPORT: CPT

## 2020-07-11 PROCEDURE — 71045 X-RAY EXAM CHEST 1 VIEW: CPT | Mod: 26

## 2020-07-11 PROCEDURE — 99232 SBSQ HOSP IP/OBS MODERATE 35: CPT

## 2020-07-11 RX ORDER — TICAGRELOR 90 MG/1
90 TABLET ORAL EVERY 12 HOURS
Refills: 0 | Status: COMPLETED | OUTPATIENT
Start: 2020-07-11 | End: 2020-07-12

## 2020-07-11 RX ORDER — AMLODIPINE BESYLATE 2.5 MG/1
10 TABLET ORAL ONCE
Refills: 0 | Status: COMPLETED | OUTPATIENT
Start: 2020-07-11 | End: 2020-07-11

## 2020-07-11 RX ORDER — ALPRAZOLAM 0.25 MG
1 TABLET ORAL
Refills: 0 | Status: DISCONTINUED | OUTPATIENT
Start: 2020-07-11 | End: 2020-07-14

## 2020-07-11 RX ORDER — FUROSEMIDE 40 MG
40 TABLET ORAL EVERY 8 HOURS
Refills: 0 | Status: DISCONTINUED | OUTPATIENT
Start: 2020-07-11 | End: 2020-07-12

## 2020-07-11 RX ORDER — PANTOPRAZOLE SODIUM 20 MG/1
40 TABLET, DELAYED RELEASE ORAL ONCE
Refills: 0 | Status: COMPLETED | OUTPATIENT
Start: 2020-07-11 | End: 2020-07-11

## 2020-07-11 RX ORDER — FUROSEMIDE 40 MG
20 TABLET ORAL ONCE
Refills: 0 | Status: COMPLETED | OUTPATIENT
Start: 2020-07-11 | End: 2020-07-11

## 2020-07-11 RX ORDER — HYDROXYCHLOROQUINE SULFATE 200 MG
200 TABLET ORAL
Refills: 0 | Status: DISCONTINUED | OUTPATIENT
Start: 2020-07-11 | End: 2020-07-14

## 2020-07-11 RX ORDER — MONTELUKAST 4 MG/1
10 TABLET, CHEWABLE ORAL AT BEDTIME
Refills: 0 | Status: DISCONTINUED | OUTPATIENT
Start: 2020-07-11 | End: 2020-07-14

## 2020-07-11 RX ORDER — ASPIRIN/CALCIUM CARB/MAGNESIUM 324 MG
81 TABLET ORAL DAILY
Refills: 0 | Status: DISCONTINUED | OUTPATIENT
Start: 2020-07-11 | End: 2020-07-13

## 2020-07-11 RX ORDER — POTASSIUM CHLORIDE 20 MEQ
10 PACKET (EA) ORAL
Refills: 0 | Status: COMPLETED | OUTPATIENT
Start: 2020-07-11 | End: 2020-07-12

## 2020-07-11 RX ORDER — POTASSIUM CHLORIDE 20 MEQ
20 PACKET (EA) ORAL EVERY 8 HOURS
Refills: 0 | Status: DISCONTINUED | OUTPATIENT
Start: 2020-07-11 | End: 2020-07-11

## 2020-07-11 RX ADMIN — Medication 20 MILLIGRAM(S): at 14:58

## 2020-07-11 RX ADMIN — MONTELUKAST 10 MILLIGRAM(S): 4 TABLET, CHEWABLE ORAL at 23:36

## 2020-07-11 RX ADMIN — Medication 1 MILLIGRAM(S): at 23:36

## 2020-07-11 RX ADMIN — Medication 10 MILLIEQUIVALENT(S): at 18:21

## 2020-07-11 RX ADMIN — AMLODIPINE BESYLATE 10 MILLIGRAM(S): 2.5 TABLET ORAL at 23:36

## 2020-07-11 RX ADMIN — TICAGRELOR 90 MILLIGRAM(S): 90 TABLET ORAL at 18:21

## 2020-07-11 RX ADMIN — Medication 40 MILLIGRAM(S): at 23:36

## 2020-07-11 RX ADMIN — PANTOPRAZOLE SODIUM 40 MILLIGRAM(S): 20 TABLET, DELAYED RELEASE ORAL at 23:36

## 2020-07-11 NOTE — ED CDU PROVIDER INITIAL DAY NOTE - PROGRESS NOTE DETAILS
received signout from Tr Head - pt in obs s/p pci protocol for exacerbation of CHF; cardiology and cts note reviewed; am labs/cxr/ strict I&O ordered; will order home meds; brillinta and lasix already ordered by Tr Head;

## 2020-07-11 NOTE — ED PROVIDER NOTE - OBJECTIVE STATEMENT
76 yo female with a pmh of aortic 74 yo female with a pmh of aortic valve stenosis, CAD stent x4 6/15, HTN, GERD, SLE, trigeminal neuralgia, breast CA s/p rodrick mastectomy, and Sjogren's syndrome present for SOB. pt states SOB has been gradually worsening over the past 3 weeks following the stent placement. pt also experienced intermittent nonproductive cough and non-radiating generalized chest pain. denies any other symptoms including fevers, headache, recent illness/travel, or abdominal pain.

## 2020-07-11 NOTE — ED PROVIDER NOTE - PROGRESS NOTE DETAILS
Attending Note: I personally evaluated the patient. I reviewed the Physician Assistant’s note and agree with the findings and plan except as documented in my note.   76 yo F PMH lupus, Sjogren’s, GERD, HTN, CAD with stents recently placed on 7/15 follows with Dr. Begum p/w c/o fatigue and SOB since d/c x2 weeks ago. Pt denies fever, chills, cough, abd pain and LE swelling. Pt denies being on diuretics. Reports she is supposed to have TAVR with Dr. Cole in 2 weeks. No HA, n/v/d. PE: NAD. NCAT. PERRL, EOMI. MMM. RRR. (+) Mild scattered bibasilar rales. Abdomen soft, NT/ND. I&P: Pt with CAD, recent stents within 30 days coming in with SOB and fatigue, likely mild CHF exacerbation. Will check labs, EKG, CXR, and contact cardiology and CT surg.

## 2020-07-11 NOTE — ED PROVIDER NOTE - PHYSICAL EXAMINATION
Gen: NAD, AOx3  Head: NCAT  HEENT: PERRL, oral mucosa moist, normal conjunctiva, oropharynx clear without exudate or erythema  Lung: no respiratory distress, rales  CV: normal s1/s2, rrr, Normal perfusion, pulses 2+ throughout  Abd: soft, NTND, no CVA tenderness  Genitourinary: no pelvic tenderness  MSK: No edema, no visible deformities, full range of motion in all 4 extremities  Neuro: No focal neurologic deficits  Skin: No rash   Psych: normal affect

## 2020-07-11 NOTE — CONSULT NOTE ADULT - ATTENDING COMMENTS
Patient had complex PCI and BAV a few weeks ago and has been complaining of fatigue and SOB ever since. She needs to be admitted to medicine for a complete W/U.
Seen / examined yesterday evening.  Known form office.    CAD + Severe AS s/p BAV + PCI few weeks ago (LM / CX / LAD).  Scheduled for TAVR next week.    Recently seen in office at which time she was wheezing with a clinical COPD exacerbation.  Beta-blocker was stopped.  Continued with have symptoms related to COPD, then developed AF / AFL with subsequent spontaneous cardioversion.    I spoke with Dr. Sepulveda (primary cardiologist) who cared for her this admission.  Antithrombotic therapy changed to Plavix + Eliquis.  Rate control with [facility]B.    Breathing improved considerably since admission.  Able to ambulated.  Hemodynamics stable.  Clinically compensated / euvolemic.    Plan for discharge with TAVR next week as scheduled.

## 2020-07-11 NOTE — ED CDU PROVIDER INITIAL DAY NOTE - OBJECTIVE STATEMENT
75 y.o. female with pmx of av stenosis, cad stent x 4 6/15 dr. mcneil, gerd, sle, sjogren breast ca comes to ed stating intermittent sob s/p pci x 3 weeks, denies any recent travel, no recent trauma, + smoker, as per ed initial team states spoke to ct surgery will evaluate patient for plan and management.

## 2020-07-11 NOTE — ED PROVIDER NOTE - NS ED ROS FT
Constitutional: (-) fever  Eyes/ENT: (-) visual changes   Cardiovascular: (+) chest pain, (-) syncope  Respiratory: (+) cough, (+) shortness of breath  Gastrointestinal: (-) vomiting, (-) diarrhea  Genitourinary: (-) dysuria, (-) hesitancy, (-) frequency   Musculoskeletal: (-) neck pain, (-) back pain, (-) joint pain  Integumentary: (-) rash, (-) edema  Neurological: (-) headache, (-) altered mental status  Allergic/Immunologic: (-) pruritus

## 2020-07-11 NOTE — ED PROVIDER NOTE - NS ED ATTENDING STATEMENT MOD
I have personally performed a face to face diagnostic evaluation on this patient. I have reviewed the ACP note and agree with the history, exam and plan of care, except as noted. Negative

## 2020-07-11 NOTE — ED CDU PROVIDER INITIAL DAY NOTE - RESPIRATORY, MLM
Breath sounds clear and equal bilaterally. Breath sounds clear and equal bilaterally. mild b/l lower rales

## 2020-07-11 NOTE — ED CDU PROVIDER INITIAL DAY NOTE - ATTENDING CONTRIBUTION TO CARE
Pt has a history of CAD, s\p stents about 4 weeks ago. Pt additionally has COPD and Aortic stenosis. Placed into observation for diuresis. Yesterday was treated with lasix. This am is feeling improved. On exam no JVD, S1S2 rrr, lungs clear, no edema lower ext .

## 2020-07-11 NOTE — ED CDU PROVIDER INITIAL DAY NOTE - MEDICAL DECISION MAKING DETAILS
Pt presented with weakness and chest pain s\p coronary stents about 4 weeks ago. chest x-ray increased vascular congestion>>diuresis.

## 2020-07-11 NOTE — CONSULT NOTE ADULT - SUBJECTIVE AND OBJECTIVE BOX
Surgeon: /Kaylene/ Pat    Consult requesting by: Gabriel    HISTORY OF PRESENT ILLNESS:  75y Female presented to ED with CC of shortness of breath  74 yo female, Known to CTS,  with a pmh of aortic valve stenosis scheduled for TAVR 7/22/20, CAD stent x4 6/15, HTN, GERD, SLE, trigeminal neuralgia, breast CA s/p rodrick mastectomy, and Sjogren's syndrome presented for SOB. pt states SOB has been gradually worsening over the past 3 weeks following the stent placement. pt was sleeping this AM when she was awakened by significant SOB and palpitations.  pt also experienced intermittent nonproductive cough and non-radiating generalized chest pain. denies any other symptoms including fevers, headache, recent illness/travel, or abdominal pain. No chest pain. Felt better after EMS gave her oxygen.  called CTS service to say she went to ED    Home Medications:  ALPRAZolam 0.5 mg oral tablet: 2 tab(s) orally once a day (at bedtime), As Needed (15 Olivier 2020 14:46)  amLODIPine 10 mg oral tablet: 1 tab(s) orally once a day (15 Olivier 2020 14:46)  cevimeline 30 mg oral capsule: 3 cap(s) orally once a day (15 Olivier 2020 14:46)  docusate sodium 100 mg oral capsule: 1 cap(s) orally 2 times a day (15 Olivier 2020 14:46)  Fioricet oral capsule: 2 cap(s) orally once a day (15 Olivier 2020 14:46)  hydroxychloroquine 200 mg oral tablet: 1 tab(s) orally 2 times a day (15 Olivier 2020 14:46)  Low Dose ASA 81 mg oral tablet: 1 tab(s) orally once a day (15 Olivier 2020 14:46)  montelukast 10 mg oral tablet: 1 tab(s) orally once a day (15 Olivier 2020 14:46)  ocular lubricant ophthalmic solution: 1 drop(s) to each affected eye every 2 hours, As needed, Dry Eyes (15 Olivier 2020 14:46)  omeprazole 40 mg oral delayed release capsule: 1 cap(s) orally once a day (15 Oliiver 2020 14:46)  senna oral tablet: 2 tab(s) orally once a day (at bedtime) (15 Olivier 2020 14:46)  Singulair: 2  orally once a day (15 Olivier 2020 14:46)  sulfaSALAzine 500 mg oral tablet: 3 tab(s) orally 2 times a day (15 Olivier 2020 14:46)  Brilinta 90mg BID  - did not come as active      NYHA functional class    [ ] Class I (no limitation) [ ] Class II (slight limitation) [ ] Class III (marked limitation) [ ] Class IV (symptoms at rest)    PAST MEDICAL & SURGICAL HISTORY:  Aortic valve stenosis, etiology of cardiac valve disease unspecified  Lupus (systemic lupus erythematosus)  Trigeminal neuralgia  Sjogren's syndrome, with unspecified organ involvement  GERD without esophagitis  Vitamin D deficiency  Constipation, unspecified constipation type  Rectal prolapse  Hypertension, unspecified type  Malignant neoplasm of lower-outer quadrant of left breast of female, estrogen receptor positive  History of appendectomy  H/O bilateral mastectomy  History of partial colectomy      MEDICATIONS  (STANDING):    MEDICATIONS  (PRN):    Antiplatelet therapy: Brilinta 90mg                          Last dose/amt:    Allergies    Naprosyn (Stomach Upset)  Vicodin (Stomach Upset)    Intolerances        SOCIAL HISTORY:  nor recent drug, alcohol or tobacco abuse  Occupation: retired  Lives with:   FAMILY HISTORY:  Family history of coronary artery disease (Mother, Sibling)      Review of Systems  all reviewed as per HPI and PMH                                                                                                                                                                                          PHYSICAL EXAM  Vital Signs Last 24 Hrs  T(C): 36.3 (11 Jul 2020 15:41), Max: 36.8 (11 Jul 2020 12:57)  T(F): 97.3 (11 Jul 2020 15:41), Max: 98.2 (11 Jul 2020 12:57)  HR: 80 (11 Jul 2020 15:41) (80 - 94)  BP: 130/65 (11 Jul 2020 15:41) (130/65 - 152/66)  RR: 18 (11 Jul 2020 15:41) (18 - 18)  SpO2: 97% (11 Jul 2020 15:41) (96% - 97%)  HR in 50's on monitor during exam    CONSTITUTIONAL:    well nourished, well developed, overweight female in NAD                                                                       Neuro: oriented to person/place & time with no focal motor or sensory  deficits     Eyes: PERRLA, EOMI, no nystagmus, sclera anicteric  ENT:  nasal/oral mucosa with absence of cyanosis,   Neck: no jugular vein distention, trachea midline, no goiter   Chest: bilatertal breath sounds with fair air movement + bilateral rales                                                                           CV:  RSR, S1S2, + significant murmur appreciated  GI:  soft, non-tender protuberant, + bowel sounds                                                                                                          Extremities:  no evidence of cyanosis or deformity, no significant pedal edema   SKIN : no rashes                                                          LABS:                        12.1   8.37  )-----------( 322      ( 11 Jul 2020 13:51 )             38.3     07-11    137  |  100  |  8<L>  ----------------------------<  112<H>  4.1   |  21  |  0.7    Ca    9.6      11 Jul 2020 13:51    TPro  8.0  /  Alb  4.8  /  TBili  0.4  /  DBili  x   /  AST  20  /  ALT  12  /  AlkPhos  86  07-11    CARDIAC MARKERS ( 11 Jul 2020 13:51 )  x     / <0.01 ng/mL / x     / x     / x        COVID-19: pending    Transthoracic Echocardiogram (06.16.20 @ 09:02) >  Summary:   1. LV Ejection Fraction by Samson's Method with a biplane EF of 63 %.   2. Normal left ventricular size and wall thicknesses, with normal systolic and diastolic function.   3. The left ventricular diastolic function could not be assessed in this study.   4. The mean global longitudinal peak strain by speckle tracking is -20.6% which is normal.   5. Severe aortic valve stenosis.   6. Severe calcific aortic stenosis with a peakAV velocity of 4.11 m/s, peak/mean AV gradient of 67.7/41.1 mm. Hg and a calculated AV area of 0.9 cm2.   7. Estimated pulmonary artery systolic pressure is 39.9 mmHg assuming a right atrial pressure of 8 mmHg, which is consistent with borderline pulmonary hypertension.   8. LA volume Index is 36.7 ml/m² ml/m2.    INTERVENTION: 6/15/20  BAV  PCI of LAD  PCI of LM / CX (treated Guideliner dissection)     Xray Chest 1 View-PORTABLE IMMEDIATE (07.11.20 @ 14:15) >  Impression:      Pulmonary vascular congestion. No parenchymal opacity pleural effusion or air leak      Impression:    74 yo overweight female with severe AS, HFpEF and multiple medical comorbidities awoke this AM with acute dyspnea and palpitations.  Scenario leans toward ? episode of a fib (palpitations-new)  2/2 NETTE (obese) exacerbating HF in patient with severe AS.  Patient doing much better now, room air sat 94-95% with reg HR 56-58    Assessment/ Plan:  Admit to Telemetry Observation  Consider repeating TTE  aggressive diuresis  make sure patient gets all home medications HERVE: Brilinta 90 mg BID due to recent PCI of LAD  Take weight daily  Dr. Johnson aware patient in ED and agrees with plan  Will discuss with Dr. Cole and note to follow  Repeat BMP and EKG in AM  Supplemental O2 as needed to maintain sat 95%  SCD's Surgeon: /Kaylene/ Pat    Consult requesting by: Gabriel    HISTORY OF PRESENT ILLNESS:  75y Female presented to ED with CC of shortness of breath  74 yo female, Known to CTS,  with a pmh of aortic valve stenosis scheduled for TAVR 7/22/20, CAD stent x4 6/15, HTN, GERD, SLE, trigeminal neuralgia, breast CA s/p rodrick mastectomy, and Sjogren's syndrome presented for SOB. pt states SOB has been gradually worsening over the past 3 weeks following the stent placement. pt was sleeping this AM when she was awakened by significant SOB and palpitations.  pt also experienced intermittent nonproductive cough and non-radiating generalized chest pain. denies any other symptoms including fevers, headache, recent illness/travel, or abdominal pain. No chest pain. Felt better after EMS gave her oxygen.  called CTS service to say she went to ED    Home Medications:  ALPRAZolam 0.5 mg oral tablet: 2 tab(s) orally once a day (at bedtime), As Needed (15 Olivier 2020 14:46)  amLODIPine 10 mg oral tablet: 1 tab(s) orally once a day (15 Olivier 2020 14:46)  cevimeline 30 mg oral capsule: 3 cap(s) orally once a day (15 Olivier 2020 14:46)  docusate sodium 100 mg oral capsule: 1 cap(s) orally 2 times a day (15 Olivier 2020 14:46)  Fioricet oral capsule: 2 cap(s) orally once a day (15 Olivier 2020 14:46)  hydroxychloroquine 200 mg oral tablet: 1 tab(s) orally 2 times a day (15 Olivier 2020 14:46)  Low Dose ASA 81 mg oral tablet: 1 tab(s) orally once a day (15 Olivier 2020 14:46)  montelukast 10 mg oral tablet: 1 tab(s) orally once a day (15 Olivier 2020 14:46)  ocular lubricant ophthalmic solution: 1 drop(s) to each affected eye every 2 hours, As needed, Dry Eyes (15 Olivier 2020 14:46)  omeprazole 40 mg oral delayed release capsule: 1 cap(s) orally once a day (15 Olivier 2020 14:46)  senna oral tablet: 2 tab(s) orally once a day (at bedtime) (15 Olivier 2020 14:46)  Singulair: 2  orally once a day (15 Olivier 2020 14:46)  sulfaSALAzine 500 mg oral tablet: 3 tab(s) orally 2 times a day (15 Olivier 2020 14:46)  Brilinta 90mg BID  - did not come as active      NYHA functional class    [ ] Class I (no limitation) [ ] Class II (slight limitation) [ ] Class III (marked limitation) [ ] Class IV (symptoms at rest)    PAST MEDICAL & SURGICAL HISTORY:  Aortic valve stenosis, etiology of cardiac valve disease unspecified  Lupus (systemic lupus erythematosus)  Trigeminal neuralgia  Sjogren's syndrome, with unspecified organ involvement  GERD without esophagitis  Vitamin D deficiency  Constipation, unspecified constipation type  Rectal prolapse  Hypertension, unspecified type  Malignant neoplasm of lower-outer quadrant of left breast of female, estrogen receptor positive  History of appendectomy  H/O bilateral mastectomy  History of partial colectomy      MEDICATIONS  (STANDING):    MEDICATIONS  (PRN):    Antiplatelet therapy: Brilinta 90mg                          Last dose/amt:    Allergies    Naprosyn (Stomach Upset)  Vicodin (Stomach Upset)    Intolerances        SOCIAL HISTORY:  nor recent drug, alcohol or tobacco abuse  Occupation: retired  Lives with:   FAMILY HISTORY:  Family history of coronary artery disease (Mother, Sibling)      Review of Systems  all reviewed as per HPI and PMH                                                                                                                                                                                          PHYSICAL EXAM  Vital Signs Last 24 Hrs  T(C): 36.3 (11 Jul 2020 15:41), Max: 36.8 (11 Jul 2020 12:57)  T(F): 97.3 (11 Jul 2020 15:41), Max: 98.2 (11 Jul 2020 12:57)  HR: 80 (11 Jul 2020 15:41) (80 - 94)  BP: 130/65 (11 Jul 2020 15:41) (130/65 - 152/66)  RR: 18 (11 Jul 2020 15:41) (18 - 18)  SpO2: 97% (11 Jul 2020 15:41) (96% - 97%)  HR in 50's on monitor during exam    CONSTITUTIONAL:    well nourished, well developed, overweight female in NAD                                                                       Neuro: oriented to person/place & time with no focal motor or sensory  deficits     Eyes: PERRLA, EOMI, no nystagmus, sclera anicteric  ENT:  nasal/oral mucosa with absence of cyanosis,   Neck: no jugular vein distention, trachea midline, no goiter   Chest: bilatertal breath sounds with fair air movement + bilateral rales                                                                           CV:  RSR, S1S2, + significant murmur appreciated  GI:  soft, non-tender protuberant, + bowel sounds                                                                                                          Extremities:  no evidence of cyanosis or deformity, no significant pedal edema   SKIN : no rashes                                                          LABS:                        12.1   8.37  )-----------( 322      ( 11 Jul 2020 13:51 )             38.3     07-11    137  |  100  |  8<L>  ----------------------------<  112<H>  4.1   |  21  |  0.7    Ca    9.6      11 Jul 2020 13:51    TPro  8.0  /  Alb  4.8  /  TBili  0.4  /  DBili  x   /  AST  20  /  ALT  12  /  AlkPhos  86  07-11    CARDIAC MARKERS ( 11 Jul 2020 13:51 )  x     / <0.01 ng/mL / x     / x     / x        COVID-19: pending    Transthoracic Echocardiogram (06.16.20 @ 09:02) >  Summary:   1. LV Ejection Fraction by Samson's Method with a biplane EF of 63 %.   2. Normal left ventricular size and wall thicknesses, with normal systolic and diastolic function.   3. The left ventricular diastolic function could not be assessed in this study.   4. The mean global longitudinal peak strain by speckle tracking is -20.6% which is normal.   5. Severe aortic valve stenosis.   6. Severe calcific aortic stenosis with a peakAV velocity of 4.11 m/s, peak/mean AV gradient of 67.7/41.1 mm. Hg and a calculated AV area of 0.9 cm2.   7. Estimated pulmonary artery systolic pressure is 39.9 mmHg assuming a right atrial pressure of 8 mmHg, which is consistent with borderline pulmonary hypertension.   8. LA volume Index is 36.7 ml/m² ml/m2.    INTERVENTION: 6/15/20  BAV  PCI of LAD  PCI of LM / CX (treated Guideliner dissection)     Xray Chest 1 View-PORTABLE IMMEDIATE (07.11.20 @ 14:15) >  Impression:      Pulmonary vascular congestion. No parenchymal opacity pleural effusion or air leak      Impression:    74 yo overweight female with severe AS, HFpEF and multiple medical comorbidities awoke this AM with acute dyspnea and palpitations.  Scenario leans toward ? episode of a fib (palpitations-new)  2/2 NETTE (obese) exacerbating HF in patient with severe AS.  Patient doing much better now, room air sat 94-95% with reg HR 56-58    Assessment/ Plan:  Admit to Telemetry Observation  Consider repeating TTE  aggressive diuresis - suggest Lasix 40mg Q8H x3 with K supplementation the re-evaluate  make sure patient gets all home medications HERVE: Brilinta 90 mg BID due to recent PCI of LAD  Take weight daily  Dr. Johnson aware patient in ED and agrees with plan  Will discuss with Dr. Cole and note to follow  Repeat BMP, BNP, CXR and EKG in AM  Supplemental O2 as needed to maintain sat 95%  SCD's  Strict I and O's  CTS will f/u in AM, for acute issues CT Intensivist aware of patient and can be consulted ext [phone] or [phone]

## 2020-07-11 NOTE — ED PROVIDER NOTE - CLINICAL SUMMARY MEDICAL DECISION MAKING FREE TEXT BOX
pt with sob, appears to be mildly fluid overloaded, cardio/ct surg informed, will place in obs for further eval

## 2020-07-11 NOTE — CONSULT NOTE ADULT - ASSESSMENT
76 yo f, pmh severe symptomatic AS pending TAVR in ~2 weeks, CAD NYHF III s/p recent cath on 6/15/20 (pci to mid lad, prox circ, lm, temoprary ppm placement, aortic valvuloplasty), lupus, severe pulm htn, breast ca s/p bl mastectomy with no chemo/rad, mult TIAs, COPD -- comes back to ed for worsening sob upon minimal exertion and worsening fatigue.      ASSESSMENT:   Decompensated HF; CAD s/p recent pci; Severe AS s/p recent BAV  - o2 sat 97% on ra,  no appreciable le edema, bp/hr wnl;    - bnp 2061,  trops neg x 1  - no homar, no leukocytosis  - symptoms cw nyha iv, progressively worsened campa/fatigue over past few weeks since procedure 6/15/20  - states she doesn't recall taking any diuretics or lasix at home.        RECOMMENDATIONS:   - monitor in obs  - agree with diurese per ct sx recs (40mg q8h x 3) (of note,  pt states she doesn't recall taking diuretics at home)  - check EKG in am  - repeat 2d echo due to change in symptoms  - test for covid  - check cbc / bmp in am. 76 yo f, pmh severe symptomatic AS pending TAVR in ~2 weeks, CAD NYHF III s/p recent cath on 6/15/20 (pci to mid lad, prox circ, lm, temoprary ppm placement, aortic valvuloplasty), lupus, severe pulm htn, breast ca s/p bl mastectomy with no chemo/rad, mult TIAs, COPD -- comes back to ed for worsening sob upon minimal exertion and worsening fatigue.      ASSESSMENT:   Decompensated HF; CAD s/p recent pci; Severe AS s/p recent BAV  - o2 sat 97% on ra,  no appreciable le edema, bp/hr wnl;    - bnp 2061,  trops neg x 1  - no homar, no leukocytosis  - symptoms cw nyha iv, progressively worsened campa/fatigue over past few weeks since procedure 6/15/20  - states she doesn't recall taking any diuretics or lasix at home.        RECOMMENDATIONS:   - monitor in obs for diuresis.  - agree with diurese per ct sx recs (40mg q8h x 3) (of note,  pt states she doesn't recall taking diuretics at home) --> trend renal function in am.   - test for covid 74 yo f, pmh severe symptomatic AS pending TAVR in ~2 weeks, CAD NYHF III s/p recent cath on 6/15/20 (pci to mid lad, prox circ, lm, temoprary ppm placement, aortic valvuloplasty), lupus, severe pulm htn, breast ca s/p bl mastectomy with no chemo/rad, mult TIAs, COPD -- comes back to ed for worsening sob upon minimal exertion and worsening fatigue.      ASSESSMENT:   Shortness of breath likely related to her significant COPD.   At this time I do not appreciate significant crackles. she has not orthopena or pnd either. no e/o leg edema.   No e/o PNA.    Adv:  Please get LE duplex b/l LE.   Continue Lasix but drop dose to 40 mg once a day orally. This is necessary for elevated LV pressure that she experiences due to her severe AS.   Give Nebs. and inhlaers.   dvt prophylaxis.     PLEASE RESTART PLAVIX. PT HAD STENTS IN LEFT MAIN AND LAD/ LCX A MONTH AGO.  continue aspirin 81 mg once daily   add atorvastatin 40 mg once daily hs.   pt is not on beta blockers or ace in due to low bp most of the time. 76 yo f, pmh severe symptomatic AS pending TAVR in ~2 weeks, CAD NYHF III s/p recent cath on 6/15/20 (pci to mid lad, prox circ, lm, temoprary ppm placement, aortic valvuloplasty), lupus, severe pulm htn, breast ca s/p bl mastectomy with no chemo/rad, mult TIAs, COPD -- comes back to ed for worsening sob upon minimal exertion and worsening fatigue.      ASSESSMENT:   Shortness of breath likely related to her significant COPD.   At this time I do not appreciate significant crackles. she has not orthopena or pnd either. no e/o leg edema.   No e/o PNA.    Adv:  Please get LE duplex b/l LE.   Continue Lasix but drop dose to 40 mg once a day orally. This is necessary for elevated LV pressure that she experiences due to her severe AS.   Give Nebs. and inhlaers.   dvt prophylaxis.     PLEASE RESTART PLAVIX. PT HAD STENTS IN LEFT MAIN AND LAD/ LCX A MONTH AGO.  continue aspirin 81 mg once daily   add atorvastatin 40 mg once daily hs.   pt is not on beta blockers or ace in due to low bp most of the time.     restart home meds    get Lower extremity venous duplex. if abnormal then get V/Q scan.

## 2020-07-11 NOTE — CONSULT NOTE ADULT - SUBJECTIVE AND OBJECTIVE BOX
76 yo f, pmh severe symptomatic AS pending TAVR in ~2 weeks, CAD NYHF III s/p recent cath on 6/15/20 (pci to mid lad, prox circ, lm, temoprary ppm placement, aortic valvuloplasty), lupus, severe pulm htn, breast ca s/p bl mastectomy with no chemo/rad, mult TIAs, COPD -- comes back to ed for worsening sob upon minimal exertion.     Pt states since her procedure last month, she has felt progressively more sob on minimal exertion/rest, with fatigue, poor appetite, new minimally productive cough.  Onset of symptoms soon after her procedure on 6/15,  progressively worsening since then.     Of note,  lasix 40mg daily included in discharge med rec,  but pt doesn't recall taking any diuretics at home.          PAST MEDICAL & SURGICAL HISTORY  Aortic valve stenosis, etiology of cardiac valve disease unspecified  Lupus (systemic lupus erythematosus)  Trigeminal neuralgia  Sjogren's syndrome, with unspecified organ involvement  GERD without esophagitis  Vitamin D deficiency  Constipation, unspecified constipation type  Rectal prolapse  Hypertension, unspecified type  Malignant neoplasm of lower-outer quadrant of left breast of female, estrogen receptor positive  History of appendectomy  H/O bilateral mastectomy  History of partial colectomy      CARDIAC RISK FACTORS:  [x ]CAD  [ ]DM  [ ]DL  [ ]PVD  [x ]CVA  [x ]HTN  [x ]CHF     FAMILY HISTORY:  FAMILY HISTORY:  Family history of coronary artery disease (Mother, Sibling)      SOCIAL HISTORY:  []smoker  []Alcohol  []Drug    ALLERGIES:  Naprosyn (Stomach Upset)  Vicodin (Stomach Upset)      MEDICATIONS:  MEDICATIONS  (STANDING):    MEDICATIONS  (PRN):      HOME MEDICATIONS:  Home Medications:  ALPRAZolam 0.5 mg oral tablet: 2 tab(s) orally once a day (at bedtime), As Needed (15 Olivier 2020 14:46)  amLODIPine 10 mg oral tablet: 1 tab(s) orally once a day (15 Olivier 2020 14:46)  cevimeline 30 mg oral capsule: 3 cap(s) orally once a day (15 Olivier 2020 14:46)  docusate sodium 100 mg oral capsule: 1 cap(s) orally 2 times a day (15 Olivier 2020 14:46)  Fioricet oral capsule: 2 cap(s) orally once a day (15 Olivier 2020 14:46)  hydroxychloroquine 200 mg oral tablet: 1 tab(s) orally 2 times a day (15 Olivier 2020 14:46)  Low Dose ASA 81 mg oral tablet: 1 tab(s) orally once a day (15 Olivier 2020 14:46)  montelukast 10 mg oral tablet: 1 tab(s) orally once a day (15 Olivier 2020 14:46)  ocular lubricant ophthalmic solution: 1 drop(s) to each affected eye every 2 hours, As needed, Dry Eyes (15 Olivier 2020 14:46)  omeprazole 40 mg oral delayed release capsule: 1 cap(s) orally once a day (15 Olivier 2020 14:46)  senna oral tablet: 2 tab(s) orally once a day (at bedtime) (15 Olivier 2020 14:46)  Singulair: 2  orally once a day (15 Olivier 2020 14:46)  sulfaSALAzine 500 mg oral tablet: 3 tab(s) orally 2 times a day (15 Olivier 2020 14:46)      VITALS:   T(F): 97.3 (07-11 @ 15:41), Max: 98.2 (07-11 @ 12:57)  HR: 80 (07-11 @ 15:41) (80 - 94)  BP: 130/65 (07-11 @ 15:41) (130/65 - 152/66)  BP(mean): --  RR: 18 (07-11 @ 15:41) (18 - 18)  SpO2: 97% (07-11 @ 15:41) (96% - 97%)    I&O's Summary      REVIEW OF SYSTEMS:  CONSTITUTIONAL: +weakness / fatigue.  no fevers  HEENT: No visual changes, neck/ear pain  RESPIRATORY: No cough, sob  CARDIOVASCULAR: occasional chest pains, occurs independently of her worsening exertional dyspnea. pt attributes chest wall pains to post mastectomy etiology  GASTROINTESTINAL: decreased appetitie  GENITOURINARY: No dysuria, frequency or hematuria  NEUROLOGICAL: No new focal deficits  SKIN: No new rashes    PHYSICAL EXAM:  General: Not in distress.  Non-toxic appearing.   HEENT: EOMI  Cardio: Regular rate and rhythm, S1, S2,   Pulm: B/L BS.  No wheezing / scattered crackles, + cough  Abdomen: Soft, non-tender, non-distended. Normoactive bowel sounds  Extremities: No edema b/l   Neuro: A&O x3. No focal deficits    LABS:                        12.1   8.37  )-----------( 322      ( 11 Jul 2020 13:51 )             38.3     07-11    137  |  100  |  8<L>  ----------------------------<  112<H>  4.1   |  21  |  0.7    Ca    9.6      11 Jul 2020 13:51    TPro  8.0  /  Alb  4.8  /  TBili  0.4  /  DBili  x   /  AST  20  /  ALT  12  /  AlkPhos  86  07-11      Troponin T, Serum: <0.01 ng/mL (07-11-20 @ 13:51)    CARDIAC MARKERS ( 11 Jul 2020 13:51 )  x     / <0.01 ng/mL / x     / x     / x            Troponin trend:    Serum Pro-Brain Natriuretic Peptide: 2061 pg/mL (07-11-20 @ 13:51)          RADIOLOGY:  -CXR: < from: Xray Chest 1 View-PORTABLE IMMEDIATE (07.11.20 @ 14:15) >  Pulmonary vascular congestion. No parenchymal opacity pleural effusion or air leak      -TTE:< from: Transthoracic Echocardiogram (06.16.20 @ 09:02) >   1. LV Ejection Fraction by Samson's Method with a biplane EF of 63 %.   2. Normal left ventricular size and wall thicknesses, with normal systolic and diastolic function.   3. The left ventricular diastolic function could not be assessed in this study.   4. The mean global longitudinal peak strain by speckle tracking is -20.6% which is normal.   5. Severe aortic valve stenosis.   6. Severe calcific aortic stenosis with a peakAV velocity of 4.11 m/s, peak/mean AV gradient of 67.7/41.1 mm. Hg and a calculated AV area of 0.9 cm2.   7. Estimated pulmonary artery systolic pressure is 39.9 mmHg assuming a right atrial pressure of 8 mmHg, which is consistent with borderline pulmonary hypertension.   8. LA volume Index is 36.7 ml/m² ml/m2.      -CATHETERIZATION: 6/16/20:   BAV   PCI to Mid LAD lesion, SYNERGY 2.75 x 12mm via JL1 guide launcher with the help of guideliner, post stent deployment, contrast injection showed  showed dissection at level  of guideliner in LAD that spread to LM and LCx  with transient loss of flow in LCx, s/p successful PCI to LM, Prox LAD and LCX with excellent final result , TIMI3 flow in LM, LAD and LCx with 0% residual stenosis and no further dissection.   OM--> SYNERGY 2.5 x 38 mm  Prox LCx --> SYNERGY 3 x 24 mm  LM/PROx LAD --> SYNERGY 3.5 x 38 mm 76 yo f, pmh severe symptomatic AS pending TAVR in ~2 weeks, CAD NYHF III s/p recent cath on 6/15/20 (pci to mid lad, prox circ, lm, temoprary ppm placement, aortic valvuloplasty), lupus, severe pulm htn, breast ca s/p bl mastectomy with no chemo/rad, mult TIAs, COPD -- comes back to ed for worsening sob upon minimal exertion.     Pt states since her procedure last month, she has felt progressively more sob on minimal exertion/rest, with fatigue, poor appetite, new minimally productive cough.  Onset of symptoms soon after her procedure on 6/15,  progressively worsening since then.     Of note,  lasix 40mg daily included in discharge med rec,  but pt doesn't recall taking any diuretics at home.          PAST MEDICAL & SURGICAL HISTORY  Aortic valve stenosis, etiology of cardiac valve disease unspecified  Lupus (systemic lupus erythematosus)  Trigeminal neuralgia  Sjogren's syndrome, with unspecified organ involvement  GERD without esophagitis  Vitamin D deficiency  Constipation, unspecified constipation type  Rectal prolapse  Hypertension, unspecified type  Malignant neoplasm of lower-outer quadrant of left breast of female, estrogen receptor positive  History of appendectomy  H/O bilateral mastectomy  History of partial colectomy      CARDIAC RISK FACTORS:  [x ]CAD  [ ]DM  [ ]DL  [ ]PVD  [x ]CVA  [x ]HTN  [x ]CHF     FAMILY HISTORY:  FAMILY HISTORY:  Family history of coronary artery disease (Mother, Sibling)      SOCIAL HISTORY:  []smoker  []Alcohol  []Drug    ALLERGIES:  Naprosyn (Stomach Upset)  Vicodin (Stomach Upset)      MEDICATIONS:  MEDICATIONS  (STANDING):    MEDICATIONS  (PRN):      HOME MEDICATIONS:  Home Medications:  ALPRAZolam 0.5 mg oral tablet: 2 tab(s) orally once a day (at bedtime), As Needed (15 Olivier 2020 14:46)  amLODIPine 10 mg oral tablet: 1 tab(s) orally once a day (15 Olivier 2020 14:46)  cevimeline 30 mg oral capsule: 3 cap(s) orally once a day (15 Olivier 2020 14:46)  docusate sodium 100 mg oral capsule: 1 cap(s) orally 2 times a day (15 Olivier 2020 14:46)  Fioricet oral capsule: 2 cap(s) orally once a day (15 Olivier 2020 14:46)  hydroxychloroquine 200 mg oral tablet: 1 tab(s) orally 2 times a day (15 Olivier 2020 14:46)  Low Dose ASA 81 mg oral tablet: 1 tab(s) orally once a day (15 Olivier 2020 14:46)  montelukast 10 mg oral tablet: 1 tab(s) orally once a day (15 Olivier 2020 14:46)  ocular lubricant ophthalmic solution: 1 drop(s) to each affected eye every 2 hours, As needed, Dry Eyes (15 Olivier 2020 14:46)  omeprazole 40 mg oral delayed release capsule: 1 cap(s) orally once a day (15 Olivier 2020 14:46)  senna oral tablet: 2 tab(s) orally once a day (at bedtime) (15 Olivier 2020 14:46)  Singulair: 2  orally once a day (15 Olivier 2020 14:46)  sulfaSALAzine 500 mg oral tablet: 3 tab(s) orally 2 times a day (15 Olivier 2020 14:46)      VITALS:   T(F): 97.3 (07-11 @ 15:41), Max: 98.2 (07-11 @ 12:57)  HR: 80 (07-11 @ 15:41) (80 - 94)  BP: 130/65 (07-11 @ 15:41) (130/65 - 152/66)  BP(mean): --  RR: 18 (07-11 @ 15:41) (18 - 18)  SpO2: 97% (07-11 @ 15:41) (96% - 97%)    I&O's Summary      REVIEW OF SYSTEMS:  CONSTITUTIONAL: +weakness / fatigue.  no fevers  HEENT: No visual changes, neck/ear pain  RESPIRATORY: No cough, sob  CARDIOVASCULAR: occasional chest pains, occurs independently of her worsening exertional dyspnea. pt attributes chest wall pains to post mastectomy etiology  GASTROINTESTINAL: decreased appetitie  GENITOURINARY: No dysuria, frequency or hematuria  NEUROLOGICAL: No new focal deficits  SKIN: No new rashes    PHYSICAL EXAM:  General: Not in distress.  Non-toxic appearing.   HEENT: EOMI  Cardio: Regular rate and rhythm, S1, S2, 2/6 esm lsb aa  Pulm:  No wheezing decreased air entry b/l. no distinct crackles.  Abdomen: Soft, non-tender, non-distended. Normoactive bowel sounds  Extremities: No edema b/l   Neuro: A&O x3. No focal deficits    LABS:                        12.1   8.37  )-----------( 322      ( 11 Jul 2020 13:51 )             38.3     07-11    137  |  100  |  8<L>  ----------------------------<  112<H>  4.1   |  21  |  0.7    Ca    9.6      11 Jul 2020 13:51    TPro  8.0  /  Alb  4.8  /  TBili  0.4  /  DBili  x   /  AST  20  /  ALT  12  /  AlkPhos  86  07-11      Troponin T, Serum: <0.01 ng/mL (07-11-20 @ 13:51)    CARDIAC MARKERS ( 11 Jul 2020 13:51 )  x     / <0.01 ng/mL / x     / x     / x            Troponin trend:    Serum Pro-Brain Natriuretic Peptide: 2061 pg/mL (07-11-20 @ 13:51)          RADIOLOGY:  -CXR: < from: Xray Chest 1 View-PORTABLE IMMEDIATE (07.11.20 @ 14:15) >  Pulmonary vascular congestion. No parenchymal opacity pleural effusion or air leak      -TTE:< from: Transthoracic Echocardiogram (06.16.20 @ 09:02) >   1. LV Ejection Fraction by Samson's Method with a biplane EF of 63 %.   2. Normal left ventricular size and wall thicknesses, with normal systolic and diastolic function.   3. The left ventricular diastolic function could not be assessed in this study.   4. The mean global longitudinal peak strain by speckle tracking is -20.6% which is normal.   5. Severe aortic valve stenosis.   6. Severe calcific aortic stenosis with a peakAV velocity of 4.11 m/s, peak/mean AV gradient of 67.7/41.1 mm. Hg and a calculated AV area of 0.9 cm2.   7. Estimated pulmonary artery systolic pressure is 39.9 mmHg assuming a right atrial pressure of 8 mmHg, which is consistent with borderline pulmonary hypertension.   8. LA volume Index is 36.7 ml/m² ml/m2.      -CATHETERIZATION: 6/16/20:   BAV   PCI to Mid LAD lesion, SYNERGY 2.75 x 12mm via JL1 guide launcher with the help of guideliner, post stent deployment, contrast injection showed  showed dissection at level  of guideliner in LAD that spread to LM and LCx  with transient loss of flow in LCx, s/p successful PCI to LM, Prox LAD and LCX with excellent final result , TIMI3 flow in LM, LAD and LCx with 0% residual stenosis and no further dissection.   OM--> SYNERGY 2.5 x 38 mm  Prox LCx --> SYNERGY 3 x 24 mm  LM/PROx LAD --> SYNERGY 3.5 x 38 mm

## 2020-07-12 LAB
ANION GAP SERPL CALC-SCNC: 18 MMOL/L — HIGH (ref 7–14)
BUN SERPL-MCNC: 11 MG/DL — SIGNIFICANT CHANGE UP (ref 10–20)
CALCIUM SERPL-MCNC: 9.9 MG/DL — SIGNIFICANT CHANGE UP (ref 8.5–10.1)
CHLORIDE SERPL-SCNC: 101 MMOL/L — SIGNIFICANT CHANGE UP (ref 98–110)
CO2 SERPL-SCNC: 21 MMOL/L — SIGNIFICANT CHANGE UP (ref 17–32)
CREAT SERPL-MCNC: 0.7 MG/DL — SIGNIFICANT CHANGE UP (ref 0.7–1.5)
GLUCOSE SERPL-MCNC: 121 MG/DL — HIGH (ref 70–99)
NT-PROBNP SERPL-SCNC: 2299 PG/ML — HIGH (ref 0–300)
POTASSIUM SERPL-MCNC: 3.5 MMOL/L — SIGNIFICANT CHANGE UP (ref 3.5–5)
POTASSIUM SERPL-SCNC: 3.5 MMOL/L — SIGNIFICANT CHANGE UP (ref 3.5–5)
SARS-COV-2 RNA SPEC QL NAA+PROBE: SIGNIFICANT CHANGE UP
SODIUM SERPL-SCNC: 140 MMOL/L — SIGNIFICANT CHANGE UP (ref 135–146)
TROPONIN T SERPL-MCNC: <0.01 NG/ML — SIGNIFICANT CHANGE UP

## 2020-07-12 PROCEDURE — 93010 ELECTROCARDIOGRAM REPORT: CPT

## 2020-07-12 PROCEDURE — 99217: CPT

## 2020-07-12 PROCEDURE — 93306 TTE W/DOPPLER COMPLETE: CPT | Mod: 26

## 2020-07-12 PROCEDURE — 71045 X-RAY EXAM CHEST 1 VIEW: CPT | Mod: 26

## 2020-07-12 RX ORDER — ACETAMINOPHEN 500 MG
650 TABLET ORAL ONCE
Refills: 0 | Status: COMPLETED | OUTPATIENT
Start: 2020-07-12 | End: 2020-07-12

## 2020-07-12 RX ORDER — DILTIAZEM HCL 120 MG
10 CAPSULE, EXT RELEASE 24 HR ORAL ONCE
Refills: 0 | Status: COMPLETED | OUTPATIENT
Start: 2020-07-12 | End: 2020-07-12

## 2020-07-12 RX ORDER — CLOPIDOGREL BISULFATE 75 MG/1
75 TABLET, FILM COATED ORAL DAILY
Refills: 0 | Status: DISCONTINUED | OUTPATIENT
Start: 2020-07-12 | End: 2020-07-12

## 2020-07-12 RX ORDER — SENNA PLUS 8.6 MG/1
2 TABLET ORAL AT BEDTIME
Refills: 0 | Status: DISCONTINUED | OUTPATIENT
Start: 2020-07-12 | End: 2020-07-14

## 2020-07-12 RX ORDER — METOPROLOL TARTRATE 50 MG
50 TABLET ORAL DAILY
Refills: 0 | Status: DISCONTINUED | OUTPATIENT
Start: 2020-07-12 | End: 2020-07-13

## 2020-07-12 RX ORDER — DILTIAZEM HCL 120 MG
15 CAPSULE, EXT RELEASE 24 HR ORAL ONCE
Refills: 0 | Status: DISCONTINUED | OUTPATIENT
Start: 2020-07-12 | End: 2020-07-12

## 2020-07-12 RX ORDER — CHLORHEXIDINE GLUCONATE 213 G/1000ML
1 SOLUTION TOPICAL DAILY
Refills: 0 | Status: DISCONTINUED | OUTPATIENT
Start: 2020-07-12 | End: 2020-07-14

## 2020-07-12 RX ORDER — ENOXAPARIN SODIUM 100 MG/ML
70 INJECTION SUBCUTANEOUS DAILY
Refills: 0 | Status: DISCONTINUED | OUTPATIENT
Start: 2020-07-12 | End: 2020-07-13

## 2020-07-12 RX ORDER — ENOXAPARIN SODIUM 100 MG/ML
80 INJECTION SUBCUTANEOUS EVERY 12 HOURS
Refills: 0 | Status: DISCONTINUED | OUTPATIENT
Start: 2020-07-12 | End: 2020-07-12

## 2020-07-12 RX ORDER — AMIODARONE HYDROCHLORIDE 400 MG/1
150 TABLET ORAL ONCE
Refills: 0 | Status: DISCONTINUED | OUTPATIENT
Start: 2020-07-12 | End: 2020-07-13

## 2020-07-12 RX ORDER — FUROSEMIDE 40 MG
40 TABLET ORAL DAILY
Refills: 0 | Status: DISCONTINUED | OUTPATIENT
Start: 2020-07-12 | End: 2020-07-14

## 2020-07-12 RX ORDER — PANTOPRAZOLE SODIUM 20 MG/1
40 TABLET, DELAYED RELEASE ORAL
Refills: 0 | Status: DISCONTINUED | OUTPATIENT
Start: 2020-07-12 | End: 2020-07-13

## 2020-07-12 RX ORDER — SERTRALINE 25 MG/1
50 TABLET, FILM COATED ORAL DAILY
Refills: 0 | Status: DISCONTINUED | OUTPATIENT
Start: 2020-07-12 | End: 2020-07-14

## 2020-07-12 RX ORDER — ALBUTEROL 90 UG/1
2 AEROSOL, METERED ORAL EVERY 6 HOURS
Refills: 0 | Status: DISCONTINUED | OUTPATIENT
Start: 2020-07-12 | End: 2020-07-14

## 2020-07-12 RX ORDER — TICAGRELOR 90 MG/1
90 TABLET ORAL
Refills: 0 | Status: DISCONTINUED | OUTPATIENT
Start: 2020-07-12 | End: 2020-07-13

## 2020-07-12 RX ORDER — AMIODARONE HYDROCHLORIDE 400 MG/1
1 TABLET ORAL
Qty: 900 | Refills: 0 | Status: DISCONTINUED | OUTPATIENT
Start: 2020-07-12 | End: 2020-07-13

## 2020-07-12 RX ORDER — ATORVASTATIN CALCIUM 80 MG/1
80 TABLET, FILM COATED ORAL AT BEDTIME
Refills: 0 | Status: DISCONTINUED | OUTPATIENT
Start: 2020-07-12 | End: 2020-07-14

## 2020-07-12 RX ORDER — SULFASALAZINE 500 MG
3 TABLET ORAL
Qty: 0 | Refills: 0 | DISCHARGE

## 2020-07-12 RX ORDER — HYDROXYZINE HCL 10 MG
50 TABLET ORAL EVERY 6 HOURS
Refills: 0 | Status: DISCONTINUED | OUTPATIENT
Start: 2020-07-12 | End: 2020-07-14

## 2020-07-12 RX ORDER — BUDESONIDE AND FORMOTEROL FUMARATE DIHYDRATE 160; 4.5 UG/1; UG/1
2 AEROSOL RESPIRATORY (INHALATION)
Refills: 0 | Status: DISCONTINUED | OUTPATIENT
Start: 2020-07-12 | End: 2020-07-14

## 2020-07-12 RX ORDER — MONTELUKAST 4 MG/1
2 TABLET, CHEWABLE ORAL
Qty: 0 | Refills: 0 | DISCHARGE

## 2020-07-12 RX ORDER — ENOXAPARIN SODIUM 100 MG/ML
40 INJECTION SUBCUTANEOUS AT BEDTIME
Refills: 0 | Status: DISCONTINUED | OUTPATIENT
Start: 2020-07-12 | End: 2020-07-12

## 2020-07-12 RX ORDER — TIOTROPIUM BROMIDE 18 UG/1
1 CAPSULE ORAL; RESPIRATORY (INHALATION) DAILY
Refills: 0 | Status: DISCONTINUED | OUTPATIENT
Start: 2020-07-12 | End: 2020-07-14

## 2020-07-12 RX ADMIN — Medication 10 MILLIGRAM(S): at 18:38

## 2020-07-12 RX ADMIN — Medication 50 MILLIGRAM(S): at 18:26

## 2020-07-12 RX ADMIN — BUDESONIDE AND FORMOTEROL FUMARATE DIHYDRATE 2 PUFF(S): 160; 4.5 AEROSOL RESPIRATORY (INHALATION) at 21:46

## 2020-07-12 RX ADMIN — SENNA PLUS 2 TABLET(S): 8.6 TABLET ORAL at 22:15

## 2020-07-12 RX ADMIN — Medication 25 MILLIGRAM(S): at 22:24

## 2020-07-12 RX ADMIN — TICAGRELOR 90 MILLIGRAM(S): 90 TABLET ORAL at 18:29

## 2020-07-12 RX ADMIN — ALBUTEROL 2 PUFF(S): 90 AEROSOL, METERED ORAL at 21:45

## 2020-07-12 RX ADMIN — ENOXAPARIN SODIUM 70 MILLIGRAM(S): 100 INJECTION SUBCUTANEOUS at 19:51

## 2020-07-12 RX ADMIN — Medication 10 MILLIGRAM(S): at 22:14

## 2020-07-12 RX ADMIN — ATORVASTATIN CALCIUM 80 MILLIGRAM(S): 80 TABLET, FILM COATED ORAL at 22:14

## 2020-07-12 NOTE — CHART NOTE - NSCHARTNOTEFT_GEN_A_CORE
Sinus Tachy 140's  Well Criteria  6  moderate Risk  Hemodynamically stable   Patient Understand risk and benefits of CTA New onset Aflutter  noted  on ECG Tachy 140's  VA duplex rule Out with VQ scan   Starting Lovenox Therapeutic 70mg IV BID   Pushing Cardizem  10mg IV  Once   Converting  with Amio 150MG V Once Then infusion  dosing  1mG/min for  6hr  followed by Amio 0.5mg/Min   for 18hours.

## 2020-07-12 NOTE — H&P ADULT - NSHPSOCIALHISTORY_GEN_ALL_CORE
Current smoker, Social alcohol use, No illicit drug use Current smoker, smokes a pack a day, Social alcohol use, No illicit drug use

## 2020-07-12 NOTE — H&P ADULT - NSHPLABSRESULTS_GEN_ALL_CORE
12.1   8.37  )-----------( 322      ( 11 Jul 2020 13:51 )             38.3       07-12    140  |  101  |  11  ----------------------------<  121<H>  3.5   |  21  |  0.7    Ca    9.9      12 Jul 2020 05:00    TPro  8.0  /  Alb  4.8  /  TBili  0.4  /  DBili  x   /  AST  20  /  ALT  12  /  AlkPhos  86  07-11                      CARDIAC MARKERS ( 12 Jul 2020 05:00 )  x     / <0.01 ng/mL / x     / x     / x      CARDIAC MARKERS ( 11 Jul 2020 13:51 )  x     / <0.01 ng/mL / x     / x     / x            CAPILLARY BLOOD GLUCOSE

## 2020-07-12 NOTE — ED CDU PROVIDER DISPOSITION NOTE - CLINICAL COURSE
Pt placed into observation for shortness of breath. Recently had PCI >>4 stents. Has been having exertional dyspnea. Found to have increased vascular congestion. Hx of aortic stenosis. This am pt stated she was somewhat better though still short of breath. CT surgery Dr. Jorgensen evaluated pt and advised admission for further treatment.

## 2020-07-12 NOTE — H&P ADULT - NSHPREVIEWOFSYSTEMS_GEN_ALL_CORE
CONSTITUTIONAL: No weakness, fevers or chills, no weight loss   EYES/ENT: No visual changes;  No vertigo or throat pain   NECK: No pain or stiffness  RESPIRATORY: Shortness of Breath  CARDIOVASCULAR: No chest pain or palpitations  GASTROINTESTINAL: No abdominal or epigastric pain. No nausea, vomiting, or hematemesis; No diarrhea or constipation. No melena or hematochezia.  GENITOURINARY: No dysuria, frequency or hematuria  NEUROLOGICAL: No numbness or weakness  All other review of systems is negative unless indicated above.

## 2020-07-12 NOTE — H&P ADULT - ASSESSMENT
75 year old female with past medical history of aortic valve stenosis scheduled for TAVR 7/22/20, CAD stent x4 6/15, HTN, GERD, SLE, trigeminal neuralgia, breast CA s/p rodrick mastectomy and Sjogren's syndrome presented to ED for SOB.     # SOB on exertion likely due to Fluid overload due to non compliance and COPD overlap  - Patient has component of fluid overload (b/l pleural effusions and LE edema) and COPD exacerbation ( expiratory wheezes)  - Will treat for COPD exacerbation with solumedrol 60 mg IV q12h ; duonebs q6h and q4h PRN, symbicort q12h  - F/up Pulm consult ( service)  - Patient also has signs and symptoms of fluid overload ( LE edema and congested CXR), will give lasix 60 mg IV q12h for now and monitor urine output.  - Keep I<O, daily weights, fluid restriction 1L/d  - F/up Troponin in am ; BNP 1000  - F/up 2D echo, hold off on cardiology consult for now. Patient also has systolic heart murmur which might be consistent with MVR.  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  - F/up Nephrology consult ( Dr. Donis)  - Get Osmolality urine and serum ; Eduardo, Cru, Ku, Clu    # Dysuria with suprapubic tenderness:  - F/up UA, patient has hx of Citrobacter in the urine  - Treat empirically with meropenem if UA positive and f/up urine culture    # Sjogren's disease:  - c/w cevilemine qd ; patient to have somebody bring home meds tomorrow morning and fill non formulary form    # SLE:  - c/w hydroxychloroquine and complete med rec ( patient on sulfasalazine in 2018)    #  Trigeminal neuralgia:  - Will hold lyrica ( patient takes it at home) for concerns of SIADH    # HTN:  - C/w HCTZ and losartan  - Monitor BMP daily    # Diet: DASH with 1.2L fluid restriction  # DVT PPX: Lovenox 40 mg qd   # GI ppx: Protonix 40 mg qd PO  # Activity: OOBTC, f/up PT/rehab   # Dispo: From home, lives alone, f/up social work consult  # Code status: FULL 75 year old female with past medical history of aortic valve stenosis scheduled for TAVR 7/22/20, CAD stent x4 6/15, HTN, GERD, SLE, trigeminal neuralgia, breast CA s/p rodrick mastectomy and Sjogren's syndrome presented to ED for SOB.    Patient reports having worsening shortness of breath since her cath last month. Patient reports decreased ability to ambulate without losing her breath even for a few steps. Patient likely was not taking the Lasix and in ED on Xray and exam appeared to be in volume overload with elevated BNP. On my exam the patient is still short of breath and denies any significant improvement from her presentation. Exam appears to have mild wheezing on exam but no crackles at bases. Cardiology and CT Surgery following the patient and may possibly have the planned TAVR done inpatient.    Will continue with Lasix daily for now as patient does not appear to be in significant volume overload, restart her medications and can give trial of short course of steroids as underlying cause of SOB could be her COPD as she is currently smoking.    # SOB on exertion likely due to Fluid overload due to non compliance and COPD overlap  - Patient has component of fluid overload (elevated BNP, vascular congestion on Xray and LE edema) and COPD exacerbation (expiratory wheezes)  - Will treat for COPD exacerbation with Prednisone 40mg for 5 days ; Duoneb q6h and q4h PRN, Symbicort q12h  - Can consult Pulmonary if no improvement  - For Fluid overload will continue with Lasix daily  - Keep I<O, daily weights, fluid restriction 1L/d  - BNP >2000  - Echo shows normal EF with with severe aortic root stenosis    # CAD s/p PCI to LAD, Prox Circ, LM  - Recent PCI on 06/15  - Continue Aspirin and Plavix (90 twice daily)  - Follow Cardiology    # Sjogren's disease:  - c/w Cevimeline daily  - patient has to take her own pills from home    # SLE:  - c/w hydroxychloroquine    #  Trigeminal neuralgia:  - Continue Lyrica    # HTN:  - C/w HCTZ and losartan  - Monitor BMP daily    Diet: DASH with 1.2L fluid restriction  DVT PPX: Lovenox 40 mg qd   GI ppx: Protonix 40 mg daily  Activity: Ambulate as tolerated  Dispo: From home, lives alone  # Code status: FULL 75 year old female with past medical history of aortic valve stenosis scheduled for TAVR 7/22/20, CAD stent x4 6/15, HTN, GERD, SLE, trigeminal neuralgia, breast CA s/p rodrick mastectomy and Sjogren's syndrome presented to ED for SOB.    Patient reports having worsening shortness of breath since her cath last month. Patient reports decreased ability to ambulate without losing her breath even for a few steps. Patient likely was not taking the Lasix and in ED on Xray and exam appeared to be in volume overload with elevated BNP. On my exam the patient is still short of breath and denies any significant improvement from her presentation. Exam appears to have mild wheezing on exam but no crackles at bases. Cardiology and CT Surgery following the patient and may possibly have the planned TAVR done inpatient.    Will continue with Lasix daily for now as patient does not appear to be in significant volume overload, restart her medications and can give trial of short course of steroids as underlying cause of SOB could be her COPD as she is currently smoking.    # SOB on exertion likely due to Fluid overload due to non compliance and COPD overlap  - Patient has component of fluid overload (elevated BNP, vascular congestion on Xray and LE edema) and COPD exacerbation (expiratory wheezes)  - Will treat for COPD exacerbation with Prednisone 40mg for 5 days ; Duoneb q6h and q4h PRN, Symbicort q12h  - Can consult Pulmonary if no improvement  - For Fluid overload will continue with Lasix daily  - Keep I<O, daily weights, fluid restriction 1L/d  - BNP >2000  - Echo shows normal EF with with severe aortic root stenosis    # Severe Aortic Stenosis  - Severe Aortic root stenosis on the echo  - TAVR planned for 07/22  - CTS following the patient    # CAD s/p PCI to LAD, Prox Circ, LM  - Recent PCI on 06/15  - Continue Aspirin and Plavix (90 twice daily)  - Follow Cardiology    # Sjogren's disease:  - c/w Cevimeline daily  - patient has to take her own pills from home    # SLE:  - c/w hydroxychloroquine    #  Trigeminal neuralgia:  - Continue Lyrica    # HTN:  - C/w HCTZ and losartan  - Monitor BMP daily    Diet: DASH with 1.2L fluid restriction  DVT PPX: Lovenox 40 mg qd   GI ppx: Protonix 40 mg daily  Activity: Ambulate as tolerated  Dispo: From home, lives alone  # Code status: FULL 75 year old female with past medical history of aortic valve stenosis scheduled for TAVR 7/22/20, CAD stent x4 6/15, HTN, GERD, SLE, trigeminal neuralgia, breast CA s/p rodrick mastectomy and Sjogren's syndrome presented to ED for SOB.    Patient reports having worsening shortness of breath since her cath last month. Patient reports decreased ability to ambulate without losing her breath even for a few steps. Patient likely was not taking the Lasix and in ED on Xray and exam appeared to be in volume overload with elevated BNP. On my exam the patient is still short of breath and denies any significant improvement from her presentation. Exam appears to have mild wheezing on exam but no crackles at bases. Cardiology and CT Surgery following the patient and may possibly have the planned TAVR done inpatient.    Will continue with Lasix daily for now as patient does not appear to be in significant volume overload, restart her medications and can give trial of short course of steroids as underlying cause of SOB could be her COPD as she is currently smoking.    # SOB on exertion likely due to Fluid overload due to non compliance and COPD overlap vs PE unlikely  - Patient has component of fluid overload (elevated BNP, vascular congestion on Xray and LE edema) and COPD exacerbation (expiratory wheezes)  - Will treat for COPD exacerbation with Prednisone 40mg for 5 days ; Albuterol q6h Symbicort q12h, Spiriva daily  - Can consult Pulmonary if no improvement  - For Fluid overload will continue with Lasix daily  - Keep I<O, daily weights, fluid restriction 1L/d  - BNP >2000  - Echo shows normal EF with with severe aortic root stenosis  - VA duplex lower extremities ordered, if abnormal can do V/Q scan  - Follow up AM xray and EKG    # Severe Aortic Stenosis  - Severe Aortic root stenosis on the echo  - TAVR planned for 07/22  - CTS following the patient    # HFpEF   - Not on beta blocker and ACE due to BP as per cardio? but on amlodipine  - Will need clarification with Cardio on the medication regimen  - Will hold Amlodipine and start Toprol 50  - Continue Atorvastatin 80  - Continue Lasix 40 daily    # CAD s/p PCI to LAD, Prox Circ, LM  - Recent PCI on 06/15  - Continue Aspirin and Plavix (90 twice daily)  - Follow Cardiology    # Sjogren's disease:  - c/w Cevimeline daily  - patient has to take her own pills from home    # SLE:  - c/w hydroxychloroquine    # Trigeminal neuralgia:  - Continue Lyrica    # Anxiety  - Continue Alprazolam  - Atarax PRN  - Continue Zoloft    # HTN:  - Continue Toprol XL 50  - Add ACE/ARB if still uncontrolled  - Will hold Amlodipine for now  - Monitor BMP daily    Diet: DASH with 1.2L fluid restriction  DVT PPX: Lovenox 40 mg daily  GI ppx: Protonix 40 mg daily  Activity: Ambulate as tolerated  Dispo: From home, lives alone  Code status: FULL 75 year old female with past medical history of aortic valve stenosis scheduled for TAVR 7/22/20, CAD stent x4 6/15, HTN, GERD, SLE, trigeminal neuralgia, breast CA s/p rodrick mastectomy and Sjogren's syndrome presented to ED for SOB.    Patient reports having worsening shortness of breath since her cath last month. Patient reports decreased ability to ambulate without losing her breath even for a few steps. Patient likely was not taking the Lasix and in ED on Xray and exam appeared to be in volume overload with elevated BNP. On my exam the patient is still short of breath and denies any significant improvement from her presentation. Exam appears to have mild wheezing on exam but no crackles at bases. Cardiology and CT Surgery following the patient and may possibly have the planned TAVR done inpatient.    Will continue with Lasix daily for now as patient does not appear to be in significant volume overload, restart her medications and can give trial of short course of steroids as underlying cause of SOB could be her COPD as she is currently smoking.    # SOB on exertion likely due to Fluid overload due to non compliance and COPD overlap vs PE  - Patient has component of fluid overload (elevated BNP, vascular congestion on Xray and LE edema) and COPD exacerbation (expiratory wheezes)  - Will treat for COPD exacerbation with Prednisone 40mg for 5 days ; Albuterol q6h Symbicort q12h, Spiriva daily  - Can consult Pulmonary if no improvement  - For Fluid overload will continue with Lasix daily  - Keep I<O, daily weights, fluid restriction 1L/d  - BNP >2000  - Echo shows normal EF with with severe aortic root stenosis  - VA duplex lower extremities ordered  - Patient tachy and has SOB, will order a CT Angio of Chest to rule out PE  - Follow up AM xray and EKG    # Severe Aortic Stenosis  - Severe Aortic root stenosis on the echo  - TAVR planned for 07/22  - CTS following the patient    # HFpEF   - Not on beta blocker and ACE due to BP as per cardio? but on amlodipine  - Will need clarification with Cardio on the medication regimen  - Will hold Amlodipine and start Toprol 50  - Continue Atorvastatin 80  - Continue Lasix 40 daily    # CAD s/p PCI to LAD, Prox Circ, LM  - Recent PCI on 06/15  - Continue Aspirin and Plavix (90 twice daily)  - Follow Cardiology    # Sjogren's disease:  - c/w Cevimeline daily  - patient has to take her own pills from home    # SLE:  - c/w hydroxychloroquine    # Trigeminal neuralgia:  - Continue Lyrica    # Anxiety  - Continue Alprazolam  - Atarax PRN  - Continue Zoloft    # HTN:  - Continue Toprol XL 50  - Add ACE/ARB if still uncontrolled  - Will hold Amlodipine for now  - Monitor BMP daily    Diet: DASH with 1.2L fluid restriction  DVT PPX: Lovenox 40 mg daily  GI ppx: Protonix 40 mg daily  Activity: Ambulate as tolerated  Dispo: From home, lives alone  Code status: FULL 75 year old female with past medical history of aortic valve stenosis scheduled for TAVR 7/22/20, CAD stent x4 6/15, HTN, GERD, SLE, trigeminal neuralgia, breast CA s/p rodrick mastectomy and Sjogren's syndrome presented to ED for SOB.    Patient reports having worsening shortness of breath since her cath last month. Patient reports decreased ability to ambulate without losing her breath even for a few steps. Patient likely was not taking the Lasix and in ED on Xray and exam appeared to be in volume overload with elevated BNP. On my exam the patient is still short of breath and denies any significant improvement from her presentation. Exam appears to have mild wheezing on exam but no crackles at bases. Cardiology and CT Surgery following the patient and may possibly have the planned TAVR done inpatient.    Will continue with Lasix daily for now as patient does not appear to be in significant volume overload, restart her medications and can give trial of short course of steroids as underlying cause of SOB could be her COPD as she is currently smoking.    # SOB on exertion likely due to Fluid overload due to non compliance and COPD overlap vs PE  - Patient has component of fluid overload (elevated BNP, vascular congestion on Xray and LE edema) and COPD exacerbation (expiratory wheezes)  - Will treat for COPD exacerbation with Prednisone 40mg for 5 days ; Albuterol q6h Symbicort q12h, Spiriva daily  - Can consult Pulmonary if no improvement  - For Fluid overload will continue with Lasix daily  - Keep I<O, daily weights, fluid restriction 1L/d  - BNP >2000  - Echo shows normal EF with with severe aortic root stenosis  - VA duplex lower extremities ordered. If positive do V/Q scan  - Follow up AM xray and EKG    # Severe Aortic Stenosis  - Severe Aortic root stenosis on the echo  - TAVR planned for 07/22  - CTS following the patient    # HFpEF   - Not on beta blocker and ACE due to BP as per cardio? but on amlodipine  - Will need clarification with Cardio on the medication regimen  - Will hold Amlodipine and start Toprol 50  - Continue Atorvastatin 80  - Continue Lasix 40 daily    # CAD s/p PCI to LAD, Prox Circ, LM  - Recent PCI on 06/15  - Continue Aspirin and Plavix (90 twice daily)  - Follow Cardiology    # Sjogren's disease:  - c/w Cevimeline daily  - patient has to take her own pills from home    # SLE:  - c/w hydroxychloroquine    # Trigeminal neuralgia:  - Continue Lyrica    # Anxiety  - Continue Alprazolam  - Atarax PRN  - Continue Zoloft    # HTN:  - Continue Toprol XL 50  - Add ACE/ARB if still uncontrolled  - Will hold Amlodipine for now  - Monitor BMP daily    Diet: DASH with 1.2L fluid restriction  DVT PPX: Lovenox 40 mg daily  GI ppx: Protonix 40 mg daily  Activity: Ambulate as tolerated  Dispo: From home, lives alone  Code status: FULL

## 2020-07-12 NOTE — H&P ADULT - HISTORY OF PRESENT ILLNESS
75 year old female with past medical history of aortic valve stenosis scheduled for TAVR 7/22/20, CAD stent x4 6/15, HTN, GERD, SLE, trigeminal neuralgia, breast CA s/p rodrick mastectomy and Sjogren's syndrome presented to ED for SOB.     As per patient SOB has been gradually worsening over the past 3 weeks following the stent placement. Patient was sleeping on the morning of the presentation when she was awakened by significant SOB and palpitations that was associated with intermittent nonproductive cough and non-radiating generalized chest pain. Patient denies any other symptoms headache, chest pain, abdominal pain, dysuria, lower extremity edema.    Patient report feeling better when EMS gave her oxygen. Patient was given Lasix on discharge last time but does not remember taking it. In ED patients chest xray showed pulmonary congestion with elevated BNP. Patient given Lasix 20 IV and 40 oral Lasix for 3 doses with improvement in the patients condition. 75 year old female with past medical history of aortic valve stenosis scheduled for TAVR 7/22/20, CAD stent x4 6/15, HTN, GERD, SLE, trigeminal neuralgia, breast CA s/p rodrick mastectomy and Sjogren's syndrome presented to ED for SOB.     As per patient SOB has been gradually worsening over the past 3 weeks following the stent placement last month. She says prior to the procedure she was able to walk more than a block without any discomfort but after the stent placement she is unable to walk a few steps without becoming short of breath. Patient was sleeping on the morning of the presentation when she was awakened by significant SOB and palpitations that was associated with intermittent nonproductive cough and non-radiating generalized chest pain. Patient reports calling Dr. Johnson office and was told to come to the ED.     Patient denies any other symptoms headache, chest pain, abdominal pain, dysuria, lower extremity edema.    Patient report feeling better when EMS gave her oxygen. Patient was given Lasix on discharge last time but does not remember taking it. In ED patients chest xray showed pulmonary congestion with elevated BNP. Patient given Lasix 20 IV and 40 oral Lasix for 3 doses with improvement in the patients condition. At time of interview patient still reported shortness of breath off oxygen but appeared to be in minimal distress.

## 2020-07-12 NOTE — H&P ADULT - ATTENDING COMMENTS
Patient seen and examined independently. Resident's H & P reviewed. Agree with the findings and plan of care except,    A 75 years old female presented to the ED with progressively worsening sob for the last three weeks following the stent placement last month. No cp.     Pro BNP: 2299  CE x 2 negative.   CXR: Pulmonary vascular congestion  EKG: Atrial Flutter @ 142/min. ST depression in leads V5-V6. LVH (Interpreted by me.)    ASSESSMENT:    1. Ac. HFpEF  2. Atrial Flutter with RVR  3. COPD  4. CAD S/P recent PCI  5. Severe AS  6. SLE  7. HTN  8. H/O Breast Ca  9. Sjogren's syndrome.     PLAN:    . Cont tele  . Now on Lasix 40 mg po daily  . Check i's and o's and daily wt  . Low salt diet and water restriction to 1.5 L/D  . Started her on Cardizem 60 mg po q 6h and tapered off the drip.  . Switch her to Cardizem- mg po daily in AM  . Care D/W the cardiologist. Recommended to stop Metoprolol succinate  . Venous doppler of LE  . Start Eliquis 5 mg po q 12h

## 2020-07-12 NOTE — H&P ADULT - NSHPPHYSICALEXAM_GEN_ALL_CORE
GEN: No acute distress, NC/AT, anicteric, on O2 via NC  LUNGS: B/l expiratory wheezing, crackles on both bases.  HEART: S1/S2 present. RRR. systolic murmur radiating to left axillae  ABD: Soft, tender in the suprapubic area, non distended  EXT: LE edema bilaterally 2+ with erythema and tender to palpation  NEURO: AAOX3, lethargic, somnolent, moves all extremities, no focal deficits. responds to questions and commands GEN: No acute distress  LUNGS: Mild bilateral wheezing, no crackles appreciated  HEART: S1/S2 present. RRR. systolic murmur radiating to left axillae  ABD: Soft, non tender, BS+  EXT: LE edema bilaterally 1+  NEURO: AAOX3, lethargic, somnolent, moves all extremities, no focal deficits. responds to questions and commands

## 2020-07-12 NOTE — ED CDU PROVIDER SUBSEQUENT DAY NOTE - PROGRESS NOTE DETAILS
pt seen bedside, NAD, no complaints overnight, asymptomatic. Negative cardiac enzymes x2 and nl ekg. pt scheduled to go for echo. Will continue to monitor patient.

## 2020-07-12 NOTE — ED CDU PROVIDER SUBSEQUENT DAY NOTE - HISTORY
pt resting in obs without complaints; home meds ordered; repeat labs pending in am; will continue to monitor;

## 2020-07-13 LAB
ALBUMIN SERPL ELPH-MCNC: 4.4 G/DL — SIGNIFICANT CHANGE UP (ref 3.5–5.2)
ALP SERPL-CCNC: 81 U/L — SIGNIFICANT CHANGE UP (ref 30–115)
ALT FLD-CCNC: 13 U/L — SIGNIFICANT CHANGE UP (ref 0–41)
ANION GAP SERPL CALC-SCNC: 19 MMOL/L — HIGH (ref 7–14)
AST SERPL-CCNC: 19 U/L — SIGNIFICANT CHANGE UP (ref 0–41)
BASOPHILS # BLD AUTO: 0.09 K/UL — SIGNIFICANT CHANGE UP (ref 0–0.2)
BASOPHILS NFR BLD AUTO: 0.9 % — SIGNIFICANT CHANGE UP (ref 0–1)
BILIRUB SERPL-MCNC: 0.8 MG/DL — SIGNIFICANT CHANGE UP (ref 0.2–1.2)
BUN SERPL-MCNC: 16 MG/DL — SIGNIFICANT CHANGE UP (ref 10–20)
CALCIUM SERPL-MCNC: 11 MG/DL — HIGH (ref 8.5–10.1)
CHLORIDE SERPL-SCNC: 95 MMOL/L — LOW (ref 98–110)
CO2 SERPL-SCNC: 21 MMOL/L — SIGNIFICANT CHANGE UP (ref 17–32)
CREAT SERPL-MCNC: 0.7 MG/DL — SIGNIFICANT CHANGE UP (ref 0.7–1.5)
EOSINOPHIL # BLD AUTO: 0.09 K/UL — SIGNIFICANT CHANGE UP (ref 0–0.7)
EOSINOPHIL NFR BLD AUTO: 0.9 % — SIGNIFICANT CHANGE UP (ref 0–8)
GLUCOSE SERPL-MCNC: 125 MG/DL — HIGH (ref 70–99)
HCT VFR BLD CALC: 41.9 % — SIGNIFICANT CHANGE UP (ref 37–47)
HGB BLD-MCNC: 13.3 G/DL — SIGNIFICANT CHANGE UP (ref 12–16)
IMM GRANULOCYTES NFR BLD AUTO: 0.5 % — HIGH (ref 0.1–0.3)
LYMPHOCYTES # BLD AUTO: 2.3 K/UL — SIGNIFICANT CHANGE UP (ref 1.2–3.4)
LYMPHOCYTES # BLD AUTO: 22.1 % — SIGNIFICANT CHANGE UP (ref 20.5–51.1)
MAGNESIUM SERPL-MCNC: 1.6 MG/DL — LOW (ref 1.8–2.4)
MCHC RBC-ENTMCNC: 26 PG — LOW (ref 27–31)
MCHC RBC-ENTMCNC: 31.7 G/DL — LOW (ref 32–37)
MCV RBC AUTO: 81.8 FL — SIGNIFICANT CHANGE UP (ref 81–99)
MONOCYTES # BLD AUTO: 1.04 K/UL — HIGH (ref 0.1–0.6)
MONOCYTES NFR BLD AUTO: 10 % — HIGH (ref 1.7–9.3)
NEUTROPHILS # BLD AUTO: 6.82 K/UL — HIGH (ref 1.4–6.5)
NEUTROPHILS NFR BLD AUTO: 65.6 % — SIGNIFICANT CHANGE UP (ref 42.2–75.2)
NRBC # BLD: 0 /100 WBCS — SIGNIFICANT CHANGE UP (ref 0–0)
PLATELET # BLD AUTO: 332 K/UL — SIGNIFICANT CHANGE UP (ref 130–400)
POTASSIUM SERPL-MCNC: 3.8 MMOL/L — SIGNIFICANT CHANGE UP (ref 3.5–5)
POTASSIUM SERPL-SCNC: 3.8 MMOL/L — SIGNIFICANT CHANGE UP (ref 3.5–5)
PROT SERPL-MCNC: 7.5 G/DL — SIGNIFICANT CHANGE UP (ref 6–8)
RBC # BLD: 5.12 M/UL — SIGNIFICANT CHANGE UP (ref 4.2–5.4)
RBC # FLD: 15 % — HIGH (ref 11.5–14.5)
SODIUM SERPL-SCNC: 135 MMOL/L — SIGNIFICANT CHANGE UP (ref 135–146)
WBC # BLD: 10.39 K/UL — SIGNIFICANT CHANGE UP (ref 4.8–10.8)
WBC # FLD AUTO: 10.39 K/UL — SIGNIFICANT CHANGE UP (ref 4.8–10.8)

## 2020-07-13 PROCEDURE — 93970 EXTREMITY STUDY: CPT | Mod: 26

## 2020-07-13 PROCEDURE — 99233 SBSQ HOSP IP/OBS HIGH 50: CPT

## 2020-07-13 PROCEDURE — 71045 X-RAY EXAM CHEST 1 VIEW: CPT | Mod: 26

## 2020-07-13 PROCEDURE — 93010 ELECTROCARDIOGRAM REPORT: CPT

## 2020-07-13 RX ORDER — CLOPIDOGREL BISULFATE 75 MG/1
75 TABLET, FILM COATED ORAL DAILY
Refills: 0 | Status: DISCONTINUED | OUTPATIENT
Start: 2020-07-15 | End: 2020-07-14

## 2020-07-13 RX ORDER — ACETAMINOPHEN 500 MG
650 TABLET ORAL EVERY 6 HOURS
Refills: 0 | Status: DISCONTINUED | OUTPATIENT
Start: 2020-07-13 | End: 2020-07-14

## 2020-07-13 RX ORDER — APIXABAN 2.5 MG/1
5 TABLET, FILM COATED ORAL EVERY 12 HOURS
Refills: 0 | Status: DISCONTINUED | OUTPATIENT
Start: 2020-07-14 | End: 2020-07-14

## 2020-07-13 RX ORDER — DILTIAZEM HCL 120 MG
5 CAPSULE, EXT RELEASE 24 HR ORAL
Qty: 125 | Refills: 0 | Status: DISCONTINUED | OUTPATIENT
Start: 2020-07-13 | End: 2020-07-13

## 2020-07-13 RX ORDER — ENOXAPARIN SODIUM 100 MG/ML
80 INJECTION SUBCUTANEOUS EVERY 12 HOURS
Refills: 0 | Status: DISCONTINUED | OUTPATIENT
Start: 2020-07-13 | End: 2020-07-13

## 2020-07-13 RX ORDER — DILTIAZEM HCL 120 MG
10 CAPSULE, EXT RELEASE 24 HR ORAL
Qty: 125 | Refills: 0 | Status: DISCONTINUED | OUTPATIENT
Start: 2020-07-13 | End: 2020-07-13

## 2020-07-13 RX ORDER — DILTIAZEM HCL 120 MG
240 CAPSULE, EXT RELEASE 24 HR ORAL DAILY
Refills: 0 | Status: DISCONTINUED | OUTPATIENT
Start: 2020-07-13 | End: 2020-07-14

## 2020-07-13 RX ORDER — DILTIAZEM HCL 120 MG
20 CAPSULE, EXT RELEASE 24 HR ORAL ONCE
Refills: 0 | Status: COMPLETED | OUTPATIENT
Start: 2020-07-13 | End: 2020-07-13

## 2020-07-13 RX ORDER — AMIODARONE HYDROCHLORIDE 400 MG/1
0.5 TABLET ORAL
Qty: 900 | Refills: 0 | Status: DISCONTINUED | OUTPATIENT
Start: 2020-07-13 | End: 2020-07-13

## 2020-07-13 RX ORDER — DILTIAZEM HCL 120 MG
10 CAPSULE, EXT RELEASE 24 HR ORAL ONCE
Refills: 0 | Status: COMPLETED | OUTPATIENT
Start: 2020-07-13 | End: 2020-07-13

## 2020-07-13 RX ORDER — DILTIAZEM HCL 120 MG
60 CAPSULE, EXT RELEASE 24 HR ORAL ONCE
Refills: 0 | Status: DISCONTINUED | OUTPATIENT
Start: 2020-07-13 | End: 2020-07-14

## 2020-07-13 RX ORDER — PANTOPRAZOLE SODIUM 20 MG/1
40 TABLET, DELAYED RELEASE ORAL AT BEDTIME
Refills: 0 | Status: DISCONTINUED | OUTPATIENT
Start: 2020-07-13 | End: 2020-07-14

## 2020-07-13 RX ORDER — CLOPIDOGREL BISULFATE 75 MG/1
600 TABLET, FILM COATED ORAL ONCE
Refills: 0 | Status: COMPLETED | OUTPATIENT
Start: 2020-07-14 | End: 2020-07-14

## 2020-07-13 RX ORDER — DILTIAZEM HCL 120 MG
60 CAPSULE, EXT RELEASE 24 HR ORAL EVERY 6 HOURS
Refills: 0 | Status: DISCONTINUED | OUTPATIENT
Start: 2020-07-13 | End: 2020-07-13

## 2020-07-13 RX ADMIN — Medication 40 MILLIGRAM(S): at 05:38

## 2020-07-13 RX ADMIN — Medication 650 MILLIGRAM(S): at 12:12

## 2020-07-13 RX ADMIN — SERTRALINE 50 MILLIGRAM(S): 25 TABLET, FILM COATED ORAL at 11:50

## 2020-07-13 RX ADMIN — ATORVASTATIN CALCIUM 80 MILLIGRAM(S): 80 TABLET, FILM COATED ORAL at 22:17

## 2020-07-13 RX ADMIN — ENOXAPARIN SODIUM 80 MILLIGRAM(S): 100 INJECTION SUBCUTANEOUS at 17:18

## 2020-07-13 RX ADMIN — TICAGRELOR 90 MILLIGRAM(S): 90 TABLET ORAL at 05:38

## 2020-07-13 RX ADMIN — Medication 10 MILLIGRAM(S): at 04:25

## 2020-07-13 RX ADMIN — Medication 40 MILLIGRAM(S): at 05:37

## 2020-07-13 RX ADMIN — BUDESONIDE AND FORMOTEROL FUMARATE DIHYDRATE 2 PUFF(S): 160; 4.5 AEROSOL RESPIRATORY (INHALATION) at 08:14

## 2020-07-13 RX ADMIN — ALBUTEROL 2 PUFF(S): 90 AEROSOL, METERED ORAL at 08:15

## 2020-07-13 RX ADMIN — SENNA PLUS 2 TABLET(S): 8.6 TABLET ORAL at 22:17

## 2020-07-13 RX ADMIN — PANTOPRAZOLE SODIUM 40 MILLIGRAM(S): 20 TABLET, DELAYED RELEASE ORAL at 22:17

## 2020-07-13 RX ADMIN — Medication 150 MILLIGRAM(S): at 12:02

## 2020-07-13 RX ADMIN — Medication 20 MILLIGRAM(S): at 03:18

## 2020-07-13 RX ADMIN — ALBUTEROL 2 PUFF(S): 90 AEROSOL, METERED ORAL at 14:34

## 2020-07-13 RX ADMIN — Medication 10 MG/HR: at 04:07

## 2020-07-13 RX ADMIN — TICAGRELOR 90 MILLIGRAM(S): 90 TABLET ORAL at 17:18

## 2020-07-13 RX ADMIN — Medication 650 MILLIGRAM(S): at 00:13

## 2020-07-13 RX ADMIN — Medication 5 MG/HR: at 03:25

## 2020-07-13 RX ADMIN — Medication 60 MILLIGRAM(S): at 10:28

## 2020-07-13 RX ADMIN — PANTOPRAZOLE SODIUM 40 MILLIGRAM(S): 20 TABLET, DELAYED RELEASE ORAL at 05:38

## 2020-07-13 NOTE — PROGRESS NOTE ADULT - SUBJECTIVE AND OBJECTIVE BOX
HPI:  75 year old female with past medical history of aortic valve stenosis scheduled for TAVR 7/22/20, CAD stent x4 6/15, HTN, GERD, SLE, trigeminal neuralgia, breast CA s/p rodrick mastectomy and Sjogren's syndrome presented to ED for SOB.     As per patient SOB has been gradually worsening over the past 3 weeks following the stent placement last month. She says prior to the procedure she was able to walk more than a block without any discomfort but after the stent placement she is unable to walk a few steps without becoming short of breath. Patient was sleeping on the morning of the presentation when she was awakened by significant SOB and palpitations that was associated with intermittent nonproductive cough and non-radiating generalized chest pain. Patient reports calling Dr. Johnson office and was told to come to the ED.     Patient denies any other symptoms headache, chest pain, abdominal pain, dysuria, lower extremity edema.    Patient report feeling better when EMS gave her oxygen. Patient was given Lasix on discharge last time but does not remember taking it. In ED patients chest xray showed pulmonary congestion with elevated BNP. Patient given Lasix 20 IV and 40 oral Lasix for 3 doses with improvement in the patients condition. At time of interview patient still reported shortness of breath off oxygen but appeared to be in minimal distress. (12 Jul 2020 14:24)    Currently admitted to medicine with the primary diagnosis of SOB (shortness of breath)     Today is hospital day 1d.     INTERVAL HPI / OVERNIGHT EVENTS:  Patient was examined and seen at bedside. This morning she is resting comfortably in bed and reports no new issues or overnight events.     ROS: Otherwise unremarkable     PAST MEDICAL & SURGICAL HISTORY  Aortic valve stenosis, etiology of cardiac valve disease unspecified  Lupus (systemic lupus erythematosus)  Trigeminal neuralgia  Sjogren's syndrome, with unspecified organ involvement  GERD without esophagitis  Vitamin D deficiency  Constipation, unspecified constipation type  Rectal prolapse  Hypertension, unspecified type  Malignant neoplasm of lower-outer quadrant of left breast of female, estrogen receptor positive  History of appendectomy  H/O bilateral mastectomy  History of partial colectomy    ALLERGIES  Naprosyn (Stomach Upset)  Vicodin (Stomach Upset)    MEDICATIONS  STANDING MEDICATIONS  ALBUTerol    90 MICROgram(s) HFA Inhaler 2 Puff(s) Inhalation every 6 hours  aspirin  chewable 81 milliGRAM(s) Oral daily  atorvastatin 80 milliGRAM(s) Oral at bedtime  budesonide 160 MICROgram(s)/formoterol 4.5 MICROgram(s) Inhaler 2 Puff(s) Inhalation two times a day  chlorhexidine 4% Liquid 1 Application(s) Topical daily  diltiazem Infusion 10 mG/Hr IV Continuous <Continuous>  enoxaparin Injectable 70 milliGRAM(s) SubCutaneous daily  furosemide    Tablet 40 milliGRAM(s) Oral daily  hydroxychloroquine 200 milliGRAM(s) Oral two times a day  metoprolol succinate ER 50 milliGRAM(s) Oral daily  montelukast 10 milliGRAM(s) Oral at bedtime  pantoprazole    Tablet 40 milliGRAM(s) Oral before breakfast  predniSONE   Tablet 40 milliGRAM(s) Oral daily  pregabalin 25 milliGRAM(s) Oral daily  senna 2 Tablet(s) Oral at bedtime  sertraline 50 milliGRAM(s) Oral daily  ticagrelor 90 milliGRAM(s) Oral two times a day  tiotropium 18 MICROgram(s) Capsule 1 Capsule(s) Inhalation daily    PRN MEDICATIONS  ALPRAZolam 1 milliGRAM(s) Oral two times a day PRN  artificial  tears Solution 1 Drop(s) Both EYES every 3 hours PRN  hydrOXYzine hydrochloride 50 milliGRAM(s) Oral every 6 hours PRN    VITALS:  T(F): 96  HR: 127  BP: 103/57  RR: 18  SpO2: 97%    LABS                        12.1   8.37  )-----------( 322      ( 11 Jul 2020 13:51 )             38.3     07-12    140  |  101  |  11  ----------------------------<  121<H>  3.5   |  21  |  0.7    Ca    9.9      12 Jul 2020 05:00    TPro  8.0  /  Alb  4.8  /  TBili  0.4  /  DBili  x   /  AST  20  /  ALT  12  /  AlkPhos  86  07-11    ASSESSMENT  75 year old female with past medical history of aortic valve stenosis scheduled for TAVR 7/22/20, CAD stent x4 6/15, HTN, GERD, SLE, trigeminal neuralgia, breast CA s/p rodrick mastectomy and Sjogren's syndrome presented to ED for SOB.    Patient reports having worsening shortness of breath since her cath last month. Patient reports decreased ability to ambulate without losing her breath even for a few steps. Patient likely was not taking the Lasix and in ED on Xray and exam appeared to be in volume overload with elevated BNP. On my exam the patient is still short of breath and denies any significant improvement from her presentation. Exam appears to have mild wheezing on exam but no crackles at bases. Cardiology and CT Surgery following the patient and may possibly have the planned TAVR done inpatient.    Will continue with Lasix daily for now as patient does not appear to be in significant volume overload, restart her medications and can give trial of short course of steroids as underlying cause of SOB could be her COPD as she is currently smoking.    # SOB on exertion likely due to Fluid overload due to non compliance and COPD overlap vs PE  - Patient has component of fluid overload (elevated BNP, vascular congestion on Xray and LE edema) and COPD exacerbation (expiratory wheezes)  - Will treat for COPD exacerbation with Prednisone 40mg for 5 days ; Albuterol q6h Symbicort q12h, Spiriva daily  - Can consult Pulmonary if no improvement  - For Fluid overload will continue with Lasix daily  - Keep I<O, daily weights, fluid restriction 1L/d  - BNP >2000  - Echo shows normal EF with with severe aortic root stenosis  - VA duplex lower extremities ordered. If positive do V/Q scan  - Follow up AM xray and EKG    # Severe Aortic Stenosis  - Severe Aortic root stenosis on the echo  - TAVR planned for 07/22  - CTS following the patient    # HFpEF   - Not on beta blocker and ACE due to BP as per cardio? but on amlodipine  - Will need clarification with Cardio on the medication regimen  - Will hold Amlodipine and start Toprol 50  - Continue Atorvastatin 80  - Continue Lasix 40 daily    # CAD s/p PCI to LAD, Prox Circ, LM  - Recent PCI on 06/15  - Continue Aspirin and Plavix (90 twice daily)  - Follow Cardiology    # Sjogren's disease:  - c/w Cevimeline daily  - patient has to take her own pills from home    # SLE:  - c/w hydroxychloroquine    # Trigeminal neuralgia:  - Continue Lyrica    # Anxiety  - Continue Alprazolam  - Atarax PRN  - Continue Zoloft    # HTN:  - Continue Toprol XL 50  - Add ACE/ARB if still uncontrolled  - Will hold Amlodipine for now  - Monitor BMP daily    Diet: DASH with 1.2L fluid restriction  DVT PPX: Lovenox 40 mg daily  GI ppx: Protonix 40 mg daily  Activity: Ambulate as tolerated  Dispo: From home, lives alone  Code status: FULL          CARDIAC MARKERS ( 12 Jul 2020 05:00 )  x     / <0.01 ng/mL / x     / x     / x      CARDIAC MARKERS ( 11 Jul 2020 13:51 )  x     / <0.01 ng/mL / x     / x     / x            Physical exam  General: NC/AT, NAD  HEENT: Nonicteric  Pulm: Chest clear b/l, no wheezing, crackles, or rhonci. Symmetrical excursion.   Heart: RRR, Normal S1, S2, no mumers rubs or gallops  Abd: soft, nontender, nondistended, BS+ no hepatosplenomegaly  Ext: trace edema, no rash  Neuro: AAOx3, nonfocal  Phych: Well groomed, cooperative, congruent affect. HPI:  75 year old female with past medical history of aortic valve stenosis scheduled for TAVR 7/22/20, CAD stent x4 6/15, HTN, GERD, SLE, trigeminal neuralgia, breast CA s/p rodrick mastectomy and Sjogren's syndrome presented to ED for SOB.     As per patient SOB has been gradually worsening over the past 3 weeks following the stent placement last month. She says prior to the procedure she was able to walk more than a block without any discomfort but after the stent placement she is unable to walk a few steps without becoming short of breath. Patient was sleeping on the morning of the presentation when she was awakened by significant SOB and palpitations that was associated with intermittent nonproductive cough and non-radiating generalized chest pain. Patient reports calling Dr. Johnson office and was told to come to the ED.     Patient denies any other symptoms headache, chest pain, abdominal pain, dysuria, lower extremity edema.    Patient report feeling better when EMS gave her oxygen. Patient was given Lasix on discharge last time but does not remember taking it. In ED patients chest xray showed pulmonary congestion with elevated BNP. Patient given Lasix 20 IV and 40 oral Lasix for 3 doses with improvement in the patients condition. At time of interview patient still reported shortness of breath off oxygen but appeared to be in minimal distress. (12 Jul 2020 14:24)    Currently admitted to medicine with the primary diagnosis of SOB (shortness of breath)     Today is hospital day 1d.     INTERVAL HPI / OVERNIGHT EVENTS:  Patient was examined and seen at bedside. This morning she is resting comfortably in bed and reports no new issues or overnight events.     ROS: Otherwise unremarkable     PAST MEDICAL & SURGICAL HISTORY  Aortic valve stenosis, etiology of cardiac valve disease unspecified  Lupus (systemic lupus erythematosus)  Trigeminal neuralgia  Sjogren's syndrome, with unspecified organ involvement  GERD without esophagitis  Vitamin D deficiency  Constipation, unspecified constipation type  Rectal prolapse  Hypertension, unspecified type  Malignant neoplasm of lower-outer quadrant of left breast of female, estrogen receptor positive  History of appendectomy  H/O bilateral mastectomy  History of partial colectomy    ALLERGIES  Naprosyn (Stomach Upset)  Vicodin (Stomach Upset)    MEDICATIONS  STANDING MEDICATIONS  ALBUTerol    90 MICROgram(s) HFA Inhaler 2 Puff(s) Inhalation every 6 hours  aspirin  chewable 81 milliGRAM(s) Oral daily  atorvastatin 80 milliGRAM(s) Oral at bedtime  budesonide 160 MICROgram(s)/formoterol 4.5 MICROgram(s) Inhaler 2 Puff(s) Inhalation two times a day  chlorhexidine 4% Liquid 1 Application(s) Topical daily  diltiazem Infusion 10 mG/Hr IV Continuous <Continuous>  enoxaparin Injectable 70 milliGRAM(s) SubCutaneous daily  furosemide    Tablet 40 milliGRAM(s) Oral daily  hydroxychloroquine 200 milliGRAM(s) Oral two times a day  metoprolol succinate ER 50 milliGRAM(s) Oral daily  montelukast 10 milliGRAM(s) Oral at bedtime  pantoprazole    Tablet 40 milliGRAM(s) Oral before breakfast  predniSONE   Tablet 40 milliGRAM(s) Oral daily  pregabalin 25 milliGRAM(s) Oral daily  senna 2 Tablet(s) Oral at bedtime  sertraline 50 milliGRAM(s) Oral daily  ticagrelor 90 milliGRAM(s) Oral two times a day  tiotropium 18 MICROgram(s) Capsule 1 Capsule(s) Inhalation daily    PRN MEDICATIONS  ALPRAZolam 1 milliGRAM(s) Oral two times a day PRN  artificial  tears Solution 1 Drop(s) Both EYES every 3 hours PRN  hydrOXYzine hydrochloride 50 milliGRAM(s) Oral every 6 hours PRN    VITALS:  T(F): 96  HR: 127  BP: 103/57  RR: 18  SpO2: 97%    LABS                        12.1   8.37  )-----------( 322      ( 11 Jul 2020 13:51 )             38.3     07-12    140  |  101  |  11  ----------------------------<  121<H>  3.5   |  21  |  0.7    Ca    9.9      12 Jul 2020 05:00    TPro  8.0  /  Alb  4.8  /  TBili  0.4  /  DBili  x   /  AST  20  /  ALT  12  /  AlkPhos  86  07-11    PHYSICAL EXAM:  GENERAL: NAD, lying in bed comfortably  NECK: No JVD  CHEST/LUNG: Clear to auscultation bilaterally; No rales, rhonchi, or rubs. Occasional wheezes. Dry cough exacerbated by deep breath.   HEART: Regular rate and rhythm; No murmurs, rubs, or gallops  ABDOMEN: Bowel sounds present; Soft, Nontender, Nondistended. No hepatomegally  EXTREMITIES:  Trace edema  NERVOUS SYSTEM:  Alert & Oriented X3, speech clear. No deficits   SKIN: No rashes or lesions    ASSESSMENT  75 year old female with past medical history of aortic valve stenosis scheduled for TAVR 7/22/20, CAD stent x4 6/15, HTN, GERD, SLE, trigeminal neuralgia, breast CA s/p rodrick mastectomy and Sjogren's syndrome presented to ED for SOB.    Patient reports having worsening shortness of breath since her cath last month. Patient reports decreased ability to ambulate without losing her breath even for a few steps. Patient likely was not taking the Lasix and in ED on Xray and exam appeared to be in volume overload with elevated BNP. On my exam the patient is still short of breath and denies any significant improvement from her presentation. Exam appears to have mild wheezing on exam but no crackles at bases. Cardiology and CT Surgery following the patient and may possibly have the planned TAVR done inpatient.    Will continue with Lasix daily for now as patient does not appear to be in significant volume overload, restart her medications and can give trial of short course of steroids as underlying cause of SOB could be her COPD as she is currently smoking.    Course complicated by aflutter with -140, asymptomatic, resolved after administration of cardizam, now on cardizam 60mg q6h.     # SOB on exertion likely due to COPD exacerbation  - Patient not clinically fluid overloaded; no JVD, crackles, peripheral edema  - 50+ pack-yr history of smoking, wheezing + dry cough  - Continue Prednisone 40mg for 5 days ; Albuterol q6h Symbicort q12h, Spiriva daily  - O2 sat 97% room air  - Can consult Pulmonary if no improvement  - Keep I<O, daily weights, fluid restriction 1L/d  - BNP >2000  - Echo shows normal EF with with severe aortic root stenosis  - VA duplex lower extremities ordered  - xray negative for acute cardiopulmonary disease  - Trop negative x 2, no acute changes on EKG    #Aflutter with -140  - Not symptomatic  - Converted to sinus rhythm with cardizam  - Was on cardizam drip, now PO   - Reached out to cardiology for recs on AC, pending     # Severe Aortic Stenosis  - Severe Aortic root stenosis on the echo  - TAVR planned for 07/22  - CTS following the patient      # HFpEF   - Not on beta blocker and ACE due to BP as per cardio? but on amlodipine  - Will need clarification with Cardio on the medication regimen  - Will hold Amlodipine and start Toprol 50  - Continue Atorvastatin 80  - Continue Lasix 40 daily    # CAD s/p PCI to LAD, Prox Circ, LM  - Recent PCI on 06/15  - Continue Aspirin and Plavix (90 twice daily)  - Follow Cardiology    # Sjogren's disease:  - c/w Cevimeline daily  - patient has to take her own pills from home    # SLE:  - c/w hydroxychloroquine    # Trigeminal neuralgia:  - Continue Lyrica  - Lyrica was held at some point due to concerns of SIADH(?), restarted today    # Anxiety  - Continue Alprazolam  - Atarax PRN  - Continue Zoloft    # HTN:  - Continue Toprol XL 50  - Add ACE/ARB if still uncontrolled  - Will hold Amlodipine for now  - Monitor BMP daily    Diet: DASH with 1.2L fluid restriction  DVT PPX: Lovenox 40 mg daily  GI ppx: Protonix 40 mg daily  Activity: Ambulate as tolerated  Dispo: From home, lives alone  Code status: FULL

## 2020-07-13 NOTE — CHART NOTE - NSCHARTNOTEFT_GEN_A_CORE
Patient's HR still in 130's post Amiodarone drip and Cardizem bolus. Amiodarone does not seem to effect the HR. Will consider discontinuing the Amiodarone and starting Cardizem drip. Pt currently asymptomatic.

## 2020-07-13 NOTE — CHART NOTE - NSCHARTNOTEFT_GEN_A_CORE
At 2:45AM patient's HR was still in 130's w/ variable AV block. Amiodarone drip was stopped and patient was given 20mg Cardizem IV push, started on 5ml/hr Cardizem drip, then patient was given 10mg Cardizem IV push, increased rate to 10mg/hr Cardizem drip. Patient's heart rate slowly returned to normal w/ Aflutter getting under control, pt's current HR is 65bmp, last /65. Pt is currently asymptomatic w/ tele monitor showing NSR. Overall, since 8pm patient received 50mg of Cardizem IV pushes.

## 2020-07-13 NOTE — CHART NOTE - NSCHARTNOTEFT_GEN_A_CORE
Reviewed EKG and monitor - AFLUTTER with 2:1 block - and aflutter with variable block (ekg from july 12 and 13 in chart)   stated cardizem drip - reverted now in sinus - switching to PO cardizem   started lovenox will decide wth Dr Sepulveda on AC - if start NOAC  or lovenox -as plan for TAVR next week

## 2020-07-13 NOTE — PROGRESS NOTE ADULT - SUBJECTIVE AND OBJECTIVE BOX
76 yo female, Known to CTS,  with a pmh of aortic valve stenosis scheduled for TAVR 7/22/20, CAD stent x4 6/15, HTN, GERD, SLE, trigeminal neuralgia, breast CA s/p rodrick mastectomy, and Sjogren's syndrome presented for SOB. pt states SOB has been gradually worsening over the past 3 weeks following the stent placement. pt was sleeping this AM when she was awakened by significant SOB and palpitations.  pt also experienced intermittent nonproductive cough and non-radiating generalized chest pain. denies any other symptoms including fevers, headache, recent illness/travel, or abdominal pain. No chest pain. Felt better after EMS gave her oxygen.  called CTS service to say she went to ED    SUBJECTIVE ASSESSMENT: pt seen and examined. no acute complaints, pt feels better since admission  Vital Signs Last 24 Hrs  T(F): 96 (13 Jul 2020 12:44), Max: 96.9 (12 Jul 2020 20:48)  HR: 75 (13 Jul 2020 12:44) (64 - 140)  BP: 110/60 (13 Jul 2020 12:44) (103/57 - 134/96)  RR: 20 (13 Jul 2020 12:44) (18 - 20)  SpO2: 97% (13 Jul 2020 09:26) (97% - 97%)      I&O's Detail    12 Jul 2020 07:01  -  13 Jul 2020 07:00  --------------------------------------------------------  IN:    amiodarone Infusion: 33.3 mL    amiodarone Infusion: 33.4 mL    diltiazem Infusion: 20 mL    diltiazem Infusion: 5 mL  Total IN: 91.7 mL    OUT:  Total OUT: 0 mL        Net:   I&O's Detail    12 Jul 2020 07:01  -  13 Jul 2020 07:00  --------------------------------------------------------  Total NET: 91.7 mL        CAPILLARY BLOOD GLUCOSE        Physical Exam:  General: NAD; A&Ox3/Patient is intubated and sedated  Cardiac: S1/S2, RRR, no murmur, no rubs  Lungs: unlabored respirations, CTA b/l, no wheeze, no rales, no crackles  Abdomen: Soft/NT/ND; positive bowel sounds x 4  Sternum: Intact, no click, incision healing well with no drainage  Incisions: Incisions clean/dry/intact  Extremities: No edema b/l lower extremities; good capillary refill; no cyanosis; palpable 1+ pedal pulses b/l      LABS:                        13.3   10.39 )-----------( 332      ( 13 Jul 2020 06:22 )             41.9                         12.1   8.37  )-----------( 322      ( 11 Jul 2020 13:51 )             38.3     07-13    135  |  95<L>  |  16  ----------------------------<  125<H>  3.8   |  21  |  0.7  07-12    140  |  101  |  11  ----------------------------<  121<H>  3.5   |  21  |  0.7    Ca    11.0<H>      13 Jul 2020 06:22  Mg     1.6     07-13    TPro  7.5 [6.0 - 8.0]  /  Alb  4.4 [3.5 - 5.2]  /  TBili  0.8 [0.2 - 1.2]  /  DBili  x   /  AST  19 [0 - 41]  /  ALT  13 [0 - 41]  /  AlkPhos  81 [30 - 115]  07-13          RADIOLOGY & ADDITIONAL TESTS:  CXR: < from: Xray Chest 1 View- PORTABLE-Routine (07.13.20 @ 08:19) >  Impression:      Noradiographic evidence of acute cardiopulmonary disease.    Unchanged    < end of copied text >    EKG: < from: 12 Lead ECG (07.13.20 @ 03:50) >    Ventricular Rate 106 BPM    Atrial Rate 264 BPM    QRS Duration 90 ms    Q-T Interval 394 ms    QTC Calculation(Bezet) 523 ms    R Axis 62 degrees    T Axis 94 degrees    Diagnosis Line Atrial flutter with variable A-V block  Nonspecific ST and T wave abnormality  Abnormal ECG    < end of copied text >    MEDICATIONS  (STANDING):  ALBUTerol    90 MICROgram(s) HFA Inhaler 2 Puff(s) Inhalation every 6 hours  aspirin  chewable 81 milliGRAM(s) Oral daily  atorvastatin 80 milliGRAM(s) Oral at bedtime  budesonide 160 MICROgram(s)/formoterol 4.5 MICROgram(s) Inhaler 2 Puff(s) Inhalation two times a day  chlorhexidine 4% Liquid 1 Application(s) Topical daily  diltiazem    Tablet 60 milliGRAM(s) Oral every 6 hours  enoxaparin Injectable 80 milliGRAM(s) SubCutaneous every 12 hours  furosemide    Tablet 40 milliGRAM(s) Oral daily  hydroxychloroquine 200 milliGRAM(s) Oral two times a day  metoprolol succinate ER 50 milliGRAM(s) Oral daily  montelukast 10 milliGRAM(s) Oral at bedtime  pantoprazole    Tablet 40 milliGRAM(s) Oral before breakfast  predniSONE   Tablet 40 milliGRAM(s) Oral daily  pregabalin 150 milliGRAM(s) Oral two times a day  senna 2 Tablet(s) Oral at bedtime  sertraline 50 milliGRAM(s) Oral daily  ticagrelor 90 milliGRAM(s) Oral two times a day  tiotropium 18 MICROgram(s) Capsule 1 Capsule(s) Inhalation daily    MEDICATIONS  (PRN):  acetaminophen   Tablet .. 650 milliGRAM(s) Oral every 6 hours PRN Mild Pain (1 - 3), Moderate Pain (4 - 6)  ALPRAZolam 1 milliGRAM(s) Oral two times a day PRN anxiety  artificial  tears Solution 1 Drop(s) Both EYES every 3 hours PRN Dry Eyes  hydrOXYzine hydrochloride 50 milliGRAM(s) Oral every 6 hours PRN Anxiety      Allergies    Naprosyn (Stomach Upset)  Vicodin (Stomach Upset)    Intolerances      Ambulation/Activity Status: ambulate     Assessment/Plan:  75y Female pre-op for TAVR presented with CHF  1. cont medical therapy as per primary team  2. cont lasix for fluid overload  3. will discuss with Dr. Johnson     Social Service Disposition:  home

## 2020-07-13 NOTE — PROGRESS NOTE ADULT - ASSESSMENT
74 yo f, pmh severe symptomatic AS pending TAVR in ~2 weeks, CAD NYHF III s/p recent cath on 6/15/20 (pci to mid lad, prox circ, lm, temoprary ppm placement, aortic valvuloplasty), lupus, severe pulm htn, breast ca s/p bl mastectomy with no chemo/rad, mult TIAs, COPD -- comes back to ed for worsening sob upon minimal exertion and worsening fatigue.      Shortness of breath likely related to her significant COPD. if there was diastolic hf on admission, it has resolved now.   At this time I do not appreciate significant crackles. she has not orthopena or pnd either. no e/o leg edema.   No e/o PNA.  Brilinta may also be contributing to her dyspnea.  KEEP LASIX 40 MG ONCE A DAY DUE TO ELEVATED lv FILLING PRESSURES.   Please get LE duplex b/l LE.  if abnormal then get V/Q scan.    Atrial flutter - now back in sinus.   After tonights dose of cardizem, tomorrow am, start her on 240 mg once a day.   stop metoprolol succinate as this may be contributing to her dyspnea as well.   Start eliquis 5 mg bid.     CAD s/p Stents IN LEFT MAIN AND LAD/LCX.  on aspirin, brilinta. now with atrial flutter suggest dc brilinta after tonights dose  load with plavix 600 mg tomorrow am. then 75 mg once daily starting day after tomorrow.   dc aspirin tomorrow am.     give pantoprazole at night time daily. 12 HOURS APART FROM PLAVIX.      CONTINUE atorvastatin 80 mg once daily hs.     IF STABLE TOMORROW, DC FROM CARDIAC PERSPECTIVE AND FOLLOW WITH ME IN 1 WEEK.

## 2020-07-13 NOTE — PROGRESS NOTE ADULT - SUBJECTIVE AND OBJECTIVE BOX
Pt feels fine. no chest pain no dyspnea. no palpitations.     On Exam:    Vital Signs Last 24 Hrs  T(C): 35.6 (13 Jul 2020 12:44), Max: 36.1 (12 Jul 2020 20:48)  T(F): 96 (13 Jul 2020 12:44), Max: 96.9 (12 Jul 2020 20:48)  HR: 75 (13 Jul 2020 12:44) (64 - 140)  BP: 110/60 (13 Jul 2020 12:44) (103/57 - 134/96)  RR: 20 (13 Jul 2020 12:44) (18 - 20)  SpO2: 97% (13 Jul 2020 09:26) (97% - 97%)    PHYSICAL EXAM:    · NECK:	 supple  ·RESPIRATORY:   Bilaterally decreased air entry.   · CARDIOVASCULAR	regular rate and rhythm  no rub  2/6 esm lsb aa.   . ABDOMEN :  no distention; bowel sounds normal; no rebound tenderness; no guarding; no rigidity  · EXTREMITIES: No cyanosis, clubbing or edema.   ·NEUROLOGICAL:   alert and oriented x 3;                          13.3   10.39 )-----------( 332      ( 13 Jul 2020 06:22 )             41.9       07-13    135  |  95<L>  |  16  ----------------------------<  125<H>  3.8   |  21  |  0.7    Ca    11.0<H>      13 Jul 2020 06:22  Mg     1.6     07-13    TPro  7.5  /  Alb  4.4  /  TBili  0.8  /  DBili  x   /  AST  19  /  ALT  13  /  AlkPhos  81  07-13      CARDIAC MARKERS ( 12 Jul 2020 05:00 )  x     / <0.01 ng/mL / x     / x     / x

## 2020-07-14 ENCOUNTER — TRANSCRIPTION ENCOUNTER (OUTPATIENT)
Age: 75
End: 2020-07-14

## 2020-07-14 VITALS — SYSTOLIC BLOOD PRESSURE: 130 MMHG | DIASTOLIC BLOOD PRESSURE: 60 MMHG | HEART RATE: 80 BPM

## 2020-07-14 LAB
ALBUMIN SERPL ELPH-MCNC: 4.3 G/DL — SIGNIFICANT CHANGE UP (ref 3.5–5.2)
ALP SERPL-CCNC: 72 U/L — SIGNIFICANT CHANGE UP (ref 30–115)
ALT FLD-CCNC: 12 U/L — SIGNIFICANT CHANGE UP (ref 0–41)
ANION GAP SERPL CALC-SCNC: 15 MMOL/L — HIGH (ref 7–14)
AST SERPL-CCNC: 16 U/L — SIGNIFICANT CHANGE UP (ref 0–41)
BASOPHILS # BLD AUTO: 0.05 K/UL — SIGNIFICANT CHANGE UP (ref 0–0.2)
BASOPHILS NFR BLD AUTO: 0.6 % — SIGNIFICANT CHANGE UP (ref 0–1)
BILIRUB SERPL-MCNC: 0.6 MG/DL — SIGNIFICANT CHANGE UP (ref 0.2–1.2)
BUN SERPL-MCNC: 18 MG/DL — SIGNIFICANT CHANGE UP (ref 10–20)
CALCIUM SERPL-MCNC: 9.4 MG/DL — SIGNIFICANT CHANGE UP (ref 8.5–10.1)
CHLORIDE SERPL-SCNC: 94 MMOL/L — LOW (ref 98–110)
CO2 SERPL-SCNC: 25 MMOL/L — SIGNIFICANT CHANGE UP (ref 17–32)
CREAT SERPL-MCNC: 0.8 MG/DL — SIGNIFICANT CHANGE UP (ref 0.7–1.5)
EOSINOPHIL # BLD AUTO: 0.06 K/UL — SIGNIFICANT CHANGE UP (ref 0–0.7)
EOSINOPHIL NFR BLD AUTO: 0.7 % — SIGNIFICANT CHANGE UP (ref 0–8)
GLUCOSE SERPL-MCNC: 110 MG/DL — HIGH (ref 70–99)
HCT VFR BLD CALC: 39 % — SIGNIFICANT CHANGE UP (ref 37–47)
HGB BLD-MCNC: 12.2 G/DL — SIGNIFICANT CHANGE UP (ref 12–16)
IMM GRANULOCYTES NFR BLD AUTO: 0.4 % — HIGH (ref 0.1–0.3)
LYMPHOCYTES # BLD AUTO: 2.32 K/UL — SIGNIFICANT CHANGE UP (ref 1.2–3.4)
LYMPHOCYTES # BLD AUTO: 27.9 % — SIGNIFICANT CHANGE UP (ref 20.5–51.1)
MAGNESIUM SERPL-MCNC: 1.6 MG/DL — LOW (ref 1.8–2.4)
MCHC RBC-ENTMCNC: 25.6 PG — LOW (ref 27–31)
MCHC RBC-ENTMCNC: 31.3 G/DL — LOW (ref 32–37)
MCV RBC AUTO: 81.8 FL — SIGNIFICANT CHANGE UP (ref 81–99)
MONOCYTES # BLD AUTO: 0.91 K/UL — HIGH (ref 0.1–0.6)
MONOCYTES NFR BLD AUTO: 11 % — HIGH (ref 1.7–9.3)
NEUTROPHILS # BLD AUTO: 4.94 K/UL — SIGNIFICANT CHANGE UP (ref 1.4–6.5)
NEUTROPHILS NFR BLD AUTO: 59.4 % — SIGNIFICANT CHANGE UP (ref 42.2–75.2)
NRBC # BLD: 0 /100 WBCS — SIGNIFICANT CHANGE UP (ref 0–0)
PLATELET # BLD AUTO: 288 K/UL — SIGNIFICANT CHANGE UP (ref 130–400)
POTASSIUM SERPL-MCNC: 3.5 MMOL/L — SIGNIFICANT CHANGE UP (ref 3.5–5)
POTASSIUM SERPL-SCNC: 3.5 MMOL/L — SIGNIFICANT CHANGE UP (ref 3.5–5)
PROT SERPL-MCNC: 7 G/DL — SIGNIFICANT CHANGE UP (ref 6–8)
RBC # BLD: 4.77 M/UL — SIGNIFICANT CHANGE UP (ref 4.2–5.4)
RBC # FLD: 14.7 % — HIGH (ref 11.5–14.5)
SODIUM SERPL-SCNC: 134 MMOL/L — LOW (ref 135–146)
T4 FREE+ TSH PNL SERPL: 0.74 UIU/ML — SIGNIFICANT CHANGE UP (ref 0.27–4.2)
WBC # BLD: 8.31 K/UL — SIGNIFICANT CHANGE UP (ref 4.8–10.8)
WBC # FLD AUTO: 8.31 K/UL — SIGNIFICANT CHANGE UP (ref 4.8–10.8)

## 2020-07-14 PROCEDURE — 99233 SBSQ HOSP IP/OBS HIGH 50: CPT

## 2020-07-14 PROCEDURE — 99222 1ST HOSP IP/OBS MODERATE 55: CPT

## 2020-07-14 RX ORDER — CLOPIDOGREL BISULFATE 75 MG/1
1 TABLET, FILM COATED ORAL
Qty: 0 | Refills: 0 | DISCHARGE
Start: 2020-07-14

## 2020-07-14 RX ORDER — OMEPRAZOLE 10 MG/1
1 CAPSULE, DELAYED RELEASE ORAL
Qty: 0 | Refills: 0 | DISCHARGE

## 2020-07-14 RX ORDER — CLOPIDOGREL BISULFATE 75 MG/1
1 TABLET, FILM COATED ORAL
Qty: 30 | Refills: 0
Start: 2020-07-14 | End: 2020-08-12

## 2020-07-14 RX ORDER — DILTIAZEM HCL 120 MG
1 CAPSULE, EXT RELEASE 24 HR ORAL
Qty: 0 | Refills: 0 | DISCHARGE
Start: 2020-07-14

## 2020-07-14 RX ORDER — PANTOPRAZOLE SODIUM 20 MG/1
1 TABLET, DELAYED RELEASE ORAL
Qty: 0 | Refills: 0 | DISCHARGE
Start: 2020-07-14

## 2020-07-14 RX ORDER — PANTOPRAZOLE SODIUM 20 MG/1
1 TABLET, DELAYED RELEASE ORAL
Qty: 30 | Refills: 0
Start: 2020-07-14

## 2020-07-14 RX ORDER — MAGNESIUM SULFATE 500 MG/ML
2 VIAL (ML) INJECTION ONCE
Refills: 0 | Status: COMPLETED | OUTPATIENT
Start: 2020-07-14 | End: 2020-07-14

## 2020-07-14 RX ORDER — ASPIRIN/CALCIUM CARB/MAGNESIUM 324 MG
1 TABLET ORAL
Qty: 0 | Refills: 0 | DISCHARGE

## 2020-07-14 RX ORDER — AMLODIPINE BESYLATE 2.5 MG/1
1 TABLET ORAL
Qty: 0 | Refills: 0 | DISCHARGE

## 2020-07-14 RX ORDER — HYDROXYCHLOROQUINE SULFATE 200 MG
1 TABLET ORAL
Qty: 30 | Refills: 0
Start: 2020-07-14

## 2020-07-14 RX ORDER — APIXABAN 2.5 MG/1
1 TABLET, FILM COATED ORAL
Qty: 60 | Refills: 0
Start: 2020-07-14 | End: 2020-08-12

## 2020-07-14 RX ORDER — DILTIAZEM HCL 120 MG
1 CAPSULE, EXT RELEASE 24 HR ORAL
Qty: 30 | Refills: 0
Start: 2020-07-14

## 2020-07-14 RX ORDER — HYDROXYCHLOROQUINE SULFATE 200 MG
1 TABLET ORAL
Qty: 0 | Refills: 0 | DISCHARGE

## 2020-07-14 RX ADMIN — Medication 240 MILLIGRAM(S): at 05:38

## 2020-07-14 RX ADMIN — Medication 650 MILLIGRAM(S): at 14:32

## 2020-07-14 RX ADMIN — Medication 650 MILLIGRAM(S): at 15:02

## 2020-07-14 RX ADMIN — Medication 40 MILLIGRAM(S): at 05:37

## 2020-07-14 RX ADMIN — Medication 150 MILLIGRAM(S): at 17:08

## 2020-07-14 RX ADMIN — Medication 650 MILLIGRAM(S): at 05:36

## 2020-07-14 RX ADMIN — APIXABAN 5 MILLIGRAM(S): 2.5 TABLET, FILM COATED ORAL at 05:38

## 2020-07-14 RX ADMIN — SERTRALINE 50 MILLIGRAM(S): 25 TABLET, FILM COATED ORAL at 11:52

## 2020-07-14 RX ADMIN — BUDESONIDE AND FORMOTEROL FUMARATE DIHYDRATE 2 PUFF(S): 160; 4.5 AEROSOL RESPIRATORY (INHALATION) at 08:22

## 2020-07-14 RX ADMIN — CLOPIDOGREL BISULFATE 600 MILLIGRAM(S): 75 TABLET, FILM COATED ORAL at 09:56

## 2020-07-14 RX ADMIN — Medication 25 GRAM(S): at 15:08

## 2020-07-14 RX ADMIN — APIXABAN 5 MILLIGRAM(S): 2.5 TABLET, FILM COATED ORAL at 17:00

## 2020-07-14 RX ADMIN — Medication 150 MILLIGRAM(S): at 05:36

## 2020-07-14 RX ADMIN — ALBUTEROL 2 PUFF(S): 90 AEROSOL, METERED ORAL at 08:22

## 2020-07-14 RX ADMIN — TIOTROPIUM BROMIDE 1 CAPSULE(S): 18 CAPSULE ORAL; RESPIRATORY (INHALATION) at 08:24

## 2020-07-14 RX ADMIN — ALBUTEROL 2 PUFF(S): 90 AEROSOL, METERED ORAL at 20:49

## 2020-07-14 NOTE — PROGRESS NOTE ADULT - ASSESSMENT
A 75 years old female presented to the ED with progressively worsening sob for the last three weeks following the stent placement last month. No cp.     Pro BNP: 2299  CE x 2 negative.   CXR: Pulmonary vascular congestion  EKG: Atrial Flutter @ 142/min. ST depression in leads V5-V6. LVH (Interpreted by me.)      1. Ac. HFpEF  2. Atrial Flutter with RVR - NSR now  3. COPD  4. CAD S/P recent PCI  5. Severe AS  6. SLE  7. HTN  8. H/O Breast Ca  9. Sjogren's syndrome.           PLAN:    . Cont Lasix 40 mg po daily  . Low salt diet and water restriction to 1.5 L/D  . Cont Cardizem- mg po daily   . Care D/W the cardiologist. Recommended to stop Metoprolol succinate  . Cardiology also recommended to stop Brilinta and start her on Plavix  . ASA was stopped by the cardiology  . Cont Eliquis 5 mg po q 12h .   . Cont her other home meds.  . D/C home.     * Med rec reviewed. Plan of care D/W the pt. Time spent 41 minutes.

## 2020-07-14 NOTE — DISCHARGE NOTE PROVIDER - HOSPITAL COURSE
75 year old female with past medical history of aortic valve stenosis scheduled for TAVR 7/22/20, CAD stent x4 6/15, HFrEF, HTN, GERD, SLE, trigeminal neuralgia, breast CA s/p rodrick mastectomy and Sjogren's syndrome presented to ED for 3 weeks of worsening SOB following stent placement last month. Prior able to walk more than a block, now unable to walk a few steps without SOB. Associated with paroxysmal nocturnal dyspnea, palpitations, intermittent nonproductive cough, and non-radiating mild chest pain. No headache, chest pain, abdominal pain, dysuria, lower extremity edema.        Chest xray significant for pulmonary congestion, elevated BNP, admitted for HF exacerbation, may have been exacerbated by COPD as well. Clinical and imaging improvement after lasix administration. Given prednisone, duonebs for COPD. Patient now with O2 sat 97% on room air, lying fat in bed, no SOB or peripheral edema. Lungs clear.         Course complicated by Aflutter with variable AV block on 7/12, resolved with cardizam. Now in sinus rhythm, no events on telemetry. Patient on eliquis 5 mg PO q 12, cardizam  mg PO in AM, plavix 75 mg once daily. scheduled for TAVR next week.

## 2020-07-14 NOTE — DISCHARGE NOTE PROVIDER - NSDCCPCAREPLAN_GEN_ALL_CORE_FT
PRINCIPAL DISCHARGE DIAGNOSIS  Diagnosis: Acute exacerbation of congestive heart failure  Assessment and Plan of Treatment: Heart failure, sometimes known as congestive heart failure, occurs when your heart muscle doesn't pump blood as well as it should. Certain conditions, such as narrowed arteries in your heart (coronary artery disease) or high blood pressure, gradually leave your heart too weak or stiff to fill and pump efficiently.  Not all conditions that lead to heart failure can be reversed, but treatments can improve the signs and symptoms of heart failure and help you live longer. Lifestyle changes — such as exercising, reducing sodium in your diet, managing stress and losing weight — can improve your quality of life.  One way to prevent heart failure is to prevent and control conditions that cause heart failure, such as coronary artery disease, high blood pressure, diabetes or obesity  Your acute heart failure has now resolved. It is important to follow up with your cardiologist and PCP to continue to maintain your heart health and function.      SECONDARY DISCHARGE DIAGNOSES  Diagnosis: Aortic stenosis  Assessment and Plan of Treatment: Aortic valve stenosis — or aortic stenosis — occurs when the heart's aortic valve narrows. This narrowing prevents the valve from opening fully, which reduces or blocks blood flow from your heart into the main artery to your body (aorta) and onward to the rest of your body.  When the blood flow through the aortic valve is reduced or blocked, your heart needs to work harder to pump blood to your body. Eventually, this extra work limits the amount of blood it can pump, and this can cause symptoms as well as possibly weaken your heart muscle.  Your treatment depends on the severity of your condition. You may need surgery to repair or replace the valve. Left untreated, aortic valve stenosis can lead to serious heart problems.  You have severe aortic stenosis. It is critical that you follow up with your cardiologist for your scheduled procedure as soon as possible.

## 2020-07-14 NOTE — DISCHARGE NOTE NURSING/CASE MANAGEMENT/SOCIAL WORK - PATIENT PORTAL LINK FT
You can access the FollowMyHealth Patient Portal offered by [facility] by registering at the following website: [email or web address]. By joining [employer]’s FollowMyHealth portal, you will also be able to view your health information using other applications (apps) compatible with our system.

## 2020-07-14 NOTE — DISCHARGE NOTE PROVIDER - NSDCMRMEDTOKEN_GEN_ALL_CORE_FT
ALPRAZolam 0.5 mg oral tablet: 2 tab(s) orally once a day (at bedtime), As Needed  amLODIPine 10 mg oral tablet: 1 tab(s) orally once a day  apixaban 5 mg oral tablet: 1 tab(s) orally every 12 hours  atorvastatin 80 mg oral tablet: 1 tab(s) orally once a day (at bedtime)  Brilinta (ticagrelor) 90 mg oral tablet: 1 tab(s) orally 2 times a day MDD:2  cevimeline 30 mg oral capsule: 3 cap(s) orally once a day  docusate sodium 100 mg oral capsule: 1 cap(s) orally 2 times a day  Fioricet oral capsule: 2 cap(s) orally once a day  furosemide 20 mg oral tablet: 2 tab(s) orally once a day   hydroxychloroquine 200 mg oral tablet: 1 tab(s) orally 2 times a day  hydrOXYzine hydrochloride 50 mg oral tablet: 1 tab(s) orally every 6 hours, As Needed -Anxiety - for anxiety   Low Dose ASA 81 mg oral tablet: 1 tab(s) orally once a day  montelukast 10 mg oral tablet: 1 tab(s) orally once a day  ocular lubricant ophthalmic solution: 1 drop(s) to each affected eye every 2 hours, As needed, Dry Eyes  omeprazole 40 mg oral delayed release capsule: 1 cap(s) orally once a day  pregabalin 25 mg oral capsule: 1 cap(s) orally once a day MDD:one daily  senna oral tablet: 2 tab(s) orally once a day (at bedtime)  Spiriva Respimat 1.25 mcg/inh inhalation aerosol: 2 puff(s) inhaled once a day   Symbicort 160 mcg-4.5 mcg/inh inhalation aerosol: 2 puff(s) inhaled 2 times a day   Toprol-XL 50 mg oral tablet, extended release: 1 tab(s) orally once a day   traZODone 50 mg oral tablet: 1 tab(s) orally once a day (at bedtime)  Ventolin HFA 90 mcg/inh inhalation aerosol: 2 puff(s) inhaled prn MDD:maximum 6 puffs a day  Zoloft 50 mg oral tablet: 1 tab(s) orally once a day ALPRAZolam 0.5 mg oral tablet: 2 tab(s) orally once a day (at bedtime), As Needed  apixaban 5 mg oral tablet: 1 tab(s) orally every 12 hours  atorvastatin 80 mg oral tablet: 1 tab(s) orally once a day (at bedtime)  cevimeline 30 mg oral capsule: 3 cap(s) orally once a day  clopidogrel 75 mg oral tablet: 1 tab(s) orally once a day  dilTIAZem 240 mg/24 hours oral capsule, extended release: 1 cap(s) orally once a day  docusate sodium 100 mg oral capsule: 1 cap(s) orally 2 times a day  Fioricet oral capsule: 2 cap(s) orally once a day  furosemide 20 mg oral tablet: 2 tab(s) orally once a day   hydroxychloroquine 200 mg oral tablet: 1 tab(s) orally 2 times a day  hydrOXYzine hydrochloride 50 mg oral tablet: 1 tab(s) orally every 6 hours, As Needed -Anxiety - for anxiety   montelukast 10 mg oral tablet: 1 tab(s) orally once a day  ocular lubricant ophthalmic solution: 1 drop(s) to each affected eye every 2 hours, As needed, Dry Eyes  omeprazole 40 mg oral delayed release capsule: 1 cap(s) orally once a day  pregabalin 25 mg oral capsule: 1 cap(s) orally once a day MDD:one daily  senna oral tablet: 2 tab(s) orally once a day (at bedtime)  Spiriva Respimat 1.25 mcg/inh inhalation aerosol: 2 puff(s) inhaled once a day   Symbicort 160 mcg-4.5 mcg/inh inhalation aerosol: 2 puff(s) inhaled 2 times a day   traZODone 50 mg oral tablet: 1 tab(s) orally once a day (at bedtime)  Ventolin HFA 90 mcg/inh inhalation aerosol: 2 puff(s) inhaled prn MDD:maximum 6 puffs a day  Zoloft 50 mg oral tablet: 1 tab(s) orally once a day

## 2020-07-14 NOTE — DISCHARGE NOTE PROVIDER - CARE PROVIDER_API CALL
Dario Johnson)  Cardiovascular Disease; Internal Medicine  [address] [place] [address]  Phone: [phone]  Fax: [phone]  Established Patient  Follow Up Time: 1-3 days

## 2020-07-14 NOTE — DISCHARGE NOTE PROVIDER - NSDCFUSCHEDAPPT_GEN_ALL_CORE_FT
CLAYTON VELAZQUEZ ; [facility]22/2020 ; [facility] C[facility]EL[facility]Z ; 07/15/2020 ; [facility]  CLAYTON VELAZQUEZ ; 07/22/2020 ; [facility] CLAYTON VELAZQUEZ ; 07/15/2020 ; [facility]  CLAYTON VELAZQUEZ ; 07/22/2020 ; [facility]

## 2020-07-14 NOTE — PROGRESS NOTE ADULT - SUBJECTIVE AND OBJECTIVE BOX
CHELSEAYOLACLAYTON  75y Female    CHIEF COMPLAINT:    Patient is a 75y old  Female who presents with a chief complaint of Shortness of Breath (2020 10:28)      INTERVAL HPI/OVERNIGHT EVENTS:    Patient seen and examined. Feels better today. No sob. POX on exertion is 99% on RA    ROS: All other systems are negative.    Vital Signs:    T(F): 97.1 (20 @ 05:00), Max: 97.4 (20 @ 20:18)  HR: 73 (20 @ 05:00) (73 - 75)  BP: 137/63 (20 @ 05:00) (110/60 - 137/63)  RR: 18 (20 @ 05:00) (18 - 20)  SpO2: 96% (20 @ 08:39) (96% - 97%)  I&O's Summary    2020 07:01  -  2020 07:00  --------------------------------------------------------  IN: 270 mL / OUT: 250 mL / NET: 20 mL      Daily     Daily Weight in k.9 (2020 05:00)  CAPILLARY BLOOD GLUCOSE          PHYSICAL EXAM:    GENERAL:  NAD  SKIN: No rashes or lesions  HENT: Atraumatic Normocephalic. PERRL. Moist membranes.  NECK: Supple, No JVD. No lymphadenopathy.  PULMONARY: CTA B/L. No wheezing. No rales  CVS: Normal S1, S2. Rate and Rhythm are regular. No murmurs.  ABDOMEN/GI: Soft, Nontender, Nondistended; BS present  EXTREMITIES: Peripheral pulses intact. No edema B/L LE.  NEUROLOGIC:  No motor or sensory deficit.  PSYCH: Alert & oriented x 3    Consultant(s) Notes Reviewed:  [x ] YES  [ ] NO  Care Discussed with Consultants/Other Providers [ x] YES  [ ] NO    EKG reviewed  Telemetry reviewed    LABS:                        12.2   8.31  )-----------( 288      ( 2020 06:14 )             39.0     07-    134<L>  |  94<L>  |  18  ----------------------------<  110<H>  3.5   |  25  |  0.8    Ca    9.4      2020 06:14  Mg     1.6         TPro  7.0  /  Alb  4.3  /  TBili  0.6  /  DBili  x   /  AST  16  /  ALT  12  /  AlkPhos  72        Serum Pro-Brain Natriuretic Peptide: 2299 pg/mL (20 @ 05:00)  Serum Pro-Brain Natriuretic Peptide: 2061 pg/mL (20 @ 13:51)    Trop <0.01, CKMB --, CK --, 20 @ 05:00  Trop <0.01, CKMB --, CK --, 20 @ 13:51        RADIOLOGY & ADDITIONAL TESTS:      Imaging or report Personally Reviewed:  [ ] YES  [ ] NO    Medications:  Standing  ALBUTerol    90 MICROgram(s) HFA Inhaler 2 Puff(s) Inhalation every 6 hours  apixaban 5 milliGRAM(s) Oral every 12 hours  atorvastatin 80 milliGRAM(s) Oral at bedtime  budesonide 160 MICROgram(s)/formoterol 4.5 MICROgram(s) Inhaler 2 Puff(s) Inhalation two times a day  chlorhexidine 4% Liquid 1 Application(s) Topical daily  diltiazem    Tablet 60 milliGRAM(s) Oral once  diltiazem    milliGRAM(s) Oral daily  furosemide    Tablet 40 milliGRAM(s) Oral daily  hydroxychloroquine 200 milliGRAM(s) Oral two times a day  montelukast 10 milliGRAM(s) Oral at bedtime  pantoprazole    Tablet 40 milliGRAM(s) Oral at bedtime  predniSONE   Tablet 40 milliGRAM(s) Oral daily  pregabalin 150 milliGRAM(s) Oral two times a day  senna 2 Tablet(s) Oral at bedtime  sertraline 50 milliGRAM(s) Oral daily  tiotropium 18 MICROgram(s) Capsule 1 Capsule(s) Inhalation daily    PRN Meds  acetaminophen   Tablet .. 650 milliGRAM(s) Oral every 6 hours PRN  ALPRAZolam 1 milliGRAM(s) Oral two times a day PRN  artificial  tears Solution 1 Drop(s) Both EYES every 3 hours PRN  hydrOXYzine hydrochloride 50 milliGRAM(s) Oral every 6 hours PRN      Case discussed with resident    Care discussed with pt/family

## 2020-07-16 PROBLEM — Z95.5 H/O HEART ARTERY STENT: Status: ACTIVE | Noted: 2020-07-02

## 2020-07-16 NOTE — REVIEW OF SYSTEMS
[Dyspnea on exertion] : dyspnea during exertion [Feeling Fatigued] : feeling fatigued [Chest Pain] : no chest pain [Palpitations] : no palpitations [Cough] : no cough [Dizziness] : no dizziness [Confusion] : no confusion was observed

## 2020-07-16 NOTE — ASSESSMENT
[FreeTextEntry1] : Pt is a 75 y/o, female, with valvular disease of aortic stenosis, indicated on echo 12/2019 with a PV 4.72 / JEET 0.8 / MG 52.5 with EF 75%, mild MR, mid-mod TR, severe AS - stage D1, chronic diastolic HF, NYHA II, sev pHTN 63.5 mmHg, with a PMHx of Sjoren syndrome (autoimmune Dx 5-6 years ago), lupus, breast CA s/p b/l mastectomy 2010 in [facility]- no chemo, no radiation , TIAs ( 2007, 2012, 2013), Trigeminal neuralgia, smoker 1 pack day/50 years. s/p BAV PCI.\par Stop beta blocker\par plan for TAVR.

## 2020-07-16 NOTE — END OF VISIT
[FreeTextEntry3] : Seen / examined and above reviewed.\par \par Overall, doing well post BAV-PCI.\par \par No angina.\par Exertional fatigue.\par Mild wheezing on exam.\par Normotensive, SaO2 low 90's, HR 's, euvolemic.\par \par Stop beta-blocker (may be contributing to fatigue / wheezing).\par Pulmonary follow-up.\par Cont ASA / Brilinta.\par TAVR as planned.

## 2020-07-16 NOTE — HISTORY OF PRESENT ILLNESS
[FreeTextEntry1] : 76 y/o F with PMH of Severe AS, HFpEF 75%, severe pulmonary HTN, Sjogren syndrome, lupus, breast cancer s/p b/l mastectomy with no chemo or radiation, h/o TIAS (1007, 2012, 2013), COPD presented with dyspnea for exertion and had abnormal CTA showing extensive CAD for LHC with possible PCI and BAV for aortic stenosis She is s/p balloon angioplasty and PCI.

## 2020-07-16 NOTE — REASON FOR VISIT
[Follow-Up - Clinic] : a clinic follow-up of [Aortic Stenosis] : aortic stenosis [Heart Failure] : congestive heart failure [FreeTextEntry2] : discuss plan of care  [FreeTextEntry1] : Pt is a 76 y/o, female, with valvular disease of aortic stenosis, indicated on echo 12/2019 with a PV 4.72 / JEET 0.8 / MG 52.5 with EF 75%, mild MR, mid-mod TR, severe AS - stage D1, chronic diastolic HF, NYHA II, sev pHTN 63.5 mmHg, with a PMHx of Sjoren syndrome (autoimmune Dx 5-6 years ago), lupus, breast CA s/p b/l mastectomy 2010 in [facility]- no chemo, no radiation , TIAs ( 2007, 2012, 2013), Trigeminal neuralgia, smoker 1 pack day/50 years.  \par \par Pt presents s/p BAV PCI.\par \par CTA completed:\par Access b/l 4mm\par Carotid \par 40-59 % BRIANDA\par 60- 79 % LICA\par \par PFTs - done

## 2020-07-17 ENCOUNTER — APPOINTMENT (OUTPATIENT)
Dept: CARDIOLOGY | Facility: CLINIC | Age: 75
End: 2020-07-17
Payer: MEDICARE

## 2020-07-17 PROCEDURE — 99213 OFFICE O/P EST LOW 20 MIN: CPT

## 2020-07-17 NOTE — PHYSICAL EXAM
[No Deformities] : no deformities [] : no respiratory distress [General Appearance - In No Acute Distress] : no acute distress [Exaggerated Use Of Accessory Muscles For Inspiration] : no accessory muscle use [Abdomen Soft] : soft [Abnormal Walk] : normal gait [Nail Clubbing] : no clubbing of the fingernails [Cyanosis, Localized] : no localized cyanosis [Impaired Insight] : insight and judgment were intact [Oriented To Time, Place, And Person] : oriented to person, place, and time [Affect] : the affect was normal [Mood] : the mood was normal [No Anxiety] : not feeling anxious

## 2020-07-18 DIAGNOSIS — I48.92 UNSPECIFIED ATRIAL FLUTTER: ICD-10-CM

## 2020-07-18 DIAGNOSIS — F17.210 NICOTINE DEPENDENCE, CIGARETTES, UNCOMPLICATED: ICD-10-CM

## 2020-07-18 DIAGNOSIS — Z91.19 PATIENT'S NONCOMPLIANCE WITH OTHER MEDICAL TREATMENT AND REGIMEN: ICD-10-CM

## 2020-07-18 DIAGNOSIS — I11.0 HYPERTENSIVE HEART DISEASE WITH HEART FAILURE: ICD-10-CM

## 2020-07-18 DIAGNOSIS — Z95.5 PRESENCE OF CORONARY ANGIOPLASTY IMPLANT AND GRAFT: ICD-10-CM

## 2020-07-18 DIAGNOSIS — Z88.5 ALLERGY STATUS TO NARCOTIC AGENT: ICD-10-CM

## 2020-07-18 DIAGNOSIS — M32.9 SYSTEMIC LUPUS ERYTHEMATOSUS, UNSPECIFIED: ICD-10-CM

## 2020-07-18 DIAGNOSIS — K59.00 CONSTIPATION, UNSPECIFIED: ICD-10-CM

## 2020-07-18 DIAGNOSIS — Z79.02 LONG TERM (CURRENT) USE OF ANTITHROMBOTICS/ANTIPLATELETS: ICD-10-CM

## 2020-07-18 DIAGNOSIS — I50.33 ACUTE ON CHRONIC DIASTOLIC (CONGESTIVE) HEART FAILURE: ICD-10-CM

## 2020-07-18 DIAGNOSIS — F41.9 ANXIETY DISORDER, UNSPECIFIED: ICD-10-CM

## 2020-07-18 DIAGNOSIS — I44.1 ATRIOVENTRICULAR BLOCK, SECOND DEGREE: ICD-10-CM

## 2020-07-18 DIAGNOSIS — I25.10 ATHEROSCLEROTIC HEART DISEASE OF NATIVE CORONARY ARTERY WITHOUT ANGINA PECTORIS: ICD-10-CM

## 2020-07-18 DIAGNOSIS — J44.1 CHRONIC OBSTRUCTIVE PULMONARY DISEASE WITH (ACUTE) EXACERBATION: ICD-10-CM

## 2020-07-18 DIAGNOSIS — I35.0 NONRHEUMATIC AORTIC (VALVE) STENOSIS: ICD-10-CM

## 2020-07-18 DIAGNOSIS — K21.9 GASTRO-ESOPHAGEAL REFLUX DISEASE WITHOUT ESOPHAGITIS: ICD-10-CM

## 2020-07-18 DIAGNOSIS — Z90.13 ACQUIRED ABSENCE OF BILATERAL BREASTS AND NIPPLES: ICD-10-CM

## 2020-07-18 DIAGNOSIS — M35.00 SJOGREN SYNDROME, UNSPECIFIED: ICD-10-CM

## 2020-07-18 DIAGNOSIS — E55.9 VITAMIN D DEFICIENCY, UNSPECIFIED: ICD-10-CM

## 2020-07-18 DIAGNOSIS — Z85.3 PERSONAL HISTORY OF MALIGNANT NEOPLASM OF BREAST: ICD-10-CM

## 2020-07-18 DIAGNOSIS — Z88.8 ALLERGY STATUS TO OTHER DRUGS, MEDICAMENTS AND BIOLOGICAL SUBSTANCES: ICD-10-CM

## 2020-07-18 DIAGNOSIS — Z90.49 ACQUIRED ABSENCE OF OTHER SPECIFIED PARTS OF DIGESTIVE TRACT: ICD-10-CM

## 2020-07-20 ENCOUNTER — APPOINTMENT (OUTPATIENT)
Dept: CARDIOTHORACIC SURGERY | Facility: CLINIC | Age: 75
End: 2020-07-20
Payer: MEDICARE

## 2020-07-20 PROCEDURE — 99000 SPECIMEN HANDLING OFFICE-LAB: CPT

## 2020-07-21 LAB — SARS-COV-2 N GENE NPH QL NAA+PROBE: NOT DETECTED

## 2020-07-21 RX ORDER — MUPIROCIN 20 MG/G
2 OINTMENT TOPICAL
Qty: 1 | Refills: 0 | Status: DISCONTINUED | COMMUNITY
Start: 2020-07-21 | End: 2020-07-21

## 2020-07-22 ENCOUNTER — INPATIENT (INPATIENT)
Facility: HOSPITAL | Age: 75
LOS: 1 days | Discharge: HOME HEALTH | End: 2020-07-24
Attending: THORACIC SURGERY (CARDIOTHORACIC VASCULAR SURGERY) | Admitting: THORACIC SURGERY (CARDIOTHORACIC VASCULAR SURGERY)
Payer: MEDICARE

## 2020-07-22 ENCOUNTER — APPOINTMENT (OUTPATIENT)
Dept: CARDIOTHORACIC SURGERY | Facility: CLINIC | Age: 75
End: 2020-07-22

## 2020-07-22 ENCOUNTER — APPOINTMENT (OUTPATIENT)
Dept: CARDIOTHORACIC SURGERY | Facility: HOSPITAL | Age: 75
End: 2020-07-22

## 2020-07-22 ENCOUNTER — TRANSCRIPTION ENCOUNTER (OUTPATIENT)
Age: 75
End: 2020-07-22

## 2020-07-22 VITALS — TEMPERATURE: 96 F | HEART RATE: 68 BPM | RESPIRATION RATE: 14 BRPM | OXYGEN SATURATION: 96 %

## 2020-07-22 DIAGNOSIS — Z98.890 OTHER SPECIFIED POSTPROCEDURAL STATES: Chronic | ICD-10-CM

## 2020-07-22 DIAGNOSIS — Z90.49 ACQUIRED ABSENCE OF OTHER SPECIFIED PARTS OF DIGESTIVE TRACT: Chronic | ICD-10-CM

## 2020-07-22 DIAGNOSIS — I35.0 NONRHEUMATIC AORTIC (VALVE) STENOSIS: ICD-10-CM

## 2020-07-22 DIAGNOSIS — Z98.61 CORONARY ANGIOPLASTY STATUS: Chronic | ICD-10-CM

## 2020-07-22 LAB
ALBUMIN SERPL ELPH-MCNC: 3.3 G/DL — LOW (ref 3.5–5.2)
ALP SERPL-CCNC: 59 U/L — SIGNIFICANT CHANGE UP (ref 30–115)
ALT FLD-CCNC: 12 U/L — SIGNIFICANT CHANGE UP (ref 0–41)
ANION GAP SERPL CALC-SCNC: 9 MMOL/L — SIGNIFICANT CHANGE UP (ref 7–14)
APTT BLD: 28.7 SEC — SIGNIFICANT CHANGE UP (ref 27–39.2)
APTT BLD: 29.9 SEC — SIGNIFICANT CHANGE UP (ref 27–39.2)
AST SERPL-CCNC: 25 U/L — SIGNIFICANT CHANGE UP (ref 0–41)
BASE EXCESS BLDA CALC-SCNC: -0.6 MMOL/L — SIGNIFICANT CHANGE UP (ref -2–2)
BASOPHILS # BLD AUTO: 0.04 K/UL — SIGNIFICANT CHANGE UP (ref 0–0.2)
BASOPHILS NFR BLD AUTO: 0.4 % — SIGNIFICANT CHANGE UP (ref 0–1)
BILIRUB SERPL-MCNC: 0.3 MG/DL — SIGNIFICANT CHANGE UP (ref 0.2–1.2)
BLD GP AB SCN SERPL QL: SIGNIFICANT CHANGE UP
BUN SERPL-MCNC: 8 MG/DL — LOW (ref 10–20)
CALCIUM SERPL-MCNC: 8 MG/DL — LOW (ref 8.5–10.1)
CHLORIDE SERPL-SCNC: 106 MMOL/L — SIGNIFICANT CHANGE UP (ref 98–110)
CO2 SERPL-SCNC: 23 MMOL/L — SIGNIFICANT CHANGE UP (ref 17–32)
CREAT SERPL-MCNC: 0.6 MG/DL — LOW (ref 0.7–1.5)
EOSINOPHIL # BLD AUTO: 0.27 K/UL — SIGNIFICANT CHANGE UP (ref 0–0.7)
EOSINOPHIL NFR BLD AUTO: 2.6 % — SIGNIFICANT CHANGE UP (ref 0–8)
FIBRINOGEN PPP-MCNC: >700 MG/DL — HIGH (ref 204.4–570.6)
GAS PNL BLDA: SIGNIFICANT CHANGE UP
GLUCOSE SERPL-MCNC: 118 MG/DL — HIGH (ref 70–99)
HCO3 BLDA-SCNC: 25 MMOL/L — SIGNIFICANT CHANGE UP (ref 23–27)
HCT VFR BLD CALC: 28.8 % — LOW (ref 37–47)
HGB BLD-MCNC: 8.6 G/DL — LOW (ref 12–16)
HOROWITZ INDEX BLDA+IHG-RTO: SIGNIFICANT CHANGE UP
IMM GRANULOCYTES NFR BLD AUTO: 0.8 % — HIGH (ref 0.1–0.3)
INR BLD: 0.93 RATIO — SIGNIFICANT CHANGE UP (ref 0.65–1.3)
INR BLD: 1.07 RATIO — SIGNIFICANT CHANGE UP (ref 0.65–1.3)
LYMPHOCYTES # BLD AUTO: 1.7 K/UL — SIGNIFICANT CHANGE UP (ref 1.2–3.4)
LYMPHOCYTES # BLD AUTO: 16.6 % — LOW (ref 20.5–51.1)
MAGNESIUM SERPL-MCNC: 1.9 MG/DL — SIGNIFICANT CHANGE UP (ref 1.8–2.4)
MCHC RBC-ENTMCNC: 25.8 PG — LOW (ref 27–31)
MCHC RBC-ENTMCNC: 29.9 G/DL — LOW (ref 32–37)
MCV RBC AUTO: 86.5 FL — SIGNIFICANT CHANGE UP (ref 81–99)
MONOCYTES # BLD AUTO: 1 K/UL — HIGH (ref 0.1–0.6)
MONOCYTES NFR BLD AUTO: 9.7 % — HIGH (ref 1.7–9.3)
NEUTROPHILS # BLD AUTO: 7.17 K/UL — HIGH (ref 1.4–6.5)
NEUTROPHILS NFR BLD AUTO: 69.9 % — SIGNIFICANT CHANGE UP (ref 42.2–75.2)
NRBC # BLD: 0 /100 WBCS — SIGNIFICANT CHANGE UP (ref 0–0)
PCO2 BLDA: 45 MMHG — HIGH (ref 38–42)
PH BLDA: 7.35 — LOW (ref 7.38–7.42)
PLATELET # BLD AUTO: 220 K/UL — SIGNIFICANT CHANGE UP (ref 130–400)
PO2 BLDA: 76 MMHG — LOW (ref 78–95)
POTASSIUM SERPL-MCNC: 4.5 MMOL/L — SIGNIFICANT CHANGE UP (ref 3.5–5)
POTASSIUM SERPL-SCNC: 4.5 MMOL/L — SIGNIFICANT CHANGE UP (ref 3.5–5)
PROT SERPL-MCNC: 5.4 G/DL — LOW (ref 6–8)
PROTHROM AB SERPL-ACNC: 10.7 SEC — SIGNIFICANT CHANGE UP (ref 9.95–12.87)
PROTHROM AB SERPL-ACNC: 12.3 SEC — SIGNIFICANT CHANGE UP (ref 9.95–12.87)
RBC # BLD: 3.33 M/UL — LOW (ref 4.2–5.4)
RBC # FLD: 16.2 % — HIGH (ref 11.5–14.5)
SAO2 % BLDA: 96 % — SIGNIFICANT CHANGE UP (ref 94–98)
SODIUM SERPL-SCNC: 138 MMOL/L — SIGNIFICANT CHANGE UP (ref 135–146)
WBC # BLD: 10.26 K/UL — SIGNIFICANT CHANGE UP (ref 4.8–10.8)
WBC # FLD AUTO: 10.26 K/UL — SIGNIFICANT CHANGE UP (ref 4.8–10.8)

## 2020-07-22 PROCEDURE — 93010 ELECTROCARDIOGRAM REPORT: CPT

## 2020-07-22 PROCEDURE — 33363 REPLACE AORTIC VALVE OPEN: CPT | Mod: 62,Q0

## 2020-07-22 PROCEDURE — 71045 X-RAY EXAM CHEST 1 VIEW: CPT | Mod: 26

## 2020-07-22 PROCEDURE — 33363 REPLACE AORTIC VALVE OPEN: CPT | Mod: 62,78

## 2020-07-22 RX ORDER — MAGNESIUM SULFATE 500 MG/ML
1 VIAL (ML) INJECTION EVERY 12 HOURS
Refills: 0 | Status: DISCONTINUED | OUTPATIENT
Start: 2020-07-22 | End: 2020-07-24

## 2020-07-22 RX ORDER — ONDANSETRON 8 MG/1
4 TABLET, FILM COATED ORAL ONCE
Refills: 0 | Status: DISCONTINUED | OUTPATIENT
Start: 2020-07-22 | End: 2020-07-24

## 2020-07-22 RX ORDER — ALPRAZOLAM 0.25 MG
1 TABLET ORAL AT BEDTIME
Refills: 0 | Status: DISCONTINUED | OUTPATIENT
Start: 2020-07-22 | End: 2020-07-24

## 2020-07-22 RX ORDER — FENTANYL CITRATE 50 UG/ML
25 INJECTION INTRAVENOUS ONCE
Refills: 0 | Status: DISCONTINUED | OUTPATIENT
Start: 2020-07-22 | End: 2020-07-22

## 2020-07-22 RX ORDER — NICARDIPINE HYDROCHLORIDE 30 MG/1
5 CAPSULE, EXTENDED RELEASE ORAL
Qty: 40 | Refills: 0 | Status: DISCONTINUED | OUTPATIENT
Start: 2020-07-22 | End: 2020-07-22

## 2020-07-22 RX ORDER — DICLOFENAC SODIUM 75 MG/1
50 TABLET, DELAYED RELEASE ORAL
Refills: 0 | Status: DISCONTINUED | OUTPATIENT
Start: 2020-07-22 | End: 2020-07-24

## 2020-07-22 RX ORDER — FAMOTIDINE 10 MG/ML
20 INJECTION INTRAVENOUS EVERY 12 HOURS
Refills: 0 | Status: DISCONTINUED | OUTPATIENT
Start: 2020-07-22 | End: 2020-07-23

## 2020-07-22 RX ORDER — INSULIN HUMAN 100 [IU]/ML
1 INJECTION, SOLUTION SUBCUTANEOUS
Qty: 100 | Refills: 0 | Status: DISCONTINUED | OUTPATIENT
Start: 2020-07-22 | End: 2020-07-23

## 2020-07-22 RX ORDER — MIRTAZAPINE 45 MG/1
30 TABLET, ORALLY DISINTEGRATING ORAL DAILY
Refills: 0 | Status: DISCONTINUED | OUTPATIENT
Start: 2020-07-22 | End: 2020-07-24

## 2020-07-22 RX ORDER — HYDROXYZINE HCL 10 MG
25 TABLET ORAL DAILY
Refills: 0 | Status: DISCONTINUED | OUTPATIENT
Start: 2020-07-22 | End: 2020-07-22

## 2020-07-22 RX ORDER — BUDESONIDE AND FORMOTEROL FUMARATE DIHYDRATE 160; 4.5 UG/1; UG/1
2 AEROSOL RESPIRATORY (INHALATION)
Refills: 0 | Status: DISCONTINUED | OUTPATIENT
Start: 2020-07-22 | End: 2020-07-24

## 2020-07-22 RX ORDER — ASPIRIN/CALCIUM CARB/MAGNESIUM 324 MG
81 TABLET ORAL DAILY
Refills: 0 | Status: DISCONTINUED | OUTPATIENT
Start: 2020-07-23 | End: 2020-07-24

## 2020-07-22 RX ORDER — OXYCODONE HYDROCHLORIDE 5 MG/1
5 TABLET ORAL EVERY 4 HOURS
Refills: 0 | Status: DISCONTINUED | OUTPATIENT
Start: 2020-07-22 | End: 2020-07-24

## 2020-07-22 RX ORDER — CEVIMELINE 30 MG/1
3 CAPSULE ORAL
Qty: 0 | Refills: 0 | DISCHARGE

## 2020-07-22 RX ORDER — CLOPIDOGREL BISULFATE 75 MG/1
75 TABLET, FILM COATED ORAL DAILY
Refills: 0 | Status: DISCONTINUED | OUTPATIENT
Start: 2020-07-22 | End: 2020-07-24

## 2020-07-22 RX ORDER — NOREPINEPHRINE BITARTRATE/D5W 8 MG/250ML
0.05 PLASTIC BAG, INJECTION (ML) INTRAVENOUS
Qty: 8 | Refills: 0 | Status: DISCONTINUED | OUTPATIENT
Start: 2020-07-22 | End: 2020-07-23

## 2020-07-22 RX ORDER — ESCITALOPRAM OXALATE 10 MG/1
10 TABLET, FILM COATED ORAL DAILY
Refills: 0 | Status: DISCONTINUED | OUTPATIENT
Start: 2020-07-22 | End: 2020-07-23

## 2020-07-22 RX ORDER — MEPERIDINE HYDROCHLORIDE 50 MG/ML
25 INJECTION INTRAMUSCULAR; INTRAVENOUS; SUBCUTANEOUS ONCE
Refills: 0 | Status: DISCONTINUED | OUTPATIENT
Start: 2020-07-22 | End: 2020-07-22

## 2020-07-22 RX ORDER — ATORVASTATIN CALCIUM 80 MG/1
80 TABLET, FILM COATED ORAL AT BEDTIME
Refills: 0 | Status: DISCONTINUED | OUTPATIENT
Start: 2020-07-22 | End: 2020-07-24

## 2020-07-22 RX ORDER — MONTELUKAST 4 MG/1
10 TABLET, CHEWABLE ORAL DAILY
Refills: 0 | Status: DISCONTINUED | OUTPATIENT
Start: 2020-07-22 | End: 2020-07-24

## 2020-07-22 RX ORDER — POLYETHYLENE GLYCOL 3350 17 G/17G
17 POWDER, FOR SOLUTION ORAL DAILY
Refills: 0 | Status: DISCONTINUED | OUTPATIENT
Start: 2020-07-22 | End: 2020-07-24

## 2020-07-22 RX ORDER — CEFAZOLIN SODIUM 1 G
2000 VIAL (EA) INJECTION EVERY 8 HOURS
Refills: 0 | Status: COMPLETED | OUTPATIENT
Start: 2020-07-22 | End: 2020-07-23

## 2020-07-22 RX ORDER — DEXTROSE 50 % IN WATER 50 %
25 SYRINGE (ML) INTRAVENOUS
Refills: 0 | Status: DISCONTINUED | OUTPATIENT
Start: 2020-07-22 | End: 2020-07-24

## 2020-07-22 RX ORDER — TRAZODONE HCL 50 MG
50 TABLET ORAL DAILY
Refills: 0 | Status: DISCONTINUED | OUTPATIENT
Start: 2020-07-22 | End: 2020-07-24

## 2020-07-22 RX ORDER — CHLORHEXIDINE GLUCONATE 213 G/1000ML
1 SOLUTION TOPICAL
Refills: 0 | Status: DISCONTINUED | OUTPATIENT
Start: 2020-07-22 | End: 2020-07-24

## 2020-07-22 RX ORDER — POTASSIUM CHLORIDE 20 MEQ
20 PACKET (EA) ORAL ONCE
Refills: 0 | Status: COMPLETED | OUTPATIENT
Start: 2020-07-22 | End: 2020-07-22

## 2020-07-22 RX ORDER — ASPIRIN/CALCIUM CARB/MAGNESIUM 324 MG
325 TABLET ORAL ONCE
Refills: 0 | Status: COMPLETED | OUTPATIENT
Start: 2020-07-22 | End: 2020-07-22

## 2020-07-22 RX ORDER — MEPERIDINE HYDROCHLORIDE 50 MG/ML
25 INJECTION INTRAMUSCULAR; INTRAVENOUS; SUBCUTANEOUS ONCE
Refills: 0 | Status: COMPLETED | OUTPATIENT
Start: 2020-07-22

## 2020-07-22 RX ORDER — PROPOFOL 10 MG/ML
15 INJECTION, EMULSION INTRAVENOUS
Qty: 1000 | Refills: 0 | Status: DISCONTINUED | OUTPATIENT
Start: 2020-07-22 | End: 2020-07-23

## 2020-07-22 RX ORDER — NITROGLYCERIN 6.5 MG
5 CAPSULE, EXTENDED RELEASE ORAL
Qty: 50 | Refills: 0 | Status: DISCONTINUED | OUTPATIENT
Start: 2020-07-22 | End: 2020-07-24

## 2020-07-22 RX ORDER — SODIUM CHLORIDE 9 MG/ML
1000 INJECTION INTRAMUSCULAR; INTRAVENOUS; SUBCUTANEOUS
Refills: 0 | Status: DISCONTINUED | OUTPATIENT
Start: 2020-07-22 | End: 2020-07-24

## 2020-07-22 RX ORDER — DEXMEDETOMIDINE HYDROCHLORIDE IN 0.9% SODIUM CHLORIDE 4 UG/ML
0.25 INJECTION INTRAVENOUS
Qty: 200 | Refills: 0 | Status: DISCONTINUED | OUTPATIENT
Start: 2020-07-22 | End: 2020-07-22

## 2020-07-22 RX ORDER — SERTRALINE 25 MG/1
50 TABLET, FILM COATED ORAL DAILY
Refills: 0 | Status: DISCONTINUED | OUTPATIENT
Start: 2020-07-22 | End: 2020-07-22

## 2020-07-22 RX ORDER — DEXTROSE 50 % IN WATER 50 %
50 SYRINGE (ML) INTRAVENOUS
Refills: 0 | Status: DISCONTINUED | OUTPATIENT
Start: 2020-07-22 | End: 2020-07-24

## 2020-07-22 RX ORDER — CHLORHEXIDINE GLUCONATE 213 G/1000ML
5 SOLUTION TOPICAL EVERY 4 HOURS
Refills: 0 | Status: DISCONTINUED | OUTPATIENT
Start: 2020-07-22 | End: 2020-07-22

## 2020-07-22 RX ORDER — MAGNESIUM SULFATE 500 MG/ML
2 VIAL (ML) INJECTION ONCE
Refills: 0 | Status: COMPLETED | OUTPATIENT
Start: 2020-07-22 | End: 2020-07-22

## 2020-07-22 RX ORDER — FUROSEMIDE 40 MG
40 TABLET ORAL ONCE
Refills: 0 | Status: COMPLETED | OUTPATIENT
Start: 2020-07-22 | End: 2020-07-22

## 2020-07-22 RX ORDER — TIOTROPIUM BROMIDE 18 UG/1
1 CAPSULE ORAL; RESPIRATORY (INHALATION) DAILY
Refills: 0 | Status: DISCONTINUED | OUTPATIENT
Start: 2020-07-22 | End: 2020-07-24

## 2020-07-22 RX ORDER — MUPIROCIN 20 MG/G
1 OINTMENT TOPICAL EVERY 12 HOURS
Refills: 0 | Status: DISCONTINUED | OUTPATIENT
Start: 2020-07-22 | End: 2020-07-24

## 2020-07-22 RX ADMIN — Medication 600 MILLIGRAM(S): at 17:30

## 2020-07-22 RX ADMIN — ESCITALOPRAM OXALATE 10 MILLIGRAM(S): 10 TABLET, FILM COATED ORAL at 23:32

## 2020-07-22 RX ADMIN — OXYCODONE HYDROCHLORIDE 5 MILLIGRAM(S): 5 TABLET ORAL at 18:01

## 2020-07-22 RX ADMIN — MIRTAZAPINE 30 MILLIGRAM(S): 45 TABLET, ORALLY DISINTEGRATING ORAL at 23:31

## 2020-07-22 RX ADMIN — Medication 50 MILLIGRAM(S): at 23:33

## 2020-07-22 RX ADMIN — Medication 325 MILLIGRAM(S): at 17:30

## 2020-07-22 RX ADMIN — MEPERIDINE HYDROCHLORIDE 25 MILLIGRAM(S): 50 INJECTION INTRAMUSCULAR; INTRAVENOUS; SUBCUTANEOUS at 11:30

## 2020-07-22 RX ADMIN — FAMOTIDINE 20 MILLIGRAM(S): 10 INJECTION INTRAVENOUS at 17:31

## 2020-07-22 RX ADMIN — Medication 150 MILLIGRAM(S): at 23:58

## 2020-07-22 RX ADMIN — MONTELUKAST 10 MILLIGRAM(S): 4 TABLET, CHEWABLE ORAL at 23:32

## 2020-07-22 RX ADMIN — Medication 100 MILLIGRAM(S): at 14:26

## 2020-07-22 RX ADMIN — Medication 20 MILLIEQUIVALENT(S): at 18:13

## 2020-07-22 RX ADMIN — FENTANYL CITRATE 25 MICROGRAM(S): 50 INJECTION INTRAVENOUS at 15:07

## 2020-07-22 RX ADMIN — Medication 50 GRAM(S): at 17:31

## 2020-07-22 RX ADMIN — Medication 1 MILLIGRAM(S): at 21:40

## 2020-07-22 RX ADMIN — MUPIROCIN 1 APPLICATION(S): 20 OINTMENT TOPICAL at 17:32

## 2020-07-22 RX ADMIN — Medication 100 MILLIGRAM(S): at 21:43

## 2020-07-22 RX ADMIN — MEPERIDINE HYDROCHLORIDE 25 MILLIGRAM(S): 50 INJECTION INTRAMUSCULAR; INTRAVENOUS; SUBCUTANEOUS at 11:45

## 2020-07-22 RX ADMIN — FENTANYL CITRATE 25 MICROGRAM(S): 50 INJECTION INTRAVENOUS at 14:52

## 2020-07-22 RX ADMIN — MUPIROCIN 1 APPLICATION(S): 20 OINTMENT TOPICAL at 07:35

## 2020-07-22 RX ADMIN — ATORVASTATIN CALCIUM 80 MILLIGRAM(S): 80 TABLET, FILM COATED ORAL at 23:32

## 2020-07-22 RX ADMIN — Medication 40 MILLIGRAM(S): at 15:00

## 2020-07-22 RX ADMIN — OXYCODONE HYDROCHLORIDE 5 MILLIGRAM(S): 5 TABLET ORAL at 17:31

## 2020-07-22 RX ADMIN — DICLOFENAC SODIUM 50 MILLIGRAM(S): 75 TABLET, DELAYED RELEASE ORAL at 23:32

## 2020-07-22 NOTE — PATIENT PROFILE ADULT - NS PRO AD PATIENT TYPE
----- Message from Carmelita Bermeo MD sent at 11/13/2017 12:19 PM CST -----  Regarding: RE: Elevated BP  Please have him come in for nurse BP check. If still high at nurse BP check, make appt to come see me.    ----- Message -----  From: Cynthia Galloway MA  Sent: 11/13/2017  11:39 AM  To: Carmelita Bermeo MD  Subject: FW: Elevated BP                                  If anything required I can make into telephone encounter.  ----- Message -----  From: Kenia Harkins  Sent: 11/13/2017  11:31 AM  To: KEI Bermeo Nurse Msg Pool  Subject: Elevated BP                                      Your patient was noted to have an elevated blood pressure in our clinic today.  As a specialty department, we hope to have a positive impact on your care management results by notifying you of this instance. Any change in treatment would be noted in the Endocrine provider's note. Thank you for the referral to [facility] Endocr[facility]ology.         Health Care Proxy (HCP)

## 2020-07-22 NOTE — DISCHARGE NOTE PROVIDER - NSDCFUADDINST_GEN_ALL_CORE_FT
Activities/Restrictions:  1.	Do not – drive, lift, pull or push anything over 10 pounds for 4 weeks.  2.	Shower every night and carefully wash wound / groin, pat dry.  Cover if wound is draining with dry sterile dressing. No baths or swimming for 2 weeks.   3.	Apply support stockings /ace wraps to legs as soon as you get out of bed in the morning, remove in evening.   4.	Do progressive walking exercises every day, gradually increasing to 10 to 20 minutes/day, five days a week and incentive spirometer 10 times every hour while awake.  5.	DO NOT DRIVE OR CONSUME ALCOHOL WHILE TAKING PAIN MEDICATION.  Contact your Physician promptly if:  1.	 Signs of wound / groin infection, such as increasing redness, swelling, pain or drainage from incision / groin puncture sites.  2.	Progressive shortness of breath or increasing difficulty with breathing when lying down.  3.	Excessive nausea, vomiting, diarrhea or coughing.  4.	Increase swelling of legs that does not resolve with leg elevation.  5.	Chest pain that spreads to arms, jaw or back or sudden development of numbness, weakness, difficulty speaking or facial droop – Call 911.  6.	Diabetics who are unable to keep finger stick glucose under 150 for three consecutive readings.  Instructions:  1.	 Keep a daily log for Temperature, pulse, blood pressure, and weight twice a day and Glucose if diabetic with each meal. Call office for Temp > 101, pulse greater than 110 or less than 55, BP first # greater than 160 or less than 100, 3 pound weight gain in 1 day or 5 pounds in 3 days.

## 2020-07-22 NOTE — DISCHARGE NOTE PROVIDER - NSDCFUADDAPPT_GEN_ALL_CORE_FT
You have an Appointment at [facility] on July at You have an Appointment at [facility] on July 31, 2020 at 2:00pm.

## 2020-07-22 NOTE — DISCHARGE NOTE PROVIDER - CARE PROVIDER_API CALL
To Ramirez  [id number]  [address] [place] [address]  Phone: [phone]  Fax: [phone]  Follow Up Time: 1 month    [facility],   [address] [place] [address], [address]  [place] [address]  Phone: [phone]  Fax: (   )    -  Follow Up Time:     Dario Johnson)  Cardiovascular Disease; Internal Medicine  [address] [place] [address]  Phone: [phone]  Fax: [phone]  Follow Up Time: 1 week To Ramirez  [id number]  [address] [place] [address]  Phone: [phone]  Fax: [phone]  Follow Up Time: 1 month    Dario Johnson)  Cardiovascular Disease; Internal Medicine  [address] [place] [address]  Phone: [phone]  Fax: [phone]  Follow Up Time: 1 week    [facility],   [address] [place] [address], [address]  [place] [address]  Phone: [phone]  Fax: (   )    -  Scheduled Appointment: 07/31/2020 02:00 PM    Wesley Mendoza  CARDIAC ELECTROPHYSIOLOGY  [address] [place] [address]  Phone: [phone]  Fax: [phone]  Scheduled Appointment: 09/09/2020 10:30 AM

## 2020-07-22 NOTE — H&P ADULT - NSICDXPASTMEDICALHX_GEN_ALL_CORE_FT
PAST MEDICAL HISTORY:  Aortic valve stenosis, etiology of cardiac valve disease unspecified     Atherosclerotic heart disease     Atrial fibrillation/flutter     Constipation, unspecified constipation type     GERD without esophagitis     Hypertension, unspecified type     Lupus (systemic lupus erythematosus)     Malignant neoplasm of lower-outer quadrant of left breast of female, estrogen receptor positive     Rectal prolapse     Sjogren's syndrome, with unspecified organ involvement     Trigeminal neuralgia     Vitamin D deficiency

## 2020-07-22 NOTE — DISCHARGE NOTE PROVIDER - NSDCCPCAREPLAN_GEN_ALL_CORE_FT
PRINCIPAL DISCHARGE DIAGNOSIS  Diagnosis: Aortic valve stenosis, etiology of cardiac valve disease unspecified  Assessment and Plan of Treatment: Aortic valve stenosis, etiology of cardiac valve disease unspecified

## 2020-07-22 NOTE — DISCHARGE NOTE PROVIDER - NSDCACTIVITY_GEN_ALL_CORE
No heavy lifting/straining/Walking - Outdoors allowed/Do not make important decisions/Showering allowed/Walking - Indoors allowed/Do not drive or operate machinery/Stairs allowed

## 2020-07-22 NOTE — H&P ADULT - NSHPSOCIALHISTORY_GEN_ALL_CORE
Current smoker, smokes a pack a day, Social alcohol use, No illicit drug abuse  , heterosexual  domicilied  retired

## 2020-07-22 NOTE — DISCHARGE NOTE PROVIDER - NSDCFUSCHEDAPPT_GEN_ALL_CORE_FT
CLAYTON VELAZQUEZ ; 07/31/2020 ; [facility] [address] [facility]  CAROLINE CLAYTON ; 09/09/2020 ; [facility] [address] [facility] Morenita VELAZQUEZ CLAYTON ; 09/11/2020 ; [facility] [address] [facility]  CHELSEANATALIA CLAYTON ; 09/11/2020 ; [facility] [address] [facility] CLAYTON VELAZQUEZ ; 07/31/2020 ; [facility] [address] [facility]  CAROLINE CLAYTON ; 09/09/2020 ; [facility] [address] [facility] Morenita VELAZQUEZ CLAYTON ; 09/11/2020 ; [facility] [address] [facility]  CHELSEANATALIA CLAYTON ; 09/11/2020 ; [facility] [address] [facility]

## 2020-07-22 NOTE — H&P ADULT - NSHPLABSRESULTS_GEN_ALL_CORE
Comprehensive Metabolic Panel (07.14.20 @ 06:14)    Sodium, Serum: 134 mmol/L    Potassium, Serum: 3.5 mmol/L    Chloride, Serum: 94 mmol/L    Carbon Dioxide, Serum: 25 mmol/L    Anion Gap, Serum: 15 mmol/L    Blood Urea Nitrogen, Serum: 18 mg/dL    Creatinine, Serum: 0.8 mg/dL    Glucose, Serum: 110 mg/dL    Calcium, Total Serum: 9.4 mg/dL    Protein Total, Serum: 7.0 g/dL    Albumin, Serum: 4.3 g/dL    Bilirubin Total, Serum: 0.6 mg/dL    Alkaline Phosphatase, Serum: 72 U/L    Aspartate Aminotransferase (AST/SGOT): 16 U/L    Alanine Aminotransferase (ALT/SGPT): 12 U/L     Complete Blood Count + Automated Diff (07.11.20 @ 13:51)    WBC Count: 8.37 K/uL    RBC Count: 4.56 M/uL    Hemoglobin: 12.1 g/dL    Hematocrit: 38.3 %    Mean Cell Volume: 84.0 fL    Mean Cell Hemoglobin: 26.5 pg    Mean Cell Hemoglobin Conc: 31.6 g/dL    Red Cell Distrib Width: 14.7 %    Platelet Count - Automated: 322 K/uL    Auto Neutrophil #: 6.29 K/uL    Auto Lymphocyte #: 1.35 K/uL    Auto Monocyte #: 0.61 K/uL    Auto Eosinophil #: 0.03 K/uL    Auto Basophil #: 0.07 K/uL    Auto Neutrophil %: 75.2: Differential percentages must be correlated with absolute numbers for  clinical significance. %    Auto Lymphocyte %: 16.1 %    Auto Monocyte %: 7.3 %    Auto Eosinophil %: 0.4 %    Auto Basophil %: 0.8 %    Auto Immature Granulocyte %: 0.2 %    Nucleated RBC: 0 /100 WBCs  < from: Transthoracic Echocardiogram (07.12.20 @ 12:06) >     1. LV Ejection Fraction by Samson's Method with a biplane EF of 67 %.   2. Mildly increased LV wall thickness.   3. Spectral Doppler shows impaired relaxation pattern of left ventricular myocardial filling (Grade I diastolic dysfunction).   4. Mild mitral annular calcification.   5. Mild mitral valve regurgitation.   6. Mild thickening and calcification of the anterior and posterior mitral valve leaflets.   7. Mild aortic regurgitation.   8. Peak transaortic gradient equals 50.8 mmHg, mean transaortic gradient equals 28.9 mmHg, the calculated aortic valve area equals 1.13 cm² by the continuity equation consistent with moderate aortic stenosis.   9. There is severe aortic rootcalcification.      < end of copied text >

## 2020-07-22 NOTE — DISCHARGE NOTE PROVIDER - PROVIDER TOKENS
PROVIDER:[TOKEN:[[id number]],FOLLOWUP:[1 month]],FREE:[LAST:[[facility]],PHONE:[phone]],FAX:[(   )    -],ADDRESS:[[address] [place] [address]]],PROVIDER:[TOKEN:[[id number]],FOLLOWUP:[1 week]] PROVIDER:[TOKEN:[[id number]],FOLLOWUP:[1 month]],PROVIDER:[TOKEN:[[id number]],FOLLOWUP:[1 week]],FREE:[LAST:[[facility]],PHONE:[phone]:[(   )    -],ADDRESS:[[address] [place] [address], [place] [address]],SCHEDULEDAPPT:[07/31/2020],SCHEDULEDAPPTTIME:[02:00 PM]],PROVIDER:[TOKEN:[[id number]],SCHEDULEDAPPT:[09/09/2020],SCHEDULEDAPPTTIME:[10:30 AM]]

## 2020-07-22 NOTE — H&P ADULT - NSHPPHYSICALEXAM_GEN_ALL_CORE
General-awake and alert in NAD, over weight female well groomed  HEENT-PERRLA, EOMI, no nasal or ear drainage, oral mucosa moist no lesions, no JVD or goiter or nodes, good ROM  Chest-bilateral breath sounds, decreased bs at bases, bilat mast scars  \Coronary- regular rate and rhythme, + significant murmur appreciated  Abd- soft nontender, nondistended  Extremities: no significant edemaedema, full ROM,   Neuro: Alert and oriented, sensation and muscle tone intact all extremities  VAsc: 2+ pulses throughout

## 2020-07-22 NOTE — DISCHARGE NOTE PROVIDER - NSDCCPTREATMENT_GEN_ALL_CORE_FT
PRINCIPAL PROCEDURE  Procedure: TAVR, carotid artery approach  Findings and Treatment: Utilizing a 23mm johnson  Pericardial Tissue Heart VAlve, Model # 9600TFX, Serial # [id number]

## 2020-07-22 NOTE — DISCHARGE NOTE PROVIDER - CARE PROVIDERS DIRECT ADDRESSES
,DirectAddress_Unknown,DirectAddress_Unknown,[email or web address] ,DirectAddress_Unknown,[email or web address],DirectAddress_Unknown,[email or web address]

## 2020-07-22 NOTE — H&P ADULT - NSICDXPASTSURGICALHX_GEN_ALL_CORE_FT
PAST SURGICAL HISTORY:  H/O bilateral mastectomy     History of appendectomy     History of partial colectomy     History of percutaneous coronary intervention

## 2020-07-22 NOTE — DISCHARGE NOTE PROVIDER - NSDCMRMEDTOKEN_GEN_ALL_CORE_FT
ALPRAZolam 0.5 mg oral tablet: 2 tab(s) orally once a day (at bedtime), As Needed  apixaban 5 mg oral tablet: 1 tab(s) orally every 12 hours  atorvastatin 80 mg oral tablet: 1 tab(s) orally once a day (at bedtime)  cevimeline 30 mg oral capsule: 3 cap(s) orally once a day  clopidogrel 75 mg oral tablet: 1 tab(s) orally once a day  dilTIAZem 240 mg/24 hours oral capsule, extended release: 1 cap(s) orally once a day  docusate sodium 100 mg oral capsule: 1 cap(s) orally 2 times a day  Fioricet oral capsule: 2 cap(s) orally once a day  furosemide 20 mg oral tablet: 2 tab(s) orally once a day   hydroxychloroquine 200 mg oral tablet: 1 tab(s) orally 2 times a day  hydrOXYzine hydrochloride 50 mg oral tablet: 1 tab(s) orally every 6 hours, As Needed -Anxiety - for anxiety   montelukast 10 mg oral tablet: 1 tab(s) orally once a day  ocular lubricant ophthalmic solution: 1 drop(s) to each affected eye every 2 hours, As needed, Dry Eyes  omeprazole 40 mg oral delayed release capsule: 1 cap(s) orally once a day  pregabalin 25 mg oral capsule: 1 cap(s) orally once a day MDD:one daily  senna oral tablet: 2 tab(s) orally once a day (at bedtime)  Spiriva Respimat 1.25 mcg/inh inhalation aerosol: 2 puff(s) inhaled once a day   Symbicort 160 mcg-4.5 mcg/inh inhalation aerosol: 2 puff(s) inhaled 2 times a day   traZODone 50 mg oral tablet: 1 tab(s) orally once a day (at bedtime)  Ventolin HFA 90 mcg/inh inhalation aerosol: 2 puff(s) inhaled prn MDD:maximum 6 puffs a day  Zoloft 50 mg oral tablet: 1 tab(s) orally once a day ALPRAZolam 0.5 mg oral tablet: 2 tab(s) orally once a day (at bedtime), As Needed  apixaban 5 mg oral tablet: 1 tab(s) orally every 12 hours  aspirin 81 mg oral delayed release tablet: 1 tab(s) orally once a day  atorvastatin 80 mg oral tablet: 1 tab(s) orally once a day (at bedtime)  clopidogrel 75 mg oral tablet: 1 tab(s) orally once a day  dilTIAZem 240 mg/24 hours oral capsule, extended release: 1 cap(s) orally once a day  docusate sodium 100 mg oral capsule: 1 cap(s) orally 2 times a day  Fioricet oral capsule: 2 cap(s) orally once a day  furosemide 20 mg oral tablet: 2 tab(s) orally once a day   montelukast 10 mg oral tablet: 1 tab(s) orally once a day  ocular lubricant ophthalmic solution: 1 drop(s) to each affected eye every 2 hours, As needed, Dry Eyes  omeprazole 40 mg oral delayed release capsule: 1 cap(s) orally once a day  pregabalin 25 mg oral capsule: 1 cap(s) orally once a day MDD:one daily  senna oral tablet: 2 tab(s) orally once a day (at bedtime)  Spiriva Respimat 1.25 mcg/inh inhalation aerosol: 2 puff(s) inhaled once a day   Symbicort 160 mcg-4.5 mcg/inh inhalation aerosol: 2 puff(s) inhaled 2 times a day   traZODone 50 mg oral tablet: 1 tab(s) orally once a day (at bedtime)  Ventolin HFA 90 mcg/inh inhalation aerosol: 2 puff(s) inhaled prn MDD:maximum 6 puffs a day  Zoloft 50 mg oral tablet: 1 tab(s) orally once a day

## 2020-07-22 NOTE — DISCHARGE NOTE PROVIDER - HOSPITAL COURSE
74 yo female, Known to CTS,  with a pmh of aortic valve stenosis, CAD stent x4 6/15, HTN, GERD, SLE, trigeminal neuralgia, breast CA s/p rodrick mastectomy, afib/flutter and Sjogren's syndrome presents today for elective TAVR. pt states her SOB has been gradually worsening over the past 4 weeks following the stent placement and was recently admitted to hospital about 1 week ago with exacerbation of heart failure.  On 7/22/20 a 23 MM pericardial heart valve was inserted via right carotid approach. Her PO course was 76 yo female, Known to CTS,  with a pmh of aortic valve stenosis, CAD stent x4 6/15, HTN, GERD, SLE, trigeminal neuralgia, breast CA s/p rodrick mastectomy, afib/flutter and Sjogren's syndrome presents today for elective TAVR. pt states her SOB has been gradually worsening over the past 4 weeks following the stent placement and was recently admitted to hospital about 1 week ago with exacerbation of heart failure.  On 7/22/20 a 23 MM pericardial heart valve was inserted via right carotid approach. Post-operatively, patient had an uneventful hospital course. Patient had post-op echo which showed normal aortic valve prosthesis with normal function. Patient was discharged home in stable condition on POD#2 with instructions to [facility] on 7/31/2020 @ 2:00pm.

## 2020-07-22 NOTE — BRIEF OPERATIVE NOTE - NSICDXBRIEFPROCEDURE_GEN_ALL_CORE_FT
PROCEDURES:  TAVR, carotid artery approach 22-Jul-2020 11:21:48 Utilizing a 23mm johnson  Pericardial Tissue Heart VAlve, Model # 9600TFX, Serial # [id number] Quinten Jimenez

## 2020-07-22 NOTE — H&P ADULT - ASSESSMENT
76 yo female with multiple medical comorbidities here for elective TAVR due to progressive HF symptomatology

## 2020-07-22 NOTE — H&P ADULT - NSICDXFAMILYHX_GEN_ALL_CORE_FT
FAMILY HISTORY:  Mother  Still living? Unknown  Family history of coronary artery disease, Age at diagnosis: Age Unknown    Sibling  Still living? Unknown  Family history of coronary artery disease, Age at diagnosis: Age Unknown

## 2020-07-23 LAB
ALBUMIN SERPL ELPH-MCNC: 3.4 G/DL — LOW (ref 3.5–5.2)
ALP SERPL-CCNC: 58 U/L — SIGNIFICANT CHANGE UP (ref 30–115)
ALT FLD-CCNC: 11 U/L — SIGNIFICANT CHANGE UP (ref 0–41)
ANION GAP SERPL CALC-SCNC: 11 MMOL/L — SIGNIFICANT CHANGE UP (ref 7–14)
APTT BLD: 28.8 SEC — SIGNIFICANT CHANGE UP (ref 27–39.2)
AST SERPL-CCNC: 24 U/L — SIGNIFICANT CHANGE UP (ref 0–41)
BASE EXCESS BLDA CALC-SCNC: 2.2 MMOL/L — HIGH (ref -2–2)
BASOPHILS # BLD AUTO: 0.01 K/UL — SIGNIFICANT CHANGE UP (ref 0–0.2)
BASOPHILS NFR BLD AUTO: 0.1 % — SIGNIFICANT CHANGE UP (ref 0–1)
BILIRUB SERPL-MCNC: 0.3 MG/DL — SIGNIFICANT CHANGE UP (ref 0.2–1.2)
BUN SERPL-MCNC: 10 MG/DL — SIGNIFICANT CHANGE UP (ref 10–20)
CALCIUM SERPL-MCNC: 7.8 MG/DL — LOW (ref 8.5–10.1)
CHLORIDE SERPL-SCNC: 103 MMOL/L — SIGNIFICANT CHANGE UP (ref 98–110)
CO2 SERPL-SCNC: 24 MMOL/L — SIGNIFICANT CHANGE UP (ref 17–32)
CREAT SERPL-MCNC: 0.6 MG/DL — LOW (ref 0.7–1.5)
EOSINOPHIL # BLD AUTO: 0 K/UL — SIGNIFICANT CHANGE UP (ref 0–0.7)
EOSINOPHIL NFR BLD AUTO: 0 % — SIGNIFICANT CHANGE UP (ref 0–8)
GAS PNL BLDA: SIGNIFICANT CHANGE UP
GAS PNL BLDA: SIGNIFICANT CHANGE UP
GLUCOSE BLDC GLUCOMTR-MCNC: 128 MG/DL — HIGH (ref 70–99)
GLUCOSE SERPL-MCNC: 113 MG/DL — HIGH (ref 70–99)
HCO3 BLDA-SCNC: 26 MMOL/L — SIGNIFICANT CHANGE UP (ref 23–27)
HCT VFR BLD CALC: 27.6 % — LOW (ref 37–47)
HGB BLD-MCNC: 8.5 G/DL — LOW (ref 12–16)
HOROWITZ INDEX BLDA+IHG-RTO: SIGNIFICANT CHANGE UP
IMM GRANULOCYTES NFR BLD AUTO: 0.5 % — HIGH (ref 0.1–0.3)
INR BLD: 1.05 RATIO — SIGNIFICANT CHANGE UP (ref 0.65–1.3)
LYMPHOCYTES # BLD AUTO: 0.84 K/UL — LOW (ref 1.2–3.4)
LYMPHOCYTES # BLD AUTO: 10.1 % — LOW (ref 20.5–51.1)
MAGNESIUM SERPL-MCNC: 2.3 MG/DL — SIGNIFICANT CHANGE UP (ref 1.8–2.4)
MCHC RBC-ENTMCNC: 26.5 PG — LOW (ref 27–31)
MCHC RBC-ENTMCNC: 30.8 G/DL — LOW (ref 32–37)
MCV RBC AUTO: 86 FL — SIGNIFICANT CHANGE UP (ref 81–99)
MONOCYTES # BLD AUTO: 0.8 K/UL — HIGH (ref 0.1–0.6)
MONOCYTES NFR BLD AUTO: 9.6 % — HIGH (ref 1.7–9.3)
NEUTROPHILS # BLD AUTO: 6.62 K/UL — HIGH (ref 1.4–6.5)
NEUTROPHILS NFR BLD AUTO: 79.7 % — HIGH (ref 42.2–75.2)
NRBC # BLD: 0 /100 WBCS — SIGNIFICANT CHANGE UP (ref 0–0)
PCO2 BLDA: 39 MMHG — SIGNIFICANT CHANGE UP (ref 38–42)
PH BLDA: 7.44 — HIGH (ref 7.38–7.42)
PLATELET # BLD AUTO: 195 K/UL — SIGNIFICANT CHANGE UP (ref 130–400)
PO2 BLDA: 72 MMHG — LOW (ref 78–95)
POTASSIUM SERPL-MCNC: 4.4 MMOL/L — SIGNIFICANT CHANGE UP (ref 3.5–5)
POTASSIUM SERPL-SCNC: 4.4 MMOL/L — SIGNIFICANT CHANGE UP (ref 3.5–5)
PROT SERPL-MCNC: 5.6 G/DL — LOW (ref 6–8)
PROTHROM AB SERPL-ACNC: 12.1 SEC — SIGNIFICANT CHANGE UP (ref 9.95–12.87)
RBC # BLD: 3.21 M/UL — LOW (ref 4.2–5.4)
RBC # FLD: 15.5 % — HIGH (ref 11.5–14.5)
SAO2 % BLDA: 96 % — SIGNIFICANT CHANGE UP (ref 94–98)
SARS-COV-2 IGG SERPL QL IA: NEGATIVE — SIGNIFICANT CHANGE UP
SARS-COV-2 IGM SERPL IA-ACNC: <0.1 INDEX — SIGNIFICANT CHANGE UP
SODIUM SERPL-SCNC: 138 MMOL/L — SIGNIFICANT CHANGE UP (ref 135–146)
WBC # BLD: 8.31 K/UL — SIGNIFICANT CHANGE UP (ref 4.8–10.8)
WBC # FLD AUTO: 8.31 K/UL — SIGNIFICANT CHANGE UP (ref 4.8–10.8)

## 2020-07-23 PROCEDURE — 93010 ELECTROCARDIOGRAM REPORT: CPT

## 2020-07-23 PROCEDURE — 99232 SBSQ HOSP IP/OBS MODERATE 35: CPT | Mod: 25

## 2020-07-23 PROCEDURE — 99233 SBSQ HOSP IP/OBS HIGH 50: CPT

## 2020-07-23 PROCEDURE — 93306 TTE W/DOPPLER COMPLETE: CPT | Mod: 26

## 2020-07-23 PROCEDURE — 71045 X-RAY EXAM CHEST 1 VIEW: CPT | Mod: 26

## 2020-07-23 RX ORDER — FUROSEMIDE 40 MG
40 TABLET ORAL DAILY
Refills: 0 | Status: DISCONTINUED | OUTPATIENT
Start: 2020-07-23 | End: 2020-07-24

## 2020-07-23 RX ORDER — FAMOTIDINE 10 MG/ML
20 INJECTION INTRAVENOUS
Refills: 0 | Status: DISCONTINUED | OUTPATIENT
Start: 2020-07-23 | End: 2020-07-24

## 2020-07-23 RX ORDER — SERTRALINE 25 MG/1
50 TABLET, FILM COATED ORAL DAILY
Refills: 0 | Status: DISCONTINUED | OUTPATIENT
Start: 2020-07-23 | End: 2020-07-24

## 2020-07-23 RX ORDER — FUROSEMIDE 40 MG
40 TABLET ORAL ONCE
Refills: 0 | Status: COMPLETED | OUTPATIENT
Start: 2020-07-23 | End: 2020-07-23

## 2020-07-23 RX ORDER — POTASSIUM CHLORIDE 20 MEQ
20 PACKET (EA) ORAL ONCE
Refills: 0 | Status: COMPLETED | OUTPATIENT
Start: 2020-07-23 | End: 2020-07-23

## 2020-07-23 RX ORDER — METOPROLOL TARTRATE 50 MG
25 TABLET ORAL THREE TIMES A DAY
Refills: 0 | Status: DISCONTINUED | OUTPATIENT
Start: 2020-07-23 | End: 2020-07-24

## 2020-07-23 RX ORDER — APIXABAN 2.5 MG/1
5 TABLET, FILM COATED ORAL
Refills: 0 | Status: DISCONTINUED | OUTPATIENT
Start: 2020-07-23 | End: 2020-07-24

## 2020-07-23 RX ADMIN — SERTRALINE 50 MILLIGRAM(S): 25 TABLET, FILM COATED ORAL at 12:49

## 2020-07-23 RX ADMIN — POLYETHYLENE GLYCOL 3350 17 GRAM(S): 17 POWDER, FOR SOLUTION ORAL at 12:48

## 2020-07-23 RX ADMIN — Medication 25 MILLIGRAM(S): at 14:15

## 2020-07-23 RX ADMIN — DICLOFENAC SODIUM 50 MILLIGRAM(S): 75 TABLET, DELAYED RELEASE ORAL at 00:32

## 2020-07-23 RX ADMIN — MUPIROCIN 1 APPLICATION(S): 20 OINTMENT TOPICAL at 05:22

## 2020-07-23 RX ADMIN — APIXABAN 5 MILLIGRAM(S): 2.5 TABLET, FILM COATED ORAL at 18:20

## 2020-07-23 RX ADMIN — ATORVASTATIN CALCIUM 80 MILLIGRAM(S): 80 TABLET, FILM COATED ORAL at 22:04

## 2020-07-23 RX ADMIN — DICLOFENAC SODIUM 50 MILLIGRAM(S): 75 TABLET, DELAYED RELEASE ORAL at 06:21

## 2020-07-23 RX ADMIN — Medication 20 MILLIEQUIVALENT(S): at 06:51

## 2020-07-23 RX ADMIN — Medication 81 MILLIGRAM(S): at 12:49

## 2020-07-23 RX ADMIN — Medication 600 MILLIGRAM(S): at 18:20

## 2020-07-23 RX ADMIN — MIRTAZAPINE 30 MILLIGRAM(S): 45 TABLET, ORALLY DISINTEGRATING ORAL at 12:48

## 2020-07-23 RX ADMIN — MONTELUKAST 10 MILLIGRAM(S): 4 TABLET, CHEWABLE ORAL at 12:48

## 2020-07-23 RX ADMIN — Medication 600 MILLIGRAM(S): at 05:21

## 2020-07-23 RX ADMIN — FAMOTIDINE 20 MILLIGRAM(S): 10 INJECTION INTRAVENOUS at 05:21

## 2020-07-23 RX ADMIN — Medication 50 MILLIGRAM(S): at 12:49

## 2020-07-23 RX ADMIN — DICLOFENAC SODIUM 50 MILLIGRAM(S): 75 TABLET, DELAYED RELEASE ORAL at 18:20

## 2020-07-23 RX ADMIN — Medication 100 MILLIGRAM(S): at 05:20

## 2020-07-23 RX ADMIN — CHLORHEXIDINE GLUCONATE 1 APPLICATION(S): 213 SOLUTION TOPICAL at 05:22

## 2020-07-23 RX ADMIN — Medication 150 MILLIGRAM(S): at 06:51

## 2020-07-23 RX ADMIN — Medication 100 GRAM(S): at 18:20

## 2020-07-23 RX ADMIN — Medication 25 MILLIGRAM(S): at 22:04

## 2020-07-23 RX ADMIN — Medication 100 GRAM(S): at 05:24

## 2020-07-23 RX ADMIN — DICLOFENAC SODIUM 50 MILLIGRAM(S): 75 TABLET, DELAYED RELEASE ORAL at 05:21

## 2020-07-23 RX ADMIN — Medication 25 MILLIGRAM(S): at 08:42

## 2020-07-23 RX ADMIN — CLOPIDOGREL BISULFATE 75 MILLIGRAM(S): 75 TABLET, FILM COATED ORAL at 12:49

## 2020-07-23 RX ADMIN — APIXABAN 5 MILLIGRAM(S): 2.5 TABLET, FILM COATED ORAL at 10:04

## 2020-07-23 RX ADMIN — Medication 40 MILLIGRAM(S): at 06:50

## 2020-07-23 RX ADMIN — MUPIROCIN 1 APPLICATION(S): 20 OINTMENT TOPICAL at 19:01

## 2020-07-23 RX ADMIN — Medication 150 MILLIGRAM(S): at 18:19

## 2020-07-23 RX ADMIN — FAMOTIDINE 20 MILLIGRAM(S): 10 INJECTION INTRAVENOUS at 18:20

## 2020-07-23 NOTE — PROGRESS NOTE ADULT - SUBJECTIVE AND OBJECTIVE BOX
CTU Attending Progress Daily Note     23 Jul 2020 10:12  Admited 07-22-20, Hospital Day 1d  POD# - 1    HPI:  74 yo female, Known to CTS,  with a pmh of aortic valve stenosis, CAD stent x4 6/15, HTN, GERD, SLE, trigeminal neuralgia, breast CA s/p rodrick mastectomy, and Sjogren's syndrome presents today for elective TAVR. pt states her SOB has been gradually worsening over the past 4 weeks following the stent placement and was admitted to hospital about 1 week ago with exacerbation of heart failure. During admission had atrial fib/ flutter also. (22 Jul 2020 06:39)    Home Medications:  ALPRAZolam 0.5 mg oral tablet: 2 tab(s) orally once a day (at bedtime), As Needed (22 Jul 2020 11:34)  docusate sodium 100 mg oral capsule: 1 cap(s) orally 2 times a day (22 Jul 2020 11:34)  montelukast 10 mg oral tablet: 1 tab(s) orally once a day (22 Jul 2020 11:34)  ocular lubricant ophthalmic solution: 1 drop(s) to each affected eye every 2 hours, As needed, Dry Eyes (22 Jul 2020 11:34)  omeprazole 40 mg oral delayed release capsule: 1 cap(s) orally once a day (22 Jul 2020 11:34)  senna oral tablet: 2 tab(s) orally once a day (at bedtime) (22 Jul 2020 11:34)    FAMILY HISTORY:  Family history of coronary artery disease (Mother, Sibling)    PAST MEDICAL & SURGICAL HISTORY:  Atherosclerotic heart disease  Atrial fibrillation/flutter  Aortic valve stenosis, etiology of cardiac valve disease unspecified  Lupus (systemic lupus erythematosus)  Trigeminal neuralgia  Sjogren's syndrome, with unspecified organ involvement  GERD without esophagitis  Vitamin D deficiency  Constipation, unspecified constipation type  Rectal prolapse  Hypertension, unspecified type  Malignant neoplasm of lower-outer quadrant of left breast of female, estrogen receptor positive  History of percutaneous coronary intervention  History of appendectomy  H/O bilateral mastectomy  History of partial colectomy    Interval event for past 24 hr:  CLAYTON VELAZQUEZ  75y had no event.   Current Complains:  CLAYTON VELAZQUEZ has no new complains  Allergies    No Known Allergies    Intolerances      OBJECTIVE:  Vitals last 24 hrs  T(C): 36.8 (07-23-20 @ 07:00), Max: 37.2 (07-22-20 @ 14:00)  T(F): 98.2 (07-23-20 @ 07:00), Max: 99 (07-22-20 @ 14:00)  HR: 72 (07-23-20 @ 10:00) (66 - 95)  BP: 122/86 (07-23-20 @ 09:00) (122/86 - 122/86)  ABP: 109/47 (07-23-20 @ 10:00) (100/48 - 150/58)  ABP(mean): 70 (07-23-20 @ 10:00) (65 - 93)  RR: 14 (07-23-20 @ 10:00) (12 - 35)  SpO2: 99% (07-23-20 @ 10:00) (93% - 100%)      07-22-20 @ 07:01  -  07-23-20 @ 07:00  --------------------------------------------------------  IN:    IV PiggyBack: 200 mL    nitroglycerin  Infusion: 181 mL    Oral Fluid: 360 mL    sodium chloride 0.9%.: 160 mL  Total IN: 901 mL    OUT:    Indwelling Catheter - Urethral: 2896 mL    Voided: 75 mL  Total OUT: 2971 mL    Total NET: -2070 mL          CAPILLARY BLOOD GLUCOSE      POCT Blood Glucose.: 128 mg/dL (23 Jul 2020 02:52)    LABS:  ABG - ( 23 Jul 2020 04:00 )  pH, Arterial: 7.43  pH, Blood: x     /  pCO2: 40    /  pO2: 59    / HCO3: 26    / Base Excess: 2.0   /  SaO2: 92              Blood Gas Arterial, Lactate: 0.4 mmoL/L <L> (07-23-20 @ 04:00)  Blood Gas Arterial, Lactate: 0.5 mmoL/L (07-22-20 @ 12:24)                          8.5    8.31  )-----------( 195      ( 23 Jul 2020 03:40 )             27.6     Hemoglobin: 8.5 g/dL (07-23 @ 03:40)  Hemoglobin: 8.6 g/dL (07-22 @ 13:30)    07-23    138  |  103  |  10  ----------------------------<  113<H>  4.4   |  24  |  0.6<L>    Ca    7.8<L>      23 Jul 2020 03:40  Mg     2.3     07-23    TPro  5.6<L>  /  Alb  3.4<L>  /  TBili  0.3  /  DBili  x   /  AST  24  /  ALT  11  /  AlkPhos  58  07-23    Creatinine, Serum: 0.6 mg/dL (07-23 @ 03:40)  Creatinine, Serum: 0.6 mg/dL (07-22 @ 14:00)    PT/INR - ( 23 Jul 2020 03:40 )   PT: 12.10 sec;   INR: 1.05 ratio     PTT - ( 23 Jul 2020 03:40 )  PTT:28.8 sec      HOSPITAL MEDICATIONS:  MEDICATIONS  (STANDING):  ALPRAZolam 1 milliGRAM(s) Oral at bedtime  apixaban 5 milliGRAM(s) Oral two times a day  aspirin enteric coated 81 milliGRAM(s) Oral daily  atorvastatin 80 milliGRAM(s) Oral at bedtime  budesonide 160 MICROgram(s)/formoterol 4.5 MICROgram(s) Inhaler 2 Puff(s) Inhalation two times a day  chlorhexidine 4% Liquid 1 Application(s) Topical <User Schedule>  clopidogrel Tablet 75 milliGRAM(s) Oral daily  dextrose 50% Injectable 50 milliLiter(s) IV Push every 15 minutes  dextrose 50% Injectable 25 milliLiter(s) IV Push every 15 minutes  diclofenac 50 milliGRAM(s) Oral two times a day  famotidine    Tablet 20 milliGRAM(s) Oral two times a day  furosemide    Tablet 40 milliGRAM(s) Oral daily  guaiFENesin  milliGRAM(s) Oral every 12 hours  magnesium sulfate  IVPB 1 Gram(s) IV Intermittent every 12 hours  metoprolol tartrate 25 milliGRAM(s) Oral three times a day  mirtazapine 30 milliGRAM(s) Oral daily  montelukast 10 milliGRAM(s) Oral daily  mupirocin 2% Nasal 1 Application(s) Both Nostrils every 12 hours  nitroglycerin  Infusion 5 MICROgram(s)/Min (1.5 mL/Hr) IV Continuous <Continuous>  polyethylene glycol 3350 17 Gram(s) Oral daily  pregabalin 150 milliGRAM(s) Oral every 12 hours  sertraline 50 milliGRAM(s) Oral daily  sodium chloride 0.9%. 1000 milliLiter(s) (20 mL/Hr) IV Continuous <Continuous>  tiotropium 18 MICROgram(s) Capsule 1 Capsule(s) Inhalation daily  traZODone 50 milliGRAM(s) Oral daily    MEDICATIONS  (PRN):  ondansetron  IVPB 4 milliGRAM(s) IV Intermittent once PRN Nausea and/or Vomiting  oxyCODONE    IR 5 milliGRAM(s) Oral every 4 hours PRN Moderate Pain (4 - 6)      REVIEW OF SYSTEMS:  CONSTITUTIONAL: [X] all negative; [ ] weakness, [ ] fevers, [ ] chills  EYES/ENT: [X] all negative; [ ] visual changes, [ ] vertigo, [ ] throat pain, [ ] eye pain  NECK: [X] all negative; [ ] pain, [ ] stiffness  RESPIRATORY: [x ] all negative, [ ] cough, [ ] wheezing, [ ] hemoptysis, [ ] shortness of breath, [  ] chest pain  CARDIOVASCULAR: [x ] all negative; [ ] anginal chest pain, [ ] palpitations, [ ] orthopnea  GASTROINTESTINAL: [X] all negative; [ ]abdominal pain, [ ] nausea, [ ] vomiting, [ ] hematemesis, [ ] diarrhea, [ ] constipation, [ ] melena, [ ] hematochezia.  GENITOURINARY: [X] all negative; [ ] dysuria, [ ] frequency, [ ] hematuria  NEUROLOGICAL: [X] all negative; [ ] numbness, [ ] weakness, [ ] paresthesias  MUSCULOSKELETAL: [X] all negative, [ ] joint pain, [ ] joint swelling, [ ] joint redness, [ ] bone pain  SKIN: [X] all negative; [ ] itching, [ ] burning, [ ] rashes, [ ] lesions   All other review of systems is negative unless indicated above.    [  ] Unable to assess ROS because     PHYSICAL EXAM:          CONSTITUTIONAL: Well-developed; well-nourished; in no acute distress.   	SKIN: warm, dry, no rashes or lesions  	HEENT: Atraumatic. Normocephalic. PERRL. Moist membranes, no conjunctival injection, sclera clear  	NECK: Supple; non tender.  No JVD. No lymphadenopathy.  	CARD: Normal S1, S2. Rate and Rhythm are regular. No murmurs.  	RESP: Good air entry bilaterally, no wheezes, no rales no rhonchi.  	ABD: Soft, not tender, not distended, no CVA tendernass, no rebound no guarding, bowel sounds present  	EXT: Normal ROM.  No clubbing, no cyanosis, no pedal edema, no calf pain b/l, Peripheral pulses intact.  	LYMPH: No acute cervical adenopathy.  	NEURO: Alert, awake, motor 5/5 R, 5/5 L, sensation intact bilat, CN 2-12 intact,          PSYCH: Cooperative, appropriate. Alert & oriented x 3    RADIOLOGY:  X Reviewed and interpreted by me  CxR from 07-23-20 shows mild congestion, no pneumothorax, no effusion, no cardiomegaly,     ECG:  X Reviewed and interpreted by me - NSR

## 2020-07-23 NOTE — PHYSICAL THERAPY INITIAL EVALUATION ADULT - GENERAL OBSERVATIONS, REHAB EVAL
Chart reviewed. pt seen semi reclined in bed. Case discussed with SUHAIL Posadas. + O2 via venti mask at 15L, + A line, + IV line on. + VSS> Ptwilling to partiicipate in PT eval. Chart reviewed. pt seen semi reclined in bed. Case discussed with SUHAIL Croft. + O2 via venti mask at 15L, + A line, + IV line on. + VSS. + cardiac monitoring on.  Ptwilling to partiicipate in PT eval.

## 2020-07-23 NOTE — PROGRESS NOTE ADULT - SUBJECTIVE AND OBJECTIVE BOX
OPERATIVE PROCEDURE(s):                POD #                       75yFemale  SURGEON(s): ROWDY Cole  SUBJECTIVE ASSESSMENT:   Vital Signs Last 24 Hrs  T(F): 97.9 (23 Jul 2020 04:00), Max: 99 (22 Jul 2020 14:00)  HR: 79 (23 Jul 2020 07:00) (66 - 94)  ABP: 150/58 (23 Jul 2020 07:00) (100/48 - 150/58)  ABP(mean): 93 (23 Jul 2020 07:00)  RR: 17 (23 Jul 2020 07:00) (12 - 35)  SpO2: 96% (23 Jul 2020 07:00) (93% - 100%)      I&O's Detail    22 Jul 2020 07:01  -  23 Jul 2020 07:00  --------------------------------------------------------  IN:    IV PiggyBack: 200 mL    nitroglycerin  Infusion: 181 mL    Oral Fluid: 360 mL    sodium chloride 0.9%.: 160 mL  Total IN: 901 mL    OUT:    Indwelling Catheter - Urethral: 2896 mL    Voided: 75 mL  Total OUT: 2971 mL        Net:   I&O's Detail    22 Jul 2020 07:01  -  23 Jul 2020 07:00  --------------------------------------------------------  Total NET: -2070 mL        CAPILLARY BLOOD GLUCOSE      POCT Blood Glucose.: 128 mg/dL (23 Jul 2020 02:52)    Physical Exam:  General: NAD; A&Ox3/Patient is intubated and sedated  Cardiac: S1/S2, RRR, no murmur, no rubs  Lungs: unlabored respirations, CTA b/l, no wheeze, no rales, no crackles  Abdomen: Soft/NT/ND; positive bowel sounds x 4  Sternum: Intact, no click, incision healing well with no drainage  Incisions: Incisions clean/dry/intact  Extremities: No edema b/l lower extremities; good capillary refill; no cyanosis; palpable 1+ pedal pulses b/l    Central Venous Catheter: Yes[]  No[] , If Yes indication:           Day #  Jimenez Catheter: Yes  [] , No  [] , If yes indication:                      Day #  NGT: Yes [] No [] ,    If Yes Placement:                                     Day #  EPICARDIAL WIRES:  [] YES [] NO                                              Day #  BOWEL MOVEMENT:  [] YES [] NO, If No, Timing since last BM:      Day #  CHEST TUBE(Left/Right):  [] YES [] NO, If yes -  AIR LEAKS:  [] YES [] NO        LABS:                        8.5<L>  8.31  )-----------( 195      ( 23 Jul 2020 03:40 )             27.6<L>                        8.6<L>  10.26 )-----------( 220      ( 22 Jul 2020 13:30 )             28.8<L>    07-23    138  |  103  |  10  ----------------------------<  113<H>  4.4   |  24  |  0.6<L>  07-22    138  |  106  |  8<L>  ----------------------------<  118<H>  4.5   |  23  |  0.6<L>    Ca    7.8<L>      23 Jul 2020 03:40  Mg     2.3     07-23    TPro  5.6<L> [6.0 - 8.0]  /  Alb  3.4<L> [3.5 - 5.2]  /  TBili  0.3 [0.2 - 1.2]  /  DBili  x   /  AST  24 [0 - 41]  /  ALT  11 [0 - 41]  /  AlkPhos  58 [30 - 115]  07-23    PT/INR - ( 23 Jul 2020 03:40 )   PT: ;   INR: 1.05 ratio         PT/INR - ( 22 Jul 2020 13:30 )   PT: ;   INR: 1.07 ratio         PTT - ( 23 Jul 2020 03:40 )  PTT:28.8 sec, PTT - ( 22 Jul 2020 13:30 )  PTT:29.9 sec    ABG - ( 23 Jul 2020 04:00 )  pH: 7.43  /  pCO2: 40    /  pO2: 59    / HCO3: 26    / Base Excess: 2.0   /  SaO2: 92    /  LA: 0.4              RADIOLOGY & ADDITIONAL TESTS:  CXR:  EKG:  MEDICATIONS  (STANDING):  ALPRAZolam 1 milliGRAM(s) Oral at bedtime  aspirin enteric coated 81 milliGRAM(s) Oral daily  atorvastatin 80 milliGRAM(s) Oral at bedtime  budesonide 160 MICROgram(s)/formoterol 4.5 MICROgram(s) Inhaler 2 Puff(s) Inhalation two times a day  chlorhexidine 4% Liquid 1 Application(s) Topical <User Schedule>  clopidogrel Tablet 75 milliGRAM(s) Oral daily  dextrose 50% Injectable 50 milliLiter(s) IV Push every 15 minutes  dextrose 50% Injectable 25 milliLiter(s) IV Push every 15 minutes  diclofenac 50 milliGRAM(s) Oral two times a day  escitalopram 10 milliGRAM(s) Oral daily  famotidine Injectable 20 milliGRAM(s) IV Push every 12 hours  guaiFENesin  milliGRAM(s) Oral every 12 hours  insulin regular Infusion 1 Unit(s)/Hr (1 mL/Hr) IV Continuous <Continuous>  magnesium sulfate  IVPB 1 Gram(s) IV Intermittent every 12 hours  mirtazapine 30 milliGRAM(s) Oral daily  montelukast 10 milliGRAM(s) Oral daily  mupirocin 2% Nasal 1 Application(s) Both Nostrils every 12 hours  nitroglycerin  Infusion 5 MICROgram(s)/Min (1.5 mL/Hr) IV Continuous <Continuous>  norepinephrine Infusion 0.05 MICROgram(s)/kG/Min (7.22 mL/Hr) IV Continuous <Continuous>  polyethylene glycol 3350 17 Gram(s) Oral daily  pregabalin 150 milliGRAM(s) Oral every 12 hours  propofol Infusion 15 MICROgram(s)/kG/Min (6.75 mL/Hr) IV Continuous <Continuous>  sodium chloride 0.9%. 1000 milliLiter(s) (20 mL/Hr) IV Continuous <Continuous>  tiotropium 18 MICROgram(s) Capsule 1 Capsule(s) Inhalation daily  traZODone 50 milliGRAM(s) Oral daily    MEDICATIONS  (PRN):  ondansetron  IVPB 4 milliGRAM(s) IV Intermittent once PRN Nausea and/or Vomiting  oxyCODONE    IR 5 milliGRAM(s) Oral every 4 hours PRN Moderate Pain (4 - 6)    HEPARIN:  [] YES [] NO  Dose: XX UNITS/HR UNITS Q8H  LOVENOX:[] YES [] NO  Dose: XX mg Q24H  COUMADIN: []  YES [] NO  Dose: XX mg  Q24H  SCD's: YES b/l  GI Prophylaxis: Protonix [], Pepcid [], None [], (Contra-indication:.....)    Post-Op Beta-Blockers: Yes [], No[], If No, then contraindication:  Post-Op Aspirin: Yes [],  No [], If No, then contraindication:  Post-Op Statin: Yes [], No[], If No, then contraindication:  Allergies    No Known Allergies    Intolerances      Ambulation/Activity Status:    Assessment/Plan:  75y Female status-post .....  - Case and plan discussed with CTU Intensivist and CT Surgeon - Dr. Cole/Darrius/Kaylene   - Continue CTU supportive care    - Continue DVT/GI prophylaxis  - Incentive Spirometry 10 times an hour  - Continue to advance physical activity as tolerated and continue PT/OT as directed  1. CAD: Continue ASA, statin, BB  2. HTN:   3. A. Fib:   4. COPD/Hypoxia:   5. DM/Glucose Control:     Social Service Disposition: OPERATIVE PROCEDURE(s):   TAVR             POD #  1                     75yFemale  SURGEON(s): ROWDY Cole  SUBJECTIVE ASSESSMENT: pt seen and examined. no acute complaints at this time  Vital Signs Last 24 Hrs  T(F): 97.9 (23 Jul 2020 04:00), Max: 99 (22 Jul 2020 14:00)  HR: 79 (23 Jul 2020 07:00) (66 - 94)  ABP: 150/58 (23 Jul 2020 07:00) (100/48 - 150/58)  ABP(mean): 93 (23 Jul 2020 07:00)  RR: 17 (23 Jul 2020 07:00) (12 - 35)  SpO2: 96% (23 Jul 2020 07:00) (93% - 100%)      I&O's Detail    22 Jul 2020 07:01  -  23 Jul 2020 07:00  --------------------------------------------------------  IN:    IV PiggyBack: 200 mL    nitroglycerin  Infusion: 181 mL    Oral Fluid: 360 mL    sodium chloride 0.9%.: 160 mL  Total IN: 901 mL    OUT:    Indwelling Catheter - Urethral: 2896 mL    Voided: 75 mL  Total OUT: 2971 mL        Net:   I&O's Detail    22 Jul 2020 07:01  -  23 Jul 2020 07:00  --------------------------------------------------------  Total NET: -2070 mL        CAPILLARY BLOOD GLUCOSE      POCT Blood Glucose.: 128 mg/dL (23 Jul 2020 02:52)    Physical Exam:  General: NAD; A&Ox3  Cardiac: S1/S2, RRR, no murmur, no rubs  Lungs: unlabored respirations, CTA b/l, no wheeze, no rales, no crackles  Abdomen: Soft/NT/ND; positive bowel sounds x 4  Incisions: Incisions clean/dry/intact  Extremities: No edema b/l lower extremities; good capillary refill; no cyanosis; palpable 1+ pedal pulses b/l    Central Venous Catheter: Yes[]  No[x] , If Yes indication:        Day #   Jimenez Catheter: Yes  [] , No  [x] , If yes indication:                      Day #  NGT: Yes [] No [x] ,    If Yes Placement:                                     Day #  EPICARDIAL WIRES:  [] YES [x] NO                                              Day #  BOWEL MOVEMENT:  [] YES [x] NO, If No, Timing since last BM:      Day #  CHEST TUBE(Left/Right):  [] YES [x] NO, If yes -  AIR LEAKS:  [] YES [] NO        LABS:                        8.5<L>  8.31  )-----------( 195      ( 23 Jul 2020 03:40 )             27.6<L>                        8.6<L>  10.26 )-----------( 220      ( 22 Jul 2020 13:30 )             28.8<L>    07-23    138  |  103  |  10  ----------------------------<  113<H>  4.4   |  24  |  0.6<L>  07-22    138  |  106  |  8<L>  ----------------------------<  118<H>  4.5   |  23  |  0.6<L>    Ca    7.8<L>      23 Jul 2020 03:40  Mg     2.3     07-23    TPro  5.6<L> [6.0 - 8.0]  /  Alb  3.4<L> [3.5 - 5.2]  /  TBili  0.3 [0.2 - 1.2]  /  DBili  x   /  AST  24 [0 - 41]  /  ALT  11 [0 - 41]  /  AlkPhos  58 [30 - 115]  07-23    PT/INR - ( 23 Jul 2020 03:40 )   PT: ;   INR: 1.05 ratio         PT/INR - ( 22 Jul 2020 13:30 )   PT: ;   INR: 1.07 ratio         PTT - ( 23 Jul 2020 03:40 )  PTT:28.8 sec, PTT - ( 22 Jul 2020 13:30 )  PTT:29.9 sec    ABG - ( 23 Jul 2020 04:00 )  pH: 7.43  /  pCO2: 40    /  pO2: 59    / HCO3: 26    / Base Excess: 2.0   /  SaO2: 92    /  LA: 0.4              RADIOLOGY & ADDITIONAL TESTS:  CXR: < from: Xray Chest 1 View- PORTABLE-Routine (07.23.20 @ 06:08) >  IMPRESSION:      No radiographic evidence of acute cardiopulmonary disease.    < end of copied text >    EKG: < from: 12 Lead ECG (07.13.20 @ 03:50) >  Ventricular Rate 106 BPM    Atrial Rate 264 BPM    QRS Duration 90 ms    Q-T Interval 394 ms    QTC Calculation(Bezet) 523 ms    R Axis 62 degrees    T Axis 94 degrees    Diagnosis Line Atrial flutter with variable A-V block  Nonspecific ST and T wave abnormality  Abnormal ECG    < end of copied text >    MEDICATIONS  (STANDING):  ALPRAZolam 1 milliGRAM(s) Oral at bedtime  aspirin enteric coated 81 milliGRAM(s) Oral daily  atorvastatin 80 milliGRAM(s) Oral at bedtime  budesonide 160 MICROgram(s)/formoterol 4.5 MICROgram(s) Inhaler 2 Puff(s) Inhalation two times a day  chlorhexidine 4% Liquid 1 Application(s) Topical <User Schedule>  clopidogrel Tablet 75 milliGRAM(s) Oral daily  dextrose 50% Injectable 50 milliLiter(s) IV Push every 15 minutes  dextrose 50% Injectable 25 milliLiter(s) IV Push every 15 minutes  diclofenac 50 milliGRAM(s) Oral two times a day  escitalopram 10 milliGRAM(s) Oral daily  famotidine Injectable 20 milliGRAM(s) IV Push every 12 hours  guaiFENesin  milliGRAM(s) Oral every 12 hours  insulin regular Infusion 1 Unit(s)/Hr (1 mL/Hr) IV Continuous <Continuous>  magnesium sulfate  IVPB 1 Gram(s) IV Intermittent every 12 hours  mirtazapine 30 milliGRAM(s) Oral daily  montelukast 10 milliGRAM(s) Oral daily  mupirocin 2% Nasal 1 Application(s) Both Nostrils every 12 hours  nitroglycerin  Infusion 5 MICROgram(s)/Min (1.5 mL/Hr) IV Continuous <Continuous>  norepinephrine Infusion 0.05 MICROgram(s)/kG/Min (7.22 mL/Hr) IV Continuous <Continuous>  polyethylene glycol 3350 17 Gram(s) Oral daily  pregabalin 150 milliGRAM(s) Oral every 12 hours  propofol Infusion 15 MICROgram(s)/kG/Min (6.75 mL/Hr) IV Continuous <Continuous>  sodium chloride 0.9%. 1000 milliLiter(s) (20 mL/Hr) IV Continuous <Continuous>  tiotropium 18 MICROgram(s) Capsule 1 Capsule(s) Inhalation daily  traZODone 50 milliGRAM(s) Oral daily    MEDICATIONS  (PRN):  ondansetron  IVPB 4 milliGRAM(s) IV Intermittent once PRN Nausea and/or Vomiting  oxyCODONE    IR 5 milliGRAM(s) Oral every 4 hours PRN Moderate Pain (4 - 6)    HEPARIN:  [] YES [x] NO  Dose: XX UNITS/HR UNITS Q8H  LOVENOX:[] YES [x] NO  Dose: XX mg Q24H  COUMADIN: []  YES [x] NO  Dose: XX mg  Q24H  SCD's: YES b/l  GI Prophylaxis: Protonix [], Pepcid [x], None [], (Contra-indication:.....)    Post-Op Beta-Blockers: Yes [x], No[], If No, then contraindication:  Post-Op Aspirin: Yes [x],  No [], If No, then contraindication:  Post-Op Statin: Yes [x], No[], If No, then contraindication:  Allergies    No Known Allergies    Intolerances      Ambulation/Activity Status: ambulate    Assessment/Plan:  75y Female status-post TAVR POD#1   - Case and plan discussed with CTU Intensivist and CT Surgeon - Dr. Cole/Darrius/Pat  - Continue CTU supportive care    - Continue DVT/GI prophylaxis  - Incentive Spirometry 10 times an hour  - Continue to advance physical activity as tolerated and continue PT/OT as directed  1. Continue ASA, statin, BB, check post-tavr echo  2. HTN: wean off nitro gtt, cont lopressor  3. A. Fib: cont lopressor, restart eliquis  4. COPD/Hypoxia: wean o2 as tolerated, nebs, mucinex, lasix for fluid overload, encourage incentive spirometry      Social Service Disposition:  home OPERATIVE PROCEDURE(s):   TAVR             POD #  1                     75yFemale  SURGEON(s): ROWDY Cole  SUBJECTIVE ASSESSMENT: pt seen and examined. no acute complaints at this time  Vital Signs Last 24 Hrs  T(F): 97.9 (23 Jul 2020 04:00), Max: 99 (22 Jul 2020 14:00)  HR: 79 (23 Jul 2020 07:00) (66 - 94)  ABP: 150/58 (23 Jul 2020 07:00) (100/48 - 150/58)  ABP(mean): 93 (23 Jul 2020 07:00)  RR: 17 (23 Jul 2020 07:00) (12 - 35)  SpO2: 96% (23 Jul 2020 07:00) (93% - 100%)      I&O's Detail    22 Jul 2020 07:01  -  23 Jul 2020 07:00  --------------------------------------------------------  IN:    IV PiggyBack: 200 mL    nitroglycerin  Infusion: 181 mL    Oral Fluid: 360 mL    sodium chloride 0.9%.: 160 mL  Total IN: 901 mL    OUT:    Indwelling Catheter - Urethral: 2896 mL    Voided: 75 mL  Total OUT: 2971 mL        Net:   I&O's Detail    22 Jul 2020 07:01  -  23 Jul 2020 07:00  --------------------------------------------------------  Total NET: -2070 mL        CAPILLARY BLOOD GLUCOSE      POCT Blood Glucose.: 128 mg/dL (23 Jul 2020 02:52)    Physical Exam:  General: NAD; A&Ox3  Cardiac: S1/S2, RRR, no murmur, no rubs  Lungs: unlabored respirations, CTA b/l, no wheeze, no rales, no crackles  Abdomen: Soft/NT/ND; positive bowel sounds x 4  Incisions: Incisions clean/dry/intact  Extremities: No edema b/l lower extremities; good capillary refill; no cyanosis; palpable 1+ pedal pulses b/l, bilateral groins- no hematoma    Central Venous Catheter: Yes[]  No[x] , If Yes indication:        Day #   Jimenez Catheter: Yes  [] , No  [x] , If yes indication:                      Day #  NGT: Yes [] No [x] ,    If Yes Placement:                                     Day #  EPICARDIAL WIRES:  [] YES [x] NO                                              Day #  BOWEL MOVEMENT:  [] YES [x] NO, If No, Timing since last BM:      Day #  CHEST TUBE(Left/Right):  [] YES [x] NO, If yes -  AIR LEAKS:  [] YES [] NO        LABS:                        8.5<L>  8.31  )-----------( 195      ( 23 Jul 2020 03:40 )             27.6<L>                        8.6<L>  10.26 )-----------( 220      ( 22 Jul 2020 13:30 )             28.8<L>    07-23    138  |  103  |  10  ----------------------------<  113<H>  4.4   |  24  |  0.6<L>  07-22    138  |  106  |  8<L>  ----------------------------<  118<H>  4.5   |  23  |  0.6<L>    Ca    7.8<L>      23 Jul 2020 03:40  Mg     2.3     07-23    TPro  5.6<L> [6.0 - 8.0]  /  Alb  3.4<L> [3.5 - 5.2]  /  TBili  0.3 [0.2 - 1.2]  /  DBili  x   /  AST  24 [0 - 41]  /  ALT  11 [0 - 41]  /  AlkPhos  58 [30 - 115]  07-23    PT/INR - ( 23 Jul 2020 03:40 )   PT: ;   INR: 1.05 ratio         PT/INR - ( 22 Jul 2020 13:30 )   PT: ;   INR: 1.07 ratio         PTT - ( 23 Jul 2020 03:40 )  PTT:28.8 sec, PTT - ( 22 Jul 2020 13:30 )  PTT:29.9 sec    ABG - ( 23 Jul 2020 04:00 )  pH: 7.43  /  pCO2: 40    /  pO2: 59    / HCO3: 26    / Base Excess: 2.0   /  SaO2: 92    /  LA: 0.4              RADIOLOGY & ADDITIONAL TESTS:  CXR: < from: Xray Chest 1 View- PORTABLE-Routine (07.23.20 @ 06:08) >  IMPRESSION:      No radiographic evidence of acute cardiopulmonary disease.    < end of copied text >    EKG: < from: 12 Lead ECG (07.13.20 @ 03:50) >  Ventricular Rate 106 BPM    Atrial Rate 264 BPM    QRS Duration 90 ms    Q-T Interval 394 ms    QTC Calculation(Bezet) 523 ms    R Axis 62 degrees    T Axis 94 degrees    Diagnosis Line Atrial flutter with variable A-V block  Nonspecific ST and T wave abnormality  Abnormal ECG    < end of copied text >    MEDICATIONS  (STANDING):  ALPRAZolam 1 milliGRAM(s) Oral at bedtime  aspirin enteric coated 81 milliGRAM(s) Oral daily  atorvastatin 80 milliGRAM(s) Oral at bedtime  budesonide 160 MICROgram(s)/formoterol 4.5 MICROgram(s) Inhaler 2 Puff(s) Inhalation two times a day  chlorhexidine 4% Liquid 1 Application(s) Topical <User Schedule>  clopidogrel Tablet 75 milliGRAM(s) Oral daily  dextrose 50% Injectable 50 milliLiter(s) IV Push every 15 minutes  dextrose 50% Injectable 25 milliLiter(s) IV Push every 15 minutes  diclofenac 50 milliGRAM(s) Oral two times a day  escitalopram 10 milliGRAM(s) Oral daily  famotidine Injectable 20 milliGRAM(s) IV Push every 12 hours  guaiFENesin  milliGRAM(s) Oral every 12 hours  insulin regular Infusion 1 Unit(s)/Hr (1 mL/Hr) IV Continuous <Continuous>  magnesium sulfate  IVPB 1 Gram(s) IV Intermittent every 12 hours  mirtazapine 30 milliGRAM(s) Oral daily  montelukast 10 milliGRAM(s) Oral daily  mupirocin 2% Nasal 1 Application(s) Both Nostrils every 12 hours  nitroglycerin  Infusion 5 MICROgram(s)/Min (1.5 mL/Hr) IV Continuous <Continuous>  norepinephrine Infusion 0.05 MICROgram(s)/kG/Min (7.22 mL/Hr) IV Continuous <Continuous>  polyethylene glycol 3350 17 Gram(s) Oral daily  pregabalin 150 milliGRAM(s) Oral every 12 hours  propofol Infusion 15 MICROgram(s)/kG/Min (6.75 mL/Hr) IV Continuous <Continuous>  sodium chloride 0.9%. 1000 milliLiter(s) (20 mL/Hr) IV Continuous <Continuous>  tiotropium 18 MICROgram(s) Capsule 1 Capsule(s) Inhalation daily  traZODone 50 milliGRAM(s) Oral daily    MEDICATIONS  (PRN):  ondansetron  IVPB 4 milliGRAM(s) IV Intermittent once PRN Nausea and/or Vomiting  oxyCODONE    IR 5 milliGRAM(s) Oral every 4 hours PRN Moderate Pain (4 - 6)    HEPARIN:  [] YES [x] NO  Dose: XX UNITS/HR UNITS Q8H  LOVENOX:[] YES [x] NO  Dose: XX mg Q24H  COUMADIN: []  YES [x] NO  Dose: XX mg  Q24H  SCD's: YES b/l  GI Prophylaxis: Protonix [], Pepcid [x], None [], (Contra-indication:.....)    Post-Op Beta-Blockers: Yes [x], No[], If No, then contraindication:  Post-Op Aspirin: Yes [x],  No [], If No, then contraindication:  Post-Op Statin: Yes [x], No[], If No, then contraindication:  Allergies    No Known Allergies    Intolerances      Ambulation/Activity Status: ambulate    Assessment/Plan:  75y Female status-post TAVR POD#1   - Case and plan discussed with CTU Intensivist and CT Surgeon - Dr. Cole/Darrius/Pat  - Continue CTU supportive care    - Continue DVT/GI prophylaxis  - Incentive Spirometry 10 times an hour  - Continue to advance physical activity as tolerated and continue PT/OT as directed  1. Continue ASA, statin, BB, check post-tavr echo  2. HTN: wean off nitro gtt, cont lopressor  3. A. Fib: cont lopressor, restart eliquis  4. COPD/Hypoxia: wean o2 as tolerated, nebs, mucinex, lasix for fluid overload, encourage incentive spirometry      Social Service Disposition:  home

## 2020-07-23 NOTE — PHYSICAL THERAPY INITIAL EVALUATION ADULT - PERTINENT HX OF CURRENT PROBLEM, REHAB EVAL
Pt is a a75 y/o female currently hospitalized s/p TAVR. PMH:with a pmh of aortic valve stenosis, CAD stent x4 6/15, HTN, GERD, SLE, trigeminal neuralgia, breast CA s/p rodrick mastectomy, and Sjogren's syndrome presents today for elective TAVR.

## 2020-07-24 ENCOUNTER — TRANSCRIPTION ENCOUNTER (OUTPATIENT)
Age: 75
End: 2020-07-24

## 2020-07-24 VITALS
DIASTOLIC BLOOD PRESSURE: 61 MMHG | SYSTOLIC BLOOD PRESSURE: 139 MMHG | HEART RATE: 71 BPM | RESPIRATION RATE: 22 BRPM | OXYGEN SATURATION: 95 %

## 2020-07-24 PROBLEM — I48.91 UNSPECIFIED ATRIAL FIBRILLATION: Chronic | Status: ACTIVE | Noted: 2020-07-22

## 2020-07-24 PROBLEM — I25.10 ATHEROSCLEROTIC HEART DISEASE OF NATIVE CORONARY ARTERY WITHOUT ANGINA PECTORIS: Chronic | Status: ACTIVE | Noted: 2020-07-22

## 2020-07-24 LAB
ALBUMIN SERPL ELPH-MCNC: 3.5 G/DL — SIGNIFICANT CHANGE UP (ref 3.5–5.2)
ALP SERPL-CCNC: 61 U/L — SIGNIFICANT CHANGE UP (ref 30–115)
ALT FLD-CCNC: 13 U/L — SIGNIFICANT CHANGE UP (ref 0–41)
ANION GAP SERPL CALC-SCNC: 12 MMOL/L — SIGNIFICANT CHANGE UP (ref 7–14)
APTT BLD: 30.6 SEC — SIGNIFICANT CHANGE UP (ref 27–39.2)
AST SERPL-CCNC: 25 U/L — SIGNIFICANT CHANGE UP (ref 0–41)
BASOPHILS # BLD AUTO: 0.04 K/UL — SIGNIFICANT CHANGE UP (ref 0–0.2)
BASOPHILS NFR BLD AUTO: 0.5 % — SIGNIFICANT CHANGE UP (ref 0–1)
BILIRUB SERPL-MCNC: 0.4 MG/DL — SIGNIFICANT CHANGE UP (ref 0.2–1.2)
BUN SERPL-MCNC: 12 MG/DL — SIGNIFICANT CHANGE UP (ref 10–20)
CALCIUM SERPL-MCNC: 8 MG/DL — LOW (ref 8.5–10.1)
CHLORIDE SERPL-SCNC: 103 MMOL/L — SIGNIFICANT CHANGE UP (ref 98–110)
CO2 SERPL-SCNC: 24 MMOL/L — SIGNIFICANT CHANGE UP (ref 17–32)
CREAT SERPL-MCNC: 0.7 MG/DL — SIGNIFICANT CHANGE UP (ref 0.7–1.5)
EOSINOPHIL # BLD AUTO: 0.06 K/UL — SIGNIFICANT CHANGE UP (ref 0–0.7)
EOSINOPHIL NFR BLD AUTO: 0.7 % — SIGNIFICANT CHANGE UP (ref 0–8)
GLUCOSE SERPL-MCNC: 91 MG/DL — SIGNIFICANT CHANGE UP (ref 70–99)
HCT VFR BLD CALC: 28.7 % — LOW (ref 37–47)
HGB BLD-MCNC: 8.8 G/DL — LOW (ref 12–16)
IMM GRANULOCYTES NFR BLD AUTO: 0.3 % — SIGNIFICANT CHANGE UP (ref 0.1–0.3)
INR BLD: 1.23 RATIO — SIGNIFICANT CHANGE UP (ref 0.65–1.3)
LYMPHOCYTES # BLD AUTO: 2.37 K/UL — SIGNIFICANT CHANGE UP (ref 1.2–3.4)
LYMPHOCYTES # BLD AUTO: 26.7 % — SIGNIFICANT CHANGE UP (ref 20.5–51.1)
MAGNESIUM SERPL-MCNC: 2.3 MG/DL — SIGNIFICANT CHANGE UP (ref 1.8–2.4)
MCHC RBC-ENTMCNC: 25.7 PG — LOW (ref 27–31)
MCHC RBC-ENTMCNC: 30.7 G/DL — LOW (ref 32–37)
MCV RBC AUTO: 83.9 FL — SIGNIFICANT CHANGE UP (ref 81–99)
MONOCYTES # BLD AUTO: 1.02 K/UL — HIGH (ref 0.1–0.6)
MONOCYTES NFR BLD AUTO: 11.5 % — HIGH (ref 1.7–9.3)
NEUTROPHILS # BLD AUTO: 5.34 K/UL — SIGNIFICANT CHANGE UP (ref 1.4–6.5)
NEUTROPHILS NFR BLD AUTO: 60.3 % — SIGNIFICANT CHANGE UP (ref 42.2–75.2)
NRBC # BLD: 0 /100 WBCS — SIGNIFICANT CHANGE UP (ref 0–0)
PLATELET # BLD AUTO: 197 K/UL — SIGNIFICANT CHANGE UP (ref 130–400)
POTASSIUM SERPL-MCNC: 4.6 MMOL/L — SIGNIFICANT CHANGE UP (ref 3.5–5)
POTASSIUM SERPL-SCNC: 4.6 MMOL/L — SIGNIFICANT CHANGE UP (ref 3.5–5)
PROT SERPL-MCNC: 5.9 G/DL — LOW (ref 6–8)
PROTHROM AB SERPL-ACNC: 14.2 SEC — HIGH (ref 9.95–12.87)
RBC # BLD: 3.42 M/UL — LOW (ref 4.2–5.4)
RBC # FLD: 15.9 % — HIGH (ref 11.5–14.5)
SODIUM SERPL-SCNC: 139 MMOL/L — SIGNIFICANT CHANGE UP (ref 135–146)
WBC # BLD: 8.86 K/UL — SIGNIFICANT CHANGE UP (ref 4.8–10.8)
WBC # FLD AUTO: 8.86 K/UL — SIGNIFICANT CHANGE UP (ref 4.8–10.8)

## 2020-07-24 PROCEDURE — 99232 SBSQ HOSP IP/OBS MODERATE 35: CPT

## 2020-07-24 PROCEDURE — 71045 X-RAY EXAM CHEST 1 VIEW: CPT | Mod: 26

## 2020-07-24 PROCEDURE — 93010 ELECTROCARDIOGRAM REPORT: CPT

## 2020-07-24 RX ORDER — FAMOTIDINE 10 MG/ML
1 INJECTION INTRAVENOUS
Qty: 60 | Refills: 0
Start: 2020-07-24 | End: 2020-08-22

## 2020-07-24 RX ORDER — HYDRALAZINE HCL 50 MG
10 TABLET ORAL ONCE
Refills: 0 | Status: COMPLETED | OUTPATIENT
Start: 2020-07-24 | End: 2020-07-24

## 2020-07-24 RX ORDER — MAGNESIUM HYDROXIDE 400 MG/1
30 TABLET, CHEWABLE ORAL DAILY
Refills: 0 | Status: DISCONTINUED | OUTPATIENT
Start: 2020-07-24 | End: 2020-07-24

## 2020-07-24 RX ORDER — ACETAMINOPHEN 500 MG
650 TABLET ORAL ONCE
Refills: 0 | Status: COMPLETED | OUTPATIENT
Start: 2020-07-24 | End: 2020-07-24

## 2020-07-24 RX ORDER — METOPROLOL TARTRATE 50 MG
1 TABLET ORAL
Qty: 60 | Refills: 0
Start: 2020-07-24 | End: 2020-08-22

## 2020-07-24 RX ORDER — ASPIRIN/CALCIUM CARB/MAGNESIUM 324 MG
1 TABLET ORAL
Qty: 0 | Refills: 0 | DISCHARGE
Start: 2020-07-24 | End: 2020-07-25

## 2020-07-24 RX ADMIN — Medication 150 MILLIGRAM(S): at 05:18

## 2020-07-24 RX ADMIN — Medication 50 MILLIGRAM(S): at 13:22

## 2020-07-24 RX ADMIN — CHLORHEXIDINE GLUCONATE 1 APPLICATION(S): 213 SOLUTION TOPICAL at 05:18

## 2020-07-24 RX ADMIN — Medication 650 MILLIGRAM(S): at 02:00

## 2020-07-24 RX ADMIN — MUPIROCIN 1 APPLICATION(S): 20 OINTMENT TOPICAL at 05:23

## 2020-07-24 RX ADMIN — Medication 25 MILLIGRAM(S): at 05:17

## 2020-07-24 RX ADMIN — FAMOTIDINE 20 MILLIGRAM(S): 10 INJECTION INTRAVENOUS at 05:18

## 2020-07-24 RX ADMIN — OXYCODONE HYDROCHLORIDE 5 MILLIGRAM(S): 5 TABLET ORAL at 05:20

## 2020-07-24 RX ADMIN — CLOPIDOGREL BISULFATE 75 MILLIGRAM(S): 75 TABLET, FILM COATED ORAL at 13:22

## 2020-07-24 RX ADMIN — MIRTAZAPINE 30 MILLIGRAM(S): 45 TABLET, ORALLY DISINTEGRATING ORAL at 13:22

## 2020-07-24 RX ADMIN — Medication 25 MILLIGRAM(S): at 13:22

## 2020-07-24 RX ADMIN — Medication 650 MILLIGRAM(S): at 01:30

## 2020-07-24 RX ADMIN — APIXABAN 5 MILLIGRAM(S): 2.5 TABLET, FILM COATED ORAL at 05:17

## 2020-07-24 RX ADMIN — Medication 10 MILLIGRAM(S): at 04:00

## 2020-07-24 RX ADMIN — SERTRALINE 50 MILLIGRAM(S): 25 TABLET, FILM COATED ORAL at 13:22

## 2020-07-24 RX ADMIN — DICLOFENAC SODIUM 50 MILLIGRAM(S): 75 TABLET, DELAYED RELEASE ORAL at 05:23

## 2020-07-24 RX ADMIN — DICLOFENAC SODIUM 50 MILLIGRAM(S): 75 TABLET, DELAYED RELEASE ORAL at 06:08

## 2020-07-24 RX ADMIN — Medication 1 MILLIGRAM(S): at 04:48

## 2020-07-24 RX ADMIN — Medication 40 MILLIGRAM(S): at 05:18

## 2020-07-24 RX ADMIN — Medication 600 MILLIGRAM(S): at 05:17

## 2020-07-24 RX ADMIN — MONTELUKAST 10 MILLIGRAM(S): 4 TABLET, CHEWABLE ORAL at 13:22

## 2020-07-24 RX ADMIN — Medication 81 MILLIGRAM(S): at 13:22

## 2020-07-24 RX ADMIN — POLYETHYLENE GLYCOL 3350 17 GRAM(S): 17 POWDER, FOR SOLUTION ORAL at 13:22

## 2020-07-24 RX ADMIN — Medication 100 GRAM(S): at 05:32

## 2020-07-24 RX ADMIN — OXYCODONE HYDROCHLORIDE 5 MILLIGRAM(S): 5 TABLET ORAL at 04:48

## 2020-07-24 RX ADMIN — TIOTROPIUM BROMIDE 1 CAPSULE(S): 18 CAPSULE ORAL; RESPIRATORY (INHALATION) at 08:15

## 2020-07-24 RX ADMIN — MAGNESIUM HYDROXIDE 30 MILLILITER(S): 400 TABLET, CHEWABLE ORAL at 09:00

## 2020-07-24 NOTE — DISCHARGE NOTE NURSING/CASE MANAGEMENT/SOCIAL WORK - PATIENT PORTAL LINK FT
You can access the FollowMyHealth Patient Portal offered by [facility] by registering at the following website: [email or web address]. By joining [employer]’s FollowMyHealth portal, you will also be able to view your health information using other applications (apps) compatible with our system.

## 2020-07-24 NOTE — PROGRESS NOTE ADULT - SUBJECTIVE AND OBJECTIVE BOX
OPERATIVE PROCEDURE(s):                POD #       2                75yFemale  SURGEON(s): ROWDY Cole  SUBJECTIVE ASSESSMENT:   Vital Signs Last 24 Hrs  T(F): 97.4 (24 Jul 2020 04:00), Max: 98.4 (23 Jul 2020 12:00)  HR: 74 (24 Jul 2020 07:00) (66 - 95)  BP: 125/58 (24 Jul 2020 07:00) (96/47 - 159/67)  BP(mean): 80 (24 Jul 2020 07:00) (68 - 100)  ABP: 96/41 (23 Jul 2020 11:00) (96/41 - 127/67)  ABP(mean): 60 (23 Jul 2020 11:00)  RR: 16 (23 Jul 2020 23:00) (14 - 35)  SpO2: 95% (24 Jul 2020 07:00) (93% - 100%)  CVP(mm Hg): --  CVP(cm H2O): --  CO: --  CI: --  PA: --  SVR: --    I&O's Detail    23 Jul 2020 07:01  -  24 Jul 2020 07:00  --------------------------------------------------------  IN:    nitroglycerin  Infusion: 80 mL    Oral Fluid: 600 mL    sodium chloride 0.9%.: 80 mL  Total IN: 760 mL    OUT:    Voided: 1600 mL  Total OUT: 1600 mL        Net:   I&O's Detail    22 Jul 2020 07:01  -  23 Jul 2020 07:00  --------------------------------------------------------  Total NET: -2070 mL      23 Jul 2020 07:01  -  24 Jul 2020 07:00  --------------------------------------------------------  Total NET: -840 mL        CAPILLARY BLOOD GLUCOSE        Physical Exam:  General: NAD; A&Ox3/Patient is intubated and sedated  Cardiac: S1/S2, RRR, no murmur, no rubs  Lungs: unlabored respirations, CTA b/l, no wheeze, no rales, no crackles  Abdomen: Soft/NT/ND; positive bowel sounds x 4  Sternum: Intact, no click, incision healing well with no drainage  Incisions: Incisions clean/dry/intact  Extremities: No edema b/l lower extremities; good capillary refill; no cyanosis; palpable 1+ pedal pulses b/l    Central Venous Catheter: Yes[]  No[] , If Yes indication:           Day #  Jimenez Catheter: Yes  [] , No  [] , If yes indication:                      Day #  NGT: Yes [] No [] ,    If Yes Placement:                                     Day #  EPICARDIAL WIRES:  [] YES [] NO                                              Day #  BOWEL MOVEMENT:  [] YES [] NO, If No, Timing since last BM:      Day #  CHEST TUBE(Left/Right):  [] YES [] NO, If yes -  AIR LEAKS:  [] YES [] NO        LABS:                        8.8<L>  8.86  )-----------( 197      ( 24 Jul 2020 04:10 )             28.7<L>                        8.5<L>  8.31  )-----------( 195      ( 23 Jul 2020 03:40 )             27.6<L>    07-24    139  |  103  |  12  ----------------------------<  91  4.6   |  24  |  0.7  07-23    138  |  103  |  10  ----------------------------<  113<H>  4.4   |  24  |  0.6<L>    Ca    8.0<L>      24 Jul 2020 04:10  Mg     2.3     07-24    TPro  5.9<L> [6.0 - 8.0]  /  Alb  3.5 [3.5 - 5.2]  /  TBili  0.4 [0.2 - 1.2]  /  DBili  x   /  AST  25 [0 - 41]  /  ALT  13 [0 - 41]  /  AlkPhos  61 [30 - 115]  07-24    PT/INR - ( 24 Jul 2020 04:10 )   PT: ;   INR: 1.23 ratio         PT/INR - ( 23 Jul 2020 03:40 )   PT: ;   INR: 1.05 ratio         PTT - ( 24 Jul 2020 04:10 )  PTT:30.6 sec, PTT - ( 23 Jul 2020 03:40 )  PTT:28.8 sec    ABG - ( 23 Jul 2020 10:41 )  pH: 7.44  /  pCO2: 39    /  pO2: 72    / HCO3: 26    / Base Excess: 2.2   /  SaO2: 96    /  LA: 1.6              RADIOLOGY & ADDITIONAL TESTS:  CXR:  EKG:  MEDICATIONS  (STANDING):  ALPRAZolam 1 milliGRAM(s) Oral at bedtime  apixaban 5 milliGRAM(s) Oral two times a day  aspirin enteric coated 81 milliGRAM(s) Oral daily  atorvastatin 80 milliGRAM(s) Oral at bedtime  budesonide 160 MICROgram(s)/formoterol 4.5 MICROgram(s) Inhaler 2 Puff(s) Inhalation two times a day  chlorhexidine 4% Liquid 1 Application(s) Topical <User Schedule>  clopidogrel Tablet 75 milliGRAM(s) Oral daily  dextrose 50% Injectable 50 milliLiter(s) IV Push every 15 minutes  dextrose 50% Injectable 25 milliLiter(s) IV Push every 15 minutes  diclofenac 50 milliGRAM(s) Oral two times a day  famotidine    Tablet 20 milliGRAM(s) Oral two times a day  furosemide    Tablet 40 milliGRAM(s) Oral daily  guaiFENesin  milliGRAM(s) Oral every 12 hours  magnesium sulfate  IVPB 1 Gram(s) IV Intermittent every 12 hours  metoprolol tartrate 25 milliGRAM(s) Oral three times a day  mirtazapine 30 milliGRAM(s) Oral daily  montelukast 10 milliGRAM(s) Oral daily  mupirocin 2% Nasal 1 Application(s) Both Nostrils every 12 hours  nitroglycerin  Infusion 5 MICROgram(s)/Min (1.5 mL/Hr) IV Continuous <Continuous>  polyethylene glycol 3350 17 Gram(s) Oral daily  pregabalin 150 milliGRAM(s) Oral every 12 hours  sertraline 50 milliGRAM(s) Oral daily  sodium chloride 0.9%. 1000 milliLiter(s) (20 mL/Hr) IV Continuous <Continuous>  tiotropium 18 MICROgram(s) Capsule 1 Capsule(s) Inhalation daily  traZODone 50 milliGRAM(s) Oral daily    MEDICATIONS  (PRN):  ondansetron  IVPB 4 milliGRAM(s) IV Intermittent once PRN Nausea and/or Vomiting  oxyCODONE    IR 5 milliGRAM(s) Oral every 4 hours PRN Moderate Pain (4 - 6)    HEPARIN:  [] YES [] NO  Dose: XX UNITS/HR UNITS Q8H  LOVENOX:[] YES [] NO  Dose: XX mg Q24H  COUMADIN: []  YES [] NO  Dose: XX mg  Q24H  SCD's: YES b/l  GI Prophylaxis: Protonix [], Pepcid [], None [], (Contra-indication:.....)    Post-Op Beta-Blockers: Yes [], No[], If No, then contraindication:  Post-Op Aspirin: Yes [],  No [], If No, then contraindication:  Post-Op Statin: Yes [], No[], If No, then contraindication:  Allergies    No Known Allergies    Intolerances      Ambulation/Activity Status:    Assessment/Plan:  75y Female status-post .....  - Case and plan discussed with CTU Intensivist and CT Surgeon - Dr. Cole/Darrius/Kaylene   - Continue CTU supportive care    - Continue DVT/GI prophylaxis  - Incentive Spirometry 10 times an hour  - Continue to advance physical activity as tolerated and continue PT/OT as directed  1. CAD: Continue ASA, statin, BB  2. HTN:   3. A. Fib:   4. COPD/Hypoxia:   5. DM/Glucose Control:     Social Service Disposition: OPERATIVE PROCEDURE(s):  TAVR              POD #       2                75yFemale  SURGEON(s): ROWDY Cole  SUBJECTIVE ASSESSMENT: pt seen and examined. no acute compliants  Vital Signs Last 24 Hrs  T(F): 97.4 (24 Jul 2020 04:00), Max: 98.4 (23 Jul 2020 12:00)  HR: 74 (24 Jul 2020 07:00) (66 - 95)  BP: 125/58 (24 Jul 2020 07:00) (96/47 - 159/67)  BP(mean): 80 (24 Jul 2020 07:00) (68 - 100)  ABP: 96/41 (23 Jul 2020 11:00) (96/41 - 127/67)  ABP(mean): 60 (23 Jul 2020 11:00)  RR: 16 (23 Jul 2020 23:00) (14 - 35)  SpO2: 95% (24 Jul 2020 07:00) (93% - 100%)      I&O's Detail    23 Jul 2020 07:01  -  24 Jul 2020 07:00  --------------------------------------------------------  IN:    nitroglycerin  Infusion: 80 mL    Oral Fluid: 600 mL    sodium chloride 0.9%.: 80 mL  Total IN: 760 mL    OUT:    Voided: 1600 mL  Total OUT: 1600 mL        Net:   I&O's Detail    22 Jul 2020 07:01  -  23 Jul 2020 07:00  --------------------------------------------------------  Total NET: -2070 mL      23 Jul 2020 07:01  -  24 Jul 2020 07:00  --------------------------------------------------------  Total NET: -840 mL        CAPILLARY BLOOD GLUCOSE        Physical Exam:  General: NAD; A&Ox3  Cardiac: S1/S2, RRR, no murmur, no rubs  Lungs: unlabored respirations, CTA b/l, no wheeze, no rales, no crackles  Abdomen: Soft/NT/ND; positive bowel sounds x 4  Incisions: Incisions clean/dry/intact  Extremities: No edema b/l lower extremities; good capillary refill; no cyanosis; palpable 1+ pedal pulses b/l, bilateral groins- no hematoma    Central Venous Catheter: Yes[]  No[x] , If Yes indication:        Day #   Jimenez Catheter: Yes  [] , No  [x] , If yes indication:                      Day #  NGT: Yes [] No [x] ,    If Yes Placement:                                     Day #  EPICARDIAL WIRES:  [] YES [x] NO                                              Day #  BOWEL MOVEMENT:  [] YES [x] NO, If No, Timing since last BM:      Day #  CHEST TUBE(Left/Right):  [] YES [x] NO, If yes -  AIR LEAKS:  [] YES [] NO            LABS:                        8.8<L>  8.86  )-----------( 197      ( 24 Jul 2020 04:10 )             28.7<L>                        8.5<L>  8.31  )-----------( 195      ( 23 Jul 2020 03:40 )             27.6<L>    07-24    139  |  103  |  12  ----------------------------<  91  4.6   |  24  |  0.7  07-23    138  |  103  |  10  ----------------------------<  113<H>  4.4   |  24  |  0.6<L>    Ca    8.0<L>      24 Jul 2020 04:10  Mg     2.3     07-24    TPro  5.9<L> [6.0 - 8.0]  /  Alb  3.5 [3.5 - 5.2]  /  TBili  0.4 [0.2 - 1.2]  /  DBili  x   /  AST  25 [0 - 41]  /  ALT  13 [0 - 41]  /  AlkPhos  61 [30 - 115]  07-24    PT/INR - ( 24 Jul 2020 04:10 )   PT: ;   INR: 1.23 ratio         PT/INR - ( 23 Jul 2020 03:40 )   PT: ;   INR: 1.05 ratio         PTT - ( 24 Jul 2020 04:10 )  PTT:30.6 sec, PTT - ( 23 Jul 2020 03:40 )  PTT:28.8 sec    ABG - ( 23 Jul 2020 10:41 )  pH: 7.44  /  pCO2: 39    /  pO2: 72    / HCO3: 26    / Base Excess: 2.2   /  SaO2: 96    /  LA: 1.6              RADIOLOGY & ADDITIONAL TESTS:  CXR: < from: Xray Chest 1 View- PORTABLE-Routine (07.23.20 @ 06:08) >    IMPRESSION:      No radiographic evidence of acute cardiopulmonary disease.    < end of copied text >    EKG: < from: 12 Lead ECG (07.23.20 @ 05:28) >  Ventricular Rate 77 BPM    Atrial Rate 77 BPM    P-R Interval 154 ms    QRS Duration 156 ms    Q-T Interval 492 ms    QTC Calculation(Bezet) 556 ms    P Axis 80 degrees    R Axis 154 degrees    T Axis 12 degrees    Diagnosis Line Normal sinus rhythm  Non-specific intra-ventricular conduction block  Lateral infarct , age undetermined  Marked ST abnormality, possible inferolateral subendocardial injury  Abnormal ECG    < end of copied text >    MEDICATIONS  (STANDING):  ALPRAZolam 1 milliGRAM(s) Oral at bedtime  apixaban 5 milliGRAM(s) Oral two times a day  aspirin enteric coated 81 milliGRAM(s) Oral daily  atorvastatin 80 milliGRAM(s) Oral at bedtime  budesonide 160 MICROgram(s)/formoterol 4.5 MICROgram(s) Inhaler 2 Puff(s) Inhalation two times a day  chlorhexidine 4% Liquid 1 Application(s) Topical <User Schedule>  clopidogrel Tablet 75 milliGRAM(s) Oral daily  dextrose 50% Injectable 50 milliLiter(s) IV Push every 15 minutes  dextrose 50% Injectable 25 milliLiter(s) IV Push every 15 minutes  diclofenac 50 milliGRAM(s) Oral two times a day  famotidine    Tablet 20 milliGRAM(s) Oral two times a day  furosemide    Tablet 40 milliGRAM(s) Oral daily  guaiFENesin  milliGRAM(s) Oral every 12 hours  magnesium sulfate  IVPB 1 Gram(s) IV Intermittent every 12 hours  metoprolol tartrate 25 milliGRAM(s) Oral three times a day  mirtazapine 30 milliGRAM(s) Oral daily  montelukast 10 milliGRAM(s) Oral daily  mupirocin 2% Nasal 1 Application(s) Both Nostrils every 12 hours  nitroglycerin  Infusion 5 MICROgram(s)/Min (1.5 mL/Hr) IV Continuous <Continuous>  polyethylene glycol 3350 17 Gram(s) Oral daily  pregabalin 150 milliGRAM(s) Oral every 12 hours  sertraline 50 milliGRAM(s) Oral daily  sodium chloride 0.9%. 1000 milliLiter(s) (20 mL/Hr) IV Continuous <Continuous>  tiotropium 18 MICROgram(s) Capsule 1 Capsule(s) Inhalation daily  traZODone 50 milliGRAM(s) Oral daily    MEDICATIONS  (PRN):  ondansetron  IVPB 4 milliGRAM(s) IV Intermittent once PRN Nausea and/or Vomiting  oxyCODONE    IR 5 milliGRAM(s) Oral every 4 hours PRN Moderate Pain (4 - 6)    HEPARIN:  [] YES [x] NO  Dose: XX UNITS/HR UNITS Q8H  LOVENOX:[] YES [x] NO  Dose: XX mg Q24H  COUMADIN: []  YES [x] NO  Dose: XX mg  Q24H  SCD's: YES b/l  GI Prophylaxis: Protonix [], Pepcid [x], None [], (Contra-indication:.....)    Post-Op Beta-Blockers: Yes [x], No[], If No, then contraindication:  Post-Op Aspirin: Yes [x],  No [], If No, then contraindication:  Post-Op Statin: Yes [x], No[], If No, then contraindication:  Allergies    No Known Allergies    Intolerances      Ambulation/Activity Status: ambulate      Assessment/Plan:  75y Female status-post TAVR POD#2  - Case and plan discussed with CTU Intensivist and CT Surgeon - Dr. Cole/Darrius/Pat  - Continue CTU supportive care    - Continue DVT/GI prophylaxis  - Incentive Spirometry 10 times an hour  - Continue to advance physical activity as tolerated and continue PT/OT as directed  1. Continue cont statin, bb. cont asa, plavix and eliquis for 1 more day then will drop asa and cont plavix and eliquis as per Dr. Johnson  2. HTN: wean off nitro gtt, cont lopressor  3. A. Fib: cont lopressor, eliquis  4. COPD/Hypoxia: encourage incentive spirometry  5. chronic diastolic heart failure: cont home dose of lasix      Social Service Disposition:  home

## 2020-07-24 NOTE — PROGRESS NOTE ADULT - ASSESSMENT
PROBLEMS:  I spent 45 minutes of time examining patient, reviewing vitals, labs, medications, imaging and discussing with the team goals of care to prevent life-threatening in this patient who is at high risk for deterioration or death due to:    1.	AS - s/p TAVR - ASA, plavix, ECHO today  2.	A. Fib - eliquis  3.	HTN - Lopressor 25 q 8  4.	hypoxia -  - diuresis - wean off Oxygen    PLAN  Neuro: move all 4 extremities. no sensory or motor deficits  Pain management.   ALPRAZolam 1 milliGRAM(s) Oral at bedtime  diclofenac 50 milliGRAM(s) Oral two times a day  mirtazapine 30 milliGRAM(s) Oral daily  pregabalin 150 milliGRAM(s) Oral every 12 hours  sertraline 50 milliGRAM(s) Oral daily  traZODone 50 milliGRAM(s) Oral daily    Pulm: Wean off supplemental oxygen as able. Daily CXR. Encourage coughing, deep breathing and use of incentive spirometry.   budesonide 160 MICROgram(s)/formoterol 4.5 MICROgram(s) Inhaler 2 Puff(s) Inhalation two times a day  guaiFENesin  milliGRAM(s) Oral every 12 hours  montelukast 10 milliGRAM(s) Oral daily  tiotropium 18 MICROgram(s) Capsule 1 Capsule(s) Inhalation daily    Cardio: Monitor telemetry/alarms. Continue supportive care   furosemide    Tablet 40 milliGRAM(s) Oral daily  metoprolol tartrate 25 milliGRAM(s) Oral three times a day    GI: Continue stool softeners.    famotidine    Tablet 20 milliGRAM(s) Oral two times a day    Nutrition: Continue present diet  Endocrine and glucose control:   atorvastatin 80 milliGRAM(s) Oral at bedtime    Renal: monitor urine output, supplement electrolytes as needed,     Vasc: Heparin SC and/or SCDs for DVT prophylaxis  apixaban 5 milliGRAM(s) Oral two times a day  aspirin enteric coated 81 milliGRAM(s) Oral daily  clopidogrel Tablet 75 milliGRAM(s) Oral daily    ID: Stable, no fever , no chills. Off antibiotics.    Tubes: Monitor Chest tube output  Therapy: OOB/ambulate  Disposition: start planing discharge home or placement    Pertinent clinical, laboratory, radiographic, hemodynamic, echocardiographic, respiratory data, microbiologic data and chart were reviewed and analyzed frequently throughout the course of the day and night. GI and DVT prophylaxis, glycemic control, head of bed elevation and skin care issues were addressed.  Patient seen, examined and plan discussed with CT Surgery / CTICU team during rounds.    [ ] The patient remains in critical and unstable condition, and requires ICU care and monitoring  [x ] The patient is improving but requires continued monitoring and adjustment of therapy
PROBLEMS:  I spent 45 minutes of time examining patient, reviewing vitals, labs, medications, imaging and discussing with the team goals of care to prevent life-threatening in this patient who is at high risk for deterioration or death due to:    1.	AS - s/p TAVR - ASA, plavix, ECHOdone  2.	A. Fib - eliquis  3.	HTN - Lopressor 25 q 8  4.	hypoxia - resolved    PLAN  Neuro: move all 4 extremities. no sensory or motor deficits  Pain management.   ALPRAZolam 1 milliGRAM(s) Oral at bedtime  diclofenac 50 milliGRAM(s) Oral two times a day  mirtazapine 30 milliGRAM(s) Oral daily  pregabalin 150 milliGRAM(s) Oral every 12 hours  sertraline 50 milliGRAM(s) Oral daily  traZODone 50 milliGRAM(s) Oral daily    Pulm: Wean off supplemental oxygen as able. Daily CXR. Encourage coughing, deep breathing and use of incentive spirometry.   budesonide 160 MICROgram(s)/formoterol 4.5 MICROgram(s) Inhaler 2 Puff(s) Inhalation two times a day  guaiFENesin  milliGRAM(s) Oral every 12 hours  montelukast 10 milliGRAM(s) Oral daily  tiotropium 18 MICROgram(s) Capsule 1 Capsule(s) Inhalation daily    Cardio: Monitor telemetry/alarms. Continue supportive care   furosemide    Tablet 40 milliGRAM(s) Oral daily  metoprolol tartrate 25 milliGRAM(s) Oral three times a day    GI: Continue stool softeners.    famotidine    Tablet 20 milliGRAM(s) Oral two times a day  magnesium hydroxide Suspension 30 milliLiter(s) Oral daily PRN    Nutrition: Continue present diet  Endocrine and glucose control:   atorvastatin 80 milliGRAM(s) Oral at bedtime    Renal: monitor urine output, supplement electrolytes as needed,     Vasc: Heparin SC and/or SCDs for DVT prophylaxis  apixaban 5 milliGRAM(s) Oral two times a day  aspirin enteric coated 81 milliGRAM(s) Oral daily  clopidogrel Tablet 75 milliGRAM(s) Oral daily    ID: Stable, no fever , no chills. Off antibiotics.    Therapy: OOB/ambulate  Disposition: start planing discharge home or placement    Pertinent clinical, laboratory, radiographic, hemodynamic, echocardiographic, respiratory data, microbiologic data and chart were reviewed and analyzed frequently throughout the course of the day and night. GI and DVT prophylaxis, glycemic control, head of bed elevation and skin care issues were addressed.  Patient seen, examined and plan discussed with CT Surgery / CTICU team during rounds.    [ ] The patient remains in critical and unstable condition, and requires ICU care and monitoring  [ ] The patient is improving but requires continued monitoring and adjustment of therapy

## 2020-07-24 NOTE — PROGRESS NOTE ADULT - SUBJECTIVE AND OBJECTIVE BOX
CTU Attending Progress Daily Note     24 Jul 2020 07:52  Admited 07-22-20, Hospital Day 2d  POD# -     HPI:  74 yo female, Known to CTS,  with a pmh of aortic valve stenosis, CAD stent x4 6/15, HTN, GERD, SLE, trigeminal neuralgia, breast CA s/p rodrick mastectomy, and Sjogren's syndrome presents today for elective TAVR. pt states her SOB has been gradually worsening over the past 4 weeks following the stent placement and was admitted to hospital about 1 week ago with exacerbation of heart failure. During admission had atrial fib/ flutter also. (22 Jul 2020 06:39)    Home Medications:  ALPRAZolam 0.5 mg oral tablet: 2 tab(s) orally once a day (at bedtime), As Needed (22 Jul 2020 11:34)  docusate sodium 100 mg oral capsule: 1 cap(s) orally 2 times a day (22 Jul 2020 11:34)  montelukast 10 mg oral tablet: 1 tab(s) orally once a day (22 Jul 2020 11:34)  ocular lubricant ophthalmic solution: 1 drop(s) to each affected eye every 2 hours, As needed, Dry Eyes (22 Jul 2020 11:34)  omeprazole 40 mg oral delayed release capsule: 1 cap(s) orally once a day (22 Jul 2020 11:34)  senna oral tablet: 2 tab(s) orally once a day (at bedtime) (22 Jul 2020 11:34)    FAMILY HISTORY:  Family history of coronary artery disease (Mother, Sibling)    PAST MEDICAL & SURGICAL HISTORY:  Atherosclerotic heart disease  Atrial fibrillation/flutter  Aortic valve stenosis, etiology of cardiac valve disease unspecified  Lupus (systemic lupus erythematosus)  Trigeminal neuralgia  Sjogren's syndrome, with unspecified organ involvement  GERD without esophagitis  Vitamin D deficiency  Constipation, unspecified constipation type  Rectal prolapse  Hypertension, unspecified type  Malignant neoplasm of lower-outer quadrant of left breast of female, estrogen receptor positive  History of percutaneous coronary intervention  History of appendectomy  H/O bilateral mastectomy  History of partial colectomy    Interval event for past 24 hr:  CLAYTON VELAZQUEZ  75y had no event.   Current Complains:  CLAYTON VELAZQUEZ has no new complains  Allergies    No Known Allergies    Intolerances      OBJECTIVE:  Vitals last 24 hrs  T(C): 36.3 (07-24-20 @ 04:00), Max: 36.9 (07-23-20 @ 12:00)  T(F): 97.4 (07-24-20 @ 04:00), Max: 98.4 (07-23-20 @ 12:00)  HR: 74 (07-24-20 @ 07:00) (66 - 95)  BP: 125/58 (07-24-20 @ 07:00) (96/47 - 159/67)  ABP: 96/41 (07-23-20 @ 11:00) (96/41 - 127/67)  ABP(mean): 60 (07-23-20 @ 11:00) (60 - 91)  RR: 16 (07-23-20 @ 23:00) (14 - 35)  SpO2: 95% (07-24-20 @ 07:00) (93% - 100%)  CVP(mm Hg): --  CI: --  PA: --  PA(direct): --  PCWP: --  SVR: --  PVR: --    07-23-20 @ 07:01  -  07-24-20 @ 07:00  --------------------------------------------------------  IN:    nitroglycerin  Infusion: 80 mL    Oral Fluid: 600 mL    sodium chloride 0.9%.: 80 mL  Total IN: 760 mL    OUT:    Voided: 1600 mL  Total OUT: 1600 mL    Total NET: -840 mL          CAPILLARY BLOOD GLUCOSE        LABS:  ABG - ( 23 Jul 2020 10:41 )  pH, Arterial: 7.44  pH, Blood: x     /  pCO2: 39    /  pO2: 72    / HCO3: 26    / Base Excess: 2.2   /  SaO2: 96              Blood Gas Arterial, Lactate: 1.6 mmoL/L (07-23-20 @ 10:41)                          8.8    8.86  )-----------( 197      ( 24 Jul 2020 04:10 )             28.7     Hemoglobin: 8.8 g/dL (07-24 @ 04:10)  Hemoglobin: 8.5 g/dL (07-23 @ 03:40)  Hemoglobin: 8.6 g/dL (07-22 @ 13:30)    07-24    139  |  103  |  12  ----------------------------<  91  4.6   |  24  |  0.7    Ca    8.0<L>      24 Jul 2020 04:10  Mg     2.3     07-24    TPro  5.9<L>  /  Alb  3.5  /  TBili  0.4  /  DBili  x   /  AST  25  /  ALT  13  /  AlkPhos  61  07-24    Creatinine, Serum: 0.7 mg/dL (07-24 @ 04:10)  Creatinine, Serum: 0.6 mg/dL (07-23 @ 03:40)  Creatinine, Serum: 0.6 mg/dL (07-22 @ 14:00)    PT/INR - ( 24 Jul 2020 04:10 )   PT: 14.20 sec;   INR: 1.23 ratio         PTT - ( 24 Jul 2020 04:10 )  PTT:30.6 sec      HOSPITAL MEDICATIONS:  MEDICATIONS  (STANDING):  ALPRAZolam 1 milliGRAM(s) Oral at bedtime  apixaban 5 milliGRAM(s) Oral two times a day  aspirin enteric coated 81 milliGRAM(s) Oral daily  atorvastatin 80 milliGRAM(s) Oral at bedtime  budesonide 160 MICROgram(s)/formoterol 4.5 MICROgram(s) Inhaler 2 Puff(s) Inhalation two times a day  chlorhexidine 4% Liquid 1 Application(s) Topical <User Schedule>  clopidogrel Tablet 75 milliGRAM(s) Oral daily  dextrose 50% Injectable 50 milliLiter(s) IV Push every 15 minutes  dextrose 50% Injectable 25 milliLiter(s) IV Push every 15 minutes  diclofenac 50 milliGRAM(s) Oral two times a day  famotidine    Tablet 20 milliGRAM(s) Oral two times a day  furosemide    Tablet 40 milliGRAM(s) Oral daily  guaiFENesin  milliGRAM(s) Oral every 12 hours  magnesium sulfate  IVPB 1 Gram(s) IV Intermittent every 12 hours  metoprolol tartrate 25 milliGRAM(s) Oral three times a day  mirtazapine 30 milliGRAM(s) Oral daily  montelukast 10 milliGRAM(s) Oral daily  mupirocin 2% Nasal 1 Application(s) Both Nostrils every 12 hours  nitroglycerin  Infusion 5 MICROgram(s)/Min (1.5 mL/Hr) IV Continuous <Continuous>  polyethylene glycol 3350 17 Gram(s) Oral daily  pregabalin 150 milliGRAM(s) Oral every 12 hours  sertraline 50 milliGRAM(s) Oral daily  sodium chloride 0.9%. 1000 milliLiter(s) (20 mL/Hr) IV Continuous <Continuous>  tiotropium 18 MICROgram(s) Capsule 1 Capsule(s) Inhalation daily  traZODone 50 milliGRAM(s) Oral daily    MEDICATIONS  (PRN):  ondansetron  IVPB 4 milliGRAM(s) IV Intermittent once PRN Nausea and/or Vomiting  oxyCODONE    IR 5 milliGRAM(s) Oral every 4 hours PRN Moderate Pain (4 - 6)      REVIEW OF SYSTEMS:  CONSTITUTIONAL: [X] all negative; [ ] weakness, [ ] fevers, [ ] chills  EYES/ENT: [X] all negative; [ ] visual changes, [ ] vertigo, [ ] throat pain, [ ] eye pain  NECK: [X] all negative; [ ] pain, [ ] stiffness  RESPIRATORY: [ ] all negative, [ ] cough, [ ] wheezing, [ ] hemoptysis, [ ] shortness of breath, [  ] chest pain  CARDIOVASCULAR: [ ] all negative; [ ] anginal chest pain, [ ] palpitations, [ ] orthopnea  GASTROINTESTINAL: [X] all negative; [ ]abdominal pain, [ ] nausea, [ ] vomiting, [ ] hematemesis, [ ] diarrhea, [ ] constipation, [ ] melena, [ ] hematochezia.  GENITOURINARY: [X] all negative; [ ] dysuria, [ ] frequency, [ ] hematuria  NEUROLOGICAL: [X] all negative; [ ] numbness, [ ] weakness, [ ] paresthesias  MUSCULOSKELETAL: [X] all negative, [ ] joint pain, [ ] joint swelling, [ ] joint redness, [ ] bone pain  SKIN: [X] all negative; [ ] itching, [ ] burning, [ ] rashes, [ ] lesions   All other review of systems is negative unless indicated above.    [  ] Unable to assess ROS because     PHYSICAL EXAM:          CONSTITUTIONAL: Well-developed; well-nourished; in no acute distress.   	SKIN: warm, dry, no rashes or lesions  	HEENT: Atraumatic. Normocephalic. PERRL. Moist membranes, no conjunctival injection, sclera clear  	NECK: Supple; non tender.  No JVD. No lymphadenopathy.  	CARD: Normal S1, S2. Rate and Rhythm are regular. No murmurs.  	RESP: Good air entry bilaterally, no wheezes, no rales no rhonchi.  	ABD: Soft, not tender, not distended, no CVA tendernass, no rebound no guarding, bowel sounds present  	EXT: Normal ROM.  No clubbing, no cyanosis, no pedal edema, no calf pain b/l, Peripheral pulses intact.  	LYMPH: No acute cervical adenopathy.  	NEURO: Alert, awake, motor 5/5 R, 5/5 L, sensation intact bilat, CN 2-12 intact,          PSYCH: Cooperative, appropriate. Alert & oriented x 3    RADIOLOGY:  X Reviewed and interpreted by me  CxR from 07-24-20 shows mild congestion, no pneumothorax, no effusion, no cardiomegaly,   ETT above perez in good position, NGT in place, TLC in place, S-G Catheter in place, Chest Tubes in place,     ECG:  X Reviewed and interpreted by me CTU Attending Progress Daily Note     24 Jul 2020 07:52  Admited 07-22-20, Hospital Day 2d  POD# - 2    HPI:  74 yo female, Known to CTS,  with a pmh of aortic valve stenosis, CAD stent x4 6/15, HTN, GERD, SLE, trigeminal neuralgia, breast CA s/p rodrick mastectomy, and Sjogren's syndrome presents today for elective TAVR. pt states her SOB has been gradually worsening over the past 4 weeks following the stent placement and was admitted to hospital about 1 week ago with exacerbation of heart failure. During admission had atrial fib/ flutter also. (22 Jul 2020 06:39)    Home Medications:  ALPRAZolam 0.5 mg oral tablet: 2 tab(s) orally once a day (at bedtime), As Needed (22 Jul 2020 11:34)  docusate sodium 100 mg oral capsule: 1 cap(s) orally 2 times a day (22 Jul 2020 11:34)  montelukast 10 mg oral tablet: 1 tab(s) orally once a day (22 Jul 2020 11:34)  ocular lubricant ophthalmic solution: 1 drop(s) to each affected eye every 2 hours, As needed, Dry Eyes (22 Jul 2020 11:34)  omeprazole 40 mg oral delayed release capsule: 1 cap(s) orally once a day (22 Jul 2020 11:34)  senna oral tablet: 2 tab(s) orally once a day (at bedtime) (22 Jul 2020 11:34)    FAMILY HISTORY:  Family history of coronary artery disease (Mother, Sibling)    PAST MEDICAL & SURGICAL HISTORY:  Atherosclerotic heart disease  Atrial fibrillation/flutter  Aortic valve stenosis, etiology of cardiac valve disease unspecified  Lupus (systemic lupus erythematosus)  Trigeminal neuralgia  Sjogren's syndrome, with unspecified organ involvement  GERD without esophagitis  Vitamin D deficiency  Constipation, unspecified constipation type  Rectal prolapse  Hypertension, unspecified type  Malignant neoplasm of lower-outer quadrant of left breast of female, estrogen receptor positive  History of percutaneous coronary intervention  History of appendectomy  H/O bilateral mastectomy  History of partial colectomy    Interval event for past 24 hr:  CLAYTON VELAZQUEZ  75y had no event.   Current Complains:  CLAYTON VELAZQUEZ has no new complains  Allergies    No Known Allergies    Intolerances      OBJECTIVE:  Vitals last 24 hrs  T(C): 36.3 (07-24-20 @ 04:00), Max: 36.9 (07-23-20 @ 12:00)  T(F): 97.4 (07-24-20 @ 04:00), Max: 98.4 (07-23-20 @ 12:00)  HR: 74 (07-24-20 @ 07:00) (66 - 95)  BP: 125/58 (07-24-20 @ 07:00) (96/47 - 159/67)  ABP: 96/41 (07-23-20 @ 11:00) (96/41 - 127/67)  ABP(mean): 60 (07-23-20 @ 11:00) (60 - 91)  RR: 16 (07-23-20 @ 23:00) (14 - 35)  SpO2: 95% (07-24-20 @ 07:00) (93% - 100%)      07-23-20 @ 07:01  -  07-24-20 @ 07:00  --------------------------------------------------------  IN:    nitroglycerin  Infusion: 80 mL    Oral Fluid: 600 mL    sodium chloride 0.9%.: 80 mL  Total IN: 760 mL    OUT:    Voided: 1600 mL  Total OUT: 1600 mL    Total NET: -840 mL          CAPILLARY BLOOD GLUCOSE        LABS:  ABG - ( 23 Jul 2020 10:41 )  pH, Arterial: 7.44  pH, Blood: x     /  pCO2: 39    /  pO2: 72    / HCO3: 26    / Base Excess: 2.2   /  SaO2: 96              Blood Gas Arterial, Lactate: 1.6 mmoL/L (07-23-20 @ 10:41)                          8.8    8.86  )-----------( 197      ( 24 Jul 2020 04:10 )             28.7     Hemoglobin: 8.8 g/dL (07-24 @ 04:10)  Hemoglobin: 8.5 g/dL (07-23 @ 03:40)  Hemoglobin: 8.6 g/dL (07-22 @ 13:30)    07-24    139  |  103  |  12  ----------------------------<  91  4.6   |  24  |  0.7    Ca    8.0<L>      24 Jul 2020 04:10  Mg     2.3     07-24    TPro  5.9<L>  /  Alb  3.5  /  TBili  0.4  /  DBili  x   /  AST  25  /  ALT  13  /  AlkPhos  61  07-24    Creatinine, Serum: 0.7 mg/dL (07-24 @ 04:10)  Creatinine, Serum: 0.6 mg/dL (07-23 @ 03:40)  Creatinine, Serum: 0.6 mg/dL (07-22 @ 14:00)    PT/INR - ( 24 Jul 2020 04:10 )   PT: 14.20 sec;   INR: 1.23 ratio     PTT - ( 24 Jul 2020 04:10 )  PTT:30.6 sec      HOSPITAL MEDICATIONS:  MEDICATIONS  (STANDING):  ALPRAZolam 1 milliGRAM(s) Oral at bedtime  apixaban 5 milliGRAM(s) Oral two times a day  aspirin enteric coated 81 milliGRAM(s) Oral daily  atorvastatin 80 milliGRAM(s) Oral at bedtime  budesonide 160 MICROgram(s)/formoterol 4.5 MICROgram(s) Inhaler 2 Puff(s) Inhalation two times a day  chlorhexidine 4% Liquid 1 Application(s) Topical <User Schedule>  clopidogrel Tablet 75 milliGRAM(s) Oral daily  dextrose 50% Injectable 50 milliLiter(s) IV Push every 15 minutes  dextrose 50% Injectable 25 milliLiter(s) IV Push every 15 minutes  diclofenac 50 milliGRAM(s) Oral two times a day  famotidine    Tablet 20 milliGRAM(s) Oral two times a day  furosemide    Tablet 40 milliGRAM(s) Oral daily  guaiFENesin  milliGRAM(s) Oral every 12 hours  magnesium sulfate  IVPB 1 Gram(s) IV Intermittent every 12 hours  metoprolol tartrate 25 milliGRAM(s) Oral three times a day  mirtazapine 30 milliGRAM(s) Oral daily  montelukast 10 milliGRAM(s) Oral daily  mupirocin 2% Nasal 1 Application(s) Both Nostrils every 12 hours  nitroglycerin  Infusion 5 MICROgram(s)/Min (1.5 mL/Hr) IV Continuous <Continuous>  polyethylene glycol 3350 17 Gram(s) Oral daily  pregabalin 150 milliGRAM(s) Oral every 12 hours  sertraline 50 milliGRAM(s) Oral daily  sodium chloride 0.9%. 1000 milliLiter(s) (20 mL/Hr) IV Continuous <Continuous>  tiotropium 18 MICROgram(s) Capsule 1 Capsule(s) Inhalation daily  traZODone 50 milliGRAM(s) Oral daily    MEDICATIONS  (PRN):  ondansetron  IVPB 4 milliGRAM(s) IV Intermittent once PRN Nausea and/or Vomiting  oxyCODONE    IR 5 milliGRAM(s) Oral every 4 hours PRN Moderate Pain (4 - 6)      REVIEW OF SYSTEMS:  CONSTITUTIONAL: [X] all negative; [ ] weakness, [ ] fevers, [ ] chills  EYES/ENT: [X] all negative; [ ] visual changes, [ ] vertigo, [ ] throat pain, [ ] eye pain  NECK: [X] all negative; [ ] pain, [ ] stiffness  RESPIRATORY: [x ] all negative, [ ] cough, [ ] wheezing, [ ] hemoptysis, [ ] shortness of breath, [  ] chest pain  CARDIOVASCULAR: [x ] all negative; [ ] anginal chest pain, [ ] palpitations, [ ] orthopnea  GASTROINTESTINAL: [X] all negative; [ ]abdominal pain, [ ] nausea, [ ] vomiting, [ ] hematemesis, [ ] diarrhea, [ ] constipation, [ ] melena, [ ] hematochezia.  GENITOURINARY: [X] all negative; [ ] dysuria, [ ] frequency, [ ] hematuria  NEUROLOGICAL: [X] all negative; [ ] numbness, [ ] weakness, [ ] paresthesias  MUSCULOSKELETAL: [X] all negative, [ ] joint pain, [ ] joint swelling, [ ] joint redness, [ ] bone pain  SKIN: [X] all negative; [ ] itching, [ ] burning, [ ] rashes, [ ] lesions   All other review of systems is negative unless indicated above.    [  ] Unable to assess ROS because     PHYSICAL EXAM:          CONSTITUTIONAL: Well-developed; well-nourished; in no acute distress.   	SKIN: warm, dry, no rashes or lesions  	HEENT: Atraumatic. Normocephalic. PERRL. Moist membranes, no conjunctival injection, sclera clear  	NECK: Supple; non tender.  No JVD. No lymphadenopathy.  	CARD: Normal S1, S2. Rate and Rhythm are regular. 1/6 murmur.  	RESP: Good air entry bilaterally, no wheezes, no rales no rhonchi.  	ABD: Soft, not tender, not distended, no CVA tendernass, no rebound no guarding, bowel sounds present  	EXT: Normal ROM.  No clubbing, no cyanosis, no pedal edema, no calf pain b/l, Peripheral pulses intact.  	LYMPH: No acute cervical adenopathy.  	NEURO: Alert, awake, motor 5/5 R, 5/5 L, sensation intact bilat, CN 2-12 intact,          PSYCH: Cooperative, appropriate. Alert & oriented x 3    RADIOLOGY:  X Reviewed and interpreted by me  CxR from 07-24-20 shows mild congestion, no pneumothorax, no effusion, no cardiomegaly,     ECG:  X Reviewed and interpreted by me - NSR - 69, QTc - 514, LBBB    < from: Transthoracic Echocardiogram (07.23.20 @ 07:25) >  Summary:   1. Left ventricular ejection fraction, by visual estimation, is 60 to 65%.   2. Mildly increased LV wall thickness.   3. Spectral Doppler shows impaired relaxation pattern of left ventricular myocardial filling (Grade I diastolic dysfunction).   4. Mild aortic paravalvular regurgitation.   5. TAVR in the aortic position.   6. There is severe aortic root calcification.   7. Mitral annular calcification.   8. Mild thickening and calcification of the anterior and posterior mitral valve leaflets.   9. Mild mitral valve regurgitation.    < end of copied text >

## 2020-07-25 ENCOUNTER — TRANSCRIPTION ENCOUNTER (OUTPATIENT)
Age: 75
End: 2020-07-25

## 2020-07-26 NOTE — ASSESSMENT
[FreeTextEntry1] : Pt is a 75 y/o, female, with valvular disease of aortic stenosis, indicated on echo 12/2019 with a PV 4.72 / JEET 0.8 / MG 52.5 with EF 75%, mild MR, mid-mod TR, severe AS - stage D1, chronic diastolic HF, NYHA II, sev pHTN 63.5 mmHg, with a PMHx of Sjoren syndrome (autoimmune Dx 5-6 years ago), lupus, breast CA s/p b/l mastectomy 2010 in [facility]- no chemo, no radiation , TIAs ( 2007, 2012, 2013), Trigeminal neuralgia, smoker 1 pack day/50 years. s/p BAV PCI.\par Stop Eliquis Monday\par Start Aspirin 81mg\par TAVR Wednesday

## 2020-07-26 NOTE — REASON FOR VISIT
[Follow-Up - Clinic] : a clinic follow-up of [Aortic Stenosis] : aortic stenosis [Heart Failure] : congestive heart failure [FreeTextEntry2] : discuss plan of care  [FreeTextEntry1] : Pt is a 74 y/o, female, with valvular disease of aortic stenosis, indicated on echo 12/2019 with a PV 4.72 / JEET 0.8 / MG 52.5 with EF 75%, mild MR, mid-mod TR, severe AS - stage D1, chronic diastolic HF, NYHA II, sev pHTN 63.5 mmHg, with a PMHx of Sjoren syndrome (autoimmune Dx 5-6 years ago), lupus, breast CA s/p b/l mastectomy 2010 in [facility]- no chemo, no radiation , TIAs ( 2007, 2012, 2013), Trigeminal neuralgia, smoker 1 pack day/50 years.  SHe arrives today prior to TAVR and medication instruction. \par \par Pt presents s/p BAV PCI.\par \par CTA completed:\par Access b/l 4mm\par Carotid \par 40-59 % BRIANDA\par 60- 79 % LICA\par \par PFTs - done

## 2020-07-26 NOTE — END OF VISIT
[FreeTextEntry3] : Interval hospitalization for AF.  Eliquis started.  Brilinta changed to Plavix.  ASA stopped.\par Stable since discharge.\par COPD may be better off beta-blocker and Brilinta.\par Remains compensated.\par \par TAVR as planned.\par Hold Eliquis for procedure.  Will resume DAPT perioperatively given recent PCI.

## 2020-07-27 LAB — NT-PROBNP SERPL-MCNC: 1221 PG/ML

## 2020-07-27 RX ORDER — TICAGRELOR 90 MG/1
90 TABLET ORAL
Refills: 0 | Status: DISCONTINUED | COMMUNITY
End: 2020-07-27

## 2020-07-27 RX ORDER — ALPRAZOLAM 1 MG/1
1 TABLET ORAL
Refills: 0 | Status: ACTIVE | COMMUNITY

## 2020-07-27 RX ORDER — PREGABALIN 150 MG/1
150 CAPSULE ORAL
Refills: 0 | Status: DISCONTINUED | COMMUNITY
End: 2020-07-27

## 2020-07-27 RX ORDER — MUPIROCIN 20 MG/G
2 OINTMENT TOPICAL
Qty: 1 | Refills: 0 | Status: DISCONTINUED | COMMUNITY
Start: 2020-07-21 | End: 2020-07-27

## 2020-07-27 RX ORDER — MONTELUKAST 10 MG/1
10 TABLET, FILM COATED ORAL DAILY
Refills: 0 | Status: ACTIVE | COMMUNITY

## 2020-07-27 RX ORDER — AMLODIPINE BESYLATE 10 MG/1
10 TABLET ORAL
Refills: 0 | Status: DISCONTINUED | COMMUNITY
End: 2020-07-27

## 2020-07-27 RX ORDER — ESCITALOPRAM OXALATE 10 MG/1
10 TABLET, FILM COATED ORAL
Refills: 0 | Status: DISCONTINUED | COMMUNITY
End: 2020-07-27

## 2020-07-27 RX ORDER — ATORVASTATIN CALCIUM 80 MG/1
80 TABLET, FILM COATED ORAL
Qty: 30 | Refills: 0 | Status: ACTIVE | COMMUNITY
Start: 2020-07-27 | End: 1900-01-01

## 2020-07-27 RX ORDER — MIRTAZAPINE 30 MG/1
30 TABLET, FILM COATED ORAL
Refills: 0 | Status: DISCONTINUED | COMMUNITY
End: 2020-07-27

## 2020-07-27 RX ORDER — TRAZODONE HYDROCHLORIDE 50 MG/1
50 TABLET ORAL
Refills: 0 | Status: ACTIVE | COMMUNITY
Start: 2020-07-27

## 2020-07-27 RX ORDER — ROSUVASTATIN CALCIUM 40 MG/1
40 TABLET, FILM COATED ORAL
Refills: 0 | Status: DISCONTINUED | COMMUNITY
End: 2020-07-27

## 2020-07-27 RX ORDER — DICLOFENAC SODIUM 50 MG/1
50 TABLET, DELAYED RELEASE ORAL
Refills: 0 | Status: DISCONTINUED | COMMUNITY
End: 2020-07-27

## 2020-07-27 RX ORDER — BUTALBITAL, ASPIRIN AND CAFFEINE 50; 325; 40 MG/1; MG/1; MG/1
TABLET ORAL
Refills: 0 | Status: DISCONTINUED | COMMUNITY
End: 2020-07-27

## 2020-07-29 DIAGNOSIS — I11.0 HYPERTENSIVE HEART DISEASE WITH HEART FAILURE: ICD-10-CM

## 2020-07-29 DIAGNOSIS — M35.00 SJOGREN SYNDROME, UNSPECIFIED: ICD-10-CM

## 2020-07-29 DIAGNOSIS — I25.10 ATHEROSCLEROTIC HEART DISEASE OF NATIVE CORONARY ARTERY WITHOUT ANGINA PECTORIS: ICD-10-CM

## 2020-07-29 DIAGNOSIS — E55.9 VITAMIN D DEFICIENCY, UNSPECIFIED: ICD-10-CM

## 2020-07-29 DIAGNOSIS — I48.91 UNSPECIFIED ATRIAL FIBRILLATION: ICD-10-CM

## 2020-07-29 DIAGNOSIS — Z90.49 ACQUIRED ABSENCE OF OTHER SPECIFIED PARTS OF DIGESTIVE TRACT: ICD-10-CM

## 2020-07-29 DIAGNOSIS — Z85.3 PERSONAL HISTORY OF MALIGNANT NEOPLASM OF BREAST: ICD-10-CM

## 2020-07-29 DIAGNOSIS — G50.0 TRIGEMINAL NEURALGIA: ICD-10-CM

## 2020-07-29 DIAGNOSIS — Z95.5 PRESENCE OF CORONARY ANGIOPLASTY IMPLANT AND GRAFT: ICD-10-CM

## 2020-07-29 DIAGNOSIS — I50.32 CHRONIC DIASTOLIC (CONGESTIVE) HEART FAILURE: ICD-10-CM

## 2020-07-29 DIAGNOSIS — Z90.13 ACQUIRED ABSENCE OF BILATERAL BREASTS AND NIPPLES: ICD-10-CM

## 2020-07-29 DIAGNOSIS — K59.00 CONSTIPATION, UNSPECIFIED: ICD-10-CM

## 2020-07-29 DIAGNOSIS — I44.7 LEFT BUNDLE-BRANCH BLOCK, UNSPECIFIED: ICD-10-CM

## 2020-07-29 DIAGNOSIS — M32.9 SYSTEMIC LUPUS ERYTHEMATOSUS, UNSPECIFIED: ICD-10-CM

## 2020-07-29 DIAGNOSIS — K21.9 GASTRO-ESOPHAGEAL REFLUX DISEASE WITHOUT ESOPHAGITIS: ICD-10-CM

## 2020-07-29 DIAGNOSIS — I65.29 OCCLUSION AND STENOSIS OF UNSPECIFIED CAROTID ARTERY: ICD-10-CM

## 2020-07-29 DIAGNOSIS — Z82.49 FAMILY HISTORY OF ISCHEMIC HEART DISEASE AND OTHER DISEASES OF THE CIRCULATORY SYSTEM: ICD-10-CM

## 2020-07-29 DIAGNOSIS — I73.9 PERIPHERAL VASCULAR DISEASE, UNSPECIFIED: ICD-10-CM

## 2020-07-29 DIAGNOSIS — R09.02 HYPOXEMIA: ICD-10-CM

## 2020-07-29 DIAGNOSIS — F17.210 NICOTINE DEPENDENCE, CIGARETTES, UNCOMPLICATED: ICD-10-CM

## 2020-07-29 DIAGNOSIS — I48.92 UNSPECIFIED ATRIAL FLUTTER: ICD-10-CM

## 2020-07-31 ENCOUNTER — APPOINTMENT (OUTPATIENT)
Dept: CARDIOLOGY | Facility: CLINIC | Age: 75
End: 2020-07-31

## 2020-07-31 ENCOUNTER — TRANSCRIPTION ENCOUNTER (OUTPATIENT)
Age: 75
End: 2020-07-31

## 2020-07-31 ENCOUNTER — INPATIENT (INPATIENT)
Facility: HOSPITAL | Age: 75
LOS: 4 days | Discharge: HOME HEALTH | End: 2020-08-05
Attending: THORACIC SURGERY (CARDIOTHORACIC VASCULAR SURGERY) | Admitting: THORACIC SURGERY (CARDIOTHORACIC VASCULAR SURGERY)
Payer: MEDICARE

## 2020-07-31 VITALS
RESPIRATION RATE: 16 BRPM | OXYGEN SATURATION: 83 % | HEART RATE: 50 BPM | TEMPERATURE: 99 F | DIASTOLIC BLOOD PRESSURE: 49 MMHG | HEIGHT: 66 IN | SYSTOLIC BLOOD PRESSURE: 93 MMHG

## 2020-07-31 DIAGNOSIS — Z98.890 OTHER SPECIFIED POSTPROCEDURAL STATES: Chronic | ICD-10-CM

## 2020-07-31 DIAGNOSIS — Z90.49 ACQUIRED ABSENCE OF OTHER SPECIFIED PARTS OF DIGESTIVE TRACT: Chronic | ICD-10-CM

## 2020-07-31 DIAGNOSIS — Z98.61 CORONARY ANGIOPLASTY STATUS: Chronic | ICD-10-CM

## 2020-07-31 LAB
ALBUMIN SERPL ELPH-MCNC: 3.8 G/DL — SIGNIFICANT CHANGE UP (ref 3.5–5.2)
ALP SERPL-CCNC: 72 U/L — SIGNIFICANT CHANGE UP (ref 30–115)
ALT FLD-CCNC: 9 U/L — SIGNIFICANT CHANGE UP (ref 0–41)
ANION GAP SERPL CALC-SCNC: 11 MMOL/L — SIGNIFICANT CHANGE UP (ref 7–14)
APTT BLD: 30.2 SEC — SIGNIFICANT CHANGE UP (ref 27–39.2)
AST SERPL-CCNC: 15 U/L — SIGNIFICANT CHANGE UP (ref 0–41)
BASE EXCESS BLDV CALC-SCNC: -2.7 MMOL/L — LOW (ref -2–2)
BASOPHILS # BLD AUTO: 0.04 K/UL — SIGNIFICANT CHANGE UP (ref 0–0.2)
BASOPHILS NFR BLD AUTO: 0.6 % — SIGNIFICANT CHANGE UP (ref 0–1)
BILIRUB SERPL-MCNC: 0.3 MG/DL — SIGNIFICANT CHANGE UP (ref 0.2–1.2)
BLD GP AB SCN SERPL QL: SIGNIFICANT CHANGE UP
BUN SERPL-MCNC: 9 MG/DL — LOW (ref 10–20)
CA-I SERPL-SCNC: 1.06 MMOL/L — LOW (ref 1.12–1.3)
CALCIUM SERPL-MCNC: 7.9 MG/DL — LOW (ref 8.5–10.1)
CHLORIDE SERPL-SCNC: 100 MMOL/L — SIGNIFICANT CHANGE UP (ref 98–110)
CO2 SERPL-SCNC: 21 MMOL/L — SIGNIFICANT CHANGE UP (ref 17–32)
CREAT SERPL-MCNC: 0.7 MG/DL — SIGNIFICANT CHANGE UP (ref 0.7–1.5)
EOSINOPHIL # BLD AUTO: 0.2 K/UL — SIGNIFICANT CHANGE UP (ref 0–0.7)
EOSINOPHIL NFR BLD AUTO: 2.8 % — SIGNIFICANT CHANGE UP (ref 0–8)
GAS PNL BLDV: 128 MMOL/L — LOW (ref 136–145)
GAS PNL BLDV: SIGNIFICANT CHANGE UP
GLUCOSE SERPL-MCNC: 101 MG/DL — HIGH (ref 70–99)
HCO3 BLDV-SCNC: 22 MMOL/L — SIGNIFICANT CHANGE UP (ref 22–29)
HCT VFR BLD CALC: 26.2 % — LOW (ref 37–47)
HCT VFR BLDA CALC: 40.5 % — SIGNIFICANT CHANGE UP (ref 34–44)
HGB BLD CALC-MCNC: 13.2 G/DL — LOW (ref 14–18)
HGB BLD-MCNC: 7.9 G/DL — LOW (ref 12–16)
IMM GRANULOCYTES NFR BLD AUTO: 0.4 % — HIGH (ref 0.1–0.3)
INR BLD: 1.4 RATIO — HIGH (ref 0.65–1.3)
LACTATE BLDV-MCNC: 1.2 MMOL/L — SIGNIFICANT CHANGE UP (ref 0.5–1.6)
LYMPHOCYTES # BLD AUTO: 1.87 K/UL — SIGNIFICANT CHANGE UP (ref 1.2–3.4)
LYMPHOCYTES # BLD AUTO: 26.3 % — SIGNIFICANT CHANGE UP (ref 20.5–51.1)
MCHC RBC-ENTMCNC: 26 PG — LOW (ref 27–31)
MCHC RBC-ENTMCNC: 30.2 G/DL — LOW (ref 32–37)
MCV RBC AUTO: 86.2 FL — SIGNIFICANT CHANGE UP (ref 81–99)
MONOCYTES # BLD AUTO: 0.77 K/UL — HIGH (ref 0.1–0.6)
MONOCYTES NFR BLD AUTO: 10.8 % — HIGH (ref 1.7–9.3)
NEUTROPHILS # BLD AUTO: 4.19 K/UL — SIGNIFICANT CHANGE UP (ref 1.4–6.5)
NEUTROPHILS NFR BLD AUTO: 59.1 % — SIGNIFICANT CHANGE UP (ref 42.2–75.2)
NRBC # BLD: 0 /100 WBCS — SIGNIFICANT CHANGE UP (ref 0–0)
PCO2 BLDV: 39 MMHG — LOW (ref 41–51)
PH BLDV: 7.37 — SIGNIFICANT CHANGE UP (ref 7.26–7.43)
PLATELET # BLD AUTO: 169 K/UL — SIGNIFICANT CHANGE UP (ref 130–400)
PO2 BLDV: 34 MMHG — SIGNIFICANT CHANGE UP (ref 20–40)
POTASSIUM BLDV-SCNC: 4.2 MMOL/L — SIGNIFICANT CHANGE UP (ref 3.3–5.6)
POTASSIUM SERPL-MCNC: 4.4 MMOL/L — SIGNIFICANT CHANGE UP (ref 3.5–5)
POTASSIUM SERPL-SCNC: 4.4 MMOL/L — SIGNIFICANT CHANGE UP (ref 3.5–5)
PROT SERPL-MCNC: 6 G/DL — SIGNIFICANT CHANGE UP (ref 6–8)
PROTHROM AB SERPL-ACNC: 16.1 SEC — HIGH (ref 9.95–12.87)
RBC # BLD: 3.04 M/UL — LOW (ref 4.2–5.4)
RBC # FLD: 16.8 % — HIGH (ref 11.5–14.5)
SAO2 % BLDV: 67 % — SIGNIFICANT CHANGE UP
SARS-COV-2 RNA SPEC QL NAA+PROBE: SIGNIFICANT CHANGE UP
SODIUM SERPL-SCNC: 132 MMOL/L — LOW (ref 135–146)
TROPONIN T SERPL-MCNC: <0.01 NG/ML — SIGNIFICANT CHANGE UP
WBC # BLD: 7.1 K/UL — SIGNIFICANT CHANGE UP (ref 4.8–10.8)
WBC # FLD AUTO: 7.1 K/UL — SIGNIFICANT CHANGE UP (ref 4.8–10.8)

## 2020-07-31 PROCEDURE — 93306 TTE W/DOPPLER COMPLETE: CPT | Mod: 26

## 2020-07-31 PROCEDURE — 99291 CRITICAL CARE FIRST HOUR: CPT

## 2020-07-31 PROCEDURE — 71045 X-RAY EXAM CHEST 1 VIEW: CPT | Mod: 26

## 2020-07-31 PROCEDURE — 93010 ELECTROCARDIOGRAM REPORT: CPT

## 2020-07-31 PROCEDURE — 99223 1ST HOSP IP/OBS HIGH 75: CPT

## 2020-07-31 RX ORDER — MONTELUKAST 4 MG/1
10 TABLET, CHEWABLE ORAL DAILY
Refills: 0 | Status: DISCONTINUED | OUTPATIENT
Start: 2020-07-31 | End: 2020-08-05

## 2020-07-31 RX ORDER — BUDESONIDE AND FORMOTEROL FUMARATE DIHYDRATE 160; 4.5 UG/1; UG/1
2 AEROSOL RESPIRATORY (INHALATION)
Refills: 0 | Status: DISCONTINUED | OUTPATIENT
Start: 2020-07-31 | End: 2020-08-05

## 2020-07-31 RX ORDER — SERTRALINE 25 MG/1
50 TABLET, FILM COATED ORAL DAILY
Refills: 0 | Status: DISCONTINUED | OUTPATIENT
Start: 2020-07-31 | End: 2020-08-05

## 2020-07-31 RX ORDER — CALCIUM GLUCONATE 100 MG/ML
1 VIAL (ML) INTRAVENOUS ONCE
Refills: 0 | Status: COMPLETED | OUTPATIENT
Start: 2020-07-31 | End: 2020-07-31

## 2020-07-31 RX ORDER — CLOPIDOGREL BISULFATE 75 MG/1
75 TABLET, FILM COATED ORAL DAILY
Refills: 0 | Status: DISCONTINUED | OUTPATIENT
Start: 2020-07-31 | End: 2020-08-01

## 2020-07-31 RX ORDER — ATROPINE SULFATE 0.1 MG/ML
0.5 SYRINGE (ML) INJECTION ONCE
Refills: 0 | Status: COMPLETED | OUTPATIENT
Start: 2020-07-31 | End: 2020-07-31

## 2020-07-31 RX ORDER — SODIUM CHLORIDE 9 MG/ML
250 INJECTION, SOLUTION INTRAVENOUS ONCE
Refills: 0 | Status: COMPLETED | OUTPATIENT
Start: 2020-07-31 | End: 2020-07-31

## 2020-07-31 RX ORDER — PANTOPRAZOLE SODIUM 20 MG/1
40 TABLET, DELAYED RELEASE ORAL
Refills: 0 | Status: DISCONTINUED | OUTPATIENT
Start: 2020-07-31 | End: 2020-08-05

## 2020-07-31 RX ORDER — ATORVASTATIN CALCIUM 80 MG/1
80 TABLET, FILM COATED ORAL AT BEDTIME
Refills: 0 | Status: DISCONTINUED | OUTPATIENT
Start: 2020-07-31 | End: 2020-08-05

## 2020-07-31 RX ORDER — TIOTROPIUM BROMIDE 18 UG/1
1 CAPSULE ORAL; RESPIRATORY (INHALATION) DAILY
Refills: 0 | Status: DISCONTINUED | OUTPATIENT
Start: 2020-07-31 | End: 2020-08-05

## 2020-07-31 RX ORDER — APIXABAN 2.5 MG/1
5 TABLET, FILM COATED ORAL
Refills: 0 | Status: DISCONTINUED | OUTPATIENT
Start: 2020-07-31 | End: 2020-08-01

## 2020-07-31 RX ORDER — CHLORHEXIDINE GLUCONATE 213 G/1000ML
1 SOLUTION TOPICAL
Refills: 0 | Status: DISCONTINUED | OUTPATIENT
Start: 2020-07-31 | End: 2020-08-05

## 2020-07-31 RX ADMIN — APIXABAN 5 MILLIGRAM(S): 2.5 TABLET, FILM COATED ORAL at 21:06

## 2020-07-31 RX ADMIN — SODIUM CHLORIDE 250 MILLILITER(S): 9 INJECTION, SOLUTION INTRAVENOUS at 13:34

## 2020-07-31 RX ADMIN — ATORVASTATIN CALCIUM 80 MILLIGRAM(S): 80 TABLET, FILM COATED ORAL at 21:06

## 2020-07-31 RX ADMIN — SODIUM CHLORIDE 250 MILLILITER(S): 9 INJECTION, SOLUTION INTRAVENOUS at 12:53

## 2020-07-31 RX ADMIN — Medication 100 GRAM(S): at 12:52

## 2020-07-31 RX ADMIN — BUDESONIDE AND FORMOTEROL FUMARATE DIHYDRATE 2 PUFF(S): 160; 4.5 AEROSOL RESPIRATORY (INHALATION) at 21:07

## 2020-07-31 RX ADMIN — Medication 0.5 MILLIGRAM(S): at 12:53

## 2020-07-31 RX ADMIN — Medication 1 GRAM(S): at 13:05

## 2020-07-31 NOTE — CONSULT NOTE ADULT - SUBJECTIVE AND OBJECTIVE BOX
Patient is a 75y old  Female who presents with a chief complaint of weakness (31 Jul 2020 13:10)    HPI: Patient is a 76 yo F with hx of severe AS sp TAVR 7/22, CAD sp PCI with LINDA to LM, LCx and LAD, AF/AFL on Eliquis, SLE, Sjogrens, trigeminal neuralgia, GERD, HTN, breast CA, HLD sent to the ED by Dr Johnson for sinus pauses of 5 seconds on MCOT. Pt was sent home with OT sp TAVR on 7/24 and sts she was feeling well until yesterday when she developed episodes of dizziness and near syncope with fatigue. Pt denies passing out at any time. In ED pt was hypotensive with bradycardia HR in 30s and was given Atropine, now HR ~55bpm. Pt admits to feeling weak and tired now. Not on any BB at home.    PAST MEDICAL & SURGICAL HISTORY:  Atherosclerotic heart disease  Atrial fibrillation/flutter  Aortic valve stenosis, etiology of cardiac valve disease unspecified  Lupus (systemic lupus erythematosus)  Trigeminal neuralgia  Sjogren's syndrome, with unspecified organ involvement  GERD without esophagitis  Vitamin D deficiency  Constipation, unspecified constipation type  Rectal prolapse  Hypertension, unspecified type  Malignant neoplasm of lower-outer quadrant of left breast of female, estrogen receptor positive  History of percutaneous coronary intervention  History of appendectomy  H/O bilateral mastectomy  History of partial colectomy      PREVIOUS DIAGNOSTIC TESTING:      ECHO  FINDINGS:  < from: Transthoracic Echocardiogram (07.23.20 @ 07:25) >  Summary:   1. Left ventricular ejection fraction, by visual estimation, is 60 to 65%.   2. Mildly increased LV wall thickness.   3. Spectral Doppler shows impaired relaxation pattern of left ventricular myocardial filling (Grade I diastolic dysfunction).   4. Mild aortic paravalvular regurgitation.   5. TAVR in the aortic position.   6. There is severe aortic root calcification.   7. Mitral annular calcification.   8. Mild thickening and calcification of the anterior and posterior mitral valve leaflets.   9. Mild mitral valve regurgitation.    STRESS  FINDINGS:    CATHETERIZATION  FINDINGS:  < from: Cardiac Cath Lab - Adult (06.15.20 @ 16:00) >    LESION #1 INTERVENTION: PCI of mid LAD.         --  Note, PCI was performed after BAV as outlined below.    The right femoral artery sheath was upsized to a 7F Raabe sheath over a    J-wire. The LCA was selectively engaged witha 7F JL3.5 guide, and the LAD    was wired with a Runthrough NS 180cm.         --  The stenosis was treated with cutting balloon angioplasty with a 2.5 X    6 [place] balloon.         --  The lesion was stented with a 2.75 X 12 Synergy RX stent.         --  The stent was post-dilated with a 3.0 x 8 NC Quantum [place] RX balloon    delivered with a 6F Guideliner secondary to difficulty advancing the    balloon. Subsequent angiography demonstrated a Guideliner related    dissection extending from the Left Main into the Circumflex.         LESION #2 INTERVENTION: PCI of Circumflex.         --  The Circumflex was wired with a BMW, and the OM2 was wired with an    [employer]ong[employer] Fielder.         --  The proximal CX was dilated with a 3.0 x 20RX Sprinter Legend balloon.         --  The mid to distal CX was dilated with a 2.5 x 20RX Sprinter Legend    balloon.         --  The mid to distal CX was stented with a 2.5 X 38 Synergy RX stent.         --  The proximal to mid CX was stented into the LM with a 3.0 X 24 Synergy    RX stent. The stent overlap was post-dilated with the stent balloon.         LESION #3 INTERVENTION: PCI of Left Main.         --  The Left Main was stented into the LAD with a 3.5 X 38 Synergy RX    stent.    ELECTROPHYSIOLOGY STUDY  FINDINGS:    CAROTID ULTRASOUND:  FINDINGS    VENOUS DUPLEX SCAN:  FINDINGS:    CHEST CT PULMONARY ANGIO with IV Contrast:  FINDINGS:    MEDICATIONS  (STANDING):  apixaban 5 milliGRAM(s) Oral two times a day  atorvastatin 80 milliGRAM(s) Oral at bedtime  budesonide 160 MICROgram(s)/formoterol 4.5 MICROgram(s) Inhaler 2 Puff(s) Inhalation two times a day  chlorhexidine 4% Liquid 1 Application(s) Topical <User Schedule>  clopidogrel Tablet 75 milliGRAM(s) Oral daily  montelukast 10 milliGRAM(s) Oral daily  pantoprazole    Tablet 40 milliGRAM(s) Oral before breakfast  pregabalin 25 milliGRAM(s) Oral daily  sertraline 50 milliGRAM(s) Oral daily  tiotropium 18 MICROgram(s) Capsule 1 Capsule(s) Inhalation daily    MEDICATIONS  (PRN):      FAMILY HISTORY:  Family history of coronary artery disease (Mother, Sibling)      SOCIAL HISTORY: +Smoker, No illicit drug or ETOH use    Past Surgical History:  H/O bilateral mastectomy    History of appendectomy    History of partial colectomy    History of percutaneous coronary intervention.    Allergies:  No Known Allergies    REVIEW OF SYSTEMS:  CONSTITUTIONAL: No fever, weight loss, chills, shakes; +fatigue  RESPIRATORY: No cough, wheezing, hemoptysis; + shortness of breath  CARDIOVASCULAR: No chest pain, dyspnea, palpitations, syncope, paroxysmal nocturnal dyspnea, orthopnea, or arm or leg swelling  GASTROINTESTINAL: No abdominal  or epigastric pain, nausea, vomiting, hematemesis, diarrhea, constipation, melena or bright red blood.  NEUROLOGICAL: +Dizziness and near syncope. No LOC. No headaches, memory loss, slurred speech, limb weakness, loss of strength, numbness, or tremors  MUSCULOSKELETAL: No joint pain or swelling, muscle, back, or extremity pain      Vital Signs Last 24 Hrs  T(C): 36.6 (31 Jul 2020 13:22), Max: 37.2 (31 Jul 2020 12:11)  T(F): 97.8 (31 Jul 2020 13:22), Max: 98.9 (31 Jul 2020 12:11)  HR: 54 (31 Jul 2020 13:22) (47 - 58)  BP: 121/57 (31 Jul 2020 13:11) (84/35 - 121/57)  BP(mean): 88 (31 Jul 2020 13:11) (63 - 88)  RR: 14 (31 Jul 2020 13:22) (14 - 19)  SpO2: 96% (31 Jul 2020 13:22) (83% - 99%)    PHYSICAL EXAM:  GENERAL: In no apparent distress, well nourished, and hydrated.  HEAD:  Atraumatic, Normocephalic  EYES: EOMI, PERRLA, conjunctiva and sclera clear  ENMT: No tonsillar erythema, exudates, or enlargements; ist mucous membranes, Good dentition, No lesions  NECK: Supple and normal thyroid.  No JVD or carotid bruit.  Carotid pulse is 2+ bilaterally.  HEART: Regular rhythm with bradycardia; No murmurs, rubs, or gallops. +MCOT on chest  PULMONARY: Clear to auscultation and perfusion.  No rales, wheezing, or rhonchi bilaterally.  ABDOMEN: Soft, Nontender, Nondistended; Bowel sounds present  EXTREMITIES:  2+ Peripheral Pulses, No clubbing, cyanosis, or edema  NEUROLOGICAL: Sleepy but arousable. A&Ox3; No focal deficits.      INTERPRETATION OF TELEMETRY: Sinus Bradycardia 55 bpm    ECG:  < from: 12 Lead ECG (07.24.20 @ 07:46) >    Ventricular Rate 69 BPM    Atrial Rate 69 BPM    P-R Interval 170 ms    QRS Duration 154 ms    Q-T Interval 480 ms    QTC Calculation(Bezet) 514 ms    P Axis 66 degrees    R Axis -43 degrees    T Axis 91 degrees    Diagnosis Line Normal sinus rhythm  Left axis deviation  Left bundle branch block  Abnormal ECG    Confirmed by BENY COX MD ([id number]) on 7/24/2020 11:18:40 AM    I&O's Detail      LABS:                        7.9    7.10  )-----------( 169      ( 31 Jul 2020 12:36 )             26.2     07-31    132<L>  |  100  |  9<L>  ----------------------------<  101<H>  4.4   |  21  |  0.7    Ca    7.9<L>      31 Jul 2020 12:36    TPro  6.0  /  Alb  3.8  /  TBili  0.3  /  DBili  x   /  AST  15  /  ALT  9   /  AlkPhos  72  07-31    CARDIAC MARKERS ( 31 Jul 2020 12:36 )  x     / <0.01 ng/mL / x     / x     / x          PT/INR - ( 31 Jul 2020 12:36 )   PT: 16.10 sec;   INR: 1.40 ratio         PTT - ( 31 Jul 2020 12:36 )  PTT:30.2 sec    Daily Height in cm: 167.64 (31 Jul 2020 12:11)        RADIOLOGY & ADDITIONAL STUDIES:  < from: Xray Chest 1 View AP/PA (07.31.20 @ 12:51) >    EXAM:  XR CHEST FRONTAL 1V            PROCEDURE DATE:  07/31/2020        INTERPRETATION:  Clinical History / Reason for exam: Chest pain.    Comparison : Chest radiograph July 24, 2020.    Technique/Positioning: Single frontal chest x-ray obtained.    Findings:    Support devices: None.    Cardiac/mediastinum/hilum: Unchanged.    Lung parenchyma/Pleura: No lobar consultation, pleural effusion, or pneumothorax.    Skeleton/soft tissues: Unchanged..    Impression:    No radiographic evidence of acute cardiopulmonary disease.      NELSON MADISON M.D., ATTENDING RADIOLOGIST  This document has been electronically signed. Jul 31 2020  1:11PM Patient is a 75y old  Female who presents with a chief complaint of weakness (31 Jul 2020 13:10)    HPI: Patient is a 76 yo F with hx of severe AS sp TAVR 7/22, CAD sp PCI with LINDA to LM, LCx and LAD, AF/AFL on Eliquis, SLE, Sjogrens, trigeminal neuralgia, GERD, HTN, breast CA, HLD sent to the ED by Dr Johnson for sinus pauses of 5 seconds on MCOT. Pt was sent home with OT sp TAVR on 7/24 and sts she was feeling well until yesterday when she developed episodes of dizziness and near syncope with fatigue. Pt denies passing out at any time. In ED pt was hypotensive with bradycardia HR in 30s and was given Atropine, now HR ~55bpm. Pt admits to feeling weak and tired now. Not on any BB at home.    PAST MEDICAL & SURGICAL HISTORY:  Atherosclerotic heart disease  Atrial fibrillation/flutter  Aortic valve stenosis, etiology of cardiac valve disease unspecified  Lupus (systemic lupus erythematosus)  Trigeminal neuralgia  Sjogren's syndrome, with unspecified organ involvement  GERD without esophagitis  Vitamin D deficiency  Constipation, unspecified constipation type  Rectal prolapse  Hypertension, unspecified type  Malignant neoplasm of lower-outer quadrant of left breast of female, estrogen receptor positive  History of percutaneous coronary intervention  History of appendectomy  H/O bilateral mastectomy  History of partial colectomy      PREVIOUS DIAGNOSTIC TESTING:      ECHO  FINDINGS:  < from: Transthoracic Echocardiogram (07.23.20 @ 07:25) >  Summary:   1. Left ventricular ejection fraction, by visual estimation, is 60 to 65%.   2. Mildly increased LV wall thickness.   3. Spectral Doppler shows impaired relaxation pattern of left ventricular myocardial filling (Grade I diastolic dysfunction).   4. Mild aortic paravalvular regurgitation.   5. TAVR in the aortic position.   6. There is severe aortic root calcification.   7. Mitral annular calcification.   8. Mild thickening and calcification of the anterior and posterior mitral valve leaflets.   9. Mild mitral valve regurgitation.    STRESS  FINDINGS:    CATHETERIZATION  FINDINGS:  < from: Cardiac Cath Lab - Adult (06.15.20 @ 16:00) >    LESION #1 INTERVENTION: PCI of mid LAD.         --  Note, PCI was performed after BAV as outlined below.    The right femoral artery sheath was upsized to a 7F Raabe sheath over a    J-wire. The LCA was selectively engaged witha 7F JL3.5 guide, and the LAD    was wired with a Runthrough NS 180cm.         --  The stenosis was treated with cutting balloon angioplasty with a 2.5 X    6 [place] balloon.         --  The lesion was stented with a 2.75 X 12 Synergy RX stent.         --  The stent was post-dilated with a 3.0 x 8 NC Quantum [place] RX balloon    delivered with a 6F Guideliner secondary to difficulty advancing the    balloon. Subsequent angiography demonstrated a Guideliner related    dissection extending from the Left Main into the Circumflex.         LESION #2 INTERVENTION: PCI of Circumflex.         --  The Circumflex was wired with a BMW, and the OM2 was wired with an    [employer] Fielder.         --  The proximal CX was dilated with a 3.0 x 20RX Sprinter Legend balloon.         --  The mid to distal CX was dilated with a 2.5 x 20RX Sprinter Legend    balloon.         --  The mid to distal CX was stented with a 2.5 X 38 Synergy RX stent.         --  The proximal to mid CX was stented into the LM with a 3.0 X 24 Synergy    RX stent. The stent overlap was post-dilated with the stent balloon.         LESION #3 INTERVENTION: PCI of Left Main.         --  The Left Main was stented into the LAD with a 3.5 X 38 Synergy RX    stent.     MEDICATIONS  (STANDING):  apixaban 5 milliGRAM(s) Oral two times a day  atorvastatin 80 milliGRAM(s) Oral at bedtime  budesonide 160 MICROgram(s)/formoterol 4.5 MICROgram(s) Inhaler 2 Puff(s) Inhalation two times a day  chlorhexidine 4% Liquid 1 Application(s) Topical <User Schedule>  clopidogrel Tablet 75 milliGRAM(s) Oral daily  montelukast 10 milliGRAM(s) Oral daily  pantoprazole    Tablet 40 milliGRAM(s) Oral before breakfast  pregabalin 25 milliGRAM(s) Oral daily  sertraline 50 milliGRAM(s) Oral daily  tiotropium 18 MICROgram(s) Capsule 1 Capsule(s) Inhalation daily    MEDICATIONS  (PRN):      FAMILY HISTORY:  Family history of coronary artery disease (Mother, Sibling)      SOCIAL HISTORY: +Smoker, No illicit drug or ETOH use    Past Surgical History:  H/O bilateral mastectomy    History of appendectomy    History of partial colectomy    History of percutaneous coronary intervention.    Allergies:  No Known Allergies    REVIEW OF SYSTEMS:  CONSTITUTIONAL: No fever, weight loss, chills, shakes; +fatigue  RESPIRATORY: No cough, wheezing, hemoptysis; + shortness of breath  CARDIOVASCULAR: No chest pain, dyspnea, palpitations, syncope, paroxysmal nocturnal dyspnea, orthopnea, or arm or leg swelling  GASTROINTESTINAL: No abdominal  or epigastric pain, nausea, vomiting, hematemesis, diarrhea, constipation, melena or bright red blood.  NEUROLOGICAL: +Dizziness and near syncope. No LOC. No headaches, memory loss, slurred speech, limb weakness, loss of strength, numbness, or tremors  MUSCULOSKELETAL: No joint pain or swelling, muscle, back, or extremity pain      Vital Signs Last 24 Hrs  T(C): 36.6 (31 Jul 2020 13:22), Max: 37.2 (31 Jul 2020 12:11)  T(F): 97.8 (31 Jul 2020 13:22), Max: 98.9 (31 Jul 2020 12:11)  HR: 54 (31 Jul 2020 13:22) (47 - 58)  BP: 121/57 (31 Jul 2020 13:11) (84/35 - 121/57)  BP(mean): 88 (31 Jul 2020 13:11) (63 - 88)  RR: 14 (31 Jul 2020 13:22) (14 - 19)  SpO2: 96% (31 Jul 2020 13:22) (83% - 99%)    PHYSICAL EXAM:  GENERAL: In no apparent distress, well nourished, and hydrated.  HEAD:  Atraumatic, Normocephalic  EYES: EOMI, PERRLA, conjunctiva and sclera clear  ENMT: No tonsillar erythema, exudates, or enlargements; ist mucous membranes, Good dentition, No lesions  NECK: Supple and normal thyroid.  No JVD or carotid bruit.  Carotid pulse is 2+ bilaterally.  HEART: bradycardic; No murmurs, rubs, or gallops. +MCOT on chest  PULMONARY: Clear to auscultation and perfusion.  No rales, wheezing, or rhonchi bilaterally.  ABDOMEN: Soft, Nontender, Nondistended; Bowel sounds present  EXTREMITIES:  2+ Peripheral Pulses, No clubbing, cyanosis, or edema  NEUROLOGICAL: Sleepy but arousable. A&Ox3; No focal deficits.      INTERPRETATION OF TELEMETRY: Sinus Bradycardia 55 bpm    ECG:  < from: 12 Lead ECG (07.24.20 @ 07:46) >    Ventricular Rate 69 BPM    Atrial Rate 69 BPM    P-R Interval 170 ms    QRS Duration 154 ms    Q-T Interval 480 ms    QTC Calculation(Bezet) 514 ms    P Axis 66 degrees    R Axis -43 degrees    T Axis 91 degrees    Diagnosis Line Normal sinus rhythm  Left axis deviation  Left bundle branch block  Abnormal ECG    Confirmed by BENY COX MD ([id number]) on 7/24/2020 11:18:40 AM    I&O's Detail      LABS:                        7.9    7.10  )-----------( 169      ( 31 Jul 2020 12:36 )             26.2     07-31    132<L>  |  100  |  9<L>  ----------------------------<  101<H>  4.4   |  21  |  0.7    Ca    7.9<L>      31 Jul 2020 12:36    TPro  6.0  /  Alb  3.8  /  TBili  0.3  /  DBili  x   /  AST  15  /  ALT  9   /  AlkPhos  72  07-31    CARDIAC MARKERS ( 31 Jul 2020 12:36 )  x     / <0.01 ng/mL / x     / x     / x          PT/INR - ( 31 Jul 2020 12:36 )   PT: 16.10 sec;   INR: 1.40 ratio         PTT - ( 31 Jul 2020 12:36 )  PTT:30.2 sec    Daily Height in cm: 167.64 (31 Jul 2020 12:11)        RADIOLOGY & ADDITIONAL STUDIES:  < from: Xray Chest 1 View AP/PA (07.31.20 @ 12:51) >    EXAM:  XR CHEST FRONTAL 1V            PROCEDURE DATE:  07/31/2020        INTERPRETATION:  Clinical History / Reason for exam: Chest pain.    Comparison : Chest radiograph July 24, 2020.    Technique/Positioning: Single frontal chest x-ray obtained.    Findings:    Support devices: None.    Cardiac/mediastinum/hilum: Unchanged.    Lung parenchyma/Pleura: No lobar consultation, pleural effusion, or pneumothorax.    Skeleton/soft tissues: Unchanged..    Impression:    No radiographic evidence of acute cardiopulmonary disease.      NELSON MADISON M.D., ATTENDING RADIOLOGIST  This document has been electronically signed. Jul 31 2020  1:11PM

## 2020-07-31 NOTE — ED PROVIDER NOTE - ATTENDING CONTRIBUTION TO CARE
I personally evaluated the patient. I reviewed the Resident’s or Physician Assistant’s note (as assigned above), and agree with the findings and plan except as documented in my note.     75 female here from home for weakness. Pt is a poor historian due to cognitive impairment in ED. EMS verbal note taken, limited collateral information.     ROS unable to obtain due to cognition    GEN: female in mild distress.   HEENT: non icteric conjunctiva pink. EOMI PERRLA  CHEST: CTA bilateral. normal work of breathing.   NECK: normal ROM   CV: pulses intact S1S2 bradycardic  EXT: no deformities  NEURO: appears intoxicated, cognitively impaired, waxing and waning mental status. makes needs known, poor insight into illness.   SKIN: appears pale    Impression: weakness post operatively, hemodynamic instability    Plan: resuscitation, IV labs imaging supportive care and reevaluation

## 2020-07-31 NOTE — H&P ADULT - HISTORY OF PRESENT ILLNESS
76 yo female, Known to [facility],  with a pmh of aortic valve stenosis s/p recent TAVR on 7/22/2020, CAD stent x4 6/15, HTN, GERD, SLE, trigeminal neuralgia, breast CA s/p rodrick mastectomy, afib/flutter and Sjogren's syndrome presents with weakness. She was  brought to the ED by ambulance. In the ED, patient was hypotensive with sbp in the 80s, and bradycardic with HR in 40. Patient also had a 3sec pause. Her o2 sat is 83% on RA. she was given atropine, iv fluids, and placed on o2. She denies fever, cp, palpitations, nausea, vomiting, diarrhea.

## 2020-07-31 NOTE — ED ADULT NURSE NOTE - OBJECTIVE STATEMENT
Patient present to ED from home via ambulance for complains of weakness and unsteady gait for 2 days, recently had stent placed and TAVR. Denies fever, coughing, nausea, vomiting, diarrhea, and drug use. Patient noted to have yennifer cardia and was unable to obtain BP at triage.

## 2020-07-31 NOTE — ED PROVIDER NOTE - CLINICAL SUMMARY MEDICAL DECISION MAKING FREE TEXT BOX
75 female here for evaluation of weakness from home, recent cardiothoracic surgery procedure at [facility] by Dr. Cole. Had EMS activated by [facility] from home visit, additional HPI through CT PA / [facility] is concern for marijuana use which patient admits to in ED.     Presents to ED toxic and ill appearing with waxing / waning mentation, awake at times and somolent at others with hemodynamic compromise with hypotension / bradycardia. Polypharmacy noted on Rx including CaCB, steroids, antidepressants.      Aggressive resuscitation care in ED with labs, imaging, monitoring, contact to CT surgery team. Empiric Rx given in ED for bradycardia with atropine, calcium, and considered steroids / magnesium but vitals improved with first Rx. CT surgery arrived and plan is for management in CTU with plan for prompt admission. No indication for additional imaging in ED by CTU team. Hemodynamic monitoring devices being placed in ED.

## 2020-07-31 NOTE — H&P ADULT - NSHPSOCIALHISTORY_GEN_ALL_CORE
SOCIAL HISTORY:  Smoker: [ x] Yes  [ ] No        currently smoking  ETOH use: [ ] Yes  [x ] No              FREQUENCY / QUANTITY:  Ilicit Drug use:  [ ] Yes  [ x] No  Occupation: retired  Lives with: alone  FAMILY HISTORY:  Family history of coronary artery disease (Mother, Sibling)

## 2020-07-31 NOTE — H&P ADULT - ASSESSMENT
75 year-old famle with PMH as above, s/p TAVR on 7/22/2020, was discharged home in stable condition on POD#2, now presents with weakness and was found to be hypotensive, bradycardic and hypoxic upon presentation to the ED.  Admit to CTU under DR. Cole service  Consult Dr. Johnson  a line placed to monitor BP  f/u labs, cxr, echo, ekg  EP consult - Dr. Mendoza- pt has 3 sec pause when she came in to ED. She also has a MCOT device in place.  will discuss plan with Dr. Cole and Dr. Johnson

## 2020-07-31 NOTE — ED PROVIDER NOTE - PHYSICAL EXAMINATION
Vital Signs: I have reviewed the initial vital signs.  Constitutional:  appears stated age, no acute distress.  HEENT: Airway patent, moist MM, no erythema/swelling/deformity of oral structures. EOMI, PERRLA.  CV: bradycardic rate, regular rhythm, well-perfused extremities, 2+ b/l DP and radial pulses equal.  Lungs: BCTA, no increased WOB.  ABD: NTND, no guarding or rebound, no pulsatile mass, no hernias.   MSK: Neck supple, nontender, nl ROM, no stepoff. Chest nontender. Back nontender in TLS spine or to b/l bony structures or flanks. Ext nontender, nl rom, no deformity.   INTEG: Skin warm, dry, no rash. surgical site on neck clean and intact  NEURO: alert moving all extremities, normal speech  PSYCH: Calm, cooperative, normal affect and interaction.

## 2020-07-31 NOTE — ED PROVIDER NOTE - CARE PLAN
Principal Discharge DX:	Bradycardia  Secondary Diagnosis:	Aortic stenosis  Secondary Diagnosis:	Substance abuse  Secondary Diagnosis:	Weakness

## 2020-07-31 NOTE — ED PROVIDER NOTE - OBJECTIVE STATEMENT
75yF with PMH copd, chf, afib htn hld, s/p TAVR , discharged 7/24 p/w weakness since discharge, worsening acutely this am a/w lightheadedness. pt with borderline hypotension and bradycardia on arrival. ct surgery contacted.

## 2020-07-31 NOTE — ED ADULT TRIAGE NOTE - CHIEF COMPLAINT QUOTE
weakness x 1 week s/p carotid artery stent. pt is hypotensive and hypoxic in triage. pt was taken to critical care area upon arrival

## 2020-07-31 NOTE — ED PROVIDER NOTE - PROGRESS NOTE DETAILS
patient developed bradycardia to 30s and was given 0.5 atropine as well as calcium gluconate (patient on diltiazem at home). ct surgery at bedside

## 2020-07-31 NOTE — ED ADULT NURSE REASSESSMENT NOTE - NS ED NURSE REASSESS COMMENT FT1
Ct surgery team at bedside with patient for assessment and arterial line placement, will anticipate admission, patient resting comfortable, will continue to watch and assess patient.

## 2020-07-31 NOTE — CONSULT NOTE ADULT - ASSESSMENT
Assessment: 75 yo F with AF on Eliquis CAD sp PCI, HTN, HLD, SLE, Sjogrens, and severe AS sp TAVR 7/22 discharged on POD#2 stable with MCOT in place admitted now with weakness, lightheadedness and found to have 5 second pauses on MCOT as well as in the ED. Pt yennifer into 30s and was given Atropine in ED, now SR 50s.    Impression:  Symptomatic Bradycardia  Sinus Pauses 3-5 seconds  AF on Eliquis  CAD sp PCI  AS sp TAVR (7/22)  Sjogrens  SLE      Plan:  - In the setting of AF with 5 second pause patient likely to require PPM  - COVID PCR  - Avoid all AVN blocking agents  - Cont tele monitor  - Keep pacing pads on patient and atropine at bedside  - Monitor electrolytes, maintain K > 4 and Mg > 2  - Check TSH, free T4  - 2D Echo  - Will cont to follow Assessment: 77 yo F with AF on Eliquis CAD sp PCI, HTN, HLD, SLE, Sjogrens, and severe AS sp TAVR 7/22 discharged on POD#2 stable with MCOT in place admitted now with weakness, lightheadedness and found to have 5 second pauses on MCOT as well as in the ED. Pt yennifer into 30s and was given Atropine in ED, now SR 50s.    Impression:  Symptomatic Bradycardia  Sinus Pauses 3-5 seconds  AF on Eliquis  CAD sp PCI  AS sp TAVR (7/22)  Sjogrens  SLE      Plan:  - In the setting of pauses > 3 seconds in SR patient will require DC PPM  - Will plan for procedure on Monday 8/3 with Dr Looney  - NPO after midnight on Sunday  - Hold Eliquis Sunday PM and Monday AM  - Send COVID PCR  - Avoid all AVN blocking agents  - Cont tele monitor  - Keep pacing pads on patient and atropine at bedside  - Monitor electrolytes, maintain K > 4 and Mg > 2  - Check TSH, free T4  - 2D Echo  - Will cont to follow Assessment: 77 yo F with AF on Eliquis CAD sp PCI, HTN, HLD, SLE, Sjogrens, and severe AS sp TAVR 7/22 discharged on POD#2 stable with MCOT in place admitted now with weakness, lightheadedness and found to have 5 second pauses on MCOT as well as in the ED. Pt yennifer into 30s and was given Atropine in ED, now SR 50s.    Impression:  Symptomatic Bradycardia  Sinus Pauses 3-5 seconds  AF on Eliquis  CAD sp PCI  AS sp TAVR (7/22)  Sjogrens  SLE      Plan:  - In the setting of pauses > 3 seconds in SR patient will require DC PPM  - Will plan for procedure on Monday 8/3 with Dr Looney  - NPO after midnight on Sunday  - Hold Eliquis Sunday PM and Monday AM  - Send COVID PCR  - Avoid all AVN blocking agents  - Cont tele monitor  - Keep pacing pads on patient and atropine at bedside  - Monitor electrolytes, maintain K > 4 and Mg > 2  - Check TSH, free T4  - 2D Echo  - Send Utox  - Will cont to follow Cardiologist: Dr. Sepulveda    Assessment: 75 yo F with AF on Eliquis CAD sp PCI, HTN, HLD, SLE, Sjogrens, and severe AS sp TAVR 7/22 discharged on POD#2 stable with MCOT in place admitted now with weakness, lightheadedness and found to have 5 second pauses on MCOT as well as in the ED. Pt yennifer into 30s and was given Atropine in ED, now SR 50s.    Impression:  Symptomatic Bradycardia  Sinus Pauses 3-5 seconds  AF on Eliquis  CAD sp PCI  AS s/p TAVR (7/22)  Sjogrens  SLE      Plan:  - In the setting of pauses > 3 seconds in SR patient will require DC PPM  - Will plan for procedure on Monday 8/3 with Dr Looney  - NPO after midnight on Sunday  - Hold Eliquis Sunday PM and Monday AM  - Send COVID PCR  - Avoid all AVN blocking agents  - Cont tele monitor  - Keep pacing pads on patient and atropine at bedside  - Monitor electrolytes, maintain K > 4 and Mg > 2  - Check TSH, free T4  - Send Utox  - Will cont to follow

## 2020-07-31 NOTE — ED ADULT NURSE NOTE - PMH
Aortic valve stenosis, etiology of cardiac valve disease unspecified    Atherosclerotic heart disease    Atrial fibrillation/flutter    Constipation, unspecified constipation type    GERD without esophagitis    Hypertension, unspecified type    Lupus (systemic lupus erythematosus)    Malignant neoplasm of lower-outer quadrant of left breast of female, estrogen receptor positive    Rectal prolapse    Sjogren's syndrome, with unspecified organ involvement    Trigeminal neuralgia    Vitamin D deficiency

## 2020-07-31 NOTE — H&P ADULT - NSHPLABSRESULTS_GEN_ALL_CORE
LABS:                        7.9    7.10  )-----------( 169      ( 31 Jul 2020 12:36 )             26.2    < from: Xray Chest 1 View AP/PA (07.31.20 @ 12:51) >    Impression:    No radiographic evidence of acute cardiopulmonary disease.    < end of copied text >

## 2020-07-31 NOTE — ED ADULT NURSE NOTE - PSH
H/O bilateral mastectomy    History of appendectomy    History of partial colectomy    History of percutaneous coronary intervention

## 2020-07-31 NOTE — ED CLERICAL - NS ED CLERK NOTE PRE-ARRIVAL INFORMATION; ADDITIONAL PRE-ARRIVAL INFORMATION
This patient is enrolled in the [facility] program and has undergone a cardiac surgery procedure and has active care navigation. This patient can be followed up by the care navigation team within 24 hours. To arrange close follow-up or to obtain additional clinical information about this patient, please call the contact number above.

## 2020-07-31 NOTE — CONSULT NOTE ADULT - ATTENDING COMMENTS
Will plan DC PPM for Mon    We discussed the risks/benefits/alternatives, nature of procedure, and follow up care after device is implanted.  Patient is in agreement with proceeding with the device implant.

## 2020-07-31 NOTE — H&P ADULT - NSHPPHYSICALEXAM_GEN_ALL_CORE
PHYSICAL EXAM  Vital Signs Last 24 Hrs  T(C): 37.2 (31 Jul 2020 12:11), Max: 37.2 (31 Jul 2020 12:11)  T(F): 98.9 (31 Jul 2020 12:11), Max: 98.9 (31 Jul 2020 12:11)  HR: 57 (31 Jul 2020 13:11) (47 - 58)  BP: 121/57 (31 Jul 2020 13:11) (84/35 - 121/57)  BP(mean): 88 (31 Jul 2020 13:11) (63 - 88)  RR: 19 (31 Jul 2020 13:11) (14 - 19)  SpO2: 99% (31 Jul 2020 13:11) (83% - 99%)      CONSTITUTIONAL:                                                                          WNL[ x]   Neuro: WNL[x ] Normal exam oriented to person/place & time with no focal motor or sensory  deficits. Other                     Eyes: WNL[x ]   Normal exam of conjunctiva & lids, pupils equally reactive. Other     ENT: WNL[ x]    Normal exam of nasal/oral mucosa with absence of cyanosis. Other  Neck: WNL[x ]  Normal exam of jugular veins, trachea & thyroid. Other  Chest: WNL[x] Normal lung exam with good air movement absence of wheezes, rales, or rhonchi: Other                                                                                CV:  Auscultation: normal [ x] S3[ ] S4[ ] Irregular [ ] Rub[ ] Clicks[ ]    Murmurs none:[ x]systolic [ ]  diastolic [ ] holosystolic [ ]  Carotids: No Bruits[x ] Other                 Abdominal Aorta: normal [ ] nonpalpable[ ]Other                                                                                      GI:           WNL[x ] Normal exam of abdomen, liver & spleen with no noted masses or tenderness. Other                                                                                                        Extremities: WNL[ x] Normal no evidence of cyanosis or deformity Edema: none[ ]trace[ ]1+[ ]2+[ ]3+[ ]4+[ ]  Lower Extremity Pulses: Right[ ] Left[ ]Varicosities[ ]  SKIN :WNL[x ] Normal exam to inspection & palation. Other:

## 2020-07-31 NOTE — H&P ADULT - NSHPREVIEWOFSYSTEMS_GEN_ALL_CORE
Review of Systems  CONSTITUTIONAL:  Fevers[ ] chills[ ] sweats[ ] fatigue[ ] weight loss[ ] weight gain [ ]                                     NEGATIVE [X ]   NEURO:  parathesias[ ] seizures [ ]  syncope [ ]  confusion [ ]                                                                                NEGATIVE[ X]   EYES: glasses[ ]  blurry vision[ ]  discharge[ ] pain[ ] glaucoma [ ]                                                                          NEGATIVE[X ]   ENMT:  difficulty hearing [ ]  vertigo[ ]  dysphagia[ ] epistaxis[ ] recent dental work [ ]                                    NEGATIVE[ X]   CV:  chest pain[ ] palpitations[ ] VALENZUELA [ ] diaphoresis [ ]                                                                                           NEGATIVE[ X]   RESPIRATORY:  wheezing[ ] SOB[ ] cough [ ] sputum[ ] hemoptysis[ ]                                                                  NEGATIVE[ ]   GI:  nausea[ ]  vommiting [ ]  diarrhea[ ] constipation [ ] melena [ ]                                                                      NEGATIVE[ X]   : hematuria[ ]  dysuria[ ] urgency[ ] incontinence[ ]                                                                                            NEGATIVE[ X]   MUSKULOSKELETAL:  arthritis[ ]  joint swelling [ ] muscle weakness [ ] Hx vein stripping [ ]                             NEGATIVE[X ]   SKIN/BREAST:  rash[ ] itching [ ]  hair loss[ ] masses[ ]                                                                                              NEGATIVE[ X]   PSYCH:  dementia [ ] depresion [ ] anxiety[ ]                                                                                                               NEGATIVE[X ]   HEME/LYMPH:  bruises easily[ ] enlarged lymph nodes[ ] tender lymph nodes[ ]                                               NEGATIVE[ X]   ENDOCRINE:  cold intolerance[ ] heat intolerance[ ] polydipsia[ ]                                                                          NEGATIVE[ X]

## 2020-08-01 LAB
ALBUMIN SERPL ELPH-MCNC: 3.7 G/DL — SIGNIFICANT CHANGE UP (ref 3.5–5.2)
ALP SERPL-CCNC: 73 U/L — SIGNIFICANT CHANGE UP (ref 30–115)
ALT FLD-CCNC: 8 U/L — SIGNIFICANT CHANGE UP (ref 0–41)
AMPHET UR-MCNC: NEGATIVE — SIGNIFICANT CHANGE UP
ANION GAP SERPL CALC-SCNC: 11 MMOL/L — SIGNIFICANT CHANGE UP (ref 7–14)
APPEARANCE UR: ABNORMAL
AST SERPL-CCNC: 14 U/L — SIGNIFICANT CHANGE UP (ref 0–41)
BACTERIA # UR AUTO: ABNORMAL
BARBITURATES UR SCN-MCNC: POSITIVE
BENZODIAZ UR-MCNC: POSITIVE
BILIRUB SERPL-MCNC: 0.3 MG/DL — SIGNIFICANT CHANGE UP (ref 0.2–1.2)
BILIRUB UR-MCNC: NEGATIVE — SIGNIFICANT CHANGE UP
BUN SERPL-MCNC: 9 MG/DL — LOW (ref 10–20)
CALCIUM SERPL-MCNC: 8.4 MG/DL — LOW (ref 8.5–10.1)
CHLORIDE SERPL-SCNC: 104 MMOL/L — SIGNIFICANT CHANGE UP (ref 98–110)
CO2 SERPL-SCNC: 22 MMOL/L — SIGNIFICANT CHANGE UP (ref 17–32)
COCAINE METAB.OTHER UR-MCNC: NEGATIVE — SIGNIFICANT CHANGE UP
COLOR SPEC: YELLOW — SIGNIFICANT CHANGE UP
CREAT SERPL-MCNC: 0.6 MG/DL — LOW (ref 0.7–1.5)
DIFF PNL FLD: SIGNIFICANT CHANGE UP
EPI CELLS # UR: 0 /HPF — SIGNIFICANT CHANGE UP (ref 0–5)
GLUCOSE SERPL-MCNC: 91 MG/DL — SIGNIFICANT CHANGE UP (ref 70–99)
GLUCOSE UR QL: NEGATIVE — SIGNIFICANT CHANGE UP
HCT VFR BLD CALC: 26.7 % — LOW (ref 37–47)
HGB BLD-MCNC: 8.1 G/DL — LOW (ref 12–16)
HYALINE CASTS # UR AUTO: 1 /LPF — SIGNIFICANT CHANGE UP (ref 0–7)
KETONES UR-MCNC: NEGATIVE — SIGNIFICANT CHANGE UP
LEUKOCYTE ESTERASE UR-ACNC: ABNORMAL
MAGNESIUM SERPL-MCNC: 2 MG/DL — SIGNIFICANT CHANGE UP (ref 1.8–2.4)
MCHC RBC-ENTMCNC: 25.7 PG — LOW (ref 27–31)
MCHC RBC-ENTMCNC: 30.3 G/DL — LOW (ref 32–37)
MCV RBC AUTO: 84.8 FL — SIGNIFICANT CHANGE UP (ref 81–99)
METHADONE UR-MCNC: NEGATIVE — SIGNIFICANT CHANGE UP
MRSA PCR RESULT.: NEGATIVE — SIGNIFICANT CHANGE UP
NITRITE UR-MCNC: POSITIVE
NRBC # BLD: 0 /100 WBCS — SIGNIFICANT CHANGE UP (ref 0–0)
OPIATES UR-MCNC: NEGATIVE — SIGNIFICANT CHANGE UP
PCP SPEC-MCNC: SIGNIFICANT CHANGE UP
PH UR: 7.5 — SIGNIFICANT CHANGE UP (ref 5–8)
PLATELET # BLD AUTO: 161 K/UL — SIGNIFICANT CHANGE UP (ref 130–400)
POTASSIUM SERPL-MCNC: 4.4 MMOL/L — SIGNIFICANT CHANGE UP (ref 3.5–5)
POTASSIUM SERPL-SCNC: 4.4 MMOL/L — SIGNIFICANT CHANGE UP (ref 3.5–5)
PROPOXYPHENE QUALITATIVE URINE RESULT: NEGATIVE — SIGNIFICANT CHANGE UP
PROT SERPL-MCNC: 5.7 G/DL — LOW (ref 6–8)
PROT UR-MCNC: SIGNIFICANT CHANGE UP
RBC # BLD: 3.15 M/UL — LOW (ref 4.2–5.4)
RBC # FLD: 17.2 % — HIGH (ref 11.5–14.5)
RBC CASTS # UR COMP ASSIST: 6 /HPF — HIGH (ref 0–4)
SODIUM SERPL-SCNC: 137 MMOL/L — SIGNIFICANT CHANGE UP (ref 135–146)
SP GR SPEC: 1.01 — SIGNIFICANT CHANGE UP (ref 1.01–1.02)
UROBILINOGEN FLD QL: SIGNIFICANT CHANGE UP
WBC # BLD: 7.35 K/UL — SIGNIFICANT CHANGE UP (ref 4.8–10.8)
WBC # FLD AUTO: 7.35 K/UL — SIGNIFICANT CHANGE UP (ref 4.8–10.8)
WBC UR QL: 392 /HPF — HIGH (ref 0–5)

## 2020-08-01 PROCEDURE — 71045 X-RAY EXAM CHEST 1 VIEW: CPT | Mod: 26

## 2020-08-01 PROCEDURE — 99232 SBSQ HOSP IP/OBS MODERATE 35: CPT

## 2020-08-01 PROCEDURE — 93010 ELECTROCARDIOGRAM REPORT: CPT

## 2020-08-01 PROCEDURE — 99233 SBSQ HOSP IP/OBS HIGH 50: CPT

## 2020-08-01 RX ORDER — ACETAMINOPHEN 500 MG
650 TABLET ORAL EVERY 6 HOURS
Refills: 0 | Status: DISCONTINUED | OUTPATIENT
Start: 2020-08-01 | End: 2020-08-02

## 2020-08-01 RX ORDER — AMLODIPINE BESYLATE 2.5 MG/1
5 TABLET ORAL DAILY
Refills: 0 | Status: DISCONTINUED | OUTPATIENT
Start: 2020-08-01 | End: 2020-08-05

## 2020-08-01 RX ORDER — CEFEPIME 1 G/1
1000 INJECTION, POWDER, FOR SOLUTION INTRAMUSCULAR; INTRAVENOUS EVERY 12 HOURS
Refills: 0 | Status: DISCONTINUED | OUTPATIENT
Start: 2020-08-02 | End: 2020-08-04

## 2020-08-01 RX ORDER — HYDRALAZINE HCL 50 MG
10 TABLET ORAL ONCE
Refills: 0 | Status: COMPLETED | OUTPATIENT
Start: 2020-08-01 | End: 2020-08-01

## 2020-08-01 RX ORDER — CEFEPIME 1 G/1
1000 INJECTION, POWDER, FOR SOLUTION INTRAMUSCULAR; INTRAVENOUS ONCE
Refills: 0 | Status: COMPLETED | OUTPATIENT
Start: 2020-08-01 | End: 2020-08-01

## 2020-08-01 RX ORDER — TRAZODONE HCL 50 MG
50 TABLET ORAL AT BEDTIME
Refills: 0 | Status: DISCONTINUED | OUTPATIENT
Start: 2020-08-01 | End: 2020-08-05

## 2020-08-01 RX ORDER — CEFEPIME 1 G/1
INJECTION, POWDER, FOR SOLUTION INTRAMUSCULAR; INTRAVENOUS
Refills: 0 | Status: DISCONTINUED | OUTPATIENT
Start: 2020-08-01 | End: 2020-08-04

## 2020-08-01 RX ADMIN — AMLODIPINE BESYLATE 5 MILLIGRAM(S): 2.5 TABLET ORAL at 15:17

## 2020-08-01 RX ADMIN — SERTRALINE 50 MILLIGRAM(S): 25 TABLET, FILM COATED ORAL at 11:49

## 2020-08-01 RX ADMIN — TIOTROPIUM BROMIDE 1 CAPSULE(S): 18 CAPSULE ORAL; RESPIRATORY (INHALATION) at 08:42

## 2020-08-01 RX ADMIN — Medication 650 MILLIGRAM(S): at 23:04

## 2020-08-01 RX ADMIN — Medication 650 MILLIGRAM(S): at 15:17

## 2020-08-01 RX ADMIN — Medication 25 MILLIGRAM(S): at 11:49

## 2020-08-01 RX ADMIN — Medication 10 MILLIGRAM(S): at 13:39

## 2020-08-01 RX ADMIN — Medication 50 MILLIGRAM(S): at 23:04

## 2020-08-01 RX ADMIN — APIXABAN 5 MILLIGRAM(S): 2.5 TABLET, FILM COATED ORAL at 06:11

## 2020-08-01 RX ADMIN — Medication 650 MILLIGRAM(S): at 06:00

## 2020-08-01 RX ADMIN — PANTOPRAZOLE SODIUM 40 MILLIGRAM(S): 20 TABLET, DELAYED RELEASE ORAL at 06:11

## 2020-08-01 RX ADMIN — ATORVASTATIN CALCIUM 80 MILLIGRAM(S): 80 TABLET, FILM COATED ORAL at 23:04

## 2020-08-01 RX ADMIN — Medication 650 MILLIGRAM(S): at 15:47

## 2020-08-01 RX ADMIN — MONTELUKAST 10 MILLIGRAM(S): 4 TABLET, CHEWABLE ORAL at 11:49

## 2020-08-01 RX ADMIN — CEFEPIME 100 MILLIGRAM(S): 1 INJECTION, POWDER, FOR SOLUTION INTRAMUSCULAR; INTRAVENOUS at 15:17

## 2020-08-01 NOTE — PROGRESS NOTE ADULT - ASSESSMENT
PROBLEMS:  I spent 45 minutes of time examining patient, reviewing vitals, labs, medications, imaging and discussing with the team goals of care to prevent life-threatening in this patient who is at high risk for deterioration or death due to:    1.	Bradycardia with pauses - PPM on monday  2.	paroxismal A. Fib - hold eliquis  3.	S/p TAVR - hold plavix  4.	continue other home medications    PLAN  Neuro: move all 4 extremities. no sensory or motor deficits  Pain management.   acetaminophen   Tablet .. 650 milliGRAM(s) Oral every 6 hours PRN  pregabalin 25 milliGRAM(s) Oral daily  sertraline 50 milliGRAM(s) Oral daily    Pulm: Wean off supplemental oxygen as able. Daily CXR. Encourage coughing, deep breathing and use of incentive spirometry.   budesonide 160 MICROgram(s)/formoterol 4.5 MICROgram(s) Inhaler 2 Puff(s) Inhalation two times a day  montelukast 10 milliGRAM(s) Oral daily  tiotropium 18 MICROgram(s) Capsule 1 Capsule(s) Inhalation daily    Cardio: Monitor telemetry/alarms. Continue supportive care     GI: Continue stool softeners.    pantoprazole    Tablet 40 milliGRAM(s) Oral before breakfast    Nutrition: Continue present diet  Endocrine and glucose control:   atorvastatin 80 milliGRAM(s) Oral at bedtime    Renal: monitor urine output, supplement electrolytes as needed,     Vasc: Heparin SC and/or SCDs for DVT prophylaxis    ID: Stable, no fever , no chills. Off antibiotics.    Therapy: OOB/ambulate  Disposition: start planing discharge home or placement    Pertinent clinical, laboratory, radiographic, hemodynamic, echocardiographic, respiratory data, microbiologic data and chart were reviewed and analyzed frequently throughout the course of the day and night. GI and DVT prophylaxis, glycemic control, head of bed elevation and skin care issues were addressed.  Patient seen, examined and plan discussed with CT Surgery / CTICU team during rounds.    [ ] The patient remains in critical and unstable condition, and requires ICU care and monitoring  [x ] The patient is improving but requires continued monitoring and adjustment of therapy

## 2020-08-01 NOTE — PROGRESS NOTE ADULT - SUBJECTIVE AND OBJECTIVE BOX
OPERATIVE PROCEDURE(s):                POD #                       SURGEON(s): ROWDY Cole    HPI: 74 yo female, Known to [facility], with hx of severe AS sp TAVR 7/22, CAD sp PCI with LINDA to LM, LCx and LAD, AF/AFL on Eliquis, SLE, Sjogrens, trigeminal neuralgia, GERD, HTN, breast CA, HLD sent to the ED by Dr Johnson for sinus pauses of 5 seconds on MCOT. Pt was sent home with MCOT sp TAVR on 7/24 and sts she was feeling well until yesterday when she developed episodes of dizziness and near syncope with fatigue. Pt denies passing out at any time. In ED pt was hypotensive with bradycardia HR in 30s and was given Atropine, now HR ~55bpm. Pt admits to feeling weak and tired now. Not on any BB at home.    SUBJECTIVE ASSESSMENT:75yFemale patient seen and examined at bedside.    Vital Signs Last 24 Hrs  T(F): 97.6 (01 Aug 2020 04:00), Max: 98.9 (31 Jul 2020 12:11)  HR: 82 (01 Aug 2020 05:00) (47 - 82)  BP: 141/63 (01 Aug 2020 02:00) (84/35 - 141/63)  BP(mean): 90 (01 Aug 2020 02:00) (63 - 90)  ABP: 140/55 (01 Aug 2020 05:00) (99/41 - 176/68)  ABP(mean): 87 (01 Aug 2020 05:00)  RR: 34 (01 Aug 2020 05:00) (14 - 34)  SpO2: 96% (01 Aug 2020 05:00) (83% - 99%)      Physical Exam:  General: NAD; A&Ox3  Cardiac: S1/S2, RRR, no murmur, no rubs  Lungs: unlabored respirations, CTA b/l, no wheeze, no rales, no crackles  Abdomen: Soft/NT/ND; positive bowel sounds x 4  Incisions: Incisions clean/dry/intact  Extremities: No edema b/l lower extremities; good capillary refill; no cyanosis; palpable 1+ pedal pulses b/l        LABS:                        8.1<L>  7.35  )-----------( 161      ( 01 Aug 2020 04:00 )             26.7<L>                        7.9<L>  7.10  )-----------( 169      ( 31 Jul 2020 12:36 )             26.2<L>    08-01    137  |  104  |  9<L>  ----------------------------<  91  4.4   |  22  |  0.6<L>  07-31    132<L>  |  100  |  9<L>  ----------------------------<  101<H>  4.4   |  21  |  0.7    Ca    8.4<L>      01 Aug 2020 04:00  Mg     2.0     08-01    TPro  5.7<L> [6.0 - 8.0]  /  Alb  3.7 [3.5 - 5.2]  /  TBili  0.3 [0.2 - 1.2]  /  DBili  x   /  AST  14 [0 - 41]  /  ALT  8 [0 - 41]  /  AlkPhos  73 [30 - 115]  08-01    PT/INR - ( 31 Jul 2020 12:36 )   PT: ;   INR: 1.40 ratio       PTT - ( 31 Jul 2020 12:36 )  PTT:30.2 sec      RADIOLOGY & ADDITIONAL TESTS:  CXR: < from: Xray Chest 1 View AP/PA (07.31.20 @ 12:51) >  Impression: No radiographic evidence of acute cardiopulmonary disease.  < end of copied text >    EKG: < from: 12 Lead ECG (07.31.20 @ 13:23) >  Ventricular Rate 54 BPM  Atrial Rate 54 BPM  P-R Interval 190 ms  QRS Duration 154 ms  Q-T Interval 520 ms  QTC Calculation(Bezet) 493 ms  P Axis 29 degrees  R Axis -14 degrees  T Axis 88 degrees  Diagnosis Line Sinus bradycardia  Possible Left atrial enlargement  Left bundle branch block  Abnormal ECG  Confirmed by JUANA MATA MD ([id number]) on 7/31/2020 4:34:06 PM  < end of copied text >    TTE: < from: Transthoracic Echocardiogram (07.31.20 @ 15:19) >  Summary:   1. LV Ejection Fraction by Samson's Method with a biplane EF of 59 %.   2. Elevated left atrial and left ventricular end-diastolic pressures.   3. Normal left ventricular size and wall thicknesses, with normal systolic and diastolic function.   4. Spectral Doppler shows restrictive pattern of left ventricular myocardial filling (Grade III diastolic dysfunction).   5. Mild to moderate mitral valve regurgitation.   6. Moderate mitral annular calcification.   7. Mild aortic regurgitation.   8. TAVR in the aortic position.   9. A mild AI perivalvular leak is noted.  10. Estimated pulmonary artery systolic pressure is 52.8 mmHg assuming a right atrial pressure of 8 mmHg, whichis consistent with moderate pulmonary hypertension.  11. Pulmonary hypertension is present.  12. LA volume Index is 38.3 ml/m² ml/m2.  13. There is moderate aortic root calcification.    PHYSICIAN INTERPRETATION:  Left Ventricle: Normal left ventricular size and wall thicknesses, with normal systolic and diastolic function. Spectral Doppler shows restrictive pattern of left ventricular myocardial filling (Grade III diastolic dysfunction). Elevated left atrial and left ventricular end-diastolic pressures.      LV Wall Scoring:  All segments are normal.    Right Ventricle: Normal right ventricular size and function.  Left Atrium: Mild to moderately enlarged left atrium. LA volume Index is 38.3 ml/m² ml/m2.  Right Atrium: Normal right atrial size.  Pericardium: There is no evidence of pericardial effusion.  Mitral Valve: Structurally normal mitral valve, with normal leaflet excursion. The mitral valve is normal in structure. There is moderate mitral annular calcification. Mild to moderate mitral valve regurgitation is seen.  Tricuspid Valve: Structurally normal tricuspid valve, with normal leaflet excursion. The tricuspid valve is normal in structure. Mild tricuspid regurgitation is visualized. Estimated pulmonary artery systolic pressure is 52.8 mmHg assuming a right atrial pressure of 8 mmHg, which is consistent with moderate pulmonary hypertension.  Aortic Valve: Normal trileaflet aortic valve with normal opening. The aortic valve is normal. Echo findings are consistent with normal structure and function of the aortic prosthesis. Mild aortic valve regurgitation is seen. A mild AI perivalvular leak is noted.  Pulmonic Valve: Structurally normal pulmonic valve, with normal leaflet excursion. The pulmonic valve is normal. No indication of pulmonic valve regurgitation.  Aorta: The aortic root and ascending aorta are structurally normal, with no evidence of dilitation. There is moderate aortic root calcification.  Pulmonary Artery: The main pulmonary artery is normal in size. Pulmonary hypertension is present.  Venous: The inferior vena cava was dilated, with respiratory size variation greater than 50%.    < end of copied text >      Allergies  No Known Allergies  Intolerances      MEDICATIONS  (STANDING):  apixaban 5 milliGRAM(s) Oral two times a day  atorvastatin 80 milliGRAM(s) Oral at bedtime  budesonide 160 MICROgram(s)/formoterol 4.5 MICROgram(s) Inhaler 2 Puff(s) Inhalation two times a day  chlorhexidine 4% Liquid 1 Application(s) Topical <User Schedule>  clopidogrel Tablet 75 milliGRAM(s) Oral daily  montelukast 10 milliGRAM(s) Oral daily  pantoprazole    Tablet 40 milliGRAM(s) Oral before breakfast  pregabalin 25 milliGRAM(s) Oral daily  sertraline 50 milliGRAM(s) Oral daily  tiotropium 18 MICROgram(s) Capsule 1 Capsule(s) Inhalation daily    MEDICATIONS  (PRN):  acetaminophen   Tablet .. 650 milliGRAM(s) Oral every 6 hours PRN Mild Pain (1 - 3), Moderate Pain (4 - 6)    Home Medications:  ALPRAZolam 0.5 mg oral tablet: 2 tab(s) orally once a day (at bedtime), As Needed (22 Jul 2020 11:34)  aspirin 81 mg oral delayed release tablet: 1 tab(s) orally once a day (24 Jul 2020 13:43)  docusate sodium 100 mg oral capsule: 1 cap(s) orally 2 times a day (22 Jul 2020 11:34)  montelukast 10 mg oral tablet: 1 tab(s) orally once a day (22 Jul 2020 11:34)  ocular lubricant ophthalmic solution: 1 drop(s) to each affected eye every 2 hours, As needed, Dry Eyes (22 Jul 2020 11:34)  omeprazole 40 mg oral delayed release capsule: 1 cap(s) orally once a day (22 Jul 2020 11:34)  senna oral tablet: 2 tab(s) orally once a day (at bedtime) (22 Jul 2020 11:34)      Assessment/Plan:  77 yo F with AF on Eliquis CAD sp PCI, HTN, HLD, SLE, Sjogrens, and severe AS sp TAVR 7/22 discharged on POD#2 stable with MCOT in place admitted now with weakness, lightheadedness and found to have 5 second pauses on MCOT as well as in the ED. Pt yennifer into 30s and was given Atropine in ED, now SR 50s.    - Case and plan discussed with CTU Intensivist and CT Surgeon - Dr. Cole/Darrius/Pat  - Continue CTU supportive care and ongoing plan of care as per continuing CTU rounds.    - Continue DVT/GI prophylaxis  - Incentive Spirometry 10 times an hour  - Continue to advance physical activity as tolerated and continue PT/OT as directed  1. Afib/Aflutter: cont eliquis for a/c   2. CAD: Cont plavix   3. COPD: cont budesonide 160 MICROgram(s)/formoterol 4.5 MICROgram(s) Inhaler 2 Puff(s) Inhalation two times a day   4. Symptomatic Bradycardia w/sinus pauses 3-5 secs: Patient planned for DC PPM w/Dr. Looney of EP. Will hold eliquis Sunday 8/2.

## 2020-08-01 NOTE — PROGRESS NOTE ADULT - SUBJECTIVE AND OBJECTIVE BOX
CTU Attending Progress Daily Note     01 Aug 2020 11:07  Admited 07-31-20, Hospital Day 1d    HPI:  74 yo female, Known to [facility],  with a pmh of aortic valve stenosis s/p recent TAVR on 7/22/2020, CAD stent x4 6/15, HTN, GERD, SLE, trigeminal neuralgia, breast CA s/p rodrick mastectomy, afib/flutter and Sjogren's syndrome presents with weakness. She was  brought to the ED by ambulance. In the ED, patient was hypotensive with sbp in the 80s, and bradycardic with HR in 40. Patient also had a 3sec pause. Her o2 sat is 83% on RA. she was given atropine, iv fluids, and placed on o2. She denies fever, cp, palpitations, nausea, vomiting, diarrhea. (31 Jul 2020 13:10)    Home Medications:  ALPRAZolam 0.5 mg oral tablet: 2 tab(s) orally once a day (at bedtime), As Needed (22 Jul 2020 11:34)  aspirin 81 mg oral delayed release tablet: 1 tab(s) orally once a day (24 Jul 2020 13:43)  docusate sodium 100 mg oral capsule: 1 cap(s) orally 2 times a day (22 Jul 2020 11:34)  montelukast 10 mg oral tablet: 1 tab(s) orally once a day (22 Jul 2020 11:34)  ocular lubricant ophthalmic solution: 1 drop(s) to each affected eye every 2 hours, As needed, Dry Eyes (22 Jul 2020 11:34)  omeprazole 40 mg oral delayed release capsule: 1 cap(s) orally once a day (22 Jul 2020 11:34)  senna oral tablet: 2 tab(s) orally once a day (at bedtime) (22 Jul 2020 11:34)    FAMILY HISTORY:  Family history of coronary artery disease (Mother, Sibling)    PAST MEDICAL & SURGICAL HISTORY:  Atherosclerotic heart disease  Atrial fibrillation/flutter  Aortic valve stenosis, etiology of cardiac valve disease unspecified  Lupus (systemic lupus erythematosus)  Trigeminal neuralgia  Sjogren's syndrome, with unspecified organ involvement  GERD without esophagitis  Vitamin D deficiency  Constipation, unspecified constipation type  Rectal prolapse  Hypertension, unspecified type  Malignant neoplasm of lower-outer quadrant of left breast of female, estrogen receptor positive  History of percutaneous coronary intervention  History of appendectomy  H/O bilateral mastectomy  History of partial colectomy    Interval event for past 24 hr:  CLAYTON VELAZQUEZ  75y had no event.   Current Complains:  CLAYTON VELAZQUEZ has no new complains  Allergies    No Known Allergies    Intolerances      OBJECTIVE:  Vitals last 24 hrs  T(C): 36.4 (08-01-20 @ 04:00), Max: 37.2 (07-31-20 @ 12:11)  T(F): 97.6 (08-01-20 @ 04:00), Max: 98.9 (07-31-20 @ 12:11)  HR: 78 (08-01-20 @ 11:00) (47 - 82)  BP: 141/63 (08-01-20 @ 02:00) (84/35 - 141/63)  ABP: 135/56 (08-01-20 @ 10:00) (99/41 - 176/68)  ABP(mean): 87 (08-01-20 @ 10:00) (62 - 109)  RR: 32 (08-01-20 @ 11:00) (14 - 39)  SpO2: 96% (08-01-20 @ 11:00) (83% - 99%)      07-31-20 @ 07:01  -  08-01-20 @ 07:00  --------------------------------------------------------  IN:    Oral Fluid: 300 mL  Total IN: 300 mL    OUT:    Voided: 1200 mL  Total OUT: 1200 mL    Total NET: -900 mL          CAPILLARY BLOOD GLUCOSE        LABS:                          8.1    7.35  )-----------( 161      ( 01 Aug 2020 04:00 )             26.7     Hemoglobin: 8.1 g/dL (08-01 @ 04:00)  Hemoglobin: 7.9 g/dL (07-31 @ 12:36)    08-01    137  |  104  |  9<L>  ----------------------------<  91  4.4   |  22  |  0.6<L>    Ca    8.4<L>      01 Aug 2020 04:00  Mg     2.0     08-01    TPro  5.7<L>  /  Alb  3.7  /  TBili  0.3  /  DBili  x   /  AST  14  /  ALT  8   /  AlkPhos  73  08-01    Creatinine, Serum: 0.6 mg/dL (08-01 @ 04:00)  Creatinine, Serum: 0.7 mg/dL (07-31 @ 12:36)    PT/INR - ( 31 Jul 2020 12:36 )   PT: 16.10 sec;   INR: 1.40 ratio     PTT - ( 31 Jul 2020 12:36 )  PTT:30.2 sec      HOSPITAL MEDICATIONS:  MEDICATIONS  (STANDING):  atorvastatin 80 milliGRAM(s) Oral at bedtime  budesonide 160 MICROgram(s)/formoterol 4.5 MICROgram(s) Inhaler 2 Puff(s) Inhalation two times a day  chlorhexidine 4% Liquid 1 Application(s) Topical <User Schedule>  montelukast 10 milliGRAM(s) Oral daily  pantoprazole    Tablet 40 milliGRAM(s) Oral before breakfast  pregabalin 25 milliGRAM(s) Oral daily  sertraline 50 milliGRAM(s) Oral daily  tiotropium 18 MICROgram(s) Capsule 1 Capsule(s) Inhalation daily    MEDICATIONS  (PRN):  acetaminophen   Tablet .. 650 milliGRAM(s) Oral every 6 hours PRN Mild Pain (1 - 3), Moderate Pain (4 - 6)      REVIEW OF SYSTEMS:  CONSTITUTIONAL: [X] all negative; [ ] weakness, [ ] fevers, [ ] chills  EYES/ENT: [X] all negative; [ ] visual changes, [ ] vertigo, [ ] throat pain, [ ] eye pain  NECK: [X] all negative; [ ] pain, [ ] stiffness  RESPIRATORY: [x ] all negative, [ ] cough, [ ] wheezing, [ ] hemoptysis, [ ] shortness of breath, [  ] chest pain  CARDIOVASCULAR: [x ] all negative; [ ] anginal chest pain, [ ] palpitations, [ ] orthopnea  GASTROINTESTINAL: [X] all negative; [ ]abdominal pain, [ ] nausea, [ ] vomiting, [ ] hematemesis, [ ] diarrhea, [ ] constipation, [ ] melena, [ ] hematochezia.  GENITOURINARY: [X] all negative; [ ] dysuria, [ ] frequency, [ ] hematuria  NEUROLOGICAL: [X] all negative; [ ] numbness, [ ] weakness, [ ] paresthesias  MUSCULOSKELETAL: [X] all negative, [ ] joint pain, [ ] joint swelling, [ ] joint redness, [ ] bone pain  SKIN: [X] all negative; [ ] itching, [ ] burning, [ ] rashes, [ ] lesions   All other review of systems is negative unless indicated above.    [  ] Unable to assess ROS because     PHYSICAL EXAM:          CONSTITUTIONAL: Well-developed; well-nourished; in no acute distress.   	SKIN: warm, dry, no rashes or lesions  	HEENT: Atraumatic. Normocephalic. PERRL. Moist membranes, no conjunctival injection, sclera clear  	NECK: Supple; non tender.  No JVD. No lymphadenopathy.  	CARD: Normal S1, S2. Rate and Rhythm are regular. No murmurs.  	RESP: Good air entry bilaterally, no wheezes, no rales no rhonchi.  	ABD: Soft, not tender, not distended, no CVA tendernass, no rebound no guarding, bowel sounds present  	EXT: Normal ROM.  No clubbing, no cyanosis, no pedal edema, no calf pain b/l, Peripheral pulses intact.  	LYMPH: No acute cervical adenopathy.  	NEURO: Alert, awake, motor 5/5 R, 5/5 L, sensation intact bilat, CN 2-12 intact,          PSYCH: Cooperative, appropriate. Alert & oriented x 3    RADIOLOGY:  X Reviewed and interpreted by me  CxR from 08-01-20 shows mild congestion, no pneumothorax, no effusion, no cardiomegaly,       ECG:  X Reviewed and interpreted by me - NSR - LBBB 81, QTc - 525

## 2020-08-02 LAB
ALBUMIN SERPL ELPH-MCNC: 4.1 G/DL — SIGNIFICANT CHANGE UP (ref 3.5–5.2)
ALP SERPL-CCNC: 93 U/L — SIGNIFICANT CHANGE UP (ref 30–115)
ALT FLD-CCNC: 10 U/L — SIGNIFICANT CHANGE UP (ref 0–41)
ANION GAP SERPL CALC-SCNC: 16 MMOL/L — HIGH (ref 7–14)
AST SERPL-CCNC: 24 U/L — SIGNIFICANT CHANGE UP (ref 0–41)
BILIRUB SERPL-MCNC: 0.5 MG/DL — SIGNIFICANT CHANGE UP (ref 0.2–1.2)
BLD GP AB SCN SERPL QL: SIGNIFICANT CHANGE UP
BUN SERPL-MCNC: 6 MG/DL — LOW (ref 10–20)
CALCIUM SERPL-MCNC: 8.3 MG/DL — LOW (ref 8.5–10.1)
CHLORIDE SERPL-SCNC: 105 MMOL/L — SIGNIFICANT CHANGE UP (ref 98–110)
CO2 SERPL-SCNC: 16 MMOL/L — LOW (ref 17–32)
CREAT SERPL-MCNC: 0.5 MG/DL — LOW (ref 0.7–1.5)
GLUCOSE SERPL-MCNC: 118 MG/DL — HIGH (ref 70–99)
HCT VFR BLD CALC: 31.6 % — LOW (ref 37–47)
HGB BLD-MCNC: 9.7 G/DL — LOW (ref 12–16)
MAGNESIUM SERPL-MCNC: 1.9 MG/DL — SIGNIFICANT CHANGE UP (ref 1.8–2.4)
MCHC RBC-ENTMCNC: 26.1 PG — LOW (ref 27–31)
MCHC RBC-ENTMCNC: 30.7 G/DL — LOW (ref 32–37)
MCV RBC AUTO: 84.9 FL — SIGNIFICANT CHANGE UP (ref 81–99)
NRBC # BLD: 0 /100 WBCS — SIGNIFICANT CHANGE UP (ref 0–0)
PLATELET # BLD AUTO: 222 K/UL — SIGNIFICANT CHANGE UP (ref 130–400)
POTASSIUM SERPL-MCNC: 3.5 MMOL/L — SIGNIFICANT CHANGE UP (ref 3.5–5)
POTASSIUM SERPL-SCNC: 3.5 MMOL/L — SIGNIFICANT CHANGE UP (ref 3.5–5)
PROT SERPL-MCNC: 6.7 G/DL — SIGNIFICANT CHANGE UP (ref 6–8)
RBC # BLD: 3.72 M/UL — LOW (ref 4.2–5.4)
RBC # FLD: 17.2 % — HIGH (ref 11.5–14.5)
SODIUM SERPL-SCNC: 137 MMOL/L — SIGNIFICANT CHANGE UP (ref 135–146)
WBC # BLD: 8.8 K/UL — SIGNIFICANT CHANGE UP (ref 4.8–10.8)
WBC # FLD AUTO: 8.8 K/UL — SIGNIFICANT CHANGE UP (ref 4.8–10.8)

## 2020-08-02 PROCEDURE — 93010 ELECTROCARDIOGRAM REPORT: CPT

## 2020-08-02 PROCEDURE — 99232 SBSQ HOSP IP/OBS MODERATE 35: CPT

## 2020-08-02 PROCEDURE — 71045 X-RAY EXAM CHEST 1 VIEW: CPT | Mod: 26

## 2020-08-02 RX ADMIN — Medication 1 CAPSULE(S): at 12:39

## 2020-08-02 RX ADMIN — Medication 1 CAPSULE(S): at 13:09

## 2020-08-02 RX ADMIN — SERTRALINE 50 MILLIGRAM(S): 25 TABLET, FILM COATED ORAL at 11:03

## 2020-08-02 RX ADMIN — CEFEPIME 100 MILLIGRAM(S): 1 INJECTION, POWDER, FOR SOLUTION INTRAMUSCULAR; INTRAVENOUS at 17:44

## 2020-08-02 RX ADMIN — AMLODIPINE BESYLATE 5 MILLIGRAM(S): 2.5 TABLET ORAL at 06:32

## 2020-08-02 RX ADMIN — Medication 1 CAPSULE(S): at 21:19

## 2020-08-02 RX ADMIN — CEFEPIME 100 MILLIGRAM(S): 1 INJECTION, POWDER, FOR SOLUTION INTRAMUSCULAR; INTRAVENOUS at 06:33

## 2020-08-02 RX ADMIN — Medication 25 MILLIGRAM(S): at 11:03

## 2020-08-02 RX ADMIN — BUDESONIDE AND FORMOTEROL FUMARATE DIHYDRATE 2 PUFF(S): 160; 4.5 AEROSOL RESPIRATORY (INHALATION) at 07:55

## 2020-08-02 RX ADMIN — BUDESONIDE AND FORMOTEROL FUMARATE DIHYDRATE 2 PUFF(S): 160; 4.5 AEROSOL RESPIRATORY (INHALATION) at 20:51

## 2020-08-02 RX ADMIN — PANTOPRAZOLE SODIUM 40 MILLIGRAM(S): 20 TABLET, DELAYED RELEASE ORAL at 06:32

## 2020-08-02 RX ADMIN — ATORVASTATIN CALCIUM 80 MILLIGRAM(S): 80 TABLET, FILM COATED ORAL at 21:51

## 2020-08-02 RX ADMIN — Medication 50 MILLIGRAM(S): at 21:48

## 2020-08-02 RX ADMIN — MONTELUKAST 10 MILLIGRAM(S): 4 TABLET, CHEWABLE ORAL at 11:03

## 2020-08-02 RX ADMIN — Medication 1 CAPSULE(S): at 20:49

## 2020-08-02 RX ADMIN — TIOTROPIUM BROMIDE 1 CAPSULE(S): 18 CAPSULE ORAL; RESPIRATORY (INHALATION) at 07:52

## 2020-08-02 RX ADMIN — Medication 650 MILLIGRAM(S): at 00:02

## 2020-08-02 NOTE — PROGRESS NOTE ADULT - SUBJECTIVE AND OBJECTIVE BOX
OPERATIVE PROCEDURE(s):   TAVR 2020                                 SURGEON(s): ROWDY Cole    HPI: 74 yo female, Known to [facility], with hx of severe AS sp TAVR , CAD sp PCI with LINDA to LM, LCx and LAD, AF/AFL on Eliquis, SLE, Sjogrens, trigeminal neuralgia, GERD, HTN, breast CA, HLD sent to the ED by Dr Johnson for sinus pauses of 5 seconds on OT. Pt was sent home with [facility] sp TAVR on  and sts she was feeling well until yesterday when she developed episodes of dizziness and near syncope with fatigue. Pt denies passing out at any time. In ED pt was hypotensive with bradycardia HR in 30s and was given Atropine, now HR ~55bpm. Pt admits to feeling weak and tired now. Not on any BB at home.    SUBJECTIVE ASSESSMENT: 75y Female patient seen and examined at bedside.      Vital Signs Last 24 Hrs  T(F): 97.3 (02 Aug 2020 12:55), Max: 97.4 (01 Aug 2020 14:39)  HR: 89 (02 Aug 2020 12:55) (77 - 93)  BP: 141/68 (02 Aug 2020 12:55) (128/60 - 141/69)  ABP: 149/68 (01 Aug 2020 14:39) (149/68 - 149/68)  RR: 17 (02 Aug 2020 12:55) (17 - 18)  SpO2: 96% (01 Aug 2020 19:25) (96% - 99%)    I&O's Detail    01 Aug 2020 07:  -  02 Aug 2020 07:00  --------------------------------------------------------  IN:    IV PiggyBack: 50 mL    Oral Fluid: 200 mL  Total IN: 250 mL    OUT:    Voided: 650 mL  Total OUT: 650 mL    Net: I&O's Detail    2020 07:01  -  01 Aug 2020 07:00  --------------------------------------------------------  Total NET: -900 mL    01 Aug 2020 07:01  -  02 Aug 2020 07:00  --------------------------------------------------------  Total NET: -400 mL      Physical Exam:  General: NAD; A&Ox3  Cardiac: S1/S2, RRR, no murmur, no rubs  Lungs: unlabored respirations, CTA b/l, no wheeze, no rales, no crackles  Abdomen: Soft/NT/ND; positive bowel sounds x 4  Incisions: Incisions clean/dry/intact  Extremities: No edema b/l lower extremities; good capillary refill; no cyanosis; palpable 1+ pedal pulses b/l        LABS:                        9.7<L>  8.80  )-----------( 222      ( 02 Aug 2020 06:39 )             31.6<L>                        8.1<L>  7.35  )-----------( 161      ( 01 Aug 2020 04:00 )             26.7<L>    08-02    137  |  105  |  6<L>  ----------------------------<  118<H>  3.5   |  16<L>  |  0.5<L>  08    137  |  104  |  9<L>  ----------------------------<  91  4.4   |  22  |  0.6<L>    Ca    8.3<L>      02 Aug 2020 06:39  Mg     1.9     08-02    TPro  6.7 [6.0 - 8.0]  /  Alb  4.1 [3.5 - 5.2]  /  TBili  0.5 [0.2 - 1.2]  /  DBili  x   /  AST  24 [0 - 41]  /  ALT  10 [0 - 41]  /  AlkPhos  93 [30 - 115]  0802    Urinalysis Basic - ( 01 Aug 2020 11:54 )  Color: Yellow / Appearance: Slightly Turbid / S.011 / pH: x  Gluc: x / Ketone: Negative  / Bili: Negative / Urobili: <2 mg/dL   Blood: x / Protein: Trace / Nitrite: Positive   Leuk Esterase: Large / RBC: 6 /HPF /  /HPF   Sq Epi: x / Non Sq Epi: 0 /HPF / Bacteria: Few      RADIOLOGY & ADDITIONAL TESTS:  CXR: < from: Xray Chest 1 View- PORTABLE-Routine (20 @ 08:37) >  Impression: No radiographic evidence of acute cardiopulmonary disease.  < end of copied text >    EKG: < from: 12 Lead ECG (20 @ 08:12) >  Ventricular Rate 89 BPM  Atrial Rate 89 BPM  P-R Interval 180 ms  QRS Duration 152 ms  Q-T Interval 438 ms  QTC Calculation(Bezet) 532 ms  P Axis 98 degrees  R Axis 190 degrees  T Axis 77 degrees  Diagnosis Line *** Suspect arm lead reversal, interpretation assumes no reversal  Normal sinus rhythm  Right atrial enlargement  Right superior axis deviation  Non-specific intra-ventricular conduction block  Abnormal ECG  Confirmed by JUANA MATA MD ([id number]) on 2020 1:45:38 PM  < end of copied text >      Allergies  No Known Allergies  Intolerances      MEDICATIONS  (STANDING):  amLODIPine   Tablet 5 milliGRAM(s) Oral daily  atorvastatin 80 milliGRAM(s) Oral at bedtime  budesonide 160 MICROgram(s)/formoterol 4.5 MICROgram(s) Inhaler 2 Puff(s) Inhalation two times a day  cefepime   IVPB      cefepime   IVPB 1000 milliGRAM(s) IV Intermittent every 12 hours  chlorhexidine 4% Liquid 1 Application(s) Topical <User Schedule>  montelukast 10 milliGRAM(s) Oral daily  pantoprazole    Tablet 40 milliGRAM(s) Oral before breakfast  pregabalin 25 milliGRAM(s) Oral daily  sertraline 50 milliGRAM(s) Oral daily  tiotropium 18 MICROgram(s) Capsule 1 Capsule(s) Inhalation daily    MEDICATIONS  (PRN):  acetaminophen 300 mG/butalbital 50 mG/ caffeine 40 mG 1 Capsule(s) Oral every 8 hours PRN headache  traZODone 50 milliGRAM(s) Oral at bedtime PRN insomnia      Assessment/Plan:  77 yo F with AF on Eliquis CAD sp PCI, HTN, HLD, SLE, Sjogrens, and severe AS sp TAVR 7/22 discharged on POD#2 stable with MCOT in place admitted now with weakness, lightheadedness and found to have 5 second pauses on MCOT as well as in the ED. Pt yennifer into 30s and was given Atropine in ED, now SR 50s.    - Case and plan discussed with CTU Intensivist and CT Surgeon - Dr. Cole/Alex   - Continue supportive care.  - Continue DVT/GI prophylaxis  - Incentive Spirometry 10 times an hour  - Continue to advance physical activity as tolerated and continue PT/OT as directed  1. Afib/Aflutter: cont eliquis for a/c   2. CAD:  plavix on hold    3. COPD: cont budesonide 160 MICROgram(s)/formoterol 4.5 MICROgram(s) Inhaler 2 Puff(s) Inhalation two times a day   4. Symptomatic Bradycardia w/sinus pauses 3-5 secs: Patient planned for DC PPM w/Dr. Looney of EP. Will hold eliquis . NPO after Midnight.

## 2020-08-03 LAB
AMOBARBITAL UR QL SCN: SIGNIFICANT CHANGE UP NG/ML
AMOBARBITAL, UR RESULT: SIGNIFICANT CHANGE UP NG/ML
BARBITURATES IN-HOUSE INTERPRETATION: SIGNIFICANT CHANGE UP
BARBITURATES UR QL SCN: SIGNIFICANT CHANGE UP
BUTALBITAL UR QL SCN: SIGNIFICANT CHANGE UP NG/ML
BUTALBITAL, UR RESULT: SIGNIFICANT CHANGE UP NG/ML
PHENOBARBITAL, UR RESULT: SIGNIFICANT CHANGE UP NG/ML
SECOBARBITAL UR QL SCN: SIGNIFICANT CHANGE UP NG/ML
SECOBARBITAL, UR RESULT: SIGNIFICANT CHANGE UP NG/ML
T4 FREE SERPL-MCNC: 0.9 NG/DL — SIGNIFICANT CHANGE UP (ref 0.9–1.8)
TSH SERPL-MCNC: 0.44 UIU/ML — SIGNIFICANT CHANGE UP (ref 0.27–4.2)

## 2020-08-03 PROCEDURE — 99233 SBSQ HOSP IP/OBS HIGH 50: CPT

## 2020-08-03 PROCEDURE — 71045 X-RAY EXAM CHEST 1 VIEW: CPT | Mod: 26

## 2020-08-03 PROCEDURE — 99232 SBSQ HOSP IP/OBS MODERATE 35: CPT

## 2020-08-03 RX ORDER — HYDRALAZINE HCL 50 MG
25 TABLET ORAL ONCE
Refills: 0 | Status: COMPLETED | OUTPATIENT
Start: 2020-08-03 | End: 2020-08-03

## 2020-08-03 RX ORDER — HYDRALAZINE HCL 50 MG
10 TABLET ORAL ONCE
Refills: 0 | Status: COMPLETED | OUTPATIENT
Start: 2020-08-03 | End: 2020-08-03

## 2020-08-03 RX ORDER — DILTIAZEM HCL 120 MG
15 CAPSULE, EXT RELEASE 24 HR ORAL ONCE
Refills: 0 | Status: COMPLETED | OUTPATIENT
Start: 2020-08-03 | End: 2020-08-03

## 2020-08-03 RX ORDER — CEFEPIME 1 G/1
1000 INJECTION, POWDER, FOR SOLUTION INTRAMUSCULAR; INTRAVENOUS ONCE
Refills: 0 | Status: COMPLETED | OUTPATIENT
Start: 2020-08-03 | End: 2020-08-03

## 2020-08-03 RX ORDER — METOPROLOL TARTRATE 50 MG
5 TABLET ORAL ONCE
Refills: 0 | Status: COMPLETED | OUTPATIENT
Start: 2020-08-03 | End: 2020-08-03

## 2020-08-03 RX ORDER — DILTIAZEM HCL 120 MG
10 CAPSULE, EXT RELEASE 24 HR ORAL ONCE
Refills: 0 | Status: COMPLETED | OUTPATIENT
Start: 2020-08-03 | End: 2020-08-03

## 2020-08-03 RX ORDER — POTASSIUM CHLORIDE 20 MEQ
20 PACKET (EA) ORAL ONCE
Refills: 0 | Status: COMPLETED | OUTPATIENT
Start: 2020-08-03 | End: 2020-08-03

## 2020-08-03 RX ADMIN — Medication 20 MILLIEQUIVALENT(S): at 23:14

## 2020-08-03 RX ADMIN — ATORVASTATIN CALCIUM 80 MILLIGRAM(S): 80 TABLET, FILM COATED ORAL at 22:19

## 2020-08-03 RX ADMIN — SERTRALINE 50 MILLIGRAM(S): 25 TABLET, FILM COATED ORAL at 11:42

## 2020-08-03 RX ADMIN — Medication 10 MILLIGRAM(S): at 22:38

## 2020-08-03 RX ADMIN — Medication 15 MILLIGRAM(S): at 22:53

## 2020-08-03 RX ADMIN — Medication 50 MILLIGRAM(S): at 22:18

## 2020-08-03 RX ADMIN — BUDESONIDE AND FORMOTEROL FUMARATE DIHYDRATE 2 PUFF(S): 160; 4.5 AEROSOL RESPIRATORY (INHALATION) at 09:36

## 2020-08-03 RX ADMIN — MONTELUKAST 10 MILLIGRAM(S): 4 TABLET, CHEWABLE ORAL at 11:42

## 2020-08-03 RX ADMIN — Medication 1 CAPSULE(S): at 23:01

## 2020-08-03 RX ADMIN — Medication 150 MILLIGRAM(S): at 18:10

## 2020-08-03 RX ADMIN — TIOTROPIUM BROMIDE 1 CAPSULE(S): 18 CAPSULE ORAL; RESPIRATORY (INHALATION) at 07:42

## 2020-08-03 RX ADMIN — Medication 1 CAPSULE(S): at 05:12

## 2020-08-03 RX ADMIN — Medication 1 CAPSULE(S): at 05:42

## 2020-08-03 RX ADMIN — AMLODIPINE BESYLATE 5 MILLIGRAM(S): 2.5 TABLET ORAL at 05:12

## 2020-08-03 RX ADMIN — BUDESONIDE AND FORMOTEROL FUMARATE DIHYDRATE 2 PUFF(S): 160; 4.5 AEROSOL RESPIRATORY (INHALATION) at 22:04

## 2020-08-03 RX ADMIN — Medication 25 MILLIGRAM(S): at 15:52

## 2020-08-03 RX ADMIN — CEFEPIME 100 MILLIGRAM(S): 1 INJECTION, POWDER, FOR SOLUTION INTRAMUSCULAR; INTRAVENOUS at 18:10

## 2020-08-03 RX ADMIN — Medication 1 CAPSULE(S): at 22:18

## 2020-08-03 RX ADMIN — Medication 30 MILLILITER(S): at 15:52

## 2020-08-03 RX ADMIN — Medication 5 MILLIGRAM(S): at 23:14

## 2020-08-03 RX ADMIN — CEFEPIME 100 MILLIGRAM(S): 1 INJECTION, POWDER, FOR SOLUTION INTRAMUSCULAR; INTRAVENOUS at 13:30

## 2020-08-03 RX ADMIN — PANTOPRAZOLE SODIUM 40 MILLIGRAM(S): 20 TABLET, DELAYED RELEASE ORAL at 06:23

## 2020-08-03 NOTE — PROGRESS NOTE ADULT - SUBJECTIVE AND OBJECTIVE BOX
OPERATIVE PROCEDURE(s):  s/p TAVR  on 2020 readmitted with bradycardia                 75yFemale  SURGEON(s): ROWDY Cole  SUBJECTIVE ASSESSMENT: pt seen and examined. pt is npo for ppm today by DR. Looney. Pt is hypertensive overnight. Otherwise pt has not other acute complaints.   Vital Signs Last 24 Hrs  T(F): 96.3 (03 Aug 2020 05:05), Max: 97.3 (02 Aug 2020 12:55)  HR: 82 (03 Aug 2020 05:05) (71 - 89)  BP: 180/75 (03 Aug 2020 06:30) (141/68 - 187/84)  RR: 18 (03 Aug 2020 05:05) (17 - 18)  SpO2: 96% (02 Aug 2020 19:51) (96% - 96%)      I&O's Detail    02 Aug 2020 07:  -  03 Aug 2020 07:00  --------------------------------------------------------  IN:    Oral Fluid: 1235 mL  Total IN: 1235 mL    OUT:    Voided: 1 mL  Total OUT: 1 mL        Net:   I&O's Detail    01 Aug 2020 07:01  -  02 Aug 2020 07:00  --------------------------------------------------------  Total NET: -400 mL      02 Aug 2020 07:01  -  03 Aug 2020 07:00  --------------------------------------------------------  Total NET: 1234 mL        CAPILLARY BLOOD GLUCOSE        Physical Exam:  General: NAD; A&Ox3  Cardiac: S1/S2, RRR, no murmur, no rubs  Lungs: unlabored respirations, CTA b/l, no wheeze, no rales, no crackles  Abdomen: Soft/NT/ND; positive bowel sounds x 4  Incisions: Incisions clean/dry/intact  Extremities: No edema b/l lower extremities; good capillary refill; no cyanosis; palpable 1+ pedal pulses b/l          LABS:                        9.7<L>  8.80  )-----------( 222      ( 02 Aug 2020 06:39 )             31.6<L>                        8.1<L>  7.35  )-----------( 161      ( 01 Aug 2020 04:00 )             26.7<L>    08    137  |  105  |  6<L>  ----------------------------<  118<H>  3.5   |  16<L>  |  0.5<L>      137  |  104  |  9<L>  ----------------------------<  91  4.4   |  22  |  0.6<L>    Ca    8.3<L>      02 Aug 2020 06:39  Mg     1.9         TPro  6.7 [6.0 - 8.0]  /  Alb  4.1 [3.5 - 5.2]  /  TBili  0.5 [0.2 - 1.2]  /  DBili  x   /  AST  24 [0 - 41]  /  ALT  10 [0 - 41]  /  AlkPhos  93 [30 - 115]        Urinalysis Basic - ( 01 Aug 2020 11:54 )    Color: Yellow / Appearance: Slightly Turbid / S.011 / pH: x  Gluc: x / Ketone: Negative  / Bili: Negative / Urobili: <2 mg/dL   Blood: x / Protein: Trace / Nitrite: Positive   Leuk Esterase: Large / RBC: 6 /HPF /  /HPF   Sq Epi: x / Non Sq Epi: 0 /HPF / Bacteria: Few        RADIOLOGY & ADDITIONAL TESTS:  CXR: < from: Xray Chest 1 View- PORTABLE-Routine (20 @ 08:37) >  Impression:    No radiographic evidence of acute cardiopulmonary disease.    < end of copied text >    EKG: < from: 12 Lead ECG (20 @ 08:12) >  Ventricular Rate 89 BPM    Atrial Rate 89 BPM    P-R Interval 180 ms    QRS Duration 152 ms    Q-T Interval 438 ms    QTC Calculation(Bezet) 532 ms    P Axis 98 degrees    R Axis 190 degrees    T Axis 77 degrees    Diagnosis Line *** Suspect arm lead reversal, interpretation assumes no reversal  Normal sinus rhythm  Right atrial enlargement  Right superior axis deviation  Non-specific intra-ventricular conduction block  Abnormal ECG    < end of copied text >    MEDICATIONS  (STANDING):  amLODIPine   Tablet 5 milliGRAM(s) Oral daily  atorvastatin 80 milliGRAM(s) Oral at bedtime  budesonide 160 MICROgram(s)/formoterol 4.5 MICROgram(s) Inhaler 2 Puff(s) Inhalation two times a day  cefepime   IVPB      cefepime   IVPB 1000 milliGRAM(s) IV Intermittent every 12 hours  chlorhexidine 4% Liquid 1 Application(s) Topical <User Schedule>  hydrALAZINE 25 milliGRAM(s) Oral once  montelukast 10 milliGRAM(s) Oral daily  pantoprazole    Tablet 40 milliGRAM(s) Oral before breakfast  pregabalin 25 milliGRAM(s) Oral daily  sertraline 50 milliGRAM(s) Oral daily  tiotropium 18 MICROgram(s) Capsule 1 Capsule(s) Inhalation daily    MEDICATIONS  (PRN):  acetaminophen 300 mG/butalbital 50 mG/ caffeine 40 mG 1 Capsule(s) Oral every 8 hours PRN headache  traZODone 50 milliGRAM(s) Oral at bedtime PRN insomnia    Allergies    No Known Allergies    Intolerances      Ambulation/Activity Status: ambulate     Assessment/Plan:  75y Female status-post TAVR on 2020, readmitted on 2020 for bradycardia  - Case and plan discussed with CTU Intensivist and CT Surgeon - Dr. Cole  - Continue CTU supportive care    - Continue DVT/GI prophylaxis  - Incentive Spirometry 10 times an hour  - Continue to advance physical activity as tolerated and continue PT/OT as directed  1. Afib/Aflutter:hold elquis for now as pt is pre-op for ppm  2. CAD: hold plavix for now  3. COPD: cont budesonide 160 MICROgram(s)/formoterol 4.5 MICROgram(s) Inhaler 2 Puff(s) Inhalation two times a day   4. Symptomatic Bradycardia w/sinus pauses 3-5 secs: Patient planned for DC PPM w/Dr. Looney of EP today       Social Service Disposition:  home OPERATIVE PROCEDURE(s):  s/p TAVR  on 2020 readmitted with bradycardia                 75yFemale  SURGEON(s): ROWDY Cole  SUBJECTIVE ASSESSMENT: pt seen and examined. pt is npo for ppm today by DR. Looney. Pt is hypertensive overnight. Otherwise pt has not other acute complaints.   Vital Signs Last 24 Hrs  T(F): 96.3 (03 Aug 2020 05:05), Max: 97.3 (02 Aug 2020 12:55)  HR: 82 (03 Aug 2020 05:05) (71 - 89)  BP: 180/75 (03 Aug 2020 06:30) (141/68 - 187/84)  RR: 18 (03 Aug 2020 05:05) (17 - 18)  SpO2: 96% (02 Aug 2020 19:51) (96% - 96%)      I&O's Detail    02 Aug 2020 07:  -  03 Aug 2020 07:00  --------------------------------------------------------  IN:    Oral Fluid: 1235 mL  Total IN: 1235 mL    OUT:    Voided: 1 mL  Total OUT: 1 mL        Net:   I&O's Detail    01 Aug 2020 07:01  -  02 Aug 2020 07:00  --------------------------------------------------------  Total NET: -400 mL      02 Aug 2020 07:01  -  03 Aug 2020 07:00  --------------------------------------------------------  Total NET: 1234 mL        CAPILLARY BLOOD GLUCOSE        Physical Exam:  General: NAD; A&Ox3  Cardiac: S1/S2, RRR, no murmur, no rubs  Lungs: unlabored respirations, CTA b/l, no wheeze, no rales, no crackles  Abdomen: Soft/NT/ND; positive bowel sounds x 4  Incisions: Incisions clean/dry/intact  Extremities: No edema b/l lower extremities; good capillary refill; no cyanosis; palpable 1+ pedal pulses b/l          LABS:                        9.7<L>  8.80  )-----------( 222      ( 02 Aug 2020 06:39 )             31.6<L>                        8.1<L>  7.35  )-----------( 161      ( 01 Aug 2020 04:00 )             26.7<L>    08    137  |  105  |  6<L>  ----------------------------<  118<H>  3.5   |  16<L>  |  0.5<L>      137  |  104  |  9<L>  ----------------------------<  91  4.4   |  22  |  0.6<L>    Ca    8.3<L>      02 Aug 2020 06:39  Mg     1.9         TPro  6.7 [6.0 - 8.0]  /  Alb  4.1 [3.5 - 5.2]  /  TBili  0.5 [0.2 - 1.2]  /  DBili  x   /  AST  24 [0 - 41]  /  ALT  10 [0 - 41]  /  AlkPhos  93 [30 - 115]        Urinalysis Basic - ( 01 Aug 2020 11:54 )    Color: Yellow / Appearance: Slightly Turbid / S.011 / pH: x  Gluc: x / Ketone: Negative  / Bili: Negative / Urobili: <2 mg/dL   Blood: x / Protein: Trace / Nitrite: Positive   Leuk Esterase: Large / RBC: 6 /HPF /  /HPF   Sq Epi: x / Non Sq Epi: 0 /HPF / Bacteria: Few        RADIOLOGY & ADDITIONAL TESTS:  CXR: < from: Xray Chest 1 View- PORTABLE-Routine (20 @ 08:37) >  Impression:    No radiographic evidence of acute cardiopulmonary disease.    < end of copied text >    EKG: < from: 12 Lead ECG (20 @ 08:12) >  Ventricular Rate 89 BPM    Atrial Rate 89 BPM    P-R Interval 180 ms    QRS Duration 152 ms    Q-T Interval 438 ms    QTC Calculation(Bezet) 532 ms    P Axis 98 degrees    R Axis 190 degrees    T Axis 77 degrees    Diagnosis Line *** Suspect arm lead reversal, interpretation assumes no reversal  Normal sinus rhythm  Right atrial enlargement  Right superior axis deviation  Non-specific intra-ventricular conduction block  Abnormal ECG    < end of copied text >    MEDICATIONS  (STANDING):  amLODIPine   Tablet 5 milliGRAM(s) Oral daily  atorvastatin 80 milliGRAM(s) Oral at bedtime  budesonide 160 MICROgram(s)/formoterol 4.5 MICROgram(s) Inhaler 2 Puff(s) Inhalation two times a day  cefepime   IVPB      cefepime   IVPB 1000 milliGRAM(s) IV Intermittent every 12 hours  chlorhexidine 4% Liquid 1 Application(s) Topical <User Schedule>  hydrALAZINE 25 milliGRAM(s) Oral once  montelukast 10 milliGRAM(s) Oral daily  pantoprazole    Tablet 40 milliGRAM(s) Oral before breakfast  pregabalin 25 milliGRAM(s) Oral daily  sertraline 50 milliGRAM(s) Oral daily  tiotropium 18 MICROgram(s) Capsule 1 Capsule(s) Inhalation daily    MEDICATIONS  (PRN):  acetaminophen 300 mG/butalbital 50 mG/ caffeine 40 mG 1 Capsule(s) Oral every 8 hours PRN headache  traZODone 50 milliGRAM(s) Oral at bedtime PRN insomnia    Allergies    No Known Allergies    Intolerances      Ambulation/Activity Status: ambulate     Assessment/Plan:  75y Female status-post TAVR on 2020, readmitted on 2020 for bradycardia  - Case and plan discussed with CTU Intensivist and CT Surgeon - Dr. Cole  - Continue CTU supportive care    - Continue DVT/GI prophylaxis  - Incentive Spirometry 10 times an hour  - Continue to advance physical activity as tolerated and continue PT/OT as directed  1. Afib/Aflutter:hold elquis for now as pt is pre-op for ppm  2. CAD: hold plavix for now  3. COPD: cont budesonide 160 MICROgram(s)/formoterol 4.5 MICROgram(s) Inhaler 2 Puff(s) Inhalation two times a day   4. Symptomatic Bradycardia w/sinus pauses 3-5 secs: Patient planned for DC PPM w/Dr. ENAMORADO- cancelled today, will move to tomorrow        Social Service Disposition:  home

## 2020-08-03 NOTE — PROGRESS NOTE ADULT - ATTENDING COMMENTS
NPO after midnight for DC PPM tomorrow for sinus pause. ECHO with normal EF.   Cont antibiotics for simple UTI. Midline placed by CTS.    Will plan  DC PPM.  We discussed the risks/benefits/alternatives, nature of procedure, and follow up care after device is implanted. We also discussed remote monitoring in details. Patient is in agreement with proceeding with the device implant. We discussed the risks of bleeding, hematoma, injury to vessels and heart, perforation, tamponade, pneumothorax, infection, lead dislodgment, device malfunction, and rare risks of stroke/heart attack/death. Patient expressed understanding of the discussion. I answered all questions in detail.

## 2020-08-03 NOTE — PROGRESS NOTE ADULT - I WAS PHYSICALLY PRESENT FOR THE KEY PORTIONS OF THE EVALUATION AND MANAGEMENT (E/M) SERVICE PROVIDED.  I AGREE WITH THE ABOVE HISTORY, PHYSICAL, AND PLAN WHICH I HAVE REVIEWED AND EDITED WHERE APPROPRIATE
Pharmacy called questioning if the patient is taking the Myrbetrig 50 mg tablets or needing prior authorization. States RN can call [phone] and ask for the pharmacist      Statement Selected

## 2020-08-03 NOTE — PROGRESS NOTE ADULT - SUBJECTIVE AND OBJECTIVE BOX
INTERVAL HPI/OVERNIGHT EVENTS: No acute tele events overnight. Patient received abx for UTI Sunday 8/2 but IV line infiltrated and no abx were given today. Patient symptomatic from UTI with burning and frequency, requires further abx course before PPM. No fever, chills or WBC.    MEDICATIONS  (STANDING):  amLODIPine   Tablet 5 milliGRAM(s) Oral daily  atorvastatin 80 milliGRAM(s) Oral at bedtime  budesonide 160 MICROgram(s)/formoterol 4.5 MICROgram(s) Inhaler 2 Puff(s) Inhalation two times a day  cefepime   IVPB      cefepime   IVPB 1000 milliGRAM(s) IV Intermittent every 12 hours  chlorhexidine 4% Liquid 1 Application(s) Topical <User Schedule>  hydrALAZINE 25 milliGRAM(s) Oral once  montelukast 10 milliGRAM(s) Oral daily  pantoprazole    Tablet 40 milliGRAM(s) Oral before breakfast  pregabalin 150 milliGRAM(s) Oral every 12 hours  sertraline 50 milliGRAM(s) Oral daily  tiotropium 18 MICROgram(s) Capsule 1 Capsule(s) Inhalation daily    MEDICATIONS  (PRN):  acetaminophen 300 mG/butalbital 50 mG/ caffeine 40 mG 1 Capsule(s) Oral every 8 hours PRN headache  traZODone 50 milliGRAM(s) Oral at bedtime PRN insomnia      Allergies  No Known Allergies    REVIEW OF SYSTEMS: No dizziness, syncope, CP or palpitations    Vital Signs Last 24 Hrs  T(C): 36.1 (03 Aug 2020 13:14), Max: 36.3 (02 Aug 2020 20:53)  T(F): 97 (03 Aug 2020 13:14), Max: 97.3 (02 Aug 2020 20:53)  HR: 83 (03 Aug 2020 14:30) (71 - 89)  BP: 161/70 (03 Aug 2020 14:30) (146/65 - 187/84)  BP(mean): --  RR: 18 (03 Aug 2020 13:14) (18 - 18)  SpO2: 96% (03 Aug 2020 09:43) (96% - 96%)    08-02-20 @ 07:01  -  08-03-20 @ 07:00  --------------------------------------------------------  IN: 1235 mL / OUT: 1 mL / NET: 1234 mL    08-03-20 @ 07:01  -  08-03-20 @ 14:40  --------------------------------------------------------  IN: 180 mL / OUT: 75 mL / NET: 105 mL        Physical Exam  GENERAL: In no apparent distress, well nourished, and hydrated.  HEAD:  Atraumatic, Normocephalic  EYES: EOMI, PERRLA, conjunctiva and sclera clear  ENMT: No tonsillar erythema, exudates, or enlargements; ist mucous membranes, Good dentition, No lesions  NECK: Supple and normal thyroid.  No JVD or carotid bruit.  Carotid pulse is 2+ bilaterally.  HEART: Regular rate and rhythm; No murmurs, rubs, or gallops.  PULMONARY: Clear to auscultation and perfusion.  No rales, wheezing, or rhonchi bilaterally.  ABDOMEN: Soft, Nontender, Nondistended; Bowel sounds present  EXTREMITIES:  2+ Peripheral Pulses, No clubbing, cyanosis, or edema  NEUROLOGICAL: A&Ox3; no focal deficits    LABS:                        9.7    8.80  )-----------( 222      ( 02 Aug 2020 06:39 )             31.6     08-02    137  |  105  |  6<L>  ----------------------------<  118<H>  3.5   |  16<L>  |  0.5<L>    Ca    8.3<L>      02 Aug 2020 06:39  Mg     1.9     08-02    TPro  6.7  /  Alb  4.1  /  TBili  0.5  /  DBili  x   /  AST  24  /  ALT  10  /  AlkPhos  93  08-02      TELE: SR 70s, no ectopy, no pauses    RADIOLOGY & ADDITIONAL TESTS:  < from: 12 Lead ECG (08.02.20 @ 08:12) >    Ventricular Rate 89 BPM    Atrial Rate 89 BPM    P-R Interval 180 ms    QRS Duration 152 ms    Q-T Interval 438 ms    QTC Calculation(Bezet) 532 ms    P Axis 98 degrees    R Axis 190 degrees    T Axis 77 degrees    Diagnosis Line *** Suspect arm lead reversal, interpretation assumes no reversal  Normal sinus rhythm  Right atrial enlargement  Right superior axis deviation  Non-specific intra-ventricular conduction block  Abnormal ECG    Confirmed by JUANA MATA MD ([id number]) on 8/2/2020 1:45:38 PM INTERVAL HPI/OVERNIGHT EVENTS: No acute tele events overnight. Patient received abx for UTI Sunday 8/2 but IV line infiltrated and no abx were given today. Patient symptomatic from UTI with burning and frequency, requires further abx course before PPM. No fever, chills or WBC.    MEDICATIONS  (STANDING):  amLODIPine   Tablet 5 milliGRAM(s) Oral daily  atorvastatin 80 milliGRAM(s) Oral at bedtime  budesonide 160 MICROgram(s)/formoterol 4.5 MICROgram(s) Inhaler 2 Puff(s) Inhalation two times a day  cefepime   IVPB      cefepime   IVPB 1000 milliGRAM(s) IV Intermittent every 12 hours  chlorhexidine 4% Liquid 1 Application(s) Topical <User Schedule>  hydrALAZINE 25 milliGRAM(s) Oral once  montelukast 10 milliGRAM(s) Oral daily  pantoprazole    Tablet 40 milliGRAM(s) Oral before breakfast  pregabalin 150 milliGRAM(s) Oral every 12 hours  sertraline 50 milliGRAM(s) Oral daily  tiotropium 18 MICROgram(s) Capsule 1 Capsule(s) Inhalation daily    MEDICATIONS  (PRN):  acetaminophen 300 mG/butalbital 50 mG/ caffeine 40 mG 1 Capsule(s) Oral every 8 hours PRN headache  traZODone 50 milliGRAM(s) Oral at bedtime PRN insomnia      Allergies  No Known Allergies    REVIEW OF SYSTEMS: No dizziness, syncope, CP or palpitations    Vital Signs Last 24 Hrs  T(C): 36.1 (03 Aug 2020 13:14), Max: 36.3 (02 Aug 2020 20:53)  T(F): 97 (03 Aug 2020 13:14), Max: 97.3 (02 Aug 2020 20:53)  HR: 83 (03 Aug 2020 14:30) (71 - 89)  BP: 161/70 (03 Aug 2020 14:30) (146/65 - 187/84)  BP(mean): --  RR: 18 (03 Aug 2020 13:14) (18 - 18)  SpO2: 96% (03 Aug 2020 09:43) (96% - 96%)    08-02-20 @ 07:01  -  08-03-20 @ 07:00  --------------------------------------------------------  IN: 1235 mL / OUT: 1 mL / NET: 1234 mL    08-03-20 @ 07:01  -  08-03-20 @ 14:40  --------------------------------------------------------  IN: 180 mL / OUT: 75 mL / NET: 105 mL        Physical Exam  GENERAL: In no apparent distress, well nourished, and hydrated.  HEAD:  Atraumatic, Normocephalic  EYES: EOMI, PERRLA, conjunctiva and sclera clear  ENMT: MMM  NECK: Supple and normal thyroid.  No JVD   HEART: Regular rate and rhythm; No murmurs, rubs, or gallops.  PULMONARY: Clear to auscultation and perfusion.  No rales, wheezing, or rhonchi bilaterally.  ABDOMEN: Soft, Nontender, Nondistended; Bowel sounds present  EXTREMITIES:  2+ Peripheral Pulses, No clubbing, cyanosis, or edema  NEUROLOGICAL: A&Ox3; no focal deficits    LABS:                        9.7    8.80  )-----------( 222      ( 02 Aug 2020 06:39 )             31.6     08-02    137  |  105  |  6<L>  ----------------------------<  118<H>  3.5   |  16<L>  |  0.5<L>    Ca    8.3<L>      02 Aug 2020 06:39  Mg     1.9     08-02    TPro  6.7  /  Alb  4.1  /  TBili  0.5  /  DBili  x   /  AST  24  /  ALT  10  /  AlkPhos  93  08-02      TELE: SR 70s, no ectopy, no pauses    RADIOLOGY & ADDITIONAL TESTS:  < from: 12 Lead ECG (08.02.20 @ 08:12) >    Ventricular Rate 89 BPM    Atrial Rate 89 BPM    P-R Interval 180 ms    QRS Duration 152 ms    Q-T Interval 438 ms    QTC Calculation(Bezet) 532 ms    P Axis 98 degrees    R Axis 190 degrees    T Axis 77 degrees    Diagnosis Line *** Suspect arm lead reversal, interpretation assumes no reversal  Normal sinus rhythm  Right atrial enlargement  Right superior axis deviation  Non-specific intra-ventricular conduction block  Abnormal ECG    Confirmed by JUANA MATA MD ([id number]) on 8/2/2020 1:45:38 PM    < from: Transthoracic Echocardiogram (07.31.20 @ 15:19) >   1. LV Ejection Fraction by Samson's Method with a biplane EF of 59 %.   2. Elevated left atrial and left ventricular end-diastolic pressures.   3. Normal left ventricular size and wall thicknesses, with normal systolic and diastolic function.   4. Spectral Doppler shows restrictive pattern of left ventricular myocardial filling (Grade III diastolic dysfunction).   5. Mild to moderate mitral valve regurgitation.   6. Moderate mitral annular calcification.   7. Mild aortic regurgitation.   8. TAVR in the aortic position.   9. A mild AI perivalvular leak is noted.  10. Estimated pulmonary artery systolic pressure is 52.8 mmHg assuming a right atrial pressure of 8 mmHg, whichis consistent with moderate pulmonary hypertension.  11. Pulmonary hypertension is present.  12. LA volume Index is 38.3 ml/m² ml/m2.  13. There is moderate aortic root calcification.    < end of copied text >

## 2020-08-03 NOTE — PROGRESS NOTE ADULT - ASSESSMENT
Cardiologist: Dr. Sepulveda    Assessment: 77 yo F with AF on Eliquis CAD sp PCI, HTN, HLD, SLE, Sjogrens, and severe AS sp TAVR 7/22 discharged on POD#2 stable with MCOT in place admitted now with weakness, lightheadedness and found to have 5 second pauses on MCOT as well as in the ED. Pt yennifer into 30s and was given Atropine in ED, now SR 70s. Patient with UTI, symptomatic with burning and frequency. Antibiotics not given today 2/2 infiltrated IV line. No fever or WBC.    Impression:  Symptomatic Bradycardia  Sinus Pauses 3-5 seconds  UTI  AF on Eliquis  CAD sp PCI  AS s/p TAVR (7/22)  Sjogrens  SLE    Plan:  - Pt was scheduled for PPM today however she did not receive appropriate antibiotic coverage for UTI and the risk of infection if implanting device with infective source already present too high. Pt needs another day of abx coverage before proceeding with PPM implant.  - Cont abx for UTI now that IV line working properly  - Will plan for DC PPM placement tomorrow with Dr Looney  - Pt can eat today, keep NPO after midnight  - Hold anticoagulation tonight and tomorrow AM  - Repeat COVD PCR today  - Cont tele monitoring  - Monitor electrolytes, maintain K > 4 and Mg > 2 Cardiologist: Dr. Sepulveda    Assessment: 77 yo F with AF on Eliquis CAD sp PCI, HTN, HLD, SLE, Sjogrens, and severe AS sp TAVR 7/22 discharged on POD#2 stable with MCOT in place admitted now with weakness, lightheadedness and found to have 5 second pause on MCOT as well as in the ED. Pt yennifer into 30s and was given Atropine in ED, now SR 70s. Patient with UTI, symptomatic with burning and frequency. Antibiotics not given today 2/2 infiltrated IV line. No fever or WBC.     Impression:  Symptomatic Bradycardia  Sinus Pauses 3-5 seconds  UTI  AF on Eliquis  CAD sp PCI  AS s/p TAVR (7/22)  Sjogrens  SLE    Plan:  - Pt was scheduled for PPM today however she did not receive appropriate antibiotic coverage for UTI and the risk of infection if implanting device with infective source already present too high. Pt needs another day of abx coverage before proceeding with PPM implant.  - Cont abx for UTI now that IV line working properly  - Will plan for DC PPM placement tomorrow with Dr Looney  - Pt can eat today, keep NPO after midnight  - Hold anticoagulation tonight and tomorrow AM  - Repeat COVD PCR today  - Cont tele monitoring  - Monitor electrolytes, maintain K > 4 and Mg > 2

## 2020-08-03 NOTE — PROCEDURE NOTE - NSPROCDETAILS_GEN_ALL_CORE
sterile dressing applied/sterile technique, catheter placed/location identified, draped/prepped, sterile technique used/ultrasound guidance/supine position

## 2020-08-04 LAB — SARS-COV-2 RNA SPEC QL NAA+PROBE: SIGNIFICANT CHANGE UP

## 2020-08-04 PROCEDURE — 99232 SBSQ HOSP IP/OBS MODERATE 35: CPT

## 2020-08-04 PROCEDURE — 71045 X-RAY EXAM CHEST 1 VIEW: CPT | Mod: 26

## 2020-08-04 PROCEDURE — 33208 INSRT HEART PM ATRIAL & VENT: CPT | Mod: KX

## 2020-08-04 RX ORDER — VANCOMYCIN HCL 1 G
1000 VIAL (EA) INTRAVENOUS ONCE
Refills: 0 | Status: COMPLETED | OUTPATIENT
Start: 2020-08-04 | End: 2020-08-04

## 2020-08-04 RX ORDER — ONDANSETRON 8 MG/1
4 TABLET, FILM COATED ORAL ONCE
Refills: 0 | Status: COMPLETED | OUTPATIENT
Start: 2020-08-04 | End: 2020-08-05

## 2020-08-04 RX ORDER — HYDROMORPHONE HYDROCHLORIDE 2 MG/ML
1 INJECTION INTRAMUSCULAR; INTRAVENOUS; SUBCUTANEOUS
Refills: 0 | Status: DISCONTINUED | OUTPATIENT
Start: 2020-08-04 | End: 2020-08-05

## 2020-08-04 RX ORDER — SODIUM CHLORIDE 9 MG/ML
1000 INJECTION INTRAMUSCULAR; INTRAVENOUS; SUBCUTANEOUS
Refills: 0 | Status: DISCONTINUED | OUTPATIENT
Start: 2020-08-04 | End: 2020-08-05

## 2020-08-04 RX ORDER — HYDROMORPHONE HYDROCHLORIDE 2 MG/ML
0.5 INJECTION INTRAMUSCULAR; INTRAVENOUS; SUBCUTANEOUS
Refills: 0 | Status: DISCONTINUED | OUTPATIENT
Start: 2020-08-04 | End: 2020-08-05

## 2020-08-04 RX ORDER — VANCOMYCIN HCL 1 G
1000 VIAL (EA) INTRAVENOUS EVERY 12 HOURS
Refills: 0 | Status: COMPLETED | OUTPATIENT
Start: 2020-08-05 | End: 2020-08-05

## 2020-08-04 RX ADMIN — AMLODIPINE BESYLATE 5 MILLIGRAM(S): 2.5 TABLET ORAL at 06:02

## 2020-08-04 RX ADMIN — BUDESONIDE AND FORMOTEROL FUMARATE DIHYDRATE 2 PUFF(S): 160; 4.5 AEROSOL RESPIRATORY (INHALATION) at 11:43

## 2020-08-04 RX ADMIN — BUDESONIDE AND FORMOTEROL FUMARATE DIHYDRATE 2 PUFF(S): 160; 4.5 AEROSOL RESPIRATORY (INHALATION) at 20:38

## 2020-08-04 RX ADMIN — TIOTROPIUM BROMIDE 1 CAPSULE(S): 18 CAPSULE ORAL; RESPIRATORY (INHALATION) at 07:53

## 2020-08-04 RX ADMIN — Medication 30 MILLILITER(S): at 20:38

## 2020-08-04 RX ADMIN — Medication 1 CAPSULE(S): at 22:44

## 2020-08-04 RX ADMIN — PANTOPRAZOLE SODIUM 40 MILLIGRAM(S): 20 TABLET, DELAYED RELEASE ORAL at 06:01

## 2020-08-04 RX ADMIN — Medication 1 CAPSULE(S): at 21:44

## 2020-08-04 RX ADMIN — Medication 150 MILLIGRAM(S): at 18:34

## 2020-08-04 RX ADMIN — Medication 250 MILLIGRAM(S): at 15:45

## 2020-08-04 RX ADMIN — SERTRALINE 50 MILLIGRAM(S): 25 TABLET, FILM COATED ORAL at 11:43

## 2020-08-04 RX ADMIN — Medication 250 MILLIGRAM(S): at 14:27

## 2020-08-04 RX ADMIN — MONTELUKAST 10 MILLIGRAM(S): 4 TABLET, CHEWABLE ORAL at 11:43

## 2020-08-04 RX ADMIN — Medication 150 MILLIGRAM(S): at 06:01

## 2020-08-04 RX ADMIN — HYDROMORPHONE HYDROCHLORIDE 1 MILLIGRAM(S): 2 INJECTION INTRAMUSCULAR; INTRAVENOUS; SUBCUTANEOUS at 19:00

## 2020-08-04 RX ADMIN — HYDROMORPHONE HYDROCHLORIDE 1 MILLIGRAM(S): 2 INJECTION INTRAMUSCULAR; INTRAVENOUS; SUBCUTANEOUS at 18:39

## 2020-08-04 RX ADMIN — SODIUM CHLORIDE 50 MILLILITER(S): 9 INJECTION INTRAMUSCULAR; INTRAVENOUS; SUBCUTANEOUS at 18:34

## 2020-08-04 RX ADMIN — ATORVASTATIN CALCIUM 80 MILLIGRAM(S): 80 TABLET, FILM COATED ORAL at 21:38

## 2020-08-04 RX ADMIN — CEFEPIME 100 MILLIGRAM(S): 1 INJECTION, POWDER, FOR SOLUTION INTRAMUSCULAR; INTRAVENOUS at 06:02

## 2020-08-04 NOTE — PROGRESS NOTE ADULT - SUBJECTIVE AND OBJECTIVE BOX
OPERATIVE PROCEDURE(s):     s/p TAVR  on 2020 readmitted with bradycardia                     75yFemale  SURGEON(s): ROWDY Cole  SUBJECTIVE ASSESSMENT: pt seen and examined. no acute complaints at this time. overnight pt was tachy in the 150s, iv cardizem and iv lopressor given.   Vital Signs Last 24 Hrs  T(F): 96.4 (04 Aug 2020 06:25), Max: 97 (03 Aug 2020 13:14)  HR: 71 (04 Aug 2020 05:06) (71 - 150)  BP: 127/71 (04 Aug 2020 06:25) (127/59 - 175/72)  RR: 19 (04 Aug 2020 06:25) (18 - 19)  SpO2: 98% (04 Aug 2020 06:25) (96% - 98%)      I&O's Detail    03 Aug 2020 07:  -  04 Aug 2020 07:00  --------------------------------------------------------  IN:    Oral Fluid: 620 mL  Total IN: 620 mL    OUT:    Voided: 75 mL  Total OUT: 75 mL        Net:   I&O's Detail    02 Aug 2020 07:01  -  03 Aug 2020 07:00  --------------------------------------------------------  Total NET: 1234 mL      03 Aug 2020 07:01  -  04 Aug 2020 07:00  --------------------------------------------------------  Total NET: 545 mL        CAPILLARY BLOOD GLUCOSE        Physical Exam:  General: NAD; A&Ox3  Cardiac: S1/S2, RRR, no murmur, no rubs  Lungs: unlabored respirations, CTA b/l, no wheeze, no rales, no crackles  Abdomen: Soft/NT/ND; positive bowel sounds x 4  Incisions: Incisions clean/dry/intact  Extremities: No edema b/l lower extremities; good capillary refill; no cyanosis; palpable 1+ pedal pulses b/l       LABS:                        9.7<L>  8.80  )-----------( 222      ( 02 Aug 2020 06:39 )             31.6<L>    08    137  |  105  |  6<L>  ----------------------------<  118<H>  3.5   |  16<L>  |  0.5<L>    Ca    8.3<L>      02 Aug 2020 06:39  Mg     1.9         TPro  6.7 [6.0 - 8.0]  /  Alb  4.1 [3.5 - 5.2]  /  TBili  0.5 [0.2 - 1.2]  /  DBili  x   /  AST  24 [0 - 41]  /  ALT  10 [0 - 41]  /  AlkPhos  93 [30 - 115]        Urinalysis Basic - ( 01 Aug 2020 11:54 )    Color: Yellow / Appearance: Slightly Turbid / S.011 / pH: x  Gluc: x / Ketone: Negative  / Bili: Negative / Urobili: <2 mg/dL   Blood: x / Protein: Trace / Nitrite: Positive   Leuk Esterase: Large / RBC: 6 /HPF /  /HPF   Sq Epi: x / Non Sq Epi: 0 /HPF / Bacteria: Few        RADIOLOGY & ADDITIONAL TESTS:  CXR: < from: Xray Chest 1 View- PORTABLE-Routine (20 @ 06:44) >  Impression:    Interval aortic valve repair.    No radiographic evidence of acute cardiopulmonary disease.    < end of copied text >      MEDICATIONS  (STANDING):  amLODIPine   Tablet 5 milliGRAM(s) Oral daily  atorvastatin 80 milliGRAM(s) Oral at bedtime  budesonide 160 MICROgram(s)/formoterol 4.5 MICROgram(s) Inhaler 2 Puff(s) Inhalation two times a day  cefepime   IVPB      cefepime   IVPB 1000 milliGRAM(s) IV Intermittent every 12 hours  chlorhexidine 4% Liquid 1 Application(s) Topical <User Schedule>  montelukast 10 milliGRAM(s) Oral daily  pantoprazole    Tablet 40 milliGRAM(s) Oral before breakfast  pregabalin 150 milliGRAM(s) Oral every 12 hours  sertraline 50 milliGRAM(s) Oral daily  tiotropium 18 MICROgram(s) Capsule 1 Capsule(s) Inhalation daily    MEDICATIONS  (PRN):  acetaminophen 300 mG/butalbital 50 mG/ caffeine 40 mG 1 Capsule(s) Oral every 8 hours PRN headache  aluminum hydroxide/magnesium hydroxide/simethicone Suspension 30 milliLiter(s) Oral every 4 hours PRN Dyspepsia  traZODone 50 milliGRAM(s) Oral at bedtime PRN insomnia      Allergies    No Known Allergies    Intolerances      Ambulation/Activity Status: ambulate     Assessment/Plan:  75y Female status-post TAVR on 2020, readmitted on 2020 for bradycardia  - Case and plan discussed with CTU Intensivist and CT Surgeon - Dr. Cole  - Continue CTU supportive care    - Continue DVT/GI prophylaxis  - Incentive Spirometry 10 times an hour  - Continue to advance physical activity as tolerated and continue PT/OT as directed  1. Afib/Aflutter: hold elquis for now as pt is pre-op for ppm  2. CAD: hold plavix for now  3. COPD: cont budesonide 160 MICROgram(s)/formoterol 4.5 MICROgram(s) Inhaler 2 Puff(s) Inhalation two times a day   4. Symptomatic Bradycardia w/sinus pauses 3-5 secs: Patient planned for DC PPM w/Dr. Looney of EP today       Social Service Disposition:  home

## 2020-08-04 NOTE — CHART NOTE - NSCHARTNOTEFT_GEN_A_CORE
Cardiac Electrophysiology Procedure Note    Procedure:  Dual Chamber pacemaker implant  Fluoroscopy    Indication:    Attending:	Wesley Mendoza MD    EQUIPMENT IMPLANTED AND BASELINE PARAMETERS  Pulse Generator   :	1010data  Model Number	[id number]  Serial Number	[id number]    Atrial Lead:  Model Number:	5076  Serial Number:	[id number]  Location	Right atrial appending  Threshold:	1.25 V at 0.5V  Sensin.3mV  Impedance:	380 Ohms  						  Right Ventricular Lead:  Model Number:	5076  Serial Number:	[id number]  Location	Right Ventricular septal apex  Threshold:	0.3 V at 0.5 ms  Sensin mV  Impedance:	 646 Ohms    				  DESCRIPTION OF PROCEDURE  	The patient was brought to the Procedure Room in a nonsedated and fasting state, having received preoperative antibiotics. Informed, written consent was obtained prior to the procedure.  The left shoulder region was cleaned and prepped with serial applications of Chlorhexidine. Patient was then covered from head to toe with sterile drapes in the usual manner. Intermittent boluses of Versed was used to maintain comfort and analgesia throughout the procedure. Blood pressure, oxygenation and level of comfort were stable throughout. The fluoroscopic anatomy of the left shoulder region was inspected.  	The left deltopectoral groove region was defined; 20 cc of lidocaine solution were infiltrated into the skin overlying the left deltopectoral groove. A 4.0 cm. incision was then made overlying and parallel to the deltopectoral groove.  This incision was extended down to the pre-pectoral fascia of the pectoralis major muscle using blunt and sharp dissection and electrocautery.   	A left axillary venogram was obtained.  Next, using the modified Seldinger technique, the left axillary vein was accessed twice with a micropuncture needle using fluoroscopic guidance.  Fluoroscopy was used to confirm the capability to position the guide wire in the inferior vena cava, thus confirming venous access.  	  	A 9Fr introducer sheath was inserted over the guide wire, and the dilator was removed.  Next, the RV lead was advanced into the heart under fluoroscopic guidance.  Next, using a combination of straight and curved stylets, the right ventricular lead was advanced to the RV.  A site was found in the RV apical septum with adequate pacing threshold, sensing, and impedance without diaphragmatic stimulation, the lead's screw was extended. The lead's position and adequate slack were confirmed in [place] and HEATON projections.  The introducer sheath was split and removed. The lead's position and adequate slack were confirmed in [place] and [place]AT[place] projections.  The lead's sewing sleeve was positioned at the site of entry into muscle layer, and the lead secured to the pre-pectoral fascia with two 2-0 silk sutures tied around the sewing sleeve encapsulating the lead. There was no evidence of diaphragmatic pacing at maximal output.  	Next, a 7 [place] sheath was inserted through the second wire into the axillary vein.  The dilator and guidewire were removed and, through this sheath, the atrial pacing lead was advanced into the heart under fluoroscopic guidance.  Next, using a J curved stylet the atrial lead was placed in the right atrial appendage.  Once adequate pacing and sensing threshold parameters were obtained, the fixation screw was extended and the sheath was split and removed. The lead position was confirmed in the [place] and [place] projections.  Adequate slack was placed into the lead, and the lead was secured into the pocket using 0 silk suture x 2. There was no evidence of diaphragmatic pacing at maximal output.   	Hemostasis was confirmed, and the wound was irrigated with vancomycin irrigation solution.  The leads were connected to the appropriate ports on the generator and secured by tightening the header set screws.  The leads and generator were carefully folded into the pocket.    		The wound margins approximated well, without any tension or overlap. The wound was closed using an interrupted layer of 2-0 absorbable Dacron suture for the deep  layer. This was followed by a continuous layer of 3-0 absorbable suture.  The skin was then closed using 4-0 absorbable Dacron sutures.  Steri-strips were placed over the wound.  A dry, sterile dressing was placed over this.  The patient's left upper extremity was placed in a sling. An ice pack was placed on top of the wound, and the patient was returned to a hospital room in stable condition. Needle count was performed and found to be consistent at the end of the procedure    COMPLICATIONS:  The patient tolerated the procedure well. There were no immediate complications.    CONCLUSIONS:  Successful dual chamber pacemaker implant.              _______________________________  Wesley Mendoza MD  Cardiac Electrophysiology

## 2020-08-04 NOTE — CHART NOTE - NSCHARTNOTEFT_GEN_A_CORE
PACU ANESTHESIA ADMISSION NOTE      Procedure:  Pacemaker placement  Post op diagnosis:  Heart block    ____  Intubated  TV:______       Rate: ______      FiO2: ______    _x___  Patent Airway    _x___  Full return of protective reflexes    _x___  Full recovery from anesthesia / back to baseline status    Vitals:            T:  97.4              BP :   135/65             R:  16            Sat:   97%            P: 64      Mental Status:  _x___ Awake   _____ Alert   _____ Drowsy   _____ Sedated    Nausea/Vomiting:  _x___  NO       ______Yes,   See Post - Op Orders         Pain Scale (0-10):  __0___    Treatment: ___ None    __x__ See Post - Op/PCA Orders    Post - Operative Fluids:   __x__ Oral   ____ See Post - Op Orders    Plan: Discharge:   ___Home       __x___Floor     _____Critical Care    _____  Other:_________________    Comments:  No anesthesia issues or complications noted.  Discharge when criteria met.

## 2020-08-05 ENCOUNTER — TRANSCRIPTION ENCOUNTER (OUTPATIENT)
Age: 75
End: 2020-08-05

## 2020-08-05 VITALS — OXYGEN SATURATION: 96 %

## 2020-08-05 LAB
ANION GAP SERPL CALC-SCNC: 13 MMOL/L — SIGNIFICANT CHANGE UP (ref 7–14)
BUN SERPL-MCNC: 10 MG/DL — SIGNIFICANT CHANGE UP (ref 10–20)
CALCIUM SERPL-MCNC: 7.4 MG/DL — LOW (ref 8.5–10.1)
CHLORIDE SERPL-SCNC: 100 MMOL/L — SIGNIFICANT CHANGE UP (ref 98–110)
CO2 SERPL-SCNC: 20 MMOL/L — SIGNIFICANT CHANGE UP (ref 17–32)
CREAT SERPL-MCNC: <0.5 MG/DL — LOW (ref 0.7–1.5)
GLUCOSE SERPL-MCNC: 96 MG/DL — SIGNIFICANT CHANGE UP (ref 70–99)
HCT VFR BLD CALC: 28.3 % — LOW (ref 37–47)
HGB BLD-MCNC: 8.8 G/DL — LOW (ref 12–16)
MAGNESIUM SERPL-MCNC: 2.2 MG/DL — SIGNIFICANT CHANGE UP (ref 1.8–2.4)
MCHC RBC-ENTMCNC: 25.8 PG — LOW (ref 27–31)
MCHC RBC-ENTMCNC: 31.1 G/DL — LOW (ref 32–37)
MCV RBC AUTO: 83 FL — SIGNIFICANT CHANGE UP (ref 81–99)
NRBC # BLD: 0 /100 WBCS — SIGNIFICANT CHANGE UP (ref 0–0)
PLATELET # BLD AUTO: 245 K/UL — SIGNIFICANT CHANGE UP (ref 130–400)
POTASSIUM SERPL-MCNC: 3.7 MMOL/L — SIGNIFICANT CHANGE UP (ref 3.5–5)
POTASSIUM SERPL-SCNC: 3.7 MMOL/L — SIGNIFICANT CHANGE UP (ref 3.5–5)
RBC # BLD: 3.41 M/UL — LOW (ref 4.2–5.4)
RBC # FLD: 16.7 % — HIGH (ref 11.5–14.5)
SODIUM SERPL-SCNC: 133 MMOL/L — LOW (ref 135–146)
WBC # BLD: 8.64 K/UL — SIGNIFICANT CHANGE UP (ref 4.8–10.8)
WBC # FLD AUTO: 8.64 K/UL — SIGNIFICANT CHANGE UP (ref 4.8–10.8)

## 2020-08-05 PROCEDURE — 93280 PM DEVICE PROGR EVAL DUAL: CPT | Mod: 26

## 2020-08-05 PROCEDURE — 99232 SBSQ HOSP IP/OBS MODERATE 35: CPT

## 2020-08-05 PROCEDURE — 71046 X-RAY EXAM CHEST 2 VIEWS: CPT | Mod: 26

## 2020-08-05 PROCEDURE — 93010 ELECTROCARDIOGRAM REPORT: CPT

## 2020-08-05 RX ORDER — OXYCODONE AND ACETAMINOPHEN 5; 325 MG/1; MG/1
1 TABLET ORAL EVERY 6 HOURS
Refills: 0 | Status: DISCONTINUED | OUTPATIENT
Start: 2020-08-05 | End: 2020-08-05

## 2020-08-05 RX ORDER — CLOPIDOGREL BISULFATE 75 MG/1
75 TABLET, FILM COATED ORAL DAILY
Refills: 0 | Status: DISCONTINUED | OUTPATIENT
Start: 2020-08-05 | End: 2020-08-05

## 2020-08-05 RX ORDER — OXYCODONE HYDROCHLORIDE 5 MG/1
1 TABLET ORAL
Qty: 12 | Refills: 0
Start: 2020-08-05 | End: 2020-08-07

## 2020-08-05 RX ORDER — CEFEPIME 1 G/1
1000 INJECTION, POWDER, FOR SOLUTION INTRAMUSCULAR; INTRAVENOUS EVERY 12 HOURS
Refills: 0 | Status: DISCONTINUED | OUTPATIENT
Start: 2020-08-05 | End: 2020-08-05

## 2020-08-05 RX ORDER — ALPRAZOLAM 0.25 MG
0.25 TABLET ORAL ONCE
Refills: 0 | Status: DISCONTINUED | OUTPATIENT
Start: 2020-08-05 | End: 2020-08-05

## 2020-08-05 RX ORDER — AZTREONAM 2 G
1 VIAL (EA) INJECTION
Qty: 10 | Refills: 0
Start: 2020-08-05 | End: 2020-08-09

## 2020-08-05 RX ORDER — AMLODIPINE BESYLATE 2.5 MG/1
1 TABLET ORAL
Qty: 30 | Refills: 0
Start: 2020-08-05 | End: 2020-09-03

## 2020-08-05 RX ADMIN — HYDROMORPHONE HYDROCHLORIDE 1 MILLIGRAM(S): 2 INJECTION INTRAMUSCULAR; INTRAVENOUS; SUBCUTANEOUS at 00:03

## 2020-08-05 RX ADMIN — Medication 0.25 MILLIGRAM(S): at 02:39

## 2020-08-05 RX ADMIN — CHLORHEXIDINE GLUCONATE 1 APPLICATION(S): 213 SOLUTION TOPICAL at 05:16

## 2020-08-05 RX ADMIN — OXYCODONE AND ACETAMINOPHEN 1 TABLET(S): 5; 325 TABLET ORAL at 11:33

## 2020-08-05 RX ADMIN — Medication 250 MILLIGRAM(S): at 15:05

## 2020-08-05 RX ADMIN — CEFEPIME 100 MILLIGRAM(S): 1 INJECTION, POWDER, FOR SOLUTION INTRAMUSCULAR; INTRAVENOUS at 11:36

## 2020-08-05 RX ADMIN — TIOTROPIUM BROMIDE 1 CAPSULE(S): 18 CAPSULE ORAL; RESPIRATORY (INHALATION) at 07:53

## 2020-08-05 RX ADMIN — CLOPIDOGREL BISULFATE 75 MILLIGRAM(S): 75 TABLET, FILM COATED ORAL at 11:34

## 2020-08-05 RX ADMIN — AMLODIPINE BESYLATE 5 MILLIGRAM(S): 2.5 TABLET ORAL at 05:15

## 2020-08-05 RX ADMIN — Medication 150 MILLIGRAM(S): at 05:16

## 2020-08-05 RX ADMIN — ONDANSETRON 4 MILLIGRAM(S): 8 TABLET, FILM COATED ORAL at 01:30

## 2020-08-05 RX ADMIN — HYDROMORPHONE HYDROCHLORIDE 1 MILLIGRAM(S): 2 INJECTION INTRAMUSCULAR; INTRAVENOUS; SUBCUTANEOUS at 00:27

## 2020-08-05 RX ADMIN — BUDESONIDE AND FORMOTEROL FUMARATE DIHYDRATE 2 PUFF(S): 160; 4.5 AEROSOL RESPIRATORY (INHALATION) at 11:34

## 2020-08-05 RX ADMIN — MONTELUKAST 10 MILLIGRAM(S): 4 TABLET, CHEWABLE ORAL at 11:34

## 2020-08-05 RX ADMIN — OXYCODONE AND ACETAMINOPHEN 1 TABLET(S): 5; 325 TABLET ORAL at 13:04

## 2020-08-05 RX ADMIN — SERTRALINE 50 MILLIGRAM(S): 25 TABLET, FILM COATED ORAL at 11:34

## 2020-08-05 RX ADMIN — PANTOPRAZOLE SODIUM 40 MILLIGRAM(S): 20 TABLET, DELAYED RELEASE ORAL at 05:16

## 2020-08-05 RX ADMIN — Medication 250 MILLIGRAM(S): at 02:39

## 2020-08-05 NOTE — DISCHARGE NOTE PROVIDER - NSDCMRMEDTOKEN_GEN_ALL_CORE_FT
ALPRAZolam 0.5 mg oral tablet: 2 tab(s) orally once a day (at bedtime), As Needed  apixaban 5 mg oral tablet: 1 tab(s) orally every 12 hours  aspirin 81 mg oral delayed release tablet: 1 tab(s) orally once a day  atorvastatin 80 mg oral tablet: 1 tab(s) orally once a day (at bedtime)  clopidogrel 75 mg oral tablet: 1 tab(s) orally once a day  dilTIAZem 240 mg/24 hours oral capsule, extended release: 1 cap(s) orally once a day  docusate sodium 100 mg oral capsule: 1 cap(s) orally 2 times a day  Fioricet oral capsule: 2 cap(s) orally once a day  furosemide 20 mg oral tablet: 2 tab(s) orally once a day   montelukast 10 mg oral tablet: 1 tab(s) orally once a day  ocular lubricant ophthalmic solution: 1 drop(s) to each affected eye every 2 hours, As needed, Dry Eyes  omeprazole 40 mg oral delayed release capsule: 1 cap(s) orally once a day  pregabalin 25 mg oral capsule: 1 cap(s) orally once a day MDD:one daily  senna oral tablet: 2 tab(s) orally once a day (at bedtime)  Spiriva Respimat 1.25 mcg/inh inhalation aerosol: 2 puff(s) inhaled once a day   Symbicort 160 mcg-4.5 mcg/inh inhalation aerosol: 2 puff(s) inhaled 2 times a day   traZODone 50 mg oral tablet: 1 tab(s) orally once a day (at bedtime)  Ventolin HFA 90 mcg/inh inhalation aerosol: 2 puff(s) inhaled prn MDD:maximum 6 puffs a day  Zoloft 50 mg oral tablet: 1 tab(s) orally once a day ALPRAZolam 0.5 mg oral tablet: 2 tab(s) orally once a day (at bedtime), As Needed  amLODIPine 5 mg oral tablet: 1 tab(s) orally once a day  apixaban 5 mg oral tablet: 1 tab(s) orally every 12 hours  atorvastatin 80 mg oral tablet: 1 tab(s) orally once a day (at bedtime)  Bactrim  mg-160 mg oral tablet: 1 tab(s) orally 2 times a day   clopidogrel 75 mg oral tablet: 1 tab(s) orally once a day  docusate sodium 100 mg oral capsule: 1 cap(s) orally 2 times a day  Fioricet oral capsule: 2 cap(s) orally once a day  furosemide 20 mg oral tablet: 2 tab(s) orally once a day   montelukast 10 mg oral tablet: 1 tab(s) orally once a day  ocular lubricant ophthalmic solution: 1 drop(s) to each affected eye every 2 hours, As needed, Dry Eyes  omeprazole 40 mg oral delayed release capsule: 1 cap(s) orally once a day  oxyCODONE 5 mg oral tablet: 1 tab(s) orally every 6 hours, As Needed -for moderate pain MDD:4   pregabalin 150 mg oral capsule: 1 cap(s) orally every 12 hours  senna oral tablet: 2 tab(s) orally once a day (at bedtime)  Spiriva Respimat 1.25 mcg/inh inhalation aerosol: 2 puff(s) inhaled once a day   Symbicort 160 mcg-4.5 mcg/inh inhalation aerosol: 2 puff(s) inhaled 2 times a day   traZODone 50 mg oral tablet: 1 tab(s) orally once a day (at bedtime)  Ventolin HFA 90 mcg/inh inhalation aerosol: 2 puff(s) inhaled prn MDD:maximum 6 puffs a day  Zoloft 50 mg oral tablet: 1 tab(s) orally once a day

## 2020-08-05 NOTE — DISCHARGE NOTE PROVIDER - PROVIDER TOKENS
FREE:[LAST:[[facility]],PHONE:[phone]],FAX:[(   [phone]    -],ADDRESS:[[address] [place] [address]],SCHEDULEDAPPT:[08/07/2020],SCHEDULEDAPPTTIME:[02:30 PM]],PROVIDER:[TOKEN:[[id number]],SCHEDULEDAPPT:[09/09/2020],SCHEDULEDAPPTTIME:[10:30 AM]]

## 2020-08-05 NOTE — PROGRESS NOTE ADULT - SUBJECTIVE AND OBJECTIVE BOX
INTERVAL HPI/OVERNIGHT EVENTS:    Patient s/p DC  PPM implant  No event over night.   Pt c/o site soreness    MEDICATIONS  (STANDING):  amLODIPine   Tablet 5 milliGRAM(s) Oral daily  atorvastatin 80 milliGRAM(s) Oral at bedtime  budesonide 160 MICROgram(s)/formoterol 4.5 MICROgram(s) Inhaler 2 Puff(s) Inhalation two times a day  cefepime   IVPB 1000 milliGRAM(s) IV Intermittent every 12 hours  chlorhexidine 4% Liquid 1 Application(s) Topical <User Schedule>  clopidogrel Tablet 75 milliGRAM(s) Oral daily  montelukast 10 milliGRAM(s) Oral daily  pantoprazole    Tablet 40 milliGRAM(s) Oral before breakfast  pregabalin 150 milliGRAM(s) Oral every 12 hours  sertraline 50 milliGRAM(s) Oral daily  sodium chloride 0.9%. 1000 milliLiter(s) (50 mL/Hr) IV Continuous <Continuous>  tiotropium 18 MICROgram(s) Capsule 1 Capsule(s) Inhalation daily  vancomycin  IVPB 1000 milliGRAM(s) IV Intermittent every 12 hours    MEDICATIONS  (PRN):  acetaminophen 300 mG/butalbital 50 mG/ caffeine 40 mG 1 Capsule(s) Oral every 8 hours PRN headache  aluminum hydroxide/magnesium hydroxide/simethicone Suspension 30 milliLiter(s) Oral every 4 hours PRN Dyspepsia  HYDROmorphone  Injectable 0.5 milliGRAM(s) IV Push every 10 minutes PRN Moderate Pain (4 - 6)  HYDROmorphone  Injectable 1 milliGRAM(s) IV Push every 10 minutes PRN Severe Pain (7 - 10)  oxycodone    5 mG/acetaminophen 325 mG 1 Tablet(s) Oral every 6 hours PRN Moderate Pain (4 - 6)  traZODone 50 milliGRAM(s) Oral at bedtime PRN insomnia      Allergies    No Known Allergies    Intolerances          Vital Signs Last 24 Hrs  T(C): 36.1 (05 Aug 2020 04:04), Max: 36.2 (04 Aug 2020 17:28)  T(F): 96.9 (05 Aug 2020 04:04), Max: 97.1 (04 Aug 2020 17:28)  HR: 62 (05 Aug 2020 04:04) (62 - 72)  BP: 129/61 (05 Aug 2020 04:04) (129/61 - 189/81)  BP(mean): --  RR: 18 (04 Aug 2020 20:12) (18 - 18)  SpO2: 97% (04 Aug 2020 18:47) (97% - 98%)    GENERAL: In no apparent distress, well nourished, and hydrated.  HEART: Regular rate and rhythm; No murmurs, rubs, or gallops.  	Wound healing well; No hematoma; no bleeding  PULMONARY: Clear to auscultation and perfusion.  No rales, wheezing, or rhonchi bilaterally.  ABDOMEN: Soft, Nontender, Nondistended; Bowel sounds present  EXTREMITIES:  2+ Peripheral Pulses, No clubbing, cyanosis, or edema  NEUROLOGICAL: Grossly nonfocal    12 Lead ECG:   Ventricular Rate 69 BPM    Atrial Rate 69 BPM    P-R Interval 164 ms    QRS Duration 152 ms    Q-T Interval 492 ms    QTC Calculation(Bezet) 527 ms    P Axis 48 degrees    R Axis -22 degrees    T Axis 95 degrees    Diagnosis Line Normal sinus rhythm  Possible Left atrial enlargement  Left bundle branch block  Abnormal ECG    Confirmed by Oj Alejandre ([id number]) on 8/5/2020 8:44:40 AM (08-05-20 @ 07:33)      RADIOLOGY & ADDITIONAL TESTS:  < from: Xray Chest 2 Views PA/Lat (08.05.20 @ 08:26) >  ndings:    Support devices: Stable appearing left pacemaker with leads overlying the right atrium and right ventricle. Overlying telemetry leads.    Cardiac/mediastinum/hilum: Stable cardiomediastinal silhouette. Stable appearance of the prosthetic aortic valve.    Lung parenchyma/Pleura: No focal consolidation, pneumothorax or pleural effusion.    Skeleton/soft tissues: Stable. Right apical staples are again noted.    Impression:    No significant interval change.    No radiographic evidence of acute cardiopulmonary disease.    < end of copied text >

## 2020-08-05 NOTE — DISCHARGE NOTE NURSING/CASE MANAGEMENT/SOCIAL WORK - PATIENT PORTAL LINK FT
You can access the FollowMyHealth Patient Portal offered by [facility] by registering at the following website: [email or web address]. By joining [employer]’s FollowMyHealth portal, you will also be able to view your health information using other applications (apps) compatible with our system.

## 2020-08-05 NOTE — PROGRESS NOTE ADULT - ASSESSMENT
A/P   Patient s/p DC  PPM implant ([employer])     - Device interrogation done, awaiting review by attending  - Keflex 500 mg PO q12 h x 5 days  - FU in the EP office for wound check with Ashly NP 9/9 @10:30am  Dr Looney office  [address] [place] [address], [address]  [phone]     No Heavy lifting >5 lbs. Do not raise your arm above shoulder level for 4-6 weeks.   No driving for 2weeks  No shower, bathtub, no wetting device site for 5 days.  Can take a shower on _ Sun _ , leave Steri-strips in place

## 2020-08-05 NOTE — DISCHARGE NOTE PROVIDER - CARE PROVIDER_API CALL
[facility],   [address] [place] [address]  [address]  [place] [address]  Phone: [phone]  Fax: (   )    -  Scheduled Appointment: 08/07/2020 02:30 PM    Veronika Looney)  Cardiovascular Disease; Internal Medicine  [address] [place] [address], [address]  [place], [place] [id number]  Phone: [phone]  Fax: [phone]  Scheduled Appointment: 09/09/2020 10:30 AM

## 2020-08-05 NOTE — PROGRESS NOTE ADULT - SUBJECTIVE AND OBJECTIVE BOX
OPERATIVE PROCEDURE(s):                POD #                       75yFemale  SURGEON(s): ROWDY Cole  SUBJECTIVE ASSESSMENT:   Vital Signs Last 24 Hrs  T(F): 96.9 (05 Aug 2020 04:04), Max: 97.1 (04 Aug 2020 17:28)  HR: 62 (05 Aug 2020 04:04) (62 - 72)  BP: 129/61 (05 Aug 2020 04:04) (129/61 - 189/81)  BP(mean): --  ABP: --  ABP(mean): --  RR: 18 (04 Aug 2020 20:12) (18 - 18)  SpO2: 97% (04 Aug 2020 18:47) (97% - 98%)  CVP(mm Hg): --  CVP(cm H2O): --  CO: --  CI: --  PA: --  SVR: --    I&O's Detail    04 Aug 2020 07:01  -  05 Aug 2020 07:00  --------------------------------------------------------  IN:    IV PiggyBack: 250 mL    Oral Fluid: 200 mL    sodium chloride 0.9%.: 450 mL  Total IN: 900 mL    OUT:    Voided: 375 mL  Total OUT: 375 mL        Net:   I&O's Detail    03 Aug 2020 07:01  -  04 Aug 2020 07:00  --------------------------------------------------------  Total NET: 545 mL      04 Aug 2020 07:01  -  05 Aug 2020 07:00  --------------------------------------------------------  Total NET: 525 mL        CAPILLARY BLOOD GLUCOSE        Physical Exam:  General: NAD; A&Ox3/Patient is intubated and sedated  Cardiac: S1/S2, RRR, no murmur, no rubs  Lungs: unlabored respirations, CTA b/l, no wheeze, no rales, no crackles  Abdomen: Soft/NT/ND; positive bowel sounds x 4  Sternum: Intact, no click, incision healing well with no drainage  Incisions: Incisions clean/dry/intact  Extremities: No edema b/l lower extremities; good capillary refill; no cyanosis; palpable 1+ pedal pulses b/l    Central Venous Catheter: Yes[]  No[] , If Yes indication:           Day #  Jimenez Catheter: Yes  [] , No  [] , If yes indication:                      Day #  NGT: Yes [] No [] ,    If Yes Placement:                                     Day #  EPICARDIAL WIRES:  [] YES [] NO                                              Day #  BOWEL MOVEMENT:  [] YES [] NO, If No, Timing since last BM:      Day #  CHEST TUBE(Left/Right):  [] YES [] NO, If yes -  AIR LEAKS:  [] YES [] NO        LABS:                        8.8<L>  8.64  )-----------( 245      ( 05 Aug 2020 05:40 )             28.3<L>    08-05    133<L>  |  100  |  10  ----------------------------<  96  3.7   |  20  |  <0.5<L>    Ca    7.4<L>      05 Aug 2020 05:40  Mg     2.2     08-05            RADIOLOGY & ADDITIONAL TESTS:  CXR:  EKG:  MEDICATIONS  (STANDING):  amLODIPine   Tablet 5 milliGRAM(s) Oral daily  atorvastatin 80 milliGRAM(s) Oral at bedtime  budesonide 160 MICROgram(s)/formoterol 4.5 MICROgram(s) Inhaler 2 Puff(s) Inhalation two times a day  cefepime   IVPB 1000 milliGRAM(s) IV Intermittent every 12 hours  chlorhexidine 4% Liquid 1 Application(s) Topical <User Schedule>  montelukast 10 milliGRAM(s) Oral daily  pantoprazole    Tablet 40 milliGRAM(s) Oral before breakfast  pregabalin 150 milliGRAM(s) Oral every 12 hours  sertraline 50 milliGRAM(s) Oral daily  sodium chloride 0.9%. 1000 milliLiter(s) (50 mL/Hr) IV Continuous <Continuous>  tiotropium 18 MICROgram(s) Capsule 1 Capsule(s) Inhalation daily  vancomycin  IVPB 1000 milliGRAM(s) IV Intermittent every 12 hours    MEDICATIONS  (PRN):  acetaminophen 300 mG/butalbital 50 mG/ caffeine 40 mG 1 Capsule(s) Oral every 8 hours PRN headache  aluminum hydroxide/magnesium hydroxide/simethicone Suspension 30 milliLiter(s) Oral every 4 hours PRN Dyspepsia  HYDROmorphone  Injectable 0.5 milliGRAM(s) IV Push every 10 minutes PRN Moderate Pain (4 - 6)  HYDROmorphone  Injectable 1 milliGRAM(s) IV Push every 10 minutes PRN Severe Pain (7 - 10)  traZODone 50 milliGRAM(s) Oral at bedtime PRN insomnia    HEPARIN:  [] YES [] NO  Dose: XX UNITS/HR UNITS Q8H  LOVENOX:[] YES [] NO  Dose: XX mg Q24H  COUMADIN: []  YES [] NO  Dose: XX mg  Q24H  SCD's: YES b/l  GI Prophylaxis: Protonix [], Pepcid [], None [], (Contra-indication:.....)    Post-Op Beta-Blockers: Yes [], No[], If No, then contraindication:  Post-Op Aspirin: Yes [],  No [], If No, then contraindication:  Post-Op Statin: Yes [], No[], If No, then contraindication:  Allergies    No Known Allergies    Intolerances      Ambulation/Activity Status:    Assessment/Plan:  75y Female status-post .....  - Case and plan discussed with CTU Intensivist and CT Surgeon - Dr. Cole/Darrius/Kaylene   - Continue CTU supportive care    - Continue DVT/GI prophylaxis  - Incentive Spirometry 10 times an hour  - Continue to advance physical activity as tolerated and continue PT/OT as directed  1. CAD: Continue ASA, statin, BB  2. HTN:   3. A. Fib:   4. COPD/Hypoxia:   5. DM/Glucose Control:     Social Service Disposition: OPERATIVE PROCEDURE(s):   s/p TAVR on 7/22/2020, now s/p PPM             POD #     1                  75yFemale  SURGEON(s): ROWDY Cole  SUBJECTIVE ASSESSMENT: pt seen and examined. no acute complaints   Vital Signs Last 24 Hrs  T(F): 96.9 (05 Aug 2020 04:04), Max: 97.1 (04 Aug 2020 17:28)  HR: 62 (05 Aug 2020 04:04) (62 - 72)  BP: 129/61 (05 Aug 2020 04:04) (129/61 - 189/81)  RR: 18 (04 Aug 2020 20:12) (18 - 18)  SpO2: 97% (04 Aug 2020 18:47) (97% - 98%)      I&O's Detail    04 Aug 2020 07:01  -  05 Aug 2020 07:00  --------------------------------------------------------  IN:    IV PiggyBack: 250 mL    Oral Fluid: 200 mL    sodium chloride 0.9%.: 450 mL  Total IN: 900 mL    OUT:    Voided: 375 mL  Total OUT: 375 mL        Net:   I&O's Detail    03 Aug 2020 07:01  -  04 Aug 2020 07:00  --------------------------------------------------------  Total NET: 545 mL      04 Aug 2020 07:01  -  05 Aug 2020 07:00  --------------------------------------------------------  Total NET: 525 mL        CAPILLARY BLOOD GLUCOSE        Physical Exam:  General: NAD; A&Ox3  Cardiac: S1/S2, RRR, no murmur, no rubs  Lungs: unlabored respirations, CTA b/l, no wheeze, no rales, no crackles  Abdomen: Soft/NT/ND; positive bowel sounds x 4  Incisions: Incisions clean/dry/intact  Extremities: No edema b/l lower extremities; good capillary refill; no cyanosis; palpable 1+ pedal pulses b/l        LABS:                        8.8<L>  8.64  )-----------( 245      ( 05 Aug 2020 05:40 )             28.3<L>    08-05    133<L>  |  100  |  10  ----------------------------<  96  3.7   |  20  |  <0.5<L>    Ca    7.4<L>      05 Aug 2020 05:40  Mg     2.2     08-05            RADIOLOGY & ADDITIONAL TESTS:  CXR: < from: Xray Chest 1 View AP/PA (08.04.20 @ 17:51) >  Impression:    No radiographic evidence of acute cardiopulmonary disease.    < end of copied text >    EKG: < from: 12 Lead ECG (08.02.20 @ 08:12) >  Ventricular Rate 89 BPM    Atrial Rate 89 BPM    P-R Interval 180 ms    QRS Duration 152 ms    Q-T Interval 438 ms    QTC Calculation(Bezet) 532 ms    P Axis 98 degrees    R Axis 190 degrees    T Axis 77 degrees    Diagnosis Line *** Suspect arm lead reversal, interpretation assumes no reversal  Normal sinus rhythm  Right atrial enlargement  Right superior axis deviation  Non-specific intra-ventricular conduction block  Abnormal ECG    < end of copied text >    MEDICATIONS  (STANDING):  amLODIPine   Tablet 5 milliGRAM(s) Oral daily  atorvastatin 80 milliGRAM(s) Oral at bedtime  budesonide 160 MICROgram(s)/formoterol 4.5 MICROgram(s) Inhaler 2 Puff(s) Inhalation two times a day  cefepime   IVPB 1000 milliGRAM(s) IV Intermittent every 12 hours  chlorhexidine 4% Liquid 1 Application(s) Topical <User Schedule>  montelukast 10 milliGRAM(s) Oral daily  pantoprazole    Tablet 40 milliGRAM(s) Oral before breakfast  pregabalin 150 milliGRAM(s) Oral every 12 hours  sertraline 50 milliGRAM(s) Oral daily  sodium chloride 0.9%. 1000 milliLiter(s) (50 mL/Hr) IV Continuous <Continuous>  tiotropium 18 MICROgram(s) Capsule 1 Capsule(s) Inhalation daily  vancomycin  IVPB 1000 milliGRAM(s) IV Intermittent every 12 hours    MEDICATIONS  (PRN):  acetaminophen 300 mG/butalbital 50 mG/ caffeine 40 mG 1 Capsule(s) Oral every 8 hours PRN headache  aluminum hydroxide/magnesium hydroxide/simethicone Suspension 30 milliLiter(s) Oral every 4 hours PRN Dyspepsia  HYDROmorphone  Injectable 0.5 milliGRAM(s) IV Push every 10 minutes PRN Moderate Pain (4 - 6)  HYDROmorphone  Injectable 1 milliGRAM(s) IV Push every 10 minutes PRN Severe Pain (7 - 10)  traZODone 50 milliGRAM(s) Oral at bedtime PRN insomnia    HEPARIN:  [] YES [x] NO  Dose: XX UNITS/HR UNITS Q8H  LOVENOX:[] YES [x] NO  Dose: XX mg Q24H  COUMADIN: []  YES [x] NO  Dose: XX mg  Q24H  SCD's: YES b/l  GI Prophylaxis: Protonix [x], Pepcid [], None [], (Contra-indication:.....)      Allergies    No Known Allergies    Intolerances      Ambulation/Activity Status: ambulate     Assessment/Plan:  75y Female status-post TAVR on 7/22/2020 readmit with bradycardia, now s/p PPM POD#1  - Case and plan discussed with CTU Intensivist and CT Surgeon - Dr. Cole  - Continue CTU supportive care    - Continue DVT/GI prophylaxis  - Incentive Spirometry 10 times an hour  - Continue to advance physical activity as tolerated and continue PT/OT as directed  1. Afib/Aflutter: hold eliquis for now, will discuss with EP when to restart  2. CAD: hold plavix for now   3. COPD: cont budesonide 160 MICROgram(s)/formoterol 4.5 MICROgram(s) Inhaler 2 Puff(s) Inhalation two times a day   4. Symptomatic Bradycardia w/sinus pauses 3-5 secs s/p DC pacemaker POD#1- needs interrogation today.    Social Service Disposition:  home possibly today OPERATIVE PROCEDURE(s):   s/p TAVR on 7/22/2020, now s/p PPM             POD #     1                  75yFemale  SURGEON(s): ROWDY Cole  SUBJECTIVE ASSESSMENT: pt seen and examined. no acute complaints   Vital Signs Last 24 Hrs  T(F): 96.9 (05 Aug 2020 04:04), Max: 97.1 (04 Aug 2020 17:28)  HR: 62 (05 Aug 2020 04:04) (62 - 72)  BP: 129/61 (05 Aug 2020 04:04) (129/61 - 189/81)  RR: 18 (04 Aug 2020 20:12) (18 - 18)  SpO2: 97% (04 Aug 2020 18:47) (97% - 98%)      I&O's Detail    04 Aug 2020 07:01  -  05 Aug 2020 07:00  --------------------------------------------------------  IN:    IV PiggyBack: 250 mL    Oral Fluid: 200 mL    sodium chloride 0.9%.: 450 mL  Total IN: 900 mL    OUT:    Voided: 375 mL  Total OUT: 375 mL        Net:   I&O's Detail    03 Aug 2020 07:01  -  04 Aug 2020 07:00  --------------------------------------------------------  Total NET: 545 mL      04 Aug 2020 07:01  -  05 Aug 2020 07:00  --------------------------------------------------------  Total NET: 525 mL        CAPILLARY BLOOD GLUCOSE        Physical Exam:  General: NAD; A&Ox3  Cardiac: S1/S2, RRR, no murmur, no rubs  Lungs: unlabored respirations, CTA b/l, no wheeze, no rales, no crackles  Abdomen: Soft/NT/ND; positive bowel sounds x 4  Incisions: Incisions clean/dry/intact  Extremities: No edema b/l lower extremities; good capillary refill; no cyanosis; palpable 1+ pedal pulses b/l        LABS:                        8.8<L>  8.64  )-----------( 245      ( 05 Aug 2020 05:40 )             28.3<L>    08-05    133<L>  |  100  |  10  ----------------------------<  96  3.7   |  20  |  <0.5<L>    Ca    7.4<L>      05 Aug 2020 05:40  Mg     2.2     08-05            RADIOLOGY & ADDITIONAL TESTS:  CXR: < from: Xray Chest 1 View AP/PA (08.04.20 @ 17:51) >  Impression:    No radiographic evidence of acute cardiopulmonary disease.    < end of copied text >    EKG: < from: 12 Lead ECG (08.02.20 @ 08:12) >  Ventricular Rate 89 BPM    Atrial Rate 89 BPM    P-R Interval 180 ms    QRS Duration 152 ms    Q-T Interval 438 ms    QTC Calculation(Bezet) 532 ms    P Axis 98 degrees    R Axis 190 degrees    T Axis 77 degrees    Diagnosis Line *** Suspect arm lead reversal, interpretation assumes no reversal  Normal sinus rhythm  Right atrial enlargement  Right superior axis deviation  Non-specific intra-ventricular conduction block  Abnormal ECG    < end of copied text >    MEDICATIONS  (STANDING):  amLODIPine   Tablet 5 milliGRAM(s) Oral daily  atorvastatin 80 milliGRAM(s) Oral at bedtime  budesonide 160 MICROgram(s)/formoterol 4.5 MICROgram(s) Inhaler 2 Puff(s) Inhalation two times a day  cefepime   IVPB 1000 milliGRAM(s) IV Intermittent every 12 hours  chlorhexidine 4% Liquid 1 Application(s) Topical <User Schedule>  montelukast 10 milliGRAM(s) Oral daily  pantoprazole    Tablet 40 milliGRAM(s) Oral before breakfast  pregabalin 150 milliGRAM(s) Oral every 12 hours  sertraline 50 milliGRAM(s) Oral daily  sodium chloride 0.9%. 1000 milliLiter(s) (50 mL/Hr) IV Continuous <Continuous>  tiotropium 18 MICROgram(s) Capsule 1 Capsule(s) Inhalation daily  vancomycin  IVPB 1000 milliGRAM(s) IV Intermittent every 12 hours    MEDICATIONS  (PRN):  acetaminophen 300 mG/butalbital 50 mG/ caffeine 40 mG 1 Capsule(s) Oral every 8 hours PRN headache  aluminum hydroxide/magnesium hydroxide/simethicone Suspension 30 milliLiter(s) Oral every 4 hours PRN Dyspepsia  HYDROmorphone  Injectable 0.5 milliGRAM(s) IV Push every 10 minutes PRN Moderate Pain (4 - 6)  HYDROmorphone  Injectable 1 milliGRAM(s) IV Push every 10 minutes PRN Severe Pain (7 - 10)  traZODone 50 milliGRAM(s) Oral at bedtime PRN insomnia    HEPARIN:  [] YES [x] NO  Dose: XX UNITS/HR UNITS Q8H  LOVENOX:[] YES [x] NO  Dose: XX mg Q24H  COUMADIN: []  YES [x] NO  Dose: XX mg  Q24H  SCD's: YES b/l  GI Prophylaxis: Protonix [x], Pepcid [], None [], (Contra-indication:.....)      Allergies    No Known Allergies    Intolerances      Ambulation/Activity Status: ambulate     Assessment/Plan:  75y Female status-post TAVR on 7/22/2020 readmit with bradycardia, now s/p PPM POD#1  - Case and plan discussed with CTU Intensivist and CT Surgeon - Dr. Cole  - Continue CTU supportive care    - Continue DVT/GI prophylaxis  - Incentive Spirometry 10 times an hour  - Continue to advance physical activity as tolerated and continue PT/OT as directed  1. Afib/Aflutter: hold eliquis for now, will discuss with EP when to restart  2. CAD: hold plavix for now   3. COPD: cont budesonide 160 MICROgram(s)/formoterol 4.5 MICROgram(s) Inhaler 2 Puff(s) Inhalation two times a day   4. Symptomatic Bradycardia w/sinus pauses 3-5 secs s/p DC pacemaker POD#1- needs interrogation today will f.u with EP Dr. Mendoza    Social Service Disposition:  home possibly today

## 2020-08-05 NOTE — DISCHARGE NOTE PROVIDER - NSDCCPTREATMENT_GEN_ALL_CORE_FT
PRINCIPAL PROCEDURE  Procedure: Insertion, cardiac pacemaker, dual chamber  Findings and Treatment:       SECONDARY PROCEDURE  Procedure: Midline catheter insertion  Findings and Treatment:

## 2020-08-05 NOTE — DISCHARGE NOTE PROVIDER - NSDCFUSCHEDAPPT_GEN_ALL_CORE_FT
CLAYTON VELAZQUEZ ; 09/09/2020 ; [facility] [address] CLAYTON Mays ; 09/11/2020 ; [facility] [address] CLAYTON Mays ; 09/11/2020 ; [facility] [address] [facility] CLAYTON VELAZQUEZ ; 08/07/2020 ; [facility] [address] [facility]  CAROLINE CLAYTON ; 09/09/2020 ; [facility] [address] [facility] Morenita VELAZQUEZ CLAYTON ; 09/11/2020 ; [facility] [address] [facility]  CHELSEANATALIA CLAYTON ; 09/11/2020 ; [facility] [address] [facility] CLAYTON VELAZQUEZ ; 08/07/2020 ; [facility] [address] [facility]  CAROLINE CLAYTON ; 09/09/2020 ; [facility] [address] [facility] Morenita VELAZQUEZ CLAYTON ; 09/11/2020 ; [facility] [address] [facility]  CHELSEANATALIA CLAYTON ; 09/11/2020 ; [facility] [address] [facility]

## 2020-08-05 NOTE — DISCHARGE NOTE PROVIDER - HOSPITAL COURSE
76 yo female, Known to [facility],  with a pmh of aortic valve stenosis s/p recent TAVR on 7/22/2020, CAD stent x4 6/15, HTN, GERD, SLE, trigeminal neuralgia, breast CA s/p rodrick mastectomy, afib/flutter and Sjogren's syndrome presents with weakness. She was  brought to the ED by ambulance. In the ED, patient was hypotensive with sbp in the 80s, and bradycardic with HR in 40. Patient also had a 3sec pause. E. P was consulted for evaluation for PPM. Pt underwent dual chamber PPM on 8/4/2020. Post-operatively, patient did well and was discharged home in stable condition on POD#..... 74 yo female, Known to [facility],  with a pmh of aortic valve stenosis s/p recent TAVR on 7/22/2020, CAD stent x4 6/15, HTN, GERD, SLE, trigeminal neuralgia, breast CA s/p rodrick mastectomy, afib/flutter and Sjogren's syndrome presents with weakness. She was  brought to the ED by ambulance. In the ED, patient was hypotensive with sbp in the 80s, and bradycardic with HR in 40. Patient also had a 3sec pause. E. P was consulted for evaluation for PPM. Pt underwent dual chamber PPM on 8/4/2020. Post-operatively, patient did well and was discharged home in stable condition on POD#1.

## 2020-08-05 NOTE — DISCHARGE NOTE PROVIDER - NSDCCPCAREPLAN_GEN_ALL_CORE_FT
PRINCIPAL DISCHARGE DIAGNOSIS  Diagnosis: Bradycardia  Assessment and Plan of Treatment:       SECONDARY DISCHARGE DIAGNOSES  Diagnosis: Weakness  Assessment and Plan of Treatment:     Diagnosis: Substance abuse  Assessment and Plan of Treatment:     Diagnosis: Aortic stenosis  Assessment and Plan of Treatment:

## 2020-08-05 NOTE — DISCHARGE NOTE PROVIDER - CARE PROVIDERS DIRECT ADDRESSES
,[email or web address]

## 2020-08-05 NOTE — DISCHARGE NOTE PROVIDER - NSDCFUADDINST_GEN_ALL_CORE_FT
No Heavy lifting >5 lbs. Do not raise your arm above shoulder level for 4-6 weeks.   No driving for 2weeks  No shower, bathtub, no wetting device site for 5 days.  Can take a shower on _ Sun _ , leave Steri-strips in place

## 2020-08-06 ENCOUNTER — NON-APPOINTMENT (OUTPATIENT)
Age: 75
End: 2020-08-06

## 2020-08-07 ENCOUNTER — APPOINTMENT (OUTPATIENT)
Dept: CARDIOLOGY | Facility: CLINIC | Age: 75
End: 2020-08-07
Payer: MEDICARE

## 2020-08-07 VITALS
DIASTOLIC BLOOD PRESSURE: 60 MMHG | OXYGEN SATURATION: 95 % | HEIGHT: 66 IN | HEART RATE: 86 BPM | WEIGHT: 162 LBS | RESPIRATION RATE: 12 BRPM | SYSTOLIC BLOOD PRESSURE: 135 MMHG | BODY MASS INDEX: 26.03 KG/M2 | TEMPERATURE: 98.5 F

## 2020-08-07 LAB
AMOBARBITAL UR CFM-MCNC: SIGNIFICANT CHANGE UP NG/MG CREAT
AMOBARBITAL UR CFM-MCNC: SIGNIFICANT CHANGE UP NG/ML
BUTABARBITAL UR QL SCN: SIGNIFICANT CHANGE UP NG/MG CREAT
BUTALBITAL UR QL SCN: 2424 NG/ML — SIGNIFICANT CHANGE UP
BUTALBITAL UR QL SCN: 5157 NG/MG CREAT — SIGNIFICANT CHANGE UP
PENTOBARB UR QL SCN: SIGNIFICANT CHANGE UP NG/MG CREAT
PENTOBARB UR QL SCN: SIGNIFICANT CHANGE UP NG/ML
PHENOBARBITAL UR QUANT: SIGNIFICANT CHANGE UP NG/MG CREAT
RESULT: ABNORMAL
SECOBARBITAL UR CFM-MCNC: SIGNIFICANT CHANGE UP NG/MG CREAT
SECOBARBITAL UR CFM-MCNC: SIGNIFICANT CHANGE UP NG/ML

## 2020-08-07 PROCEDURE — 99214 OFFICE O/P EST MOD 30 MIN: CPT

## 2020-08-07 PROCEDURE — 99024 POSTOP FOLLOW-UP VISIT: CPT

## 2020-08-07 NOTE — CDI QUERY NOTE - NSCDIOTHERTXTBX_GEN_ALL_CORE_HH
7/31 Clinical Indicators: 75F weakness x 1 week s/p carotid artery stent. pt is hypotensive and hypoxic in triage  PMH copd, chf, afib htn hld, s/p TAVR, substance abuse    Admitted for Symptomatic bradycardia    S/P Dual pacemaker Implant    8/5 Lab Finding of     IVF: NS @ 50ml/hr    Based on above clinical findings and your professional judgment please indicate if additional Dx is applicable  ?	 Hyponatremia  ?	Other please specify  ?	Clinically unable to determine

## 2020-08-07 NOTE — PHYSICAL EXAM
[No Deformities] : no deformities [General Appearance - In No Acute Distress] : no acute distress [] : no respiratory distress [Exaggerated Use Of Accessory Muscles For Inspiration] : no accessory muscle use [Heart Rate And Rhythm] : heart rate and rhythm were normal [Heart Sounds] : normal S1 and S2 [Murmurs] : no murmurs present [Abdomen Soft] : soft [Abnormal Walk] : normal gait [Nail Clubbing] : no clubbing of the fingernails [Cyanosis, Localized] : no localized cyanosis [Oriented To Time, Place, And Person] : oriented to person, place, and time [Impaired Insight] : insight and judgment were intact [Affect] : the affect was normal [Mood] : the mood was normal [No Anxiety] : not feeling anxious

## 2020-08-12 DIAGNOSIS — Z95.5 PRESENCE OF CORONARY ANGIOPLASTY IMPLANT AND GRAFT: ICD-10-CM

## 2020-08-12 DIAGNOSIS — Z85.3 PERSONAL HISTORY OF MALIGNANT NEOPLASM OF BREAST: ICD-10-CM

## 2020-08-12 DIAGNOSIS — I95.9 HYPOTENSION, UNSPECIFIED: ICD-10-CM

## 2020-08-12 DIAGNOSIS — E55.9 VITAMIN D DEFICIENCY, UNSPECIFIED: ICD-10-CM

## 2020-08-12 DIAGNOSIS — Z90.13 ACQUIRED ABSENCE OF BILATERAL BREASTS AND NIPPLES: ICD-10-CM

## 2020-08-12 DIAGNOSIS — Z79.01 LONG TERM (CURRENT) USE OF ANTICOAGULANTS: ICD-10-CM

## 2020-08-12 DIAGNOSIS — Z95.3 PRESENCE OF XENOGENIC HEART VALVE: ICD-10-CM

## 2020-08-12 DIAGNOSIS — J44.9 CHRONIC OBSTRUCTIVE PULMONARY DISEASE, UNSPECIFIED: ICD-10-CM

## 2020-08-12 DIAGNOSIS — E87.1 HYPO-OSMOLALITY AND HYPONATREMIA: ICD-10-CM

## 2020-08-12 DIAGNOSIS — M32.9 SYSTEMIC LUPUS ERYTHEMATOSUS, UNSPECIFIED: ICD-10-CM

## 2020-08-12 DIAGNOSIS — M35.00 SJOGREN SYNDROME, UNSPECIFIED: ICD-10-CM

## 2020-08-12 DIAGNOSIS — I48.0 PAROXYSMAL ATRIAL FIBRILLATION: ICD-10-CM

## 2020-08-12 DIAGNOSIS — N39.0 URINARY TRACT INFECTION, SITE NOT SPECIFIED: ICD-10-CM

## 2020-08-12 DIAGNOSIS — K21.9 GASTRO-ESOPHAGEAL REFLUX DISEASE WITHOUT ESOPHAGITIS: ICD-10-CM

## 2020-08-12 DIAGNOSIS — I25.10 ATHEROSCLEROTIC HEART DISEASE OF NATIVE CORONARY ARTERY WITHOUT ANGINA PECTORIS: ICD-10-CM

## 2020-08-12 DIAGNOSIS — R00.1 BRADYCARDIA, UNSPECIFIED: ICD-10-CM

## 2020-08-12 DIAGNOSIS — F17.210 NICOTINE DEPENDENCE, CIGARETTES, UNCOMPLICATED: ICD-10-CM

## 2020-08-12 DIAGNOSIS — I10 ESSENTIAL (PRIMARY) HYPERTENSION: ICD-10-CM

## 2020-08-12 DIAGNOSIS — F19.19 OTHER PSYCHOACTIVE SUBSTANCE ABUSE WITH UNSPECIFIED PSYCHOACTIVE SUBSTANCE-INDUCED DISORDER: ICD-10-CM

## 2020-08-12 DIAGNOSIS — E78.5 HYPERLIPIDEMIA, UNSPECIFIED: ICD-10-CM

## 2020-08-12 DIAGNOSIS — G50.0 TRIGEMINAL NEURALGIA: ICD-10-CM

## 2020-08-12 DIAGNOSIS — Z79.82 LONG TERM (CURRENT) USE OF ASPIRIN: ICD-10-CM

## 2020-08-17 RX ORDER — OMEPRAZOLE 40 MG/1
40 CAPSULE, DELAYED RELEASE ORAL TWICE DAILY
Qty: 60 | Refills: 0 | Status: ACTIVE | COMMUNITY
Start: 1900-01-01 | End: 1900-01-01

## 2020-08-17 NOTE — ASSESSMENT
[FreeTextEntry1] : 76 yo female, Known to [facility],  with a pmh of aortic valve stenosis s/p recent TAVR on 7/22/2020, CAD stent x4 6/15, HTN, GERD, SLE, trigeminal neuralgia, breast CA s/p rodrick mastectomy, afib/flutter and Sjogren's syndrome presents with weakness. She was  brought to the ED by ambulance. In the ED, patient was hypotensive with sbp in the 80s, and bradycardic with HR in 40. Patient also had a 3sec pause. E. P was consulted for evaluation for PPM. Pt underwent dual chamber PPM on 8/4/2020. Post-operatively, patient did well and was discharged home in stable condition on POD#1. SHe is doing well. Able to ambulate much better, and her breathing has improved. NYHA class II.

## 2020-08-17 NOTE — REASON FOR VISIT
[Follow-Up - Clinic] : a clinic follow-up of [Heart Failure] : congestive heart failure [FreeTextEntry2] : s/p TAVR [FreeTextEntry1] : Pt is a 74 y/o, female, with valvular disease of aortic stenosis, indicated on echo 12/2019 with a PV 4.72 / JEET 0.8 / MG 52.5 with EF 75%, mild MR, mid-mod TR, severe AS - stage D1, chronic diastolic HF, NYHA II, sev pHTN 63.5 mmHg, with a PMHx of Sjoren syndrome (autoimmune Dx 5-6 years ago), lupus, breast CA s/p b/l mastectomy 2010 in [facility]- no chemo, no radiation , TIAs ( 2007, 2012, 2013), Trigeminal neuralgia, smoker 1 pack day/50 years.  SHe arrives today s/p TAVR.\par \par Pt presents s/p BAV PCI.\par \par CTA completed:\par Access b/l 4mm\par Carotid \par 40-59 % BRIANAD\par 60- 79 % LICA\par \par PFTs - done

## 2020-08-17 NOTE — HISTORY OF PRESENT ILLNESS
[FreeTextEntry1] : 74 y/o F with PMH of Severe AS, HFpEF 75%, severe pulmonary HTN, Sjogren syndrome, lupus, breast cancer s/p b/l mastectomy with no chemo or radiation, h/o TIAS (1007, 2012, 2013), COPD presented with dyspnea for exertion and had abnormal CTA showing extensive CAD for LHC. She is s/p TAVR via trans-septal approach. She is s/p PPM.

## 2020-08-17 NOTE — END OF VISIT
[FreeTextEntry3] : Seen / examined and above reviewed.\par \par Interval hospitalization.  Sinus pauses consistent with SSS / AF.  No AV block.\par Feels well since discharge.\par BP mildly elevated.  Euvolemic.\par Well healing PPM incision.  No pocket hematoma.\par ASA / Eliquis (PCI / TAVR)\par Cont current cardiac Rx.\par EP follow-up.\par Follow-up / studies 1-month post-procedure.

## 2020-08-19 NOTE — H&P ADULT - HISTORY OF PRESENT ILLNESS
New celexa rx signed.     Greg    
Received a fax from [facility] on 8/13/2020 that the patient had been increasingly combative with her cares. They had consulted with Dr Murguia (their consulting geriatrician/memory care specialist) She had suggested possibly scheduling a morning dose of alprazolam, also alprazolam bid prn and increasing the Citalopram. The oncall physician (Dr Duffy) had ok'd the changes with the alprazolam and increased the citalopram to to 20mg daily. Please review and sign the Citalopram 20mg script to be sent to [facility].Thank you.  
Thank you.  
74 yo female, Known to [facility],  with a pmh of aortic valve stenosis, CAD stent x4 6/15, HTN, GERD, SLE, trigeminal neuralgia, breast CA s/p rodrick mastectomy, and Sjogren's syndrome presents today for elective TAVR. pt states her SOB has been gradually worsening over the past 4 weeks following the stent placement and was admitted to hospital about 1 week ago with exacerbation of heart failure. During admission had atrial fib/ flutter also.

## 2020-08-25 ENCOUNTER — TRANSCRIPTION ENCOUNTER (OUTPATIENT)
Age: 75
End: 2020-08-25

## 2020-08-28 ENCOUNTER — APPOINTMENT (OUTPATIENT)
Dept: CARDIOLOGY | Facility: CLINIC | Age: 75
End: 2020-08-28
Payer: MEDICARE

## 2020-08-28 PROCEDURE — 99213 OFFICE O/P EST LOW 20 MIN: CPT

## 2020-09-11 ENCOUNTER — APPOINTMENT (OUTPATIENT)
Dept: CARDIOLOGY | Facility: CLINIC | Age: 75
End: 2020-09-11
Payer: MEDICARE

## 2020-09-11 VITALS
BODY MASS INDEX: 26.03 KG/M2 | HEIGHT: 66 IN | SYSTOLIC BLOOD PRESSURE: 131 MMHG | OXYGEN SATURATION: 92 % | HEART RATE: 75 BPM | WEIGHT: 162 LBS | RESPIRATION RATE: 16 BRPM | DIASTOLIC BLOOD PRESSURE: 61 MMHG

## 2020-09-11 PROCEDURE — 93306 TTE W/DOPPLER COMPLETE: CPT

## 2020-09-11 PROCEDURE — 99214 OFFICE O/P EST MOD 30 MIN: CPT

## 2020-09-15 RX ORDER — CLOPIDOGREL BISULFATE 75 MG/1
75 TABLET, FILM COATED ORAL DAILY
Qty: 30 | Refills: 1 | Status: ACTIVE | COMMUNITY
Start: 2020-08-17 | End: 1900-01-01

## 2020-09-18 NOTE — PHYSICAL EXAM
[Sclera] : the sclera and conjunctiva were normal [Extraocular Movements] : extraocular movements were intact [Neck Appearance] : the appearance of the neck was normal [PERRL With Normal Accommodation] : pupils were equal in size, round, and reactive to light [Thyroid Diffuse Enlargement] : the thyroid was not enlarged [Jugular Venous Distention Increased] : there was no jugular-venous distention [Neck Cervical Mass (___cm)] : no neck mass was observed [Thyroid Nodule] : there were no palpable thyroid nodules [Auscultation Breath Sounds / Voice Sounds] : lungs were clear to auscultation bilaterally [Heart Sounds Gallop] : no gallops [Heart Rate And Rhythm] : heart rate was normal and rhythm regular [Heart Sounds] : normal S1 and S2 [Heart Sounds Pericardial Friction Rub] : no pericardial rub [Examination Of The Chest] : the chest was normal in appearance [Murmurs] : no murmurs [Diminished Respiratory Excursion] : normal chest expansion [Chest Visual Inspection Thoracic Asymmetry] : no chest asymmetry [2+] : left 2+ [No Abnormalities] : the abdominal aorta was not enlarged and no bruit was heard [Skin Color & Pigmentation] : normal skin color and pigmentation [Skin Turgor] : normal skin turgor [] : no rash [Sensation] : the sensory exam was normal to light touch and pinprick [Oriented To Time, Place, And Person] : oriented to person, place, and time [Deep Tendon Reflexes (DTR)] : deep tendon reflexes were 2+ and symmetric [No Focal Deficits] : no focal deficits [Impaired Insight] : insight and judgment were intact [Affect] : the affect was normal [Right Carotid Bruit] : no bruit heard over the right carotid [Left Carotid Bruit] : no bruit heard over the left carotid [Right Femoral Bruit] : no bruit heard over the right femoral artery [Left Femoral Bruit] : no bruit heard over the left femoral artery

## 2020-09-18 NOTE — PROCEDURE
[FreeTextEntry1] : s/p TAVR progressing well. Complains of allergies causing mild shortness of breath. No other acute complains. Counselled on smoking cesation. Will get echo and blood work next week. Start flonase OTC for allergies.

## 2020-09-18 NOTE — END OF VISIT
[FreeTextEntry3] : Seen / examined and above reviewed.\par \par Doing well post-TAVR / PPM.\par Anxious.  Clear allergy symptoms.  No apparent COPD exacerbation.  Breathing overall improved.\par Compensated / euvolemic.\par \par - Cont Plavix / Eliquis.\par - Inhalers / pulmonary follow-up\par - Zyrtec trial.\par - Follow-up / studies next week as planned.

## 2020-10-02 NOTE — PHYSICAL EXAM
[Heart Rate And Rhythm] : heart rate was normal and rhythm regular [Heart Sounds] : normal S1 and S2 [Heart Sounds Gallop] : no gallops [Murmurs] : no murmurs [Examination Of The Chest] : the chest was normal in appearance [Heart Sounds Pericardial Friction Rub] : no pericardial rub [Chest Visual Inspection Thoracic Asymmetry] : no chest asymmetry [Diminished Respiratory Excursion] : normal chest expansion [2+] : left 2+ [No Abnormalities] : the abdominal aorta was not enlarged and no bruit was heard [Right Carotid Bruit] : no bruit heard over the right carotid [Left Carotid Bruit] : no bruit heard over the left carotid [Right Femoral Bruit] : no bruit heard over the right femoral artery [Left Femoral Bruit] : no bruit heard over the left femoral artery

## 2020-10-02 NOTE — END OF VISIT
[FreeTextEntry3] : Seen / examined and above reviewed.\par Feels well.  No angina.  Breathing improved.  COPD stable.\par BP mildly elevated.  Euvolemic.\par No bleeding.\par ECHO: nL LVSF.  nL THV function.  Mild to Mod MR.\par \par - Cont Plavix and Eliquis.\par - Cardiac Rx / follow-up per Dr. Sepulveda.\par - Follow-up / studies 1-year.

## 2020-10-09 PROBLEM — Z95.2 S/P TAVR (TRANSCATHETER AORTIC VALVE REPLACEMENT): Status: ACTIVE | Noted: 2020-08-07

## 2020-10-09 NOTE — ASSESSMENT
[FreeTextEntry1] : s/p TAVR for severe AS \par severe bradycardia with pauses\par s/p dual chamber PPM on 8/4/2020\par Device interrogation showed stable parameter\par no events\par \par Incision site- healed very well.\par \par Paroxysmal A flutter/a fib ( CHA2 DS2 Vasc score 7 ) chf, htn, age, sex, stroke, cad)\par on eliquis 5mg twice daily\par \par On remote monitor\par Remote monitoring was discussed with patient, schedule, process,  as well as associated co-pay that may not covered by their insurance.\par \par RTO in 3 months with Dr. Looney\par

## 2020-10-09 NOTE — PHYSICAL EXAM
[General Appearance - Well Developed] : well developed [Normal Appearance] : normal appearance [General Appearance - Well Nourished] : well nourished [No Deformities] : no deformities [General Appearance - In No Acute Distress] : no acute distress [Heart Rate And Rhythm] : heart rate and rhythm were normal [Heart Sounds] : normal S1 and S2 [Edema] : no peripheral edema present [Systolic grade ___/6] : A grade [unfilled]/6 systolic murmur was heard. [] : no respiratory distress [Respiration, Rhythm And Depth] : normal respiratory rhythm and effort [Left Infraclavicular] : left infraclavicular area [Clean] : clean [Dry] : dry [Well-Healed] : well-healed [Abdomen Soft] : soft [Erythema] : not erythematous

## 2020-10-09 NOTE — PROCEDURE
[No] : not [NSR] : normal sinus rhythm [Pacemaker] : pacemaker [Longevity: ___ months] : The estimated remaining battery life is [unfilled] months [Threshold Testing Performed] : Threshold testing was performed [Lead Imp:  ___ohms] : lead impedance was [unfilled] ohms [Sensing Amplitude ___mv] : sensing amplitude was [unfilled] mv [___V @] : [unfilled] V [___ ms] : [unfilled] ms [de-identified] : Medtronic Micki XT DR IGLESIAS [de-identified] : [id number] [de-identified] : [id number] [de-identified] : 8/4/2020 [de-identified] : AAI<=>DDD [de-identified] : 60/130 [de-identified] :  <0.1%\par AP 3.4%

## 2020-10-09 NOTE — HISTORY OF PRESENT ILLNESS
[de-identified] : Referring: Dr. Sepulveda\par Cardiologist: Dr. Johnson\par PCP : Dr. Lita Marrero\par 75 years old female with Sjogren's syndrome, lupus, trigeminal neuralgia,  breast CA with bilateral mastectomy 2010 in [facility]- no chemo, no radiation, TIAs ( 2007, 2012, 2013), severe Pulmonary HTN, COPD HFpEF 75% , Severe AS,CAD s/p PCI stent x 4 with Temp PPM , s/p aortic valvuloplasty in 6/15/2020 . \par \par Presented to ED on 7/11/2020 for worsening sob,VALENZUELA, palpitations, fatigue. Patient had Paroxysmal flutter while in house.Patient had undergone  TAVR on 7/22/2020. was discharged  on eliquis and with MCOT.\par \par On  7/31/2020 patient c/o for weakness, soft BP and bradycardia.Dr Johnson asked patient to go to ED;  A 5 sec pause was seen on MCOT, Seen by EP and dual chamber PPM was implanted on 8/4/2020.\par \par Presents for follow up post dual chamber PPM implant\par \par ECG ( 10/6/2020) 71bpm sinus, LBBB AL 156ms, QRS 152ms, QTC 489ms\par ECHO ( 8/2/2020) EF 59% mild to moderate MR\par Denies any sob, VALENZUELA, PND, orthopnea, no palpitations, no dizziness,lightheadedness, denies chest pains\par \par

## 2021-01-01 ENCOUNTER — TRANSCRIPTION ENCOUNTER (OUTPATIENT)
Age: 76
End: 2021-01-01

## 2021-01-01 ENCOUNTER — APPOINTMENT (OUTPATIENT)
Dept: CARDIOLOGY | Facility: CLINIC | Age: 76
End: 2021-01-01
Payer: MEDICARE

## 2021-01-01 ENCOUNTER — APPOINTMENT (OUTPATIENT)
Dept: CARDIOLOGY | Facility: CLINIC | Age: 76
End: 2021-01-01

## 2021-01-01 ENCOUNTER — APPOINTMENT (OUTPATIENT)
Age: 76
End: 2021-01-01
Payer: MEDICARE

## 2021-01-01 ENCOUNTER — NON-APPOINTMENT (OUTPATIENT)
Age: 76
End: 2021-01-01

## 2021-01-01 ENCOUNTER — INPATIENT (INPATIENT)
Facility: HOSPITAL | Age: 76
LOS: 1 days | Discharge: HOME | End: 2021-05-09
Attending: HOSPITALIST | Admitting: HOSPITALIST
Payer: MEDICARE

## 2021-01-01 ENCOUNTER — EMERGENCY (EMERGENCY)
Facility: HOSPITAL | Age: 76
LOS: 0 days | Discharge: HOME | End: 2021-03-06
Attending: EMERGENCY MEDICINE | Admitting: EMERGENCY MEDICINE
Payer: MEDICARE

## 2021-01-01 ENCOUNTER — INPATIENT (INPATIENT)
Facility: HOSPITAL | Age: 76
LOS: 2 days | Discharge: HOME HEALTH | End: 2021-08-07
Attending: INTERNAL MEDICINE | Admitting: INTERNAL MEDICINE
Payer: MEDICARE

## 2021-01-01 VITALS
OXYGEN SATURATION: 95 % | HEIGHT: 66 IN | WEIGHT: 139.99 LBS | TEMPERATURE: 97 F | DIASTOLIC BLOOD PRESSURE: 61 MMHG | HEART RATE: 64 BPM | SYSTOLIC BLOOD PRESSURE: 128 MMHG | RESPIRATION RATE: 18 BRPM

## 2021-01-01 VITALS
OXYGEN SATURATION: 87 % | SYSTOLIC BLOOD PRESSURE: 122 MMHG | DIASTOLIC BLOOD PRESSURE: 78 MMHG | HEART RATE: 88 BPM | RESPIRATION RATE: 18 BRPM | HEIGHT: 66 IN | TEMPERATURE: 98 F

## 2021-01-01 VITALS
OXYGEN SATURATION: 94 % | HEART RATE: 73 BPM | RESPIRATION RATE: 16 BRPM | BODY MASS INDEX: 27.64 KG/M2 | WEIGHT: 172 LBS | DIASTOLIC BLOOD PRESSURE: 84 MMHG | HEIGHT: 66 IN | SYSTOLIC BLOOD PRESSURE: 140 MMHG

## 2021-01-01 VITALS
HEART RATE: 72 BPM | WEIGHT: 179.9 LBS | SYSTOLIC BLOOD PRESSURE: 156 MMHG | OXYGEN SATURATION: 94 % | TEMPERATURE: 98 F | RESPIRATION RATE: 20 BRPM | DIASTOLIC BLOOD PRESSURE: 67 MMHG | HEIGHT: 66 IN

## 2021-01-01 VITALS
RESPIRATION RATE: 18 BRPM | SYSTOLIC BLOOD PRESSURE: 132 MMHG | TEMPERATURE: 97 F | HEART RATE: 65 BPM | DIASTOLIC BLOOD PRESSURE: 59 MMHG

## 2021-01-01 VITALS
SYSTOLIC BLOOD PRESSURE: 110 MMHG | RESPIRATION RATE: 19 BRPM | TEMPERATURE: 99 F | DIASTOLIC BLOOD PRESSURE: 58 MMHG | HEART RATE: 62 BPM

## 2021-01-01 VITALS
WEIGHT: 172 LBS | DIASTOLIC BLOOD PRESSURE: 89 MMHG | SYSTOLIC BLOOD PRESSURE: 130 MMHG | HEART RATE: 7 BPM | BODY MASS INDEX: 27.64 KG/M2 | HEIGHT: 66 IN

## 2021-01-01 VITALS
RESPIRATION RATE: 16 BRPM | HEART RATE: 67 BPM | DIASTOLIC BLOOD PRESSURE: 60 MMHG | OXYGEN SATURATION: 98 % | SYSTOLIC BLOOD PRESSURE: 129 MMHG

## 2021-01-01 VITALS
HEART RATE: 62 BPM | SYSTOLIC BLOOD PRESSURE: 122 MMHG | HEIGHT: 66 IN | TEMPERATURE: 97.9 F | DIASTOLIC BLOOD PRESSURE: 60 MMHG | WEIGHT: 170 LBS | OXYGEN SATURATION: 95 % | BODY MASS INDEX: 27.32 KG/M2

## 2021-01-01 DIAGNOSIS — F17.200 NICOTINE DEPENDENCE, UNSPECIFIED, UNCOMPLICATED: ICD-10-CM

## 2021-01-01 DIAGNOSIS — Z98.890 OTHER SPECIFIED POSTPROCEDURAL STATES: Chronic | ICD-10-CM

## 2021-01-01 DIAGNOSIS — I50.9 HEART FAILURE, UNSPECIFIED: ICD-10-CM

## 2021-01-01 DIAGNOSIS — I10 ESSENTIAL (PRIMARY) HYPERTENSION: ICD-10-CM

## 2021-01-01 DIAGNOSIS — G50.0 TRIGEMINAL NEURALGIA: ICD-10-CM

## 2021-01-01 DIAGNOSIS — Z90.13 ACQUIRED ABSENCE OF BILATERAL BREASTS AND NIPPLES: ICD-10-CM

## 2021-01-01 DIAGNOSIS — R06.02 SHORTNESS OF BREATH: ICD-10-CM

## 2021-01-01 DIAGNOSIS — Z90.49 ACQUIRED ABSENCE OF OTHER SPECIFIED PARTS OF DIGESTIVE TRACT: Chronic | ICD-10-CM

## 2021-01-01 DIAGNOSIS — Z86.79 PERSONAL HISTORY OF OTHER DISEASES OF THE CIRCULATORY SYSTEM: ICD-10-CM

## 2021-01-01 DIAGNOSIS — Z95.0 PRESENCE OF CARDIAC PACEMAKER: ICD-10-CM

## 2021-01-01 DIAGNOSIS — Z20.822 CONTACT WITH AND (SUSPECTED) EXPOSURE TO COVID-19: ICD-10-CM

## 2021-01-01 DIAGNOSIS — I25.10 ATHEROSCLEROTIC HEART DISEASE OF NATIVE CORONARY ARTERY W/OUT ANGINA PECTORIS: ICD-10-CM

## 2021-01-01 DIAGNOSIS — J96.01 ACUTE RESPIRATORY FAILURE WITH HYPOXIA: ICD-10-CM

## 2021-01-01 DIAGNOSIS — Z98.61 CORONARY ANGIOPLASTY STATUS: Chronic | ICD-10-CM

## 2021-01-01 DIAGNOSIS — E78.5 HYPERLIPIDEMIA, UNSPECIFIED: ICD-10-CM

## 2021-01-01 DIAGNOSIS — F41.9 ANXIETY DISORDER, UNSPECIFIED: ICD-10-CM

## 2021-01-01 DIAGNOSIS — F17.210 NICOTINE DEPENDENCE, CIGARETTES, UNCOMPLICATED: ICD-10-CM

## 2021-01-01 DIAGNOSIS — Z85.3 PERSONAL HISTORY OF MALIGNANT NEOPLASM OF BREAST: ICD-10-CM

## 2021-01-01 DIAGNOSIS — Z90.49 ACQUIRED ABSENCE OF OTHER SPECIFIED PARTS OF DIGESTIVE TRACT: ICD-10-CM

## 2021-01-01 DIAGNOSIS — I11.0 HYPERTENSIVE HEART DISEASE WITH HEART FAILURE: ICD-10-CM

## 2021-01-01 DIAGNOSIS — E87.1 HYPO-OSMOLALITY AND HYPONATREMIA: ICD-10-CM

## 2021-01-01 DIAGNOSIS — J44.9 CHRONIC OBSTRUCTIVE PULMONARY DISEASE, UNSPECIFIED: ICD-10-CM

## 2021-01-01 DIAGNOSIS — I48.0 PAROXYSMAL ATRIAL FIBRILLATION: ICD-10-CM

## 2021-01-01 DIAGNOSIS — I50.33 ACUTE ON CHRONIC DIASTOLIC (CONGESTIVE) HEART FAILURE: ICD-10-CM

## 2021-01-01 DIAGNOSIS — I50.23 ACUTE ON CHRONIC SYSTOLIC (CONGESTIVE) HEART FAILURE: ICD-10-CM

## 2021-01-01 DIAGNOSIS — M35.00 SJOGREN SYNDROME, UNSPECIFIED: ICD-10-CM

## 2021-01-01 DIAGNOSIS — I25.10 ATHEROSCLEROTIC HEART DISEASE OF NATIVE CORONARY ARTERY WITHOUT ANGINA PECTORIS: ICD-10-CM

## 2021-01-01 DIAGNOSIS — Z95.5 PRESENCE OF CORONARY ANGIOPLASTY IMPLANT AND GRAFT: ICD-10-CM

## 2021-01-01 DIAGNOSIS — K21.9 GASTRO-ESOPHAGEAL REFLUX DISEASE WITHOUT ESOPHAGITIS: ICD-10-CM

## 2021-01-01 DIAGNOSIS — Z87.898 PERSONAL HISTORY OF OTHER SPECIFIED CONDITIONS: ICD-10-CM

## 2021-01-01 DIAGNOSIS — J18.9 PNEUMONIA, UNSPECIFIED ORGANISM: ICD-10-CM

## 2021-01-01 DIAGNOSIS — Z98.890 OTHER SPECIFIED POSTPROCEDURAL STATES: ICD-10-CM

## 2021-01-01 DIAGNOSIS — I45.5 OTHER SPECIFIED HEART BLOCK: ICD-10-CM

## 2021-01-01 DIAGNOSIS — I48.92 UNSPECIFIED ATRIAL FLUTTER: ICD-10-CM

## 2021-01-01 DIAGNOSIS — Z00.00 ENCOUNTER FOR GENERAL ADULT MEDICAL EXAMINATION W/OUT ABNORMAL FINDINGS: ICD-10-CM

## 2021-01-01 DIAGNOSIS — Z45.018 ENCOUNTER FOR ADJUSTMENT AND MANAGEMENT OF OTHER PART OF CARDIAC PACEMAKER: ICD-10-CM

## 2021-01-01 DIAGNOSIS — J44.1 CHRONIC OBSTRUCTIVE PULMONARY DISEASE WITH (ACUTE) EXACERBATION: ICD-10-CM

## 2021-01-01 DIAGNOSIS — I48.91 UNSPECIFIED ATRIAL FIBRILLATION: ICD-10-CM

## 2021-01-01 LAB
A1C WITH ESTIMATED AVERAGE GLUCOSE RESULT: 5.7 % — HIGH (ref 4–5.6)
ALBUMIN SERPL ELPH-MCNC: 4 G/DL — SIGNIFICANT CHANGE UP (ref 3.5–5.2)
ALBUMIN SERPL ELPH-MCNC: 4.2 G/DL — SIGNIFICANT CHANGE UP (ref 3.5–5.2)
ALBUMIN SERPL ELPH-MCNC: 4.3 G/DL — SIGNIFICANT CHANGE UP (ref 3.5–5.2)
ALP SERPL-CCNC: 157 U/L — HIGH (ref 30–115)
ALP SERPL-CCNC: 159 U/L — HIGH (ref 30–115)
ALP SERPL-CCNC: 82 U/L — SIGNIFICANT CHANGE UP (ref 30–115)
ALP SERPL-CCNC: 87 U/L — SIGNIFICANT CHANGE UP (ref 30–115)
ALP SERPL-CCNC: 99 U/L — SIGNIFICANT CHANGE UP (ref 30–115)
ALT FLD-CCNC: 20 U/L — SIGNIFICANT CHANGE UP (ref 0–41)
ALT FLD-CCNC: 22 U/L — SIGNIFICANT CHANGE UP (ref 0–41)
ALT FLD-CCNC: 27 U/L — SIGNIFICANT CHANGE UP (ref 0–41)
ALT FLD-CCNC: 29 U/L — SIGNIFICANT CHANGE UP (ref 0–41)
ALT FLD-CCNC: 29 U/L — SIGNIFICANT CHANGE UP (ref 0–41)
ANION GAP SERPL CALC-SCNC: 10 MMOL/L — SIGNIFICANT CHANGE UP (ref 7–14)
ANION GAP SERPL CALC-SCNC: 11 MMOL/L — SIGNIFICANT CHANGE UP (ref 7–14)
ANION GAP SERPL CALC-SCNC: 13 MMOL/L — SIGNIFICANT CHANGE UP (ref 7–14)
ANION GAP SERPL CALC-SCNC: 15 MMOL/L — HIGH (ref 7–14)
ANION GAP SERPL CALC-SCNC: 15 MMOL/L — HIGH (ref 7–14)
ANION GAP SERPL CALC-SCNC: 9 MMOL/L — SIGNIFICANT CHANGE UP (ref 7–14)
ANION GAP SERPL CALC-SCNC: 9 MMOL/L — SIGNIFICANT CHANGE UP (ref 7–14)
AST SERPL-CCNC: 22 U/L — SIGNIFICANT CHANGE UP (ref 0–41)
AST SERPL-CCNC: 23 U/L — SIGNIFICANT CHANGE UP (ref 0–41)
AST SERPL-CCNC: 23 U/L — SIGNIFICANT CHANGE UP (ref 0–41)
AST SERPL-CCNC: 26 U/L — SIGNIFICANT CHANGE UP (ref 0–41)
AST SERPL-CCNC: 56 U/L — HIGH (ref 0–41)
BASOPHILS # BLD AUTO: 0.01 K/UL — SIGNIFICANT CHANGE UP (ref 0–0.2)
BASOPHILS # BLD AUTO: 0.02 K/UL — SIGNIFICANT CHANGE UP (ref 0–0.2)
BASOPHILS # BLD AUTO: 0.02 K/UL — SIGNIFICANT CHANGE UP (ref 0–0.2)
BASOPHILS # BLD AUTO: 0.03 K/UL — SIGNIFICANT CHANGE UP (ref 0–0.2)
BASOPHILS # BLD AUTO: 0.04 K/UL — SIGNIFICANT CHANGE UP (ref 0–0.2)
BASOPHILS # BLD AUTO: 0.05 K/UL — SIGNIFICANT CHANGE UP (ref 0–0.2)
BASOPHILS # BLD AUTO: 0.07 K/UL — SIGNIFICANT CHANGE UP (ref 0–0.2)
BASOPHILS # BLD AUTO: 0.09 K/UL — SIGNIFICANT CHANGE UP (ref 0–0.2)
BASOPHILS NFR BLD AUTO: 0.1 % — SIGNIFICANT CHANGE UP (ref 0–1)
BASOPHILS NFR BLD AUTO: 0.2 % — SIGNIFICANT CHANGE UP (ref 0–1)
BASOPHILS NFR BLD AUTO: 0.3 % — SIGNIFICANT CHANGE UP (ref 0–1)
BASOPHILS NFR BLD AUTO: 0.6 % — SIGNIFICANT CHANGE UP (ref 0–1)
BASOPHILS NFR BLD AUTO: 0.7 % — SIGNIFICANT CHANGE UP (ref 0–1)
BASOPHILS NFR BLD AUTO: 0.8 % — SIGNIFICANT CHANGE UP (ref 0–1)
BILIRUB SERPL-MCNC: 0.2 MG/DL — SIGNIFICANT CHANGE UP (ref 0.2–1.2)
BILIRUB SERPL-MCNC: 0.4 MG/DL — SIGNIFICANT CHANGE UP (ref 0.2–1.2)
BILIRUB SERPL-MCNC: 0.6 MG/DL — SIGNIFICANT CHANGE UP (ref 0.2–1.2)
BUN SERPL-MCNC: 10 MG/DL — SIGNIFICANT CHANGE UP (ref 10–20)
BUN SERPL-MCNC: 10 MG/DL — SIGNIFICANT CHANGE UP (ref 10–20)
BUN SERPL-MCNC: 11 MG/DL — SIGNIFICANT CHANGE UP (ref 10–20)
BUN SERPL-MCNC: 11 MG/DL — SIGNIFICANT CHANGE UP (ref 10–20)
BUN SERPL-MCNC: 13 MG/DL — SIGNIFICANT CHANGE UP (ref 10–20)
BUN SERPL-MCNC: 14 MG/DL — SIGNIFICANT CHANGE UP (ref 10–20)
BUN SERPL-MCNC: 14 MG/DL — SIGNIFICANT CHANGE UP (ref 10–20)
BUN SERPL-MCNC: 15 MG/DL — SIGNIFICANT CHANGE UP (ref 10–20)
BUN SERPL-MCNC: 17 MG/DL — SIGNIFICANT CHANGE UP (ref 10–20)
CALCIUM SERPL-MCNC: 8.3 MG/DL — LOW (ref 8.5–10.1)
CALCIUM SERPL-MCNC: 8.6 MG/DL — SIGNIFICANT CHANGE UP (ref 8.5–10.1)
CALCIUM SERPL-MCNC: 9.3 MG/DL — SIGNIFICANT CHANGE UP (ref 8.5–10.1)
CALCIUM SERPL-MCNC: 9.5 MG/DL — SIGNIFICANT CHANGE UP (ref 8.5–10.1)
CALCIUM SERPL-MCNC: 9.5 MG/DL — SIGNIFICANT CHANGE UP (ref 8.5–10.1)
CHLORIDE SERPL-SCNC: 104 MMOL/L — SIGNIFICANT CHANGE UP (ref 98–110)
CHLORIDE SERPL-SCNC: 88 MMOL/L — LOW (ref 98–110)
CHLORIDE SERPL-SCNC: 93 MMOL/L — LOW (ref 98–110)
CHLORIDE SERPL-SCNC: 95 MMOL/L — LOW (ref 98–110)
CHLORIDE SERPL-SCNC: 97 MMOL/L — LOW (ref 98–110)
CHLORIDE SERPL-SCNC: 98 MMOL/L — SIGNIFICANT CHANGE UP (ref 98–110)
CHLORIDE SERPL-SCNC: 98 MMOL/L — SIGNIFICANT CHANGE UP (ref 98–110)
CHOLEST SERPL-MCNC: 281 MG/DL — HIGH
CO2 SERPL-SCNC: 18 MMOL/L — SIGNIFICANT CHANGE UP (ref 17–32)
CO2 SERPL-SCNC: 20 MMOL/L — SIGNIFICANT CHANGE UP (ref 17–32)
CO2 SERPL-SCNC: 20 MMOL/L — SIGNIFICANT CHANGE UP (ref 17–32)
CO2 SERPL-SCNC: 23 MMOL/L — SIGNIFICANT CHANGE UP (ref 17–32)
CO2 SERPL-SCNC: 24 MMOL/L — SIGNIFICANT CHANGE UP (ref 17–32)
CO2 SERPL-SCNC: 24 MMOL/L — SIGNIFICANT CHANGE UP (ref 17–32)
CO2 SERPL-SCNC: 25 MMOL/L — SIGNIFICANT CHANGE UP (ref 17–32)
CO2 SERPL-SCNC: 27 MMOL/L — SIGNIFICANT CHANGE UP (ref 17–32)
CO2 SERPL-SCNC: 28 MMOL/L — SIGNIFICANT CHANGE UP (ref 17–32)
COVID-19 SPIKE DOMAIN AB INTERP: POSITIVE
COVID-19 SPIKE DOMAIN AB INTERP: POSITIVE
COVID-19 SPIKE DOMAIN ANTIBODY RESULT: >250 U/ML — HIGH
COVID-19 SPIKE DOMAIN ANTIBODY RESULT: >250 U/ML — HIGH
CREAT SERPL-MCNC: 0.5 MG/DL — LOW (ref 0.7–1.5)
CREAT SERPL-MCNC: 0.5 MG/DL — LOW (ref 0.7–1.5)
CREAT SERPL-MCNC: 0.6 MG/DL — LOW (ref 0.7–1.5)
CREAT SERPL-MCNC: 0.7 MG/DL — SIGNIFICANT CHANGE UP (ref 0.7–1.5)
CREAT SERPL-MCNC: <0.5 MG/DL — LOW (ref 0.7–1.5)
CULTURE RESULTS: SIGNIFICANT CHANGE UP
CULTURE RESULTS: SIGNIFICANT CHANGE UP
D DIMER BLD IA.RAPID-MCNC: 288 NG/ML DDU — HIGH (ref 0–230)
EOSINOPHIL # BLD AUTO: 0 K/UL — SIGNIFICANT CHANGE UP (ref 0–0.7)
EOSINOPHIL # BLD AUTO: 0.03 K/UL — SIGNIFICANT CHANGE UP (ref 0–0.7)
EOSINOPHIL # BLD AUTO: 0.08 K/UL — SIGNIFICANT CHANGE UP (ref 0–0.7)
EOSINOPHIL # BLD AUTO: 0.1 K/UL — SIGNIFICANT CHANGE UP (ref 0–0.7)
EOSINOPHIL # BLD AUTO: 0.14 K/UL — SIGNIFICANT CHANGE UP (ref 0–0.7)
EOSINOPHIL # BLD AUTO: 0.2 K/UL — SIGNIFICANT CHANGE UP (ref 0–0.7)
EOSINOPHIL NFR BLD AUTO: 0 % — SIGNIFICANT CHANGE UP (ref 0–8)
EOSINOPHIL NFR BLD AUTO: 0.2 % — SIGNIFICANT CHANGE UP (ref 0–8)
EOSINOPHIL NFR BLD AUTO: 0.8 % — SIGNIFICANT CHANGE UP (ref 0–8)
EOSINOPHIL NFR BLD AUTO: 1.1 % — SIGNIFICANT CHANGE UP (ref 0–8)
EOSINOPHIL NFR BLD AUTO: 1.5 % — SIGNIFICANT CHANGE UP (ref 0–8)
EOSINOPHIL NFR BLD AUTO: 1.6 % — SIGNIFICANT CHANGE UP (ref 0–8)
ESTIMATED AVERAGE GLUCOSE: 117 MG/DL — HIGH (ref 68–114)
GLUCOSE SERPL-MCNC: 101 MG/DL — HIGH (ref 70–99)
GLUCOSE SERPL-MCNC: 112 MG/DL — HIGH (ref 70–99)
GLUCOSE SERPL-MCNC: 122 MG/DL — HIGH (ref 70–99)
GLUCOSE SERPL-MCNC: 130 MG/DL — HIGH (ref 70–99)
GLUCOSE SERPL-MCNC: 131 MG/DL — HIGH (ref 70–99)
GLUCOSE SERPL-MCNC: 145 MG/DL — HIGH (ref 70–99)
GLUCOSE SERPL-MCNC: 92 MG/DL — SIGNIFICANT CHANGE UP (ref 70–99)
GLUCOSE SERPL-MCNC: 95 MG/DL — SIGNIFICANT CHANGE UP (ref 70–99)
GLUCOSE SERPL-MCNC: 99 MG/DL — SIGNIFICANT CHANGE UP (ref 70–99)
HCT VFR BLD CALC: 35.6 % — LOW (ref 37–47)
HCT VFR BLD CALC: 36.1 % — LOW (ref 37–47)
HCT VFR BLD CALC: 36.3 % — LOW (ref 37–47)
HCT VFR BLD CALC: 36.3 % — LOW (ref 37–47)
HCT VFR BLD CALC: 38.2 % — SIGNIFICANT CHANGE UP (ref 37–47)
HCT VFR BLD CALC: 39.5 % — SIGNIFICANT CHANGE UP (ref 37–47)
HCT VFR BLD CALC: 39.7 % — SIGNIFICANT CHANGE UP (ref 37–47)
HCT VFR BLD CALC: 40.5 % — SIGNIFICANT CHANGE UP (ref 37–47)
HDLC SERPL-MCNC: 86 MG/DL — SIGNIFICANT CHANGE UP
HGB BLD-MCNC: 11.5 G/DL — LOW (ref 12–16)
HGB BLD-MCNC: 12 G/DL — SIGNIFICANT CHANGE UP (ref 12–16)
HGB BLD-MCNC: 12 G/DL — SIGNIFICANT CHANGE UP (ref 12–16)
HGB BLD-MCNC: 13.1 G/DL — SIGNIFICANT CHANGE UP (ref 12–16)
HGB BLD-MCNC: 13.1 G/DL — SIGNIFICANT CHANGE UP (ref 12–16)
HGB BLD-MCNC: 13.2 G/DL — SIGNIFICANT CHANGE UP (ref 12–16)
IMM GRANULOCYTES NFR BLD AUTO: 0.3 % — SIGNIFICANT CHANGE UP (ref 0.1–0.3)
IMM GRANULOCYTES NFR BLD AUTO: 0.3 % — SIGNIFICANT CHANGE UP (ref 0.1–0.3)
IMM GRANULOCYTES NFR BLD AUTO: 0.4 % — HIGH (ref 0.1–0.3)
IMM GRANULOCYTES NFR BLD AUTO: 0.4 % — HIGH (ref 0.1–0.3)
IMM GRANULOCYTES NFR BLD AUTO: 0.5 % — HIGH (ref 0.1–0.3)
IMM GRANULOCYTES NFR BLD AUTO: 1.2 % — HIGH (ref 0.1–0.3)
LACTATE SERPL-SCNC: 1.8 MMOL/L — SIGNIFICANT CHANGE UP (ref 0.7–2)
LEGIONELLA AG UR QL: NEGATIVE — SIGNIFICANT CHANGE UP
LIDOCAIN IGE QN: 8 U/L — SIGNIFICANT CHANGE UP (ref 7–60)
LIPID PNL WITH DIRECT LDL SERPL: 194 MG/DL — HIGH
LYMPHOCYTES # BLD AUTO: 1.01 K/UL — LOW (ref 1.2–3.4)
LYMPHOCYTES # BLD AUTO: 1.04 K/UL — LOW (ref 1.2–3.4)
LYMPHOCYTES # BLD AUTO: 1.5 K/UL — SIGNIFICANT CHANGE UP (ref 1.2–3.4)
LYMPHOCYTES # BLD AUTO: 1.58 K/UL — SIGNIFICANT CHANGE UP (ref 1.2–3.4)
LYMPHOCYTES # BLD AUTO: 1.68 K/UL — SIGNIFICANT CHANGE UP (ref 1.2–3.4)
LYMPHOCYTES # BLD AUTO: 1.93 K/UL — SIGNIFICANT CHANGE UP (ref 1.2–3.4)
LYMPHOCYTES # BLD AUTO: 14.1 % — LOW (ref 20.5–51.1)
LYMPHOCYTES # BLD AUTO: 14.7 % — LOW (ref 20.5–51.1)
LYMPHOCYTES # BLD AUTO: 16.6 % — LOW (ref 20.5–51.1)
LYMPHOCYTES # BLD AUTO: 17 % — LOW (ref 20.5–51.1)
LYMPHOCYTES # BLD AUTO: 17.6 % — LOW (ref 20.5–51.1)
LYMPHOCYTES # BLD AUTO: 2.02 K/UL — SIGNIFICANT CHANGE UP (ref 1.2–3.4)
LYMPHOCYTES # BLD AUTO: 2.53 K/UL — SIGNIFICANT CHANGE UP (ref 1.2–3.4)
LYMPHOCYTES # BLD AUTO: 21.7 % — SIGNIFICANT CHANGE UP (ref 20.5–51.1)
LYMPHOCYTES # BLD AUTO: 29.2 % — SIGNIFICANT CHANGE UP (ref 20.5–51.1)
LYMPHOCYTES # BLD AUTO: 7.8 % — LOW (ref 20.5–51.1)
MAGNESIUM SERPL-MCNC: 1.6 MG/DL — LOW (ref 1.8–2.4)
MAGNESIUM SERPL-MCNC: 1.8 MG/DL — SIGNIFICANT CHANGE UP (ref 1.8–2.4)
MAGNESIUM SERPL-MCNC: 1.8 MG/DL — SIGNIFICANT CHANGE UP (ref 1.8–2.4)
MAGNESIUM SERPL-MCNC: 2 MG/DL — SIGNIFICANT CHANGE UP (ref 1.8–2.4)
MCHC RBC-ENTMCNC: 26.5 PG — LOW (ref 27–31)
MCHC RBC-ENTMCNC: 26.9 PG — LOW (ref 27–31)
MCHC RBC-ENTMCNC: 27.6 PG — SIGNIFICANT CHANGE UP (ref 27–31)
MCHC RBC-ENTMCNC: 27.8 PG — SIGNIFICANT CHANGE UP (ref 27–31)
MCHC RBC-ENTMCNC: 28.4 PG — SIGNIFICANT CHANGE UP (ref 27–31)
MCHC RBC-ENTMCNC: 28.8 PG — SIGNIFICANT CHANGE UP (ref 27–31)
MCHC RBC-ENTMCNC: 28.9 PG — SIGNIFICANT CHANGE UP (ref 27–31)
MCHC RBC-ENTMCNC: 29.4 PG — SIGNIFICANT CHANGE UP (ref 27–31)
MCHC RBC-ENTMCNC: 31.4 G/DL — LOW (ref 32–37)
MCHC RBC-ENTMCNC: 31.7 G/DL — LOW (ref 32–37)
MCHC RBC-ENTMCNC: 31.7 G/DL — LOW (ref 32–37)
MCHC RBC-ENTMCNC: 31.9 G/DL — LOW (ref 32–37)
MCHC RBC-ENTMCNC: 32.3 G/DL — SIGNIFICANT CHANGE UP (ref 32–37)
MCHC RBC-ENTMCNC: 33.2 G/DL — SIGNIFICANT CHANGE UP (ref 32–37)
MCHC RBC-ENTMCNC: 33.2 G/DL — SIGNIFICANT CHANGE UP (ref 32–37)
MCHC RBC-ENTMCNC: 33.7 G/DL — SIGNIFICANT CHANGE UP (ref 32–37)
MCV RBC AUTO: 84.5 FL — SIGNIFICANT CHANGE UP (ref 81–99)
MCV RBC AUTO: 85 FL — SIGNIFICANT CHANGE UP (ref 81–99)
MCV RBC AUTO: 85.5 FL — SIGNIFICANT CHANGE UP (ref 81–99)
MCV RBC AUTO: 86 FL — SIGNIFICANT CHANGE UP (ref 81–99)
MCV RBC AUTO: 86.9 FL — SIGNIFICANT CHANGE UP (ref 81–99)
MCV RBC AUTO: 87.1 FL — SIGNIFICANT CHANGE UP (ref 81–99)
MCV RBC AUTO: 87.3 FL — SIGNIFICANT CHANGE UP (ref 81–99)
MCV RBC AUTO: 90.3 FL — SIGNIFICANT CHANGE UP (ref 81–99)
MONOCYTES # BLD AUTO: 0.52 K/UL — SIGNIFICANT CHANGE UP (ref 0.1–0.6)
MONOCYTES # BLD AUTO: 0.74 K/UL — HIGH (ref 0.1–0.6)
MONOCYTES # BLD AUTO: 0.82 K/UL — HIGH (ref 0.1–0.6)
MONOCYTES # BLD AUTO: 0.82 K/UL — HIGH (ref 0.1–0.6)
MONOCYTES # BLD AUTO: 0.87 K/UL — HIGH (ref 0.1–0.6)
MONOCYTES # BLD AUTO: 0.9 K/UL — HIGH (ref 0.1–0.6)
MONOCYTES # BLD AUTO: 0.91 K/UL — HIGH (ref 0.1–0.6)
MONOCYTES # BLD AUTO: 0.98 K/UL — HIGH (ref 0.1–0.6)
MONOCYTES NFR BLD AUTO: 10.5 % — HIGH (ref 1.7–9.3)
MONOCYTES NFR BLD AUTO: 5.7 % — SIGNIFICANT CHANGE UP (ref 1.7–9.3)
MONOCYTES NFR BLD AUTO: 6.5 % — SIGNIFICANT CHANGE UP (ref 1.7–9.3)
MONOCYTES NFR BLD AUTO: 7 % — SIGNIFICANT CHANGE UP (ref 1.7–9.3)
MONOCYTES NFR BLD AUTO: 9.1 % — SIGNIFICANT CHANGE UP (ref 1.7–9.3)
MONOCYTES NFR BLD AUTO: 9.2 % — SIGNIFICANT CHANGE UP (ref 1.7–9.3)
MONOCYTES NFR BLD AUTO: 9.7 % — HIGH (ref 1.7–9.3)
MONOCYTES NFR BLD AUTO: 9.9 % — HIGH (ref 1.7–9.3)
NEUTROPHILS # BLD AUTO: 10.38 K/UL — HIGH (ref 1.4–6.5)
NEUTROPHILS # BLD AUTO: 11 K/UL — HIGH (ref 1.4–6.5)
NEUTROPHILS # BLD AUTO: 4.98 K/UL — SIGNIFICANT CHANGE UP (ref 1.4–6.5)
NEUTROPHILS # BLD AUTO: 5.78 K/UL — SIGNIFICANT CHANGE UP (ref 1.4–6.5)
NEUTROPHILS # BLD AUTO: 5.97 K/UL — SIGNIFICANT CHANGE UP (ref 1.4–6.5)
NEUTROPHILS # BLD AUTO: 6.39 K/UL — SIGNIFICANT CHANGE UP (ref 1.4–6.5)
NEUTROPHILS # BLD AUTO: 6.53 K/UL — HIGH (ref 1.4–6.5)
NEUTROPHILS # BLD AUTO: 7.32 K/UL — HIGH (ref 1.4–6.5)
NEUTROPHILS NFR BLD AUTO: 57.4 % — SIGNIFICANT CHANGE UP (ref 42.2–75.2)
NEUTROPHILS NFR BLD AUTO: 67.1 % — SIGNIFICANT CHANGE UP (ref 42.2–75.2)
NEUTROPHILS NFR BLD AUTO: 72.1 % — SIGNIFICANT CHANGE UP (ref 42.2–75.2)
NEUTROPHILS NFR BLD AUTO: 72.6 % — SIGNIFICANT CHANGE UP (ref 42.2–75.2)
NEUTROPHILS NFR BLD AUTO: 72.8 % — SIGNIFICANT CHANGE UP (ref 42.2–75.2)
NEUTROPHILS NFR BLD AUTO: 75.4 % — HIGH (ref 42.2–75.2)
NEUTROPHILS NFR BLD AUTO: 78.2 % — HIGH (ref 42.2–75.2)
NEUTROPHILS NFR BLD AUTO: 85.5 % — HIGH (ref 42.2–75.2)
NON HDL CHOLESTEROL: 195 MG/DL — HIGH
NRBC # BLD: 0 /100 WBCS — SIGNIFICANT CHANGE UP (ref 0–0)
NT-PROBNP SERPL-SCNC: 2245 PG/ML — HIGH (ref 0–300)
NT-PROBNP SERPL-SCNC: 2345 PG/ML — HIGH (ref 0–300)
NT-PROBNP SERPL-SCNC: 5067 PG/ML — HIGH (ref 0–300)
OSMOLALITY UR: 328 MOS/KG — SIGNIFICANT CHANGE UP (ref 50–1200)
PHOSPHATE SERPL-MCNC: 4.7 MG/DL — SIGNIFICANT CHANGE UP (ref 2.1–4.9)
PLATELET # BLD AUTO: 162 K/UL — SIGNIFICANT CHANGE UP (ref 130–400)
PLATELET # BLD AUTO: 173 K/UL — SIGNIFICANT CHANGE UP (ref 130–400)
PLATELET # BLD AUTO: 192 K/UL — SIGNIFICANT CHANGE UP (ref 130–400)
PLATELET # BLD AUTO: 193 K/UL — SIGNIFICANT CHANGE UP (ref 130–400)
PLATELET # BLD AUTO: 200 K/UL — SIGNIFICANT CHANGE UP (ref 130–400)
PLATELET # BLD AUTO: 202 K/UL — SIGNIFICANT CHANGE UP (ref 130–400)
PLATELET # BLD AUTO: 204 K/UL — SIGNIFICANT CHANGE UP (ref 130–400)
PLATELET # BLD AUTO: 217 K/UL — SIGNIFICANT CHANGE UP (ref 130–400)
POTASSIUM SERPL-MCNC: 3.9 MMOL/L — SIGNIFICANT CHANGE UP (ref 3.5–5)
POTASSIUM SERPL-MCNC: 3.9 MMOL/L — SIGNIFICANT CHANGE UP (ref 3.5–5)
POTASSIUM SERPL-MCNC: 4.3 MMOL/L — SIGNIFICANT CHANGE UP (ref 3.5–5)
POTASSIUM SERPL-MCNC: 4.5 MMOL/L — SIGNIFICANT CHANGE UP (ref 3.5–5)
POTASSIUM SERPL-MCNC: 4.8 MMOL/L — SIGNIFICANT CHANGE UP (ref 3.5–5)
POTASSIUM SERPL-MCNC: 5.6 MMOL/L — HIGH (ref 3.5–5)
POTASSIUM SERPL-SCNC: 3.9 MMOL/L — SIGNIFICANT CHANGE UP (ref 3.5–5)
POTASSIUM SERPL-SCNC: 3.9 MMOL/L — SIGNIFICANT CHANGE UP (ref 3.5–5)
POTASSIUM SERPL-SCNC: 4.3 MMOL/L — SIGNIFICANT CHANGE UP (ref 3.5–5)
POTASSIUM SERPL-SCNC: 4.5 MMOL/L — SIGNIFICANT CHANGE UP (ref 3.5–5)
POTASSIUM SERPL-SCNC: 4.8 MMOL/L — SIGNIFICANT CHANGE UP (ref 3.5–5)
POTASSIUM SERPL-SCNC: 5.6 MMOL/L — HIGH (ref 3.5–5)
PROCALCITONIN SERPL-MCNC: 0.16 NG/ML — HIGH (ref 0.02–0.1)
PROT SERPL-MCNC: 6.2 G/DL — SIGNIFICANT CHANGE UP (ref 6–8)
PROT SERPL-MCNC: 6.3 G/DL — SIGNIFICANT CHANGE UP (ref 6–8)
PROT SERPL-MCNC: 6.5 G/DL — SIGNIFICANT CHANGE UP (ref 6–8)
PROT SERPL-MCNC: 6.8 G/DL — SIGNIFICANT CHANGE UP (ref 6–8)
PROT SERPL-MCNC: 6.9 G/DL — SIGNIFICANT CHANGE UP (ref 6–8)
RBC # BLD: 4 M/UL — LOW (ref 4.2–5.4)
RBC # BLD: 4.08 M/UL — LOW (ref 4.2–5.4)
RBC # BLD: 4.17 M/UL — LOW (ref 4.2–5.4)
RBC # BLD: 4.27 M/UL — SIGNIFICANT CHANGE UP (ref 4.2–5.4)
RBC # BLD: 4.52 M/UL — SIGNIFICANT CHANGE UP (ref 4.2–5.4)
RBC # BLD: 4.57 M/UL — SIGNIFICANT CHANGE UP (ref 4.2–5.4)
RBC # BLD: 4.62 M/UL — SIGNIFICANT CHANGE UP (ref 4.2–5.4)
RBC # BLD: 4.71 M/UL — SIGNIFICANT CHANGE UP (ref 4.2–5.4)
RBC # FLD: 13.3 % — SIGNIFICANT CHANGE UP (ref 11.5–14.5)
RBC # FLD: 13.4 % — SIGNIFICANT CHANGE UP (ref 11.5–14.5)
RBC # FLD: 13.4 % — SIGNIFICANT CHANGE UP (ref 11.5–14.5)
RBC # FLD: 14.7 % — HIGH (ref 11.5–14.5)
RBC # FLD: 15 % — HIGH (ref 11.5–14.5)
RBC # FLD: 15.2 % — HIGH (ref 11.5–14.5)
RBC # FLD: 15.3 % — HIGH (ref 11.5–14.5)
RBC # FLD: 15.4 % — HIGH (ref 11.5–14.5)
S PNEUM AG UR QL: NEGATIVE — SIGNIFICANT CHANGE UP
SARS-COV-2 IGG+IGM SERPL QL IA: >250 U/ML — HIGH
SARS-COV-2 IGG+IGM SERPL QL IA: >250 U/ML — HIGH
SARS-COV-2 IGG+IGM SERPL QL IA: POSITIVE
SARS-COV-2 IGG+IGM SERPL QL IA: POSITIVE
SARS-COV-2 RNA SPEC QL NAA+PROBE: SIGNIFICANT CHANGE UP
SODIUM SERPL-SCNC: 122 MMOL/L — LOW (ref 135–146)
SODIUM SERPL-SCNC: 125 MMOL/L — LOW (ref 135–146)
SODIUM SERPL-SCNC: 128 MMOL/L — LOW (ref 135–146)
SODIUM SERPL-SCNC: 130 MMOL/L — LOW (ref 135–146)
SODIUM SERPL-SCNC: 131 MMOL/L — LOW (ref 135–146)
SODIUM SERPL-SCNC: 131 MMOL/L — LOW (ref 135–146)
SODIUM SERPL-SCNC: 135 MMOL/L — SIGNIFICANT CHANGE UP (ref 135–146)
SODIUM UR-SCNC: 75 MMOL/L — SIGNIFICANT CHANGE UP
SPECIMEN SOURCE: SIGNIFICANT CHANGE UP
SPECIMEN SOURCE: SIGNIFICANT CHANGE UP
TRIGL SERPL-MCNC: 86 MG/DL — SIGNIFICANT CHANGE UP
TROPONIN T SERPL-MCNC: <0.01 NG/ML — SIGNIFICANT CHANGE UP
WBC # BLD: 10.14 K/UL — SIGNIFICANT CHANGE UP (ref 4.8–10.8)
WBC # BLD: 12.89 K/UL — HIGH (ref 4.8–10.8)
WBC # BLD: 13.75 K/UL — HIGH (ref 4.8–10.8)
WBC # BLD: 7.39 K/UL — SIGNIFICANT CHANGE UP (ref 4.8–10.8)
WBC # BLD: 8.67 K/UL — SIGNIFICANT CHANGE UP (ref 4.8–10.8)
WBC # BLD: 8.81 K/UL — SIGNIFICANT CHANGE UP (ref 4.8–10.8)
WBC # BLD: 8.9 K/UL — SIGNIFICANT CHANGE UP (ref 4.8–10.8)
WBC # BLD: 8.98 K/UL — SIGNIFICANT CHANGE UP (ref 4.8–10.8)
WBC # FLD AUTO: 10.14 K/UL — SIGNIFICANT CHANGE UP (ref 4.8–10.8)
WBC # FLD AUTO: 12.89 K/UL — HIGH (ref 4.8–10.8)
WBC # FLD AUTO: 13.75 K/UL — HIGH (ref 4.8–10.8)
WBC # FLD AUTO: 7.39 K/UL — SIGNIFICANT CHANGE UP (ref 4.8–10.8)
WBC # FLD AUTO: 8.67 K/UL — SIGNIFICANT CHANGE UP (ref 4.8–10.8)
WBC # FLD AUTO: 8.81 K/UL — SIGNIFICANT CHANGE UP (ref 4.8–10.8)
WBC # FLD AUTO: 8.9 K/UL — SIGNIFICANT CHANGE UP (ref 4.8–10.8)
WBC # FLD AUTO: 8.98 K/UL — SIGNIFICANT CHANGE UP (ref 4.8–10.8)

## 2021-01-01 PROCEDURE — 71045 X-RAY EXAM CHEST 1 VIEW: CPT | Mod: 26

## 2021-01-01 PROCEDURE — 99072 ADDL SUPL MATRL&STAF TM PHE: CPT

## 2021-01-01 PROCEDURE — 99223 1ST HOSP IP/OBS HIGH 75: CPT

## 2021-01-01 PROCEDURE — 93010 ELECTROCARDIOGRAM REPORT: CPT

## 2021-01-01 PROCEDURE — 99285 EMERGENCY DEPT VISIT HI MDM: CPT

## 2021-01-01 PROCEDURE — 99239 HOSP IP/OBS DSCHRG MGMT >30: CPT

## 2021-01-01 PROCEDURE — 93296 REM INTERROG EVL PM/IDS: CPT

## 2021-01-01 PROCEDURE — 93294 REM INTERROG EVL PM/LDLS PM: CPT

## 2021-01-01 PROCEDURE — 93970 EXTREMITY STUDY: CPT | Mod: 26

## 2021-01-01 PROCEDURE — 99233 SBSQ HOSP IP/OBS HIGH 50: CPT

## 2021-01-01 PROCEDURE — 71046 X-RAY EXAM CHEST 2 VIEWS: CPT

## 2021-01-01 PROCEDURE — 99204 OFFICE O/P NEW MOD 45 MIN: CPT | Mod: 25

## 2021-01-01 PROCEDURE — 93280 PM DEVICE PROGR EVAL DUAL: CPT

## 2021-01-01 PROCEDURE — 71260 CT THORAX DX C+: CPT | Mod: 26,MA

## 2021-01-01 PROCEDURE — 71275 CT ANGIOGRAPHY CHEST: CPT | Mod: 26

## 2021-01-01 PROCEDURE — 99214 OFFICE O/P EST MOD 30 MIN: CPT

## 2021-01-01 PROCEDURE — 93000 ELECTROCARDIOGRAM COMPLETE: CPT | Mod: 59

## 2021-01-01 PROCEDURE — 93306 TTE W/DOPPLER COMPLETE: CPT | Mod: 26

## 2021-01-01 PROCEDURE — 99213 OFFICE O/P EST LOW 20 MIN: CPT

## 2021-01-01 RX ORDER — APIXABAN 2.5 MG/1
1 TABLET, FILM COATED ORAL
Qty: 60 | Refills: 0
Start: 2021-01-01 | End: 2021-01-01

## 2021-01-01 RX ORDER — OXYCODONE HYDROCHLORIDE 5 MG/1
1 TABLET ORAL
Qty: 0 | Refills: 0 | DISCHARGE

## 2021-01-01 RX ORDER — ALBUTEROL 90 UG/1
2 AEROSOL, METERED ORAL
Qty: 2 | Refills: 0
Start: 2021-01-01 | End: 2021-01-01

## 2021-01-01 RX ORDER — AMLODIPINE BESYLATE 2.5 MG/1
1 TABLET ORAL
Qty: 0 | Refills: 0 | DISCHARGE

## 2021-01-01 RX ORDER — MIRTAZAPINE 45 MG/1
30 TABLET, ORALLY DISINTEGRATING ORAL AT BEDTIME
Refills: 0 | Status: DISCONTINUED | OUTPATIENT
Start: 2021-01-01 | End: 2021-01-01

## 2021-01-01 RX ORDER — NICOTINE POLACRILEX 2 MG
1 GUM BUCCAL
Qty: 1 | Refills: 0
Start: 2021-01-01

## 2021-01-01 RX ORDER — BUDESONIDE AND FORMOTEROL FUMARATE DIHYDRATE 160; 4.5 UG/1; UG/1
2 AEROSOL RESPIRATORY (INHALATION)
Refills: 0 | Status: DISCONTINUED | OUTPATIENT
Start: 2021-01-01 | End: 2021-01-01

## 2021-01-01 RX ORDER — CARBAMAZEPINE 200 MG
200 TABLET ORAL
Refills: 0 | Status: DISCONTINUED | OUTPATIENT
Start: 2021-01-01 | End: 2021-01-01

## 2021-01-01 RX ORDER — METOPROLOL TARTRATE 50 MG
1 TABLET ORAL
Qty: 60 | Refills: 0
Start: 2021-01-01 | End: 2021-01-01

## 2021-01-01 RX ORDER — MAGNESIUM SULFATE 500 MG/ML
2 VIAL (ML) INJECTION ONCE
Refills: 0 | Status: COMPLETED | OUTPATIENT
Start: 2021-01-01 | End: 2021-01-01

## 2021-01-01 RX ORDER — CLOPIDOGREL BISULFATE 75 MG/1
1 TABLET, FILM COATED ORAL
Qty: 30 | Refills: 0
Start: 2021-01-01 | End: 2021-01-01

## 2021-01-01 RX ORDER — METOPROLOL TARTRATE 50 MG
25 TABLET ORAL
Refills: 0 | Status: DISCONTINUED | OUTPATIENT
Start: 2021-01-01 | End: 2021-01-01

## 2021-01-01 RX ORDER — PANTOPRAZOLE SODIUM 20 MG/1
1 TABLET, DELAYED RELEASE ORAL
Qty: 14 | Refills: 0
Start: 2021-01-01 | End: 2021-01-01

## 2021-01-01 RX ORDER — IPRATROPIUM/ALBUTEROL SULFATE 18-103MCG
3 AEROSOL WITH ADAPTER (GRAM) INHALATION ONCE
Refills: 0 | Status: COMPLETED | OUTPATIENT
Start: 2021-01-01 | End: 2021-01-01

## 2021-01-01 RX ORDER — TIOTROPIUM BROMIDE 18 UG/1
2 CAPSULE ORAL; RESPIRATORY (INHALATION)
Qty: 1 | Refills: 0
Start: 2021-01-01

## 2021-01-01 RX ORDER — PANTOPRAZOLE SODIUM 20 MG/1
40 TABLET, DELAYED RELEASE ORAL
Refills: 0 | Status: DISCONTINUED | OUTPATIENT
Start: 2021-01-01 | End: 2021-01-01

## 2021-01-01 RX ORDER — APIXABAN 2.5 MG/1
5 TABLET, FILM COATED ORAL ONCE
Refills: 0 | Status: COMPLETED | OUTPATIENT
Start: 2021-01-01 | End: 2021-01-01

## 2021-01-01 RX ORDER — LOSARTAN POTASSIUM 100 MG/1
1 TABLET, FILM COATED ORAL
Qty: 30 | Refills: 0
Start: 2021-01-01 | End: 2021-01-01

## 2021-01-01 RX ORDER — FUROSEMIDE 40 MG
40 TABLET ORAL ONCE
Refills: 0 | Status: COMPLETED | OUTPATIENT
Start: 2021-01-01 | End: 2021-01-01

## 2021-01-01 RX ORDER — FLUTICASONE FUROATE, UMECLIDINIUM BROMIDE AND VILANTEROL TRIFENATATE 100; 62.5; 25 UG/1; UG/1; UG/1
100-62.5-25 POWDER RESPIRATORY (INHALATION)
Qty: 60 | Refills: 0 | Status: ACTIVE | COMMUNITY
Start: 2021-01-01 | End: 1900-01-01

## 2021-01-01 RX ORDER — NICOTINE POLACRILEX 2 MG
1 GUM BUCCAL DAILY
Refills: 0 | Status: DISCONTINUED | OUTPATIENT
Start: 2021-01-01 | End: 2021-01-01

## 2021-01-01 RX ORDER — ESCITALOPRAM OXALATE 10 MG/1
10 TABLET, FILM COATED ORAL DAILY
Refills: 0 | Status: DISCONTINUED | OUTPATIENT
Start: 2021-01-01 | End: 2021-01-01

## 2021-01-01 RX ORDER — LACTULOSE 10 G/15ML
10 SOLUTION ORAL ONCE
Refills: 0 | Status: COMPLETED | OUTPATIENT
Start: 2021-01-01 | End: 2021-01-01

## 2021-01-01 RX ORDER — FUROSEMIDE 40 MG
40 TABLET ORAL DAILY
Refills: 0 | Status: DISCONTINUED | OUTPATIENT
Start: 2021-01-01 | End: 2021-01-01

## 2021-01-01 RX ORDER — ROSUVASTATIN CALCIUM 5 MG/1
1 TABLET ORAL
Qty: 0 | Refills: 0 | DISCHARGE

## 2021-01-01 RX ORDER — MIRTAZAPINE 45 MG/1
1 TABLET, ORALLY DISINTEGRATING ORAL
Qty: 0 | Refills: 0 | DISCHARGE

## 2021-01-01 RX ORDER — FAMOTIDINE 10 MG/ML
40 INJECTION INTRAVENOUS AT BEDTIME
Refills: 0 | Status: DISCONTINUED | OUTPATIENT
Start: 2021-01-01 | End: 2021-01-01

## 2021-01-01 RX ORDER — AZITHROMYCIN 500 MG/1
500 TABLET, FILM COATED ORAL EVERY 24 HOURS
Refills: 0 | Status: DISCONTINUED | OUTPATIENT
Start: 2021-01-01 | End: 2021-01-01

## 2021-01-01 RX ORDER — ALPRAZOLAM 0.25 MG
1 TABLET ORAL AT BEDTIME
Refills: 0 | Status: DISCONTINUED | OUTPATIENT
Start: 2021-01-01 | End: 2021-01-01

## 2021-01-01 RX ORDER — BUDESONIDE AND FORMOTEROL FUMARATE DIHYDRATE 160; 4.5 UG/1; UG/1
2 AEROSOL RESPIRATORY (INHALATION)
Qty: 3 | Refills: 0
Start: 2021-01-01 | End: 2021-01-01

## 2021-01-01 RX ORDER — MONTELUKAST 4 MG/1
10 TABLET, CHEWABLE ORAL DAILY
Refills: 0 | Status: DISCONTINUED | OUTPATIENT
Start: 2021-01-01 | End: 2021-01-01

## 2021-01-01 RX ORDER — CALCIUM CARBONATE 500(1250)
2 TABLET ORAL ONCE
Refills: 0 | Status: COMPLETED | OUTPATIENT
Start: 2021-01-01 | End: 2021-01-01

## 2021-01-01 RX ORDER — LOSARTAN POTASSIUM 100 MG/1
1 TABLET, FILM COATED ORAL
Qty: 0 | Refills: 0 | DISCHARGE
Start: 2021-01-01

## 2021-01-01 RX ORDER — APIXABAN 2.5 MG/1
5 TABLET, FILM COATED ORAL
Refills: 0 | Status: DISCONTINUED | OUTPATIENT
Start: 2021-01-01 | End: 2021-01-01

## 2021-01-01 RX ORDER — ALBUTEROL 90 UG/1
2 AEROSOL, METERED ORAL EVERY 6 HOURS
Refills: 0 | Status: DISCONTINUED | OUTPATIENT
Start: 2021-01-01 | End: 2021-01-01

## 2021-01-01 RX ORDER — OMEPRAZOLE 10 MG/1
1 CAPSULE, DELAYED RELEASE ORAL
Qty: 0 | Refills: 0 | DISCHARGE

## 2021-01-01 RX ORDER — FUROSEMIDE 40 MG
1 TABLET ORAL
Qty: 30 | Refills: 0
Start: 2021-01-01 | End: 2021-01-01

## 2021-01-01 RX ORDER — ATORVASTATIN CALCIUM 80 MG/1
80 TABLET, FILM COATED ORAL AT BEDTIME
Refills: 0 | Status: DISCONTINUED | OUTPATIENT
Start: 2021-01-01 | End: 2021-01-01

## 2021-01-01 RX ORDER — ALBUTEROL 90 UG/1
2 AEROSOL, METERED ORAL ONCE
Refills: 0 | Status: COMPLETED | OUTPATIENT
Start: 2021-01-01 | End: 2021-01-01

## 2021-01-01 RX ORDER — ALBUTEROL 90 UG/1
1 AEROSOL, METERED ORAL DAILY
Refills: 0 | Status: DISCONTINUED | OUTPATIENT
Start: 2021-01-01 | End: 2021-01-01

## 2021-01-01 RX ORDER — TRAZODONE HCL 50 MG
100 TABLET ORAL AT BEDTIME
Refills: 0 | Status: DISCONTINUED | OUTPATIENT
Start: 2021-01-01 | End: 2021-01-01

## 2021-01-01 RX ORDER — CARBAMAZEPINE 200 MG
1 TABLET ORAL
Qty: 0 | Refills: 0 | DISCHARGE

## 2021-01-01 RX ORDER — METOPROLOL TARTRATE 50 MG
1 TABLET ORAL
Qty: 0 | Refills: 0 | DISCHARGE
Start: 2021-01-01

## 2021-01-01 RX ORDER — TIOTROPIUM BROMIDE 18 UG/1
1 CAPSULE ORAL; RESPIRATORY (INHALATION) DAILY
Refills: 0 | Status: DISCONTINUED | OUTPATIENT
Start: 2021-01-01 | End: 2021-01-01

## 2021-01-01 RX ORDER — ACETAMINOPHEN 500 MG
650 TABLET ORAL ONCE
Refills: 0 | Status: COMPLETED | OUTPATIENT
Start: 2021-01-01 | End: 2021-01-01

## 2021-01-01 RX ORDER — FAMOTIDINE 10 MG/ML
1 INJECTION INTRAVENOUS
Qty: 0 | Refills: 0 | DISCHARGE

## 2021-01-01 RX ORDER — APIXABAN 2.5 MG/1
5 TABLET, FILM COATED ORAL EVERY 12 HOURS
Refills: 0 | Status: DISCONTINUED | OUTPATIENT
Start: 2021-01-01 | End: 2021-01-01

## 2021-01-01 RX ORDER — CLOPIDOGREL BISULFATE 75 MG/1
75 TABLET, FILM COATED ORAL DAILY
Refills: 0 | Status: DISCONTINUED | OUTPATIENT
Start: 2021-01-01 | End: 2021-01-01

## 2021-01-01 RX ORDER — TRAZODONE HCL 50 MG
1 TABLET ORAL
Qty: 0 | Refills: 0 | DISCHARGE

## 2021-01-01 RX ORDER — SENNA PLUS 8.6 MG/1
2 TABLET ORAL AT BEDTIME
Refills: 0 | Status: DISCONTINUED | OUTPATIENT
Start: 2021-01-01 | End: 2021-01-01

## 2021-01-01 RX ORDER — LOSARTAN POTASSIUM 100 MG/1
50 TABLET, FILM COATED ORAL DAILY
Refills: 0 | Status: DISCONTINUED | OUTPATIENT
Start: 2021-01-01 | End: 2021-01-01

## 2021-01-01 RX ORDER — AMLODIPINE BESYLATE 2.5 MG/1
10 TABLET ORAL DAILY
Refills: 0 | Status: DISCONTINUED | OUTPATIENT
Start: 2021-01-01 | End: 2021-01-01

## 2021-01-01 RX ORDER — POLYETHYLENE GLYCOL 3350 17 G/17G
17 POWDER, FOR SOLUTION ORAL ONCE
Refills: 0 | Status: COMPLETED | OUTPATIENT
Start: 2021-01-01 | End: 2021-01-01

## 2021-01-01 RX ORDER — FUROSEMIDE 40 MG
40 TABLET ORAL
Refills: 0 | Status: DISCONTINUED | OUTPATIENT
Start: 2021-01-01 | End: 2021-01-01

## 2021-01-01 RX ORDER — CEFEPIME 1 G/1
2000 INJECTION, POWDER, FOR SOLUTION INTRAMUSCULAR; INTRAVENOUS ONCE
Refills: 0 | Status: COMPLETED | OUTPATIENT
Start: 2021-01-01 | End: 2021-01-01

## 2021-01-01 RX ORDER — ESCITALOPRAM OXALATE 10 MG/1
1 TABLET, FILM COATED ORAL
Qty: 0 | Refills: 0 | DISCHARGE

## 2021-01-01 RX ORDER — MONTELUKAST 4 MG/1
1 TABLET, CHEWABLE ORAL
Qty: 0 | Refills: 0 | DISCHARGE

## 2021-01-01 RX ORDER — CEFTRIAXONE 500 MG/1
1000 INJECTION, POWDER, FOR SOLUTION INTRAMUSCULAR; INTRAVENOUS EVERY 24 HOURS
Refills: 0 | Status: DISCONTINUED | OUTPATIENT
Start: 2021-01-01 | End: 2021-01-01

## 2021-01-01 RX ORDER — AZITHROMYCIN 500 MG/1
500 TABLET, FILM COATED ORAL ONCE
Refills: 0 | Status: COMPLETED | OUTPATIENT
Start: 2021-01-01 | End: 2021-01-01

## 2021-01-01 RX ORDER — IPRATROPIUM/ALBUTEROL SULFATE 18-103MCG
3 AEROSOL WITH ADAPTER (GRAM) INHALATION
Refills: 0 | Status: COMPLETED | OUTPATIENT
Start: 2021-01-01 | End: 2021-01-01

## 2021-01-01 RX ORDER — FUROSEMIDE 40 MG
20 TABLET ORAL ONCE
Refills: 0 | Status: COMPLETED | OUTPATIENT
Start: 2021-01-01 | End: 2021-01-01

## 2021-01-01 RX ORDER — TIOTROPIUM BROMIDE 18 UG/1
18 CAPSULE ORAL; RESPIRATORY (INHALATION) DAILY
Qty: 90 | Refills: 3 | Status: ACTIVE | COMMUNITY
Start: 1900-01-01 | End: 1900-01-01

## 2021-01-01 RX ORDER — OXYCODONE HYDROCHLORIDE 5 MG/1
15 TABLET ORAL EVERY 6 HOURS
Refills: 0 | Status: DISCONTINUED | OUTPATIENT
Start: 2021-01-01 | End: 2021-01-01

## 2021-01-01 RX ADMIN — Medication 200 MILLIGRAM(S): at 17:50

## 2021-01-01 RX ADMIN — CEFEPIME 100 MILLIGRAM(S): 1 INJECTION, POWDER, FOR SOLUTION INTRAMUSCULAR; INTRAVENOUS at 17:41

## 2021-01-01 RX ADMIN — Medication 200 MILLIGRAM(S): at 06:31

## 2021-01-01 RX ADMIN — APIXABAN 5 MILLIGRAM(S): 2.5 TABLET, FILM COATED ORAL at 18:01

## 2021-01-01 RX ADMIN — ATORVASTATIN CALCIUM 80 MILLIGRAM(S): 80 TABLET, FILM COATED ORAL at 22:53

## 2021-01-01 RX ADMIN — Medication 150 MILLIGRAM(S): at 05:17

## 2021-01-01 RX ADMIN — Medication 150 MILLIGRAM(S): at 18:00

## 2021-01-01 RX ADMIN — Medication 3 MILLILITER(S): at 16:32

## 2021-01-01 RX ADMIN — Medication 40 MILLIGRAM(S): at 05:48

## 2021-01-01 RX ADMIN — MIRTAZAPINE 30 MILLIGRAM(S): 45 TABLET, ORALLY DISINTEGRATING ORAL at 22:16

## 2021-01-01 RX ADMIN — Medication 40 MILLIGRAM(S): at 06:33

## 2021-01-01 RX ADMIN — Medication 150 MILLIGRAM(S): at 06:48

## 2021-01-01 RX ADMIN — ESCITALOPRAM OXALATE 10 MILLIGRAM(S): 10 TABLET, FILM COATED ORAL at 11:09

## 2021-01-01 RX ADMIN — MONTELUKAST 10 MILLIGRAM(S): 4 TABLET, CHEWABLE ORAL at 11:10

## 2021-01-01 RX ADMIN — Medication 25 GRAM(S): at 16:59

## 2021-01-01 RX ADMIN — Medication 1 PATCH: at 11:10

## 2021-01-01 RX ADMIN — CLOPIDOGREL BISULFATE 75 MILLIGRAM(S): 75 TABLET, FILM COATED ORAL at 11:09

## 2021-01-01 RX ADMIN — APIXABAN 5 MILLIGRAM(S): 2.5 TABLET, FILM COATED ORAL at 06:48

## 2021-01-01 RX ADMIN — Medication 25 MILLIGRAM(S): at 17:49

## 2021-01-01 RX ADMIN — Medication 150 MILLIGRAM(S): at 17:04

## 2021-01-01 RX ADMIN — Medication 25 MILLIGRAM(S): at 17:39

## 2021-01-01 RX ADMIN — ESCITALOPRAM OXALATE 10 MILLIGRAM(S): 10 TABLET, FILM COATED ORAL at 12:33

## 2021-01-01 RX ADMIN — ALBUTEROL 2 PUFF(S): 90 AEROSOL, METERED ORAL at 13:36

## 2021-01-01 RX ADMIN — Medication 200 MILLIGRAM(S): at 11:09

## 2021-01-01 RX ADMIN — APIXABAN 5 MILLIGRAM(S): 2.5 TABLET, FILM COATED ORAL at 06:06

## 2021-01-01 RX ADMIN — Medication 40 MILLIGRAM(S): at 06:06

## 2021-01-01 RX ADMIN — Medication 40 MILLIGRAM(S): at 05:17

## 2021-01-01 RX ADMIN — AMLODIPINE BESYLATE 10 MILLIGRAM(S): 2.5 TABLET ORAL at 06:30

## 2021-01-01 RX ADMIN — CLOPIDOGREL BISULFATE 75 MILLIGRAM(S): 75 TABLET, FILM COATED ORAL at 11:10

## 2021-01-01 RX ADMIN — Medication 60 MILLIGRAM(S): at 18:08

## 2021-01-01 RX ADMIN — BUDESONIDE AND FORMOTEROL FUMARATE DIHYDRATE 2 PUFF(S): 160; 4.5 AEROSOL RESPIRATORY (INHALATION) at 12:38

## 2021-01-01 RX ADMIN — BUDESONIDE AND FORMOTEROL FUMARATE DIHYDRATE 2 PUFF(S): 160; 4.5 AEROSOL RESPIRATORY (INHALATION) at 09:51

## 2021-01-01 RX ADMIN — ALBUTEROL 2 PUFF(S): 90 AEROSOL, METERED ORAL at 08:01

## 2021-01-01 RX ADMIN — POLYETHYLENE GLYCOL 3350 17 GRAM(S): 17 POWDER, FOR SOLUTION ORAL at 17:49

## 2021-01-01 RX ADMIN — Medication 650 MILLIGRAM(S): at 21:04

## 2021-01-01 RX ADMIN — ATORVASTATIN CALCIUM 80 MILLIGRAM(S): 80 TABLET, FILM COATED ORAL at 22:16

## 2021-01-01 RX ADMIN — Medication 200 MILLIGRAM(S): at 17:39

## 2021-01-01 RX ADMIN — Medication 3 MILLILITER(S): at 17:41

## 2021-01-01 RX ADMIN — Medication 2 DROP(S): at 11:11

## 2021-01-01 RX ADMIN — Medication 40 MILLIGRAM(S): at 06:48

## 2021-01-01 RX ADMIN — MONTELUKAST 10 MILLIGRAM(S): 4 TABLET, CHEWABLE ORAL at 14:24

## 2021-01-01 RX ADMIN — Medication 40 MILLIGRAM(S): at 23:15

## 2021-01-01 RX ADMIN — Medication 100 MILLIGRAM(S): at 22:16

## 2021-01-01 RX ADMIN — Medication 30 MILLILITER(S): at 06:07

## 2021-01-01 RX ADMIN — Medication 25 MILLIGRAM(S): at 05:48

## 2021-01-01 RX ADMIN — ESCITALOPRAM OXALATE 10 MILLIGRAM(S): 10 TABLET, FILM COATED ORAL at 11:10

## 2021-01-01 RX ADMIN — BUDESONIDE AND FORMOTEROL FUMARATE DIHYDRATE 2 PUFF(S): 160; 4.5 AEROSOL RESPIRATORY (INHALATION) at 08:13

## 2021-01-01 RX ADMIN — Medication 20 MILLIGRAM(S): at 21:04

## 2021-01-01 RX ADMIN — BUDESONIDE AND FORMOTEROL FUMARATE DIHYDRATE 2 PUFF(S): 160; 4.5 AEROSOL RESPIRATORY (INHALATION) at 08:06

## 2021-01-01 RX ADMIN — ESCITALOPRAM OXALATE 10 MILLIGRAM(S): 10 TABLET, FILM COATED ORAL at 12:10

## 2021-01-01 RX ADMIN — BUDESONIDE AND FORMOTEROL FUMARATE DIHYDRATE 2 PUFF(S): 160; 4.5 AEROSOL RESPIRATORY (INHALATION) at 08:00

## 2021-01-01 RX ADMIN — MONTELUKAST 10 MILLIGRAM(S): 4 TABLET, CHEWABLE ORAL at 12:10

## 2021-01-01 RX ADMIN — Medication 100 MILLIGRAM(S): at 22:54

## 2021-01-01 RX ADMIN — Medication 200 MILLIGRAM(S): at 06:48

## 2021-01-01 RX ADMIN — MONTELUKAST 10 MILLIGRAM(S): 4 TABLET, CHEWABLE ORAL at 12:33

## 2021-01-01 RX ADMIN — CLOPIDOGREL BISULFATE 75 MILLIGRAM(S): 75 TABLET, FILM COATED ORAL at 12:10

## 2021-01-01 RX ADMIN — APIXABAN 5 MILLIGRAM(S): 2.5 TABLET, FILM COATED ORAL at 17:49

## 2021-01-01 RX ADMIN — ATORVASTATIN CALCIUM 80 MILLIGRAM(S): 80 TABLET, FILM COATED ORAL at 21:20

## 2021-01-01 RX ADMIN — CLOPIDOGREL BISULFATE 75 MILLIGRAM(S): 75 TABLET, FILM COATED ORAL at 12:33

## 2021-01-01 RX ADMIN — Medication 100 MILLIGRAM(S): at 21:20

## 2021-01-01 RX ADMIN — LOSARTAN POTASSIUM 50 MILLIGRAM(S): 100 TABLET, FILM COATED ORAL at 05:48

## 2021-01-01 RX ADMIN — FAMOTIDINE 40 MILLIGRAM(S): 10 INJECTION INTRAVENOUS at 22:16

## 2021-01-01 RX ADMIN — PANTOPRAZOLE SODIUM 40 MILLIGRAM(S): 20 TABLET, DELAYED RELEASE ORAL at 06:33

## 2021-01-01 RX ADMIN — Medication 150 MILLIGRAM(S): at 06:28

## 2021-01-01 RX ADMIN — CEFEPIME 2000 MILLIGRAM(S): 1 INJECTION, POWDER, FOR SOLUTION INTRAMUSCULAR; INTRAVENOUS at 23:52

## 2021-01-01 RX ADMIN — APIXABAN 5 MILLIGRAM(S): 2.5 TABLET, FILM COATED ORAL at 05:17

## 2021-01-01 RX ADMIN — APIXABAN 5 MILLIGRAM(S): 2.5 TABLET, FILM COATED ORAL at 05:48

## 2021-01-01 RX ADMIN — MIRTAZAPINE 30 MILLIGRAM(S): 45 TABLET, ORALLY DISINTEGRATING ORAL at 21:20

## 2021-01-01 RX ADMIN — Medication 2 DROP(S): at 12:32

## 2021-01-01 RX ADMIN — PANTOPRAZOLE SODIUM 40 MILLIGRAM(S): 20 TABLET, DELAYED RELEASE ORAL at 06:48

## 2021-01-01 RX ADMIN — APIXABAN 5 MILLIGRAM(S): 2.5 TABLET, FILM COATED ORAL at 18:59

## 2021-01-01 RX ADMIN — APIXABAN 5 MILLIGRAM(S): 2.5 TABLET, FILM COATED ORAL at 16:59

## 2021-01-01 RX ADMIN — Medication 200 MILLIGRAM(S): at 05:48

## 2021-01-01 RX ADMIN — Medication 2 TABLET(S): at 22:28

## 2021-01-01 RX ADMIN — Medication 2 TABLET(S): at 22:31

## 2021-01-01 RX ADMIN — Medication 150 MILLIGRAM(S): at 06:06

## 2021-01-01 RX ADMIN — AMLODIPINE BESYLATE 10 MILLIGRAM(S): 2.5 TABLET ORAL at 05:17

## 2021-01-01 RX ADMIN — Medication 1 MILLIGRAM(S): at 16:35

## 2021-01-01 RX ADMIN — Medication 200 MILLIGRAM(S): at 18:00

## 2021-01-01 RX ADMIN — Medication 2 DROP(S): at 05:51

## 2021-01-01 RX ADMIN — Medication 100 MILLIGRAM(S): at 22:15

## 2021-01-01 RX ADMIN — LACTULOSE 10 GRAM(S): 10 SOLUTION ORAL at 19:02

## 2021-01-01 RX ADMIN — CEFTRIAXONE 100 MILLIGRAM(S): 500 INJECTION, POWDER, FOR SOLUTION INTRAMUSCULAR; INTRAVENOUS at 06:30

## 2021-01-01 RX ADMIN — Medication 2 DROP(S): at 11:09

## 2021-01-01 RX ADMIN — Medication 100 MILLIGRAM(S): at 06:31

## 2021-01-01 RX ADMIN — ALBUTEROL 2 PUFF(S): 90 AEROSOL, METERED ORAL at 20:26

## 2021-01-01 RX ADMIN — POLYETHYLENE GLYCOL 3350 17 GRAM(S): 17 POWDER, FOR SOLUTION ORAL at 09:20

## 2021-01-01 RX ADMIN — APIXABAN 5 MILLIGRAM(S): 2.5 TABLET, FILM COATED ORAL at 17:39

## 2021-01-01 RX ADMIN — MONTELUKAST 10 MILLIGRAM(S): 4 TABLET, CHEWABLE ORAL at 11:09

## 2021-01-01 RX ADMIN — ALBUTEROL 2 PUFF(S): 90 AEROSOL, METERED ORAL at 18:07

## 2021-01-01 RX ADMIN — Medication 150 MILLIGRAM(S): at 17:49

## 2021-01-01 RX ADMIN — Medication 3 MILLILITER(S): at 16:41

## 2021-01-01 RX ADMIN — BUDESONIDE AND FORMOTEROL FUMARATE DIHYDRATE 2 PUFF(S): 160; 4.5 AEROSOL RESPIRATORY (INHALATION) at 22:28

## 2021-01-01 RX ADMIN — Medication 1 MILLIGRAM(S): at 09:52

## 2021-01-01 RX ADMIN — Medication 150 MILLIGRAM(S): at 17:39

## 2021-01-01 RX ADMIN — Medication 40 MILLIGRAM(S): at 18:00

## 2021-01-01 RX ADMIN — CLOPIDOGREL BISULFATE 75 MILLIGRAM(S): 75 TABLET, FILM COATED ORAL at 14:24

## 2021-01-01 RX ADMIN — Medication 150 MILLIGRAM(S): at 05:50

## 2021-01-01 RX ADMIN — AMLODIPINE BESYLATE 10 MILLIGRAM(S): 2.5 TABLET ORAL at 06:06

## 2021-01-01 RX ADMIN — APIXABAN 5 MILLIGRAM(S): 2.5 TABLET, FILM COATED ORAL at 06:30

## 2021-01-01 RX ADMIN — Medication 30 MILLILITER(S): at 19:09

## 2021-01-01 RX ADMIN — ESCITALOPRAM OXALATE 10 MILLIGRAM(S): 10 TABLET, FILM COATED ORAL at 14:24

## 2021-01-01 RX ADMIN — AZITHROMYCIN 255 MILLIGRAM(S): 500 TABLET, FILM COATED ORAL at 16:40

## 2021-01-01 RX ADMIN — ALBUTEROL 2 PUFF(S): 90 AEROSOL, METERED ORAL at 08:10

## 2021-01-01 RX ADMIN — Medication 1 MILLIGRAM(S): at 10:25

## 2021-01-01 RX ADMIN — BUDESONIDE AND FORMOTEROL FUMARATE DIHYDRATE 2 PUFF(S): 160; 4.5 AEROSOL RESPIRATORY (INHALATION) at 22:55

## 2021-01-01 RX ADMIN — Medication 2 DROP(S): at 21:01

## 2021-01-01 RX ADMIN — Medication 2 DROP(S): at 01:26

## 2021-01-01 RX ADMIN — AMLODIPINE BESYLATE 10 MILLIGRAM(S): 2.5 TABLET ORAL at 06:48

## 2021-02-22 NOTE — DISCHARGE NOTE NURSING/CASE MANAGEMENT/SOCIAL WORK - NSDCPEELIQUISCOMP_GEN_ALL_CORE
Apixaban/Eliquis is used to treat and prevent blood clots. If you are not able to swallow the tablets whole, they may be crushed and mixed in water, apple juice, or applesauce and promptly taken within four hours. Never skip a dose of Apixaban/Eliquis. If you forget to take your Apixaban/Eliquis, take a dose as soon as you remember. If it is almost time for your next Apixaban/Eliquis dose, wait until then and take a regular dose. DO NOT take an extra pill to ‘catch up’.  NEVER TAKE A DOUBLE DOSE. Notify your doctor that you missed a dose. Take Apixaban/Eliquis at the same time each morning and evening. Apixaban/Eliquis may be taken with other medication or food. Patients spouse states shingles vaccine is covered by insurance. Will like patient to get it.  Please advise

## 2021-03-05 NOTE — ED PROVIDER NOTE - PATIENT PORTAL LINK FT
You can access the FollowMyHealth Patient Portal offered by [facility] by registering at the following website: [email or web address]. By joining [employer]’s FollowMyHealth portal, you will also be able to view your health information using other applications (apps) compatible with our system.

## 2021-03-05 NOTE — ED PROVIDER NOTE - CLINICAL SUMMARY MEDICAL DECISION MAKING FREE TEXT BOX
dyspnea, likely chronic 2/2 copd. cta neg for pe, or signif findings. neg trop x2 acs r/o. dc home outpt /fu.

## 2021-03-05 NOTE — ED ADULT NURSE NOTE - OBJECTIVE STATEMENT
Pt BIBA from home, c/o shortness of breath x 2 days. Denies chest pain or any other symptoms. HX copd

## 2021-03-05 NOTE — ED PROVIDER NOTE - NS ED ROS FT
Review of Systems    Constitutional: (-) fever  Eyes/ENT: (-) blurry vision, (-) epistaxis  Cardiovascular: (-) chest pain, (-) syncope  Respiratory: (+) cough, (+) shortness of breath  Gastrointestinal: (-) vomiting, (-) diarrhea  Musculoskeletal: (-) neck pain, (-) back pain, (-) joint pain  Integumentary: (-) rash, (-) edema  Neurological: (-) headache, (-) altered mental status  Psychiatric: (-) hallucinations  Allergic/Immunologic: (-) pruritus

## 2021-03-05 NOTE — ED ADULT NURSE NOTE - NSIMPLEMENTINTERV_GEN_ALL_ED
Implemented All Fall with Harm Risk Interventions:  [place] to call system. Call bell, personal items and telephone within reach. Instruct patient to call for assistance. Room bathroom lighting operational. Non-slip footwear when patient is off stretcher. Physically safe environment: no spills, clutter or unnecessary equipment. Stretcher in lowest position, wheels locked, appropriate side rails in place. Provide visual cue, wrist band, yellow gown, etc. Monitor gait and stability. Monitor for mental status changes and reorient to person, place, and time. Review medications for side effects contributing to fall risk. Reinforce activity limits and safety measures with patient and family. Provide visual clues: red socks.

## 2021-03-05 NOTE — ED PROVIDER NOTE - OBJECTIVE STATEMENT
Patient is a 75 years old F pmhx breast cancer s/p b/l mastectomy, AF, valve stenosis, lupus, HTN, s/p stent and pacemaker placement 7/2020 presents to the ED for evaluation of shortness of breath and nonproductive cough for the past 2-3 days.  Patient also notes mild sore throat, no nausea/vomiting or diarrhea.

## 2021-03-05 NOTE — ED ADULT NURSE NOTE - AS SC BRADEN MOBILITY
(3) slightly limited Post-Care Instructions: I reviewed with the patient in detail post-care instructions. Patient is not to engage in any heavy lifting, exercise, or swimming for the next 14 days. Should the patient develop any fevers, chills, bleeding, severe pain patient will contact the office immediately.

## 2021-03-05 NOTE — ED PROVIDER NOTE - ATTENDING CONTRIBUTION TO CARE
74 yo F pmh of copd, breast ca, a fib on eliquis, lupus, htn, cad, pacemaker, valve replacement presents with shortness of breath. States that for 2-3 days she has been having some shortness of breath. States that she normally doesn't need to use her inhalers but recently has been using them more. Also having some nasal congestion. no chest pain. no leg swelling or pain. no n/v/d, no abdominal pain. Called her pmd Dr. Dickinson who sent her in for evaluation.     CONSTITUTIONAL: Well-developed; well-nourished; in no acute distress.   SKIN: warm, dry  HEAD: Normocephalic; atraumatic.  EYES: PERRL, EOMI, no conjunctival erythema  ENT: No nasal discharge; airway clear.  NECK: Supple; non tender.  CARD: S1, S2 normal;  Regular rate and rhythm.   RESP: No wheezes, rales or rhonchi. good air movement, non labored breathing.   ABD: soft non tender, non distended, no rebound or guarding  EXT: Normal ROM.  5/5 strength in all 4 extremities   LYMPH: No acute cervical adenopathy.  NEURO: Alert, oriented, grossly unremarkable. neurovascularly intact  PSYCH: Cooperative, appropriate.

## 2021-03-05 NOTE — ED PROVIDER NOTE - PHYSICAL EXAMINATION
Gen: Alert, NAD, well appearing  Head: NC, AT, PERRL, EOMI  Neck: +supple, no tenderness  Pulm: Bilateral BS, normal resp effort, no wheeze/stridor/retractions  CV: RRR  Abd: soft, NT/ND  Neuro: AAOx3

## 2021-05-05 NOTE — PROGRESS NOTE ADULT - SUBJECTIVE AND OBJECTIVE BOX
CLAYTON VELAZQUEZ 74y Female  MRN#: [id number]   CODE STATUS: full code       SUBJECTIVE  Patient is a 74y old Female who presents with a chief complaint of Chest pain (23 Dec 2019 08:43)  Currently admitted to medicine with the primary diagnosis of Exertional shortness of breath    Today is hospital day 2d, and this morning she is stable and had no major events overnight   OBJECTIVE  PAST MEDICAL & SURGICAL HISTORY  Lupus (systemic lupus erythematosus)  Trigeminal neuralgia  Sjogren's syndrome, with unspecified organ involvement  GERD without esophagitis  Vitamin D deficiency  Constipation, unspecified constipation type  Rectal prolapse  Hypertension, unspecified type  Malignant neoplasm of lower-outer quadrant of left breast of female, estrogen receptor positive  H/O bilateral mastectomy  History of partial colectomy    ALLERGIES:  Naprosyn (Stomach Upset)  Vicodin (Stomach Upset)    MEDICATIONS:  STANDING MEDICATIONS  albuterol/ipratropium for Nebulization 3 milliLiter(s) Nebulizer every 6 hours  amLODIPine   Tablet 10 milliGRAM(s) Oral daily  aspirin  chewable 81 milliGRAM(s) Oral daily  atorvastatin 40 milliGRAM(s) Oral at bedtime  budesonide 160 MICROgram(s)/formoterol 4.5 MICROgram(s) Inhaler 2 Puff(s) Inhalation two times a day  chlorhexidine 4% Liquid 1 Application(s) Topical <User Schedule>  enoxaparin Injectable 40 milliGRAM(s) SubCutaneous daily  furosemide    Tablet 60 milliGRAM(s) Oral two times a day  hydroxychloroquine 200 milliGRAM(s) Oral two times a day  losartan 100 milliGRAM(s) Oral daily  montelukast 10 milliGRAM(s) Oral daily  pantoprazole    Tablet 40 milliGRAM(s) Oral before breakfast  predniSONE   Tablet 40 milliGRAM(s) Oral daily  senna 2 Tablet(s) Oral at bedtime  sertraline 50 milliGRAM(s) Oral daily  tiotropium 18 MICROgram(s) Capsule 1 Capsule(s) Inhalation daily    PRN MEDICATIONS  acetaminophen   Tablet .. 650 milliGRAM(s) Oral every 6 hours PRN  ALBUTerol    90 MICROgram(s) HFA Inhaler 2 Puff(s) Inhalation every 4 hours PRN  albuterol/ipratropium for Nebulization. 3 milliLiter(s) Nebulizer every 4 hours PRN  ALPRAZolam 1 milliGRAM(s) Oral at bedtime PRN      Home Medications  Home Medications:  ALPRAZolam 0.5 mg oral tablet: 2 tab(s) orally once a day (at bedtime), As Needed (22 Dec 2019 01:04)  amLODIPine 10 mg oral tablet: 1 tab(s) orally once a day (17 Mar 2018 12:57)  cevimeline 30 mg oral capsule: 3 cap(s) orally once a day (17 Mar 2018 12:57)  docusate sodium 100 mg oral capsule: 1 cap(s) orally 2 times a day (20 Mar 2018 08:42)  Fioricet oral capsule: 2 cap(s) orally once a day (17 Mar 2018 12:57)  hydroxychloroquine 200 mg oral tablet: 1 tab(s) orally 2 times a day (17 Mar 2018 12:57)  Low Dose ASA 81 mg oral tablet: 1 tab(s) orally once a day (17 Mar 2018 12:57)  montelukast 10 mg oral tablet: 1 tab(s) orally once a day (17 Mar 2018 12:57)  ocular lubricant ophthalmic solution: 1 drop(s) to each affected eye every 2 hours, As needed, Dry Eyes (20 Mar 2018 08:42)  omeprazole 40 mg oral delayed release capsule: 1 cap(s) orally once a day (17 Mar 2018 12:57)  rosuvastatin 40 mg oral tablet: 1 tab(s) orally once a day (at bedtime) (17 Mar 2018 12:57)  senna oral tablet: 2 tab(s) orally once a day (at bedtime) (20 Mar 2018 08:42)  sulfaSALAzine 500 mg oral tablet: 3 tab(s) orally 2 times a day (17 Mar 2018 12:57)  valsartan-hydrochlorothiazide 160mg-12.5mg oral tablet: 1 tab(s) orally once a day (17 Mar 2018 12:57)      VITAL SIGNS: Last 24 Hours  T(C): 35.6 (23 Dec 2019 05:51), Max: 36.2 (22 Dec 2019 15:53)  T(F): 96.1 (23 Dec 2019 05:51), Max: 97.2 (22 Dec 2019 15:53)  HR: 78 (23 Dec 2019 05:51) (78 - 104)  BP: 103/53 (23 Dec 2019 05:51) (103/53 - 131/63)  BP(mean): --  RR: 18 (23 Dec 2019 05:51) (18 - 18)  SpO2: 92% (23 Dec 2019 08:01) (88% - 92%)    LABS:                        10.5   9.72  )-----------( 329      ( 23 Dec 2019 07:20 )             32.4     12-    133<L>  |  90<L>  |  15  ----------------------------<  149<H>  4.1   |  28  |  0.6<L>    Ca    8.0<L>      23 Dec 2019 07:20  Mg     2.3     12-    TPro  6.8  /  Alb  4.0  /  TBili  0.3  /  DBili  x   /  AST  19  /  ALT  11  /  AlkPhos  98  12-21      Urinalysis Basic - ( 22 Dec 2019 04:10 )    Color: Yellow / Appearance: Slightly Turbid / S.012 / pH: x  Gluc: x / Ketone: Negative  / Bili: Negative / Urobili: <2 mg/dL   Blood: x / Protein: Trace / Nitrite: Positive   Leuk Esterase: Large / RBC: 3 /HPF / WBC 99 /HPF   Sq Epi: x / Non Sq Epi: 1 /HPF / Bacteria: Many            CARDIAC MARKERS ( 22 Dec 2019 07:18 )  x     / <0.01 ng/mL / x     / x     / x      CARDIAC MARKERS ( 21 Dec 2019 20:20 )  x     / <0.01 ng/mL / x     / x     / x          RADIOLOGY:    < from: Transthoracic Echocardiogram (19 @ 15:08) >       Summary:   1. LV Ejection Fraction by Samson's Method with a biplane EF of 75 %.   2. Elevated left atrial and left ventricular end-diastolic pressures.   3. Normal left ventricular size and wall thicknesses, with normal systolic and diastolic function.   4. Spectral Doppler shows pseudonormal pattern of left ventricular myocardial filling (Grade II diastolic dysfunction).   5. A sigmoid septum is noted to be present. The mean global longitudinal peak systolic strain by speckle tracking is -26.1% which is normal.   6. Mildly enlarged right atrium.   7. Mild mitral valve regurgitation.   8. Mild-moderate tricuspid regurgitation.   9. Severe aortic valve stenosis.  10. Estimated pulmonary artery systolic pressure is 63.5 mmHg assuming a right atrial pressure of 8 mmHg, which is consistent with severe pulmonary hypertension.  11. Pulmonary hypertension is present.  12. LA volume Index is 32.8 ml/m² ml/m2.  13. Mitral valve mean gradient is 7.5 mmHg consistent with moderate mitral stenosis.  14. Peak transaortic gradient equals 89.3 mmHg, mean transaortic gradient equals 52.5 mmHg, the calculated aortic valve area equals 0.85 cm² by the continuity equation consistent with severe aortic stenosis.  15. The aortic valve mean gradient is 52.5 mmHg consistent with severe aortic stenosis.    < end of copied text >    PHYSICAL EXAM:    GENERAL: NAD, well-developed, AAOx3  HEENT:  Atraumatic, Normocephalic. EOMI, PERRLA, conjunctiva and sclera clear, No JVD  PULMONARY: Clear to auscultation bilaterally; No wheeze  CARDIOVASCULAR: Regular rate and rhythm; systolic murmur   GASTROINTESTINAL: Soft, Nontender, Nondistended; Bowel sounds present  MUSCULOSKELETAL:  2+ Peripheral Pulses, No clubbing, cyanosis, or edema  NEUROLOGY: non-focal  SKIN: No rashes or lesions
NEPHROLOGY FOLLOW UP NOTE    72 y F with PMHx of Sjogren's disease, SLE, trigeminal neuralgia, breast cancer s/p b/l mastectomy, rectal prolapse s/p partial colectomy, questionable hx of heart failure, COPD not on home O2, HTN presents with chest pain and sob on exertion. Upon interview, patient was somnolent sleeping back after the question but she mentioned that she has been short of breath recently and it has worsened with exertion. She was also feeling exertional chest pain that started along with the SOB, non radiating, mild in intensity. She also noted worsening LE edema. She was advised by her friends to come to the ED. She admits for lower abdominal pain and dysuria but no headache, dizziness or paresthesias.  In ED, VS wnl except SaO2 87% on room air, she was put on 4 L O2 via NC and SaO2 went up to 93%.    pt seen and examined this AM.  Continues to desat requiring O2.  C/o cough, sob and wheeze.  Na+ improving.  BP's on lower side.  Changed to po lasix.  off hctz.  cardio eval noted.  Severe AS noted on echo    PAST MEDICAL & SURGICAL HISTORY:  Lupus (systemic lupus erythematosus)  Trigeminal neuralgia  Sjogren's syndrome, with unspecified organ involvement  GERD without esophagitis  Vitamin D deficiency  Constipation, unspecified constipation type  Rectal prolapse  Hypertension, unspecified type  Malignant neoplasm of lower-outer quadrant of left breast of female, estrogen receptor positive  H/O bilateral mastectomy  History of partial colectomy    Allergies:  Naprosyn (Stomach Upset)  Vicodin (Stomach Upset)    Home Medications Reviewed    SOCIAL HISTORY:  Denies ETOH,Smoking,   FAMILY HISTORY:  No pertinent family history in first degree relatives        REVIEW OF SYSTEMS:  CONSTITUTIONAL: No weakness, fevers or chills  EYES/ENT: No visual changes;  No vertigo or throat pain   NECK: No pain or stiffness  RESPIRATORY: No cough, wheezing, hemoptysis; No shortness of breath  CARDIOVASCULAR: No chest pain or palpitations.  GASTROINTESTINAL: No abdominal or epigastric pain. No nausea, vomiting, or hematemesis; No diarrhea or constipation. No melena or hematochezia.  GENITOURINARY: No dysuria, frequency, foamy urine, urinary urgency, incontinence or hematuria  NEUROLOGICAL: No numbness or weakness  SKIN: No itching, burning, rashes, or lesions   VASCULAR: No bilateral lower extremity edema.   All other review of systems is negative unless indicated above.      advance care planning and advance care directives reviewed and discussed including goals of care    PHYSICAL EXAM:  Constitutional: NAD  HEENT: anicteric sclera, oropharynx clear, MMM  Neck: No JVD  Respiratory: decreased bs b/l with rhonchi  Cardiovascular: S1, S2, RRR  Gastrointestinal: BS+, soft, NT/ND  Extremities: No cyanosis or clubbing. + peripheral edema  Neurological: A/O x 3, no focal deficits  Psychiatric: Normal mood, normal affect  : No CVA tenderness. No matias.   Skin: No rashes      Hospital Medications:   MEDICATIONS  (STANDING):  albuterol/ipratropium for Nebulization 3 milliLiter(s) Nebulizer every 6 hours  amLODIPine   Tablet 10 milliGRAM(s) Oral daily  aspirin  chewable 81 milliGRAM(s) Oral daily  atorvastatin 40 milliGRAM(s) Oral at bedtime  budesonide 160 MICROgram(s)/formoterol 4.5 MICROgram(s) Inhaler 2 Puff(s) Inhalation two times a day  chlorhexidine 4% Liquid 1 Application(s) Topical <User Schedule>  enoxaparin Injectable 40 milliGRAM(s) SubCutaneous daily  furosemide    Tablet 60 milliGRAM(s) Oral two times a day  hydroxychloroquine 200 milliGRAM(s) Oral two times a day  losartan 100 milliGRAM(s) Oral daily  montelukast 10 milliGRAM(s) Oral daily  pantoprazole    Tablet 40 milliGRAM(s) Oral before breakfast  predniSONE   Tablet 40 milliGRAM(s) Oral daily  senna 2 Tablet(s) Oral at bedtime  sertraline 50 milliGRAM(s) Oral daily  tiotropium 18 MICROgram(s) Capsule 1 Capsule(s) Inhalation daily        VITALS:  T(F): 96.6 (19 @ 14:06), Max: 97.2 (19 @ 21:05)  HR: 80 (19 @ 14:06)  BP: 106/51 (19 @ 14:06)  RR: 18 (19 @ 14:06)  SpO2: 92% (19 @ 08:01)  Wt(kg): --     @ 07:01  -   @ 07:00  --------------------------------------------------------  IN: 160 mL / OUT: 400 mL / NET: -240 mL     @ 07:01  -   @ 07:00  --------------------------------------------------------  IN: 180 mL / OUT: 240 mL / NET: -60 mL          LABS:      133<L>  |  90<L>  |  15  ----------------------------<  149<H>  4.1   |  28  |  0.6<L>    Ca    8.0<L>      23 Dec 2019 07:20  Mg     2.3         TPro  6.8  /  Alb  4.0  /  TBili  0.3  /  DBili      /  AST  19  /  ALT  11  /  AlkPhos  98                            10.5   9.72  )-----------( 329      ( 23 Dec 2019 07:20 )             32.4       Urine Studies:  Urinalysis Basic - ( 22 Dec 2019 04:10 )    Color: Yellow / Appearance: Slightly Turbid / S.012 / pH:   Gluc:  / Ketone: Negative  / Bili: Negative / Urobili: <2 mg/dL   Blood:  / Protein: Trace / Nitrite: Positive   Leuk Esterase: Large / RBC: 3 /HPF / WBC 99 /HPF   Sq Epi:  / Non Sq Epi: 1 /HPF / Bacteria: Many      Osmolality, Random Urine: 352 mos/kg ( @ 04:10)  Sodium, Random Urine: 39.0 mmoL/L ( @ 04:10)  Potassium, Random Urine: 67 mmol/L ( @ 04:10)  Creatinine, Random Urine: 61 mg/dL ( @ 04:10)  Chloride, Random Urine: 44 ( @ 04:10)      RADIOLOGY & ADDITIONAL STUDIES:    eccho - severe AS and pHTN severe  other studies - reviewed
NEPHROLOGY FOLLOW UP NOTE    72 y F with PMHx of Sjogren's disease, SLE, trigeminal neuralgia, breast cancer s/p b/l mastectomy, rectal prolapse s/p partial colectomy, questionable hx of heart failure, COPD not on home O2, HTN presents with chest pain and sob on exertion. Upon interview, patient was somnolent sleeping back after the question but she mentioned that she has been short of breath recently and it has worsened with exertion. She was also feeling exertional chest pain that started along with the SOB, non radiating, mild in intensity. She also noted worsening LE edema. She was advised by her friends to come to the ED. She admits for lower abdominal pain and dysuria but no headache, dizziness or paresthesias.  In ED, VS wnl except SaO2 87% on room air, she was put on 4 L O2 via NC and SaO2 went up to 93%.    pt seen and examined this AM.  K+ low today.  On PO lasix.  Legs less swollen.  Continues to require O2.  C/o cough, sob.  Na+ improving.  BP's fluctuating.  off hctz.  No fever.  Still with tremors    PAST MEDICAL & SURGICAL HISTORY:  Lupus (systemic lupus erythematosus)  Trigeminal neuralgia  Sjogren's syndrome, with unspecified organ involvement  GERD without esophagitis  Vitamin D deficiency  Constipation, unspecified constipation type  Rectal prolapse  Hypertension, unspecified type  Malignant neoplasm of lower-outer quadrant of left breast of female, estrogen receptor positive  H/O bilateral mastectomy  History of partial colectomy    Allergies:  Naprosyn (Stomach Upset)  Vicodin (Stomach Upset)    Home Medications Reviewed    SOCIAL HISTORY:  Denies ETOH,Smoking,   FAMILY HISTORY:  No pertinent family history in first degree relatives        REVIEW OF SYSTEMS:  CONSTITUTIONAL: No weakness, fevers or chills  EYES/ENT: No visual changes;  No vertigo or throat pain   NECK: No pain or stiffness  RESPIRATORY: No cough, wheezing, hemoptysis; No shortness of breath  CARDIOVASCULAR: No chest pain or palpitations.  GASTROINTESTINAL: No abdominal or epigastric pain. No nausea, vomiting, or hematemesis; No diarrhea or constipation. No melena or hematochezia.  GENITOURINARY: No dysuria, frequency, foamy urine, urinary urgency, incontinence or hematuria  NEUROLOGICAL: No numbness or weakness  SKIN: No itching, burning, rashes, or lesions   VASCULAR: No bilateral lower extremity edema.   All other review of systems is negative unless indicated above.      PHYSICAL EXAM:  Constitutional: NAD  HEENT: anicteric sclera, oropharynx clear, MMM  Neck: No JVD  Respiratory: decreased bs b/l with rhonchi  Cardiovascular: S1, S2, RRR  Gastrointestinal: BS+, soft, NT/ND  Extremities: No cyanosis or clubbing. + peripheral edema + tremor  Neurological: A/O x 3, no focal deficits  Psychiatric: Normal mood, normal affect  : No CVA tenderness. No matias.   Skin: No rashes    Hospital Medications:   MEDICATIONS  (STANDING):  albuterol/ipratropium for Nebulization 3 milliLiter(s) Nebulizer every 6 hours  amLODIPine   Tablet 10 milliGRAM(s) Oral daily  aspirin  chewable 81 milliGRAM(s) Oral daily  atorvastatin 40 milliGRAM(s) Oral at bedtime  budesonide 160 MICROgram(s)/formoterol 4.5 MICROgram(s) Inhaler 2 Puff(s) Inhalation two times a day  chlorhexidine 4% Liquid 1 Application(s) Topical <User Schedule>  enoxaparin Injectable 40 milliGRAM(s) SubCutaneous daily  furosemide    Tablet 60 milliGRAM(s) Oral daily  hydroxychloroquine 200 milliGRAM(s) Oral two times a day  losartan 100 milliGRAM(s) Oral daily  montelukast 10 milliGRAM(s) Oral daily  pantoprazole    Tablet 40 milliGRAM(s) Oral before breakfast  predniSONE   Tablet 40 milliGRAM(s) Oral daily  senna 2 Tablet(s) Oral at bedtime  sertraline 50 milliGRAM(s) Oral daily  tiotropium 18 MICROgram(s) Capsule 1 Capsule(s) Inhalation daily        VITALS:  T(F): 97 (19 @ 05:45), Max: 97 (19 @ 05:45)  HR: 62 (19 @ 05:45)  BP: 130/62 (19 @ 05:45)  RR: 18 (19 @ 05:45)  SpO2: 92% (19 @ 19:48)  Wt(kg): --     @ 07:01  -   @ 07:00  --------------------------------------------------------  IN: 180 mL / OUT: 240 mL / NET: -60 mL     @ 07:01  -   @ 07:00  --------------------------------------------------------  IN: 240 mL / OUT: 0 mL / NET: 240 mL          LABS:      136  |  91<L>  |  19  ----------------------------<  100<H>  3.2<L>   |  32  |  0.7    Ca    8.0<L>      24 Dec 2019 07:15  Mg     1.9                                 10.5   11.26 )-----------( 323      ( 24 Dec 2019 07:15 )             32.8       Urine Studies:  Urinalysis Basic - ( 22 Dec 2019 04:10 )    Color: Yellow / Appearance: Slightly Turbid / S.012 / pH:   Gluc:  / Ketone: Negative  / Bili: Negative / Urobili: <2 mg/dL   Blood:  / Protein: Trace / Nitrite: Positive   Leuk Esterase: Large / RBC: 3 /HPF / WBC 99 /HPF   Sq Epi:  / Non Sq Epi: 1 /HPF / Bacteria: Many      Osmolality, Random Urine: 352 mos/kg ( @ 04:10)  Sodium, Random Urine: 39.0 mmoL/L ( @ 04:10)  Potassium, Random Urine: 67 mmol/L ( @ 04:10)  Creatinine, Random Urine: 61 mg/dL ( @ 04:10)  Chloride, Random Urine: 44 ( @ 04:10)        RADIOLOGY & ADDITIONAL STUDIES:    echo - severe AS and pHTN severe  other studies - reviewed
OVERNIGHT EVENTS: events noted, want to go home, feels better, cxr reviewed    Vital Signs Last 24 Hrs  T(C): 36.1 (24 Dec 2019 05:45), Max: 36.1 (24 Dec 2019 05:45)  T(F): 97 (24 Dec 2019 05:45), Max: 97 (24 Dec 2019 05:45)  HR: 62 (24 Dec 2019 05:45) (62 - 80)  BP: 130/62 (24 Dec 2019 05:45) (101/53 - 130/62)  RR: 18 (24 Dec 2019 05:45) (18 - 18)  SpO2: 92% (23 Dec 2019 19:48) (88% - 92%)    PHYSICAL EXAMINATION:    GENERAL: The patient is awake and alert in no apparent distress.     HEENT: Head is normocephalic and atraumatic. Extraocular muscles are intact. Mucous membranes are moist.    NECK: Supple.    LUNGS: mild bibasilar crackles no wheezing    HEART: Regular rate and rhythm without murmur.    ABDOMEN: Soft, nontender, and nondistended.      EXTREMITIES: Without any cyanosis, clubbing, rash, lesions or edema.    NEUROLOGIC: Grossly intact.    SKIN: No ulceration or induration present.      LABS:                        10.5   11.26 )-----------( 323      ( 24 Dec 2019 07:15 )             32.8     12-24    136  |  91<L>  |  19  ----------------------------<  100<H>  3.2<L>   |  32  |  0.7    Ca    8.0<L>      24 Dec 2019 07:15  Mg     1.9     12-24          ABG - ( 23 Dec 2019 12:08 )  pH, Arterial: 7.51  pH, Blood: x     /  pCO2: 40    /  pO2: 47    / HCO3: 32    / Base Excess: 8.3   /  SaO2: 84                      Serum Pro-Brain Natriuretic Peptide: 997 pg/mL (12-21-19 @ 20:20)            12-23-19 @ 07:01  -  12-24-19 @ 07:00  --------------------------------------------------------  IN: 240 mL / OUT: 0 mL / NET: 240 mL        MICROBIOLOGY:  Culture Results:   >100,000 CFU/ml Gram Negative Rods (12-22 @ 04:10)      MEDICATIONS  (STANDING):  albuterol/ipratropium for Nebulization 3 milliLiter(s) Nebulizer every 6 hours  amLODIPine   Tablet 10 milliGRAM(s) Oral daily  aspirin  chewable 81 milliGRAM(s) Oral daily  atorvastatin 40 milliGRAM(s) Oral at bedtime  budesonide 160 MICROgram(s)/formoterol 4.5 MICROgram(s) Inhaler 2 Puff(s) Inhalation two times a day  chlorhexidine 4% Liquid 1 Application(s) Topical <User Schedule>  enoxaparin Injectable 40 milliGRAM(s) SubCutaneous daily  furosemide    Tablet 60 milliGRAM(s) Oral daily  hydroxychloroquine 200 milliGRAM(s) Oral two times a day  losartan 100 milliGRAM(s) Oral daily  montelukast 10 milliGRAM(s) Oral daily  pantoprazole    Tablet 40 milliGRAM(s) Oral before breakfast  predniSONE   Tablet 40 milliGRAM(s) Oral daily  senna 2 Tablet(s) Oral at bedtime  sertraline 50 milliGRAM(s) Oral daily  tiotropium 18 MICROgram(s) Capsule 1 Capsule(s) Inhalation daily    MEDICATIONS  (PRN):  acetaminophen   Tablet .. 650 milliGRAM(s) Oral every 6 hours PRN Temp greater or equal to 38.5C (101.3F), Moderate Pain (4 - 6)  ALBUTerol    90 MICROgram(s) HFA Inhaler 2 Puff(s) Inhalation every 4 hours PRN Shortness of Breath  albuterol/ipratropium for Nebulization. 3 milliLiter(s) Nebulizer every 4 hours PRN Shortness of Breath and/or Wheezing  ALPRAZolam 1 milliGRAM(s) Oral at bedtime PRN anxiety      RADIOLOGY & ADDITIONAL STUDIES:
Patient was seen and examined. Spoke with RN. Chart reviewed.  No events overnight.  Vital Signs Last 24 Hrs  T(F): 97 (24 Dec 2019 05:45), Max: 97 (24 Dec 2019 05:45)  HR: 62 (24 Dec 2019 05:45) (62 - 80)  BP: 130/62 (24 Dec 2019 05:45) (101/53 - 130/62)  SpO2: 92% (23 Dec 2019 19:48) (88% - 92%)  MEDICATIONS  (STANDING):  albuterol/ipratropium for Nebulization 3 milliLiter(s) Nebulizer every 6 hours  amLODIPine   Tablet 10 milliGRAM(s) Oral daily  aspirin  chewable 81 milliGRAM(s) Oral daily  atorvastatin 40 milliGRAM(s) Oral at bedtime  budesonide 160 MICROgram(s)/formoterol 4.5 MICROgram(s) Inhaler 2 Puff(s) Inhalation two times a day  chlorhexidine 4% Liquid 1 Application(s) Topical <User Schedule>  enoxaparin Injectable 40 milliGRAM(s) SubCutaneous daily  furosemide    Tablet 60 milliGRAM(s) Oral two times a day  hydroxychloroquine 200 milliGRAM(s) Oral two times a day  losartan 100 milliGRAM(s) Oral daily  montelukast 10 milliGRAM(s) Oral daily  pantoprazole    Tablet 40 milliGRAM(s) Oral before breakfast  predniSONE   Tablet 40 milliGRAM(s) Oral daily  senna 2 Tablet(s) Oral at bedtime  sertraline 50 milliGRAM(s) Oral daily  tiotropium 18 MICROgram(s) Capsule 1 Capsule(s) Inhalation daily    MEDICATIONS  (PRN):  acetaminophen   Tablet .. 650 milliGRAM(s) Oral every 6 hours PRN Temp greater or equal to 38.5C (101.3F), Moderate Pain (4 - 6)  ALBUTerol    90 MICROgram(s) HFA Inhaler 2 Puff(s) Inhalation every 4 hours PRN Shortness of Breath  albuterol/ipratropium for Nebulization. 3 milliLiter(s) Nebulizer every 4 hours PRN Shortness of Breath and/or Wheezing  ALPRAZolam 1 milliGRAM(s) Oral at bedtime PRN anxiety    Labs:                        10.5   9.72  )-----------( 329      ( 23 Dec 2019 07:20 )             32.4     23 Dec 2019 07:20    133    |  90     |  15     ----------------------------<  149    4.1     |  28     |  0.6      Ca    8.0        23 Dec 2019 07:20            Culture - Urine (collected 22 Dec 2019 04:10)  Source: .Urine Clean Catch (Midstream)  Preliminary Report (23 Dec 2019 16:43):    >100,000 CFU/ml Gram Negative Rods      General: comfortable, NAD  Neurology: A&Ox3, nonfocal  Head:  Normocephalic, atraumatic  ENT:  Mucosa moist, no ulcerations  Neck:  Supple, no JVD,   Skin: no breakdowns (as per RN)  Resp: CTA B/L  CV: RRR, S1S2,   GI: Soft, NT, bowel sounds  MS: No edema, + peripheral pulses, FROM all 4 extremity      A/P:  72 y F with PMHx of Sjogren's disease, SLE, trigeminal neuralgia, breast cancer s/p b/l mastectomy, rectal prolapse s/p partial colectomy, questionable hx of heart failure, COPD not on home O2, HTN presents with chest pain and sob on exertion.- was noted to be somnolent, but admitted to taking xanax and drinking wine    noted with pulm HTN and sever AS    O2 as needed- needs home O2    change steroids to PO prednisone 40mg for four more days    pulm eval today- please call    cardio eval today- please call    echo noted    monitor Na- renal eval as outpt    OOB, ambulate    fall precautions    DC when home O2 is arranged and cleared by cardio and pulm for outpt f/u    DVT prophylaxis  Decubitus prevention- all measures as per RN protocol  Please call or text me with any questions or updates
Patient was seen and examined. Spoke with RN. Chart reviewed.  No events overnight.  Vital Signs Last 24 Hrs  T(F): 98.1 (22 Dec 2019 05:43), Max: 99.6 (21 Dec 2019 22:04)  HR: 94 (22 Dec 2019 05:43) (89 - 94)  BP: 102/53 (22 Dec 2019 05:43) (102/53 - 118/59)  SpO2: 94% (21 Dec 2019 23:32) (88% - 97%)  MEDICATIONS  (STANDING):  albuterol/ipratropium for Nebulization 3 milliLiter(s) Nebulizer every 6 hours  amLODIPine   Tablet 10 milliGRAM(s) Oral daily  aspirin  chewable 81 milliGRAM(s) Oral daily  atorvastatin 40 milliGRAM(s) Oral at bedtime  budesonide 160 MICROgram(s)/formoterol 4.5 MICROgram(s) Inhaler 2 Puff(s) Inhalation two times a day  chlorhexidine 4% Liquid 1 Application(s) Topical <User Schedule>  enoxaparin Injectable 40 milliGRAM(s) SubCutaneous daily  furosemide   Injectable 60 milliGRAM(s) IV Push every 12 hours  hydrochlorothiazide 12.5 milliGRAM(s) Oral daily  hydroxychloroquine 200 milliGRAM(s) Oral two times a day  losartan 100 milliGRAM(s) Oral daily  methylPREDNISolone sodium succinate Injectable 60 milliGRAM(s) IV Push every 12 hours  montelukast 10 milliGRAM(s) Oral daily  pantoprazole    Tablet 40 milliGRAM(s) Oral before breakfast  senna 2 Tablet(s) Oral at bedtime  sertraline 50 milliGRAM(s) Oral daily    MEDICATIONS  (PRN):  ALBUTerol    90 MICROgram(s) HFA Inhaler 2 Puff(s) Inhalation every 4 hours PRN Shortness of Breath  albuterol/ipratropium for Nebulization. 3 milliLiter(s) Nebulizer every 4 hours PRN Shortness of Breath and/or Wheezing  ALPRAZolam 1 milliGRAM(s) Oral at bedtime PRN anxiety    Labs:                        10.0   5.11  )-----------( 305      ( 22 Dec 2019 07:18 )             31.6                         10.3   10.16 )-----------( 302      ( 21 Dec 2019 20:20 )             31.4     22 Dec 2019 07:18    130    |  90     |  15     ----------------------------<  162    4.1     |  23     |  0.8    21 Dec 2019 20:20    126    |  87     |  16     ----------------------------<  94     4.8     |  24     |  0.9      Ca    8.1        22 Dec 2019 07:18  Ca    8.6        21 Dec 2019 20:20  Mg     2.3       22 Dec 2019 07:18    TPro  6.8    /  Alb  4.0    /  TBili  0.3    /  DBili  x      /  AST  19     /  ALT  11     /  AlkPhos  98     21 Dec 2019 20:20      Urinalysis Basic - ( 22 Dec 2019 04:10 )    Color: Yellow / Appearance: Slightly Turbid / S.012 / pH: x  Gluc: x / Ketone: Negative  / Bili: Negative / Urobili: <2 mg/dL   Blood: x / Protein: Trace / Nitrite: Positive   Leuk Esterase: Large / RBC: 3 /HPF / WBC 99 /HPF   Sq Epi: x / Non Sq Epi: 1 /HPF / Bacteria: Many        General: comfortable, NAD  Neurology: A&Ox3, nonfocal  Head:  Normocephalic, atraumatic  ENT:  Mucosa moist, no ulcerations  Neck:  Supple, no JVD,   Skin: no breakdowns (as per RN)  Resp: CTA B/L  CV: RRR, S1S2,   GI: Soft, NT, bowel sounds  MS: No edema, + peripheral pulses, FROM all 4 extremity      A/P:  Attending Statement:  72 y F with PMHx of Sjogren's disease, SLE, trigeminal neuralgia, breast cancer s/p b/l mastectomy, rectal prolapse s/p partial colectomy, questionable hx of heart failure, COPD not on home O2, HTN presents with chest pain and sob on exertion.- was noted to be somnolent, but admitted to taking xanax and drinking wine    uncertain etiology of symptoms- being treated for CHF and COPD    O2 as needed    change steroids to PO prednisone 40mg for four more days    pulm eval    cardio eval    echo     monitor Na- renal eval if worsens; now improved    fall precautions    aspiration precautions    verify home meds  DVT prophylaxis  Decubitus prevention- all measures as per RN protocol  Please call or text me with any questions or updates
Patient was seen and examined. Spoke with RN. Chart reviewed.  No events overnight. In good spirits- no complaints; asking for discharge  Vital Signs Last 24 Hrs  T(F): 96.1 (23 Dec 2019 05:51), Max: 97.2 (22 Dec 2019 15:53)  HR: 78 (23 Dec 2019 05:51) (78 - 104)  BP: 103/53 (23 Dec 2019 05:51) (103/53 - 131/63)  SpO2: 92% (23 Dec 2019 08:01) (88% - 92%)  MEDICATIONS  (STANDING):  albuterol/ipratropium for Nebulization 3 milliLiter(s) Nebulizer every 6 hours  amLODIPine   Tablet 10 milliGRAM(s) Oral daily  aspirin  chewable 81 milliGRAM(s) Oral daily  atorvastatin 40 milliGRAM(s) Oral at bedtime  budesonide 160 MICROgram(s)/formoterol 4.5 MICROgram(s) Inhaler 2 Puff(s) Inhalation two times a day  chlorhexidine 4% Liquid 1 Application(s) Topical <User Schedule>  enoxaparin Injectable 40 milliGRAM(s) SubCutaneous daily  furosemide    Tablet 60 milliGRAM(s) Oral two times a day  hydroxychloroquine 200 milliGRAM(s) Oral two times a day  losartan 100 milliGRAM(s) Oral daily  montelukast 10 milliGRAM(s) Oral daily  pantoprazole    Tablet 40 milliGRAM(s) Oral before breakfast  predniSONE   Tablet 40 milliGRAM(s) Oral daily  senna 2 Tablet(s) Oral at bedtime  sertraline 50 milliGRAM(s) Oral daily    MEDICATIONS  (PRN):  acetaminophen   Tablet .. 650 milliGRAM(s) Oral every 6 hours PRN Temp greater or equal to 38.5C (101.3F), Moderate Pain (4 - 6)  ALBUTerol    90 MICROgram(s) HFA Inhaler 2 Puff(s) Inhalation every 4 hours PRN Shortness of Breath  albuterol/ipratropium for Nebulization. 3 milliLiter(s) Nebulizer every 4 hours PRN Shortness of Breath and/or Wheezing  ALPRAZolam 1 milliGRAM(s) Oral at bedtime PRN anxiety    Labs:                        10.5   9.72  )-----------( 329      ( 23 Dec 2019 07:20 )             32.4                         10.0   5.11  )-----------( 305      ( 22 Dec 2019 07:18 )             31.6     22 Dec 2019 07:18    130    |  90     |  15     ----------------------------<  162    4.1     |  23     |  0.8    21 Dec 2019 20:20    126    |  87     |  16     ----------------------------<  94     4.8     |  24     |  0.9      Ca    8.1        22 Dec 2019 07:18  Ca    8.6        21 Dec 2019 20:20  Mg     2.3       22 Dec 2019 07:18    TPro  6.8    /  Alb  4.0    /  TBili  0.3    /  DBili  x      /  AST  19     /  ALT  11     /  AlkPhos  98     21 Dec 2019 20:20      Urinalysis Basic - ( 22 Dec 2019 04:10 )    Color: Yellow / Appearance: Slightly Turbid / S.012 / pH: x  Gluc: x / Ketone: Negative  / Bili: Negative / Urobili: <2 mg/dL   Blood: x / Protein: Trace / Nitrite: Positive   Leuk Esterase: Large / RBC: 3 /HPF / WBC 99 /HPF   Sq Epi: x / Non Sq Epi: 1 /HPF / Bacteria: Many        General: comfortable, NAD  Neurology: A&Ox3, nonfocal  Head:  Normocephalic, atraumatic  ENT:  Mucosa moist, no ulcerations  Neck:  Supple, no JVD,   Skin: no breakdowns (as per RN)  Resp: CTA B/L  CV: RRR, S1S2,   GI: Soft, NT, bowel sounds  MS: No edema, + peripheral pulses, FROM all 4 extremity      A/P:  72 y F with PMHx of Sjogren's disease, SLE, trigeminal neuralgia, breast cancer s/p b/l mastectomy, rectal prolapse s/p partial colectomy, questionable hx of heart failure, COPD not on home O2, HTN presents with chest pain and sob on exertion.- was noted to be somnolent, but admitted to taking xanax and drinking wine    uncertain etiology of symptoms- being treated for CHF and COPD    O2 as needed    change steroids to PO prednisone 40mg for four more days    pulm eval as outpt    cardio eval pending    echo     monitor Na- renal eval as outpt    OOB, ambulate    fall precautions    DC today if cleared by cardio    DVT prophylaxis  Decubitus prevention- all measures as per RN protocol  Please call or text me with any questions or updates
SUBJECTIVE:    Patient is a 74y old Female who presents with a chief complaint of Chest pain (22 Dec 2019 09:45)    Currently admitted to medicine with the primary diagnosis of Exertional shortness of breath     Today is hospital day 1d. This morning she is resting comfortably in bed and reports no new issues or overnight events.     PAST MEDICAL & SURGICAL HISTORY  Lupus (systemic lupus erythematosus)  Trigeminal neuralgia  Sjogren's syndrome, with unspecified organ involvement  GERD without esophagitis  Vitamin D deficiency  Constipation, unspecified constipation type  Rectal prolapse  Hypertension, unspecified type  Malignant neoplasm of lower-outer quadrant of left breast of female, estrogen receptor positive  H/O bilateral mastectomy  History of partial colectomy    SOCIAL HISTORY:  Negative for smoking/alcohol/drug use.     ALLERGIES:  Naprosyn (Stomach Upset)  Vicodin (Stomach Upset)    MEDICATIONS:  STANDING MEDICATIONS  albuterol/ipratropium for Nebulization 3 milliLiter(s) Nebulizer every 6 hours  amLODIPine   Tablet 10 milliGRAM(s) Oral daily  aspirin  chewable 81 milliGRAM(s) Oral daily  atorvastatin 40 milliGRAM(s) Oral at bedtime  budesonide 160 MICROgram(s)/formoterol 4.5 MICROgram(s) Inhaler 2 Puff(s) Inhalation two times a day  chlorhexidine 4% Liquid 1 Application(s) Topical <User Schedule>  enoxaparin Injectable 40 milliGRAM(s) SubCutaneous daily  furosemide   Injectable 60 milliGRAM(s) IV Push every 12 hours  hydrochlorothiazide 12.5 milliGRAM(s) Oral daily  hydroxychloroquine 200 milliGRAM(s) Oral two times a day  losartan 100 milliGRAM(s) Oral daily  methylPREDNISolone sodium succinate Injectable 60 milliGRAM(s) IV Push every 12 hours  montelukast 10 milliGRAM(s) Oral daily  pantoprazole    Tablet 40 milliGRAM(s) Oral before breakfast  senna 2 Tablet(s) Oral at bedtime  sertraline 50 milliGRAM(s) Oral daily    PRN MEDICATIONS  ALBUTerol    90 MICROgram(s) HFA Inhaler 2 Puff(s) Inhalation every 4 hours PRN  albuterol/ipratropium for Nebulization. 3 milliLiter(s) Nebulizer every 4 hours PRN  ALPRAZolam 1 milliGRAM(s) Oral at bedtime PRN    VITALS:   T(F): 98.1  HR: 94  BP: 102/53  RR: 19  SpO2: 94%    PHYSICAL EXAM:  GEN: No acute distress  LUNGS: Clear to auscultation bilaterally   HEART: S1/S2 present.   ABD: Soft, non-tender, non-distended. Bowel sounds present  EXT: sob   NEURO: AAOX2      LABS:                        10.0   5.11  )-----------( 305      ( 22 Dec 2019 07:18 )             31.6         130<L>  |  90<L>  |  15  ----------------------------<  162<H>  4.1   |  23  |  0.8    Ca    8.1<L>      22 Dec 2019 07:18  Mg     2.3         TPro  6.8  /  Alb  4.0  /  TBili  0.3  /  DBili  x   /  AST  19  /  ALT  11  /  AlkPhos  98        Urinalysis Basic - ( 22 Dec 2019 04:10 )    Color: Yellow / Appearance: Slightly Turbid / S.012 / pH: x  Gluc: x / Ketone: Negative  / Bili: Negative / Urobili: <2 mg/dL   Blood: x / Protein: Trace / Nitrite: Positive   Leuk Esterase: Large / RBC: 3 /HPF / WBC 99 /HPF   Sq Epi: x / Non Sq Epi: 1 /HPF / Bacteria: Many        Serum Pro-Brain Natriuretic Peptide: 997 pg/mL (19 @ 20:20)    Troponin T, Serum: <0.01 ng/mL (19 @ 07:18)  Troponin T, Serum: <0.01 ng/mL (19 @ 20:20)      CARDIAC MARKERS ( 22 Dec 2019 07:18 )  x     / <0.01 ng/mL / x     / x     / x      CARDIAC MARKERS ( 21 Dec 2019 20:20 )  x     / <0.01 ng/mL / x     / x     / x            RADIOLOGY:
Yes

## 2021-05-07 NOTE — ED PROVIDER NOTE - ATTENDING CONTRIBUTION TO CARE
increased sob for 1 week, without orthopnea or leg swelling. not associated with chest pain . she does have mild cough with sob that is non productive. no fevers. exam shows mild cough with talkin gbut no wheezing. ext withtou swelling. heart without murmurs. plan is to obtain labs, ekg ,cxr, will treat with steroids and albutoerl.

## 2021-05-07 NOTE — ED PROVIDER NOTE - PHYSICAL EXAMINATION
Gen: Alert, NAD  Head: NC, AT, PERRL, EOMI  ENT: normal hearing, patent oropharynx without erythema/exudate, uvula midline  Neck: +supple, no tenderness  Pulm: Bilateral BS, normal resp effort  CV: RRR  Abd: soft, NT/ND  Ext: No edema lower extremity b/l  Neuro: AAOx3 Gen: Alert, NAD  Head: NC, AT, PERRL, EOMI  ENT: normal hearing, patent oropharynx without erythema/exudate, uvula midline  Neck: +supple, no tenderness  Pulm: Bilateral BS, normal resp effort  CV: RRR  Abd: soft, NT/ND  Ext: No edema lower extremity b/l  Neuro: AAOx3  Skin: No Rash, no ecchymoses

## 2021-05-07 NOTE — ED ADULT NURSE NOTE - OBJECTIVE STATEMENT
Pt with new dx of COPD c/o SOB. pt states she has white, clear phlegm and was on steroids last week for same issue.

## 2021-05-07 NOTE — ED PROVIDER NOTE - FAMILY HISTORY
Mother  Still living? Unknown  Family history of coronary artery disease, Age at diagnosis: Age Unknown     Sibling  Still living? Unknown  Family history of coronary artery disease, Age at diagnosis: Age Unknown

## 2021-05-07 NOTE — ED PROVIDER NOTE - CLINICAL SUMMARY MEDICAL DECISION MAKING FREE TEXT BOX
pt evaluated for exertional dyspnea, BNP 5067, CT chest with LNs and emphysematous changes that are stable, pt admitted for further workup and treatment SOB

## 2021-05-07 NOTE — ED PROVIDER NOTE - OBJECTIVE STATEMENT
Patient is a 75-years old F pmhx Atrial fibrillation, AV stenosis, Lupus, HTN, pacemaker, CAD w/ cardiac stent presents to the ED for evaluation of shortness of breath for "days" worse today as per patient sent in by cardiologist for evaluation of shortness of breath.  As per patient she increased number of pillows to sleep with and cardiologist concern for fluid "around her heart."  Pt was seen here for same two days ago with CTA done and trace pericardial fluid noted on CTA done in the ED.

## 2021-05-07 NOTE — ED PROVIDER NOTE - NS ED ROS FT
Review of Systems    Constitutional: (-) fever  Cardiovascular: (-) chest pain, (-) syncope  Respiratory: (-) cough  Gastrointestinal: (-) vomiting, (-) diarrhea  Musculoskeletal: (-) neck pain  Integumentary: (-) rash  Neurological: (-) headache Constitutional:  no fevers, no chills, no malaise  Eyes:  No visual changes  ENMT: No neck pain or stiffness, no nasal congestion, no ear pain, no throat pain  Cardiac:  No chest pain  Respiratory:  +sob  GI:  No nausea, vomiting, diarrhea or abdominal pain.  :  No dysuria, frequency or burning.  MS:  No back pain, no joint pain.  Neuro:  No headache, no dizziness, no change in mental status  Skin:  No skin rash  Except as documented in the HPI,  all other systems are negative

## 2021-05-07 NOTE — ED PROVIDER NOTE - PRIOR HOSPITAL/ED VISITS SUMMARY FREE TEXT FOR MDM OBTAINED AND REVIEWED OLD RECORDS QUESTION
evaluated for shortness breath may 2021 with dsypnea 2/2 to likely copd with negative troponins and nml cta which showed emphysema.

## 2021-05-08 NOTE — CONSULT NOTE ADULT - SUBJECTIVE AND OBJECTIVE BOX
Patient is a 75y old  Female who presents with a chief complaint of Shortness of Breath (08 May 2021 02:01)      HPI:  Patient of dr saima rivera was asked by his office to see today, presented with the sob, difficulty of breathing initially cough, dry, no fever, chills, sore throat, but admits the same picture as prior COPD emphysema exacerbation, she asks for the steroid, states always had helped her.  she still actively smoking, last smoking ws yesterday.  chest ct clearly shows emphysema, parenchymal changes, scanty reactive pleural effusion  echo showed grade III diastolic function with the normal systolic function.    75 year old female, with a past medical history of aortic valve stenosis s/p TAVR,  Afib, CAD stent x4 6/15, HTN, GERD, SLE, trigeminal neuralgia, breast CA s/p rodrick mastectomy, HFpEF, COPD and Sjogren's syndrome presents today for shortness of breath for four days.    Of note patient seen two days ago for similar complaints and was discharged home after CTA Chest was done which showed small pericardial fluid but no PE.    Patient since the last discharge has been having progressive shortness of breath to a point where she can not lay flat in the bed. She endorse medication compliance and denies any chest pain, diarrhea, nausea, vomiting or any other complaints.    In ED patient hemodynamically stable afebrile, CT Chest showing bilateral effusions, BNP>5000, s/p 60mg Lasix and 60 of Prednisone. Patient at time of interview endorses improvement in her symptoms (08 May 2021 02:01)      PAST MEDICAL & SURGICAL HISTORY:  Malignant neoplasm of lower-outer quadrant of left breast of female, estrogen receptor positive    Hypertension, unspecified type    Rectal prolapse    Constipation, unspecified constipation type    Vitamin D deficiency    GERD without esophagitis    Sjogren&#x27;s syndrome, with unspecified organ involvement    Trigeminal neuralgia    Lupus (systemic lupus erythematosus)    Aortic valve stenosis, etiology of cardiac valve disease unspecified    Atrial fibrillation/flutter    Atherosclerotic heart disease    History of partial colectomy    H/O bilateral mastectomy    History of appendectomy    History of percutaneous coronary intervention        PREVIOUS DIAGNOSTIC TESTING:      ECHO  FINDINGS: < from: Transthoracic Echocardiogram (07.31.20 @ 15:19) >   1. LV Ejection Fraction by Samson's Method with a biplane EF of 59 %.   2. Elevated left atrial and left ventricular end-diastolic pressures.   3. Normal left ventricular size and wall thicknesses, with normal systolic and diastolic function.   4. Spectral Doppler shows restrictive pattern of left ventricular myocardial filling (Grade III diastolic dysfunction).   5. Mild to moderate mitral valve regurgitation.   6. Moderate mitral annular calcification.   7. Mild aortic regurgitation.   8. TAVR in the aortic position.   9. A mild AI perivalvular leak is noted.  10. Estimated pulmonary artery systolic pressure is 52.8 mmHg assuming a right atrial pressure of 8 mmHg, whichis consistent with moderate pulmonary hypertension.  11. Pulmonary hypertension is present.  12. LA volume Index is 38.3 ml/m² ml/m2.  13. There is moderate aortic root calcification.      < end of copied text >      STRESS TEST  FINDINGS:    CATHETERIZATION  FINDINGS:    MEDICATIONS  (STANDING):  ALBUTerol    90 MICROgram(s) HFA Inhaler 2 Puff(s) Inhalation every 6 hours  amLODIPine   Tablet 10 milliGRAM(s) Oral daily  apixaban 5 milliGRAM(s) Oral every 12 hours  atorvastatin 80 milliGRAM(s) Oral at bedtime  budesonide 160 MICROgram(s)/formoterol 4.5 MICROgram(s) Inhaler 2 Puff(s) Inhalation two times a day  clopidogrel Tablet 75 milliGRAM(s) Oral daily  escitalopram 10 milliGRAM(s) Oral daily  famotidine    Tablet 40 milliGRAM(s) Oral at bedtime  furosemide    Tablet 40 milliGRAM(s) Oral daily  magnesium sulfate  IVPB 2 Gram(s) IV Intermittent once  mirtazapine 30 milliGRAM(s) Oral at bedtime  montelukast 10 milliGRAM(s) Oral daily  predniSONE   Tablet 40 milliGRAM(s) Oral daily  pregabalin 150 milliGRAM(s) Oral every 12 hours  tiotropium 18 MICROgram(s) Capsule 1 Capsule(s) Inhalation daily  traZODone 100 milliGRAM(s) Oral at bedtime    MEDICATIONS  (PRN):  ALPRAZolam 1 milliGRAM(s) Oral at bedtime PRN anxiety  oxyCODONE    IR 15 milliGRAM(s) Oral every 6 hours PRN Severe Pain (7 - 10)      FAMILY HISTORY:  Family history of coronary artery disease (Mother, Sibling)        SOCIAL HISTORY:  CIGARETTES: active daily smoker  ALCOHOL: no  DRUGS: no                      REVIEW OF SYSTEMS:  CONSTITUTIONAL: No distress  NECK: No pain  RESPIRATORY: non frequent mainly dry cough, wheezing, shortness of breath  CARDIOVASCULAR: No chest pain, palpitations, occasional leg swelling  GASTROINTESTINAL: No abdominal or epigastric pain. No nausea, vomiting, or hematemesis;  No melena.  NEUROLOGICAL: No dizziness, headaches, memory loss, loss of strength  SKIN: No itching, burning, rashes, or lesions   ENDOCRINE: No heat or cold intolerance  MUSCULOSKELETAL:  joint pain, No  swelling; No muscle pain  PSYCHIATRIC: No depression, but anxiety,   ALLERGY: No hives, itching, rash          Vital Signs Last 24 Hrs  T(C): 36.3 (08 May 2021 05:34), Max: 36.5 (07 May 2021 16:14)  T(F): 97.3 (08 May 2021 05:34), Max: 97.7 (07 May 2021 16:14)  HR: 63 (08 May 2021 05:34) (63 - 72)  BP: 137/59 (08 May 2021 05:34) (135/60 - 156/68)  BP(mean): --  RR: 18 (08 May 2021 05:34) (18 - 20)  SpO2: 95% (08 May 2021 02:48) (94% - 95%)                      PHYSICAL EXAM:  GENERAL: No distress, well developed, but plethoric face, and tachypneic by talking  HEAD:  Atraumatic, Normocephalic  NECK: Supple, obvious JVD, No Bruit of either carotid arteries  NERVOUS SYSTEM:  Alert, Awake, Oriented to time, place, person; Normal memory and speech; Normal motor Strength 5/5 B/L upper and lower extremities  CHEST/LUNG: Normal air entry to lung base bilaterally; scattered wheeze & basal crackle, no rales, rhonchi  HEART: Regular heart beat, S1, A2, P2, No S3, No gallop, apical systolic murmur  ABDOMEN: Soft, Non tender, Non distended; Bowel sounds present  EXTREMITIES:  1+ Peripheral Pulses in upper and barely detectable in th LE, No clubbing, No edema  SKIN: No rashes or lesions      ECG: < from: 12 Lead ECG (05.08.21 @ 08:44) >   Normal sinus rhythm  Right atrial enlargement  Left bundle branch block    < end of copied text >      I&O's Detail      LABS:                        13.1   7.39  )-----------( 202      ( 08 May 2021 06:33 )             39.5     05-08    131<L>  |  88<L>  |  11  ----------------------------<  145<H>  3.9   |  28  |  0.6<L>    Ca    9.5      08 May 2021 06:33  Phos  4.7     05-08  Mg     1.6     05-08    TPro  6.9  /  Alb  4.3  /  TBili  0.4  /  DBili  x   /  AST  23  /  ALT  29  /  AlkPhos  157<H>  05-08    CARDIAC MARKERS ( 08 May 2021 06:33 )  x     / <0.01 ng/mL / x     / x     / x      CARDIAC MARKERS ( 07 May 2021 16:43 )  x     / <0.01 ng/mL / x     / x     / x              I&O's Summary      RADIOLOGY & ADDITIONAL STUDIES:< from: Xray Chest 1 View- PORTABLE-Urgent (05.07.21 @ 17:34) >  Cardiac/mediastinum/hilum: The cardiac silhouette is magnified.  Post TAVR, stable.  Stable thoracic aortic calcifications.    Lung parenchyma/Pleura: Prominent bilateral interstitial markings, increased since the prior examination.  There are bibasilar pulmonary opacities/pleural effusions, new.    Skeleton/soft tissues: Unremarkable.    Impression:    Increased bilateral pulmonary interstitial markings and new bibasilar pulmonary opacities/pleural effusions.    < end of copied text >  < from: CT Chest w/ IV Cont (05.07.21 @ 22:01) >  TUBES/LINES: Pacing device within the subcutaneous tissues of the left chest and leads terminating in the right atrium and right ventricle.    LUNGS, PLEURA, AND AIRWAYS: Upper lobe predominant, centrilobular emphysematous changes. Small bilateral pleural effusions. Dependent atelectasis. No pneumothorax or consolidation. Central airways patent.    MEDIASTINUM/LYMPH NODES: Prominent lower paratracheal lymph nodes. For example enlarged precarinal lymph node measuring 1.1 x 2.4 cm (series 4, image 91).    HEART/GREAT VESSELS: Status post TAVR and multiple coronary stents. No pericardial effusion. Cardiomegaly. Thoracic aorta calcifications. No aneurysmal dilation of the thoracic aorta. Borderline enlarged main pulmonary artery.    BONES/SOFT TISSUES: Diffuse osteopenia. Multiple chronicright-sided rib fractures. Degenerative changes to the thoracic spine.    VISUALIZED UPPER ABDOMEN: Unremarkable.      IMPRESSION:    Small bilateral pleural effusions with prominent mediastinal lymph nodes, likely reactive.    Upper lobe predominantemphysematous changes, stable.    < end of copied text >

## 2021-05-08 NOTE — H&P ADULT - ATTENDING COMMENTS
HPI:  75 year old female, with a past medical history of aortic valve stenosis s/p TAVR, Bradycardia s/p PPM plan, Mod. Pulm HTN, Afib, CAD stent x4 6/15, HTN, GERD, SLE, trigeminal neuralgia, breast CA s/p rodrick mastectomy, HFpEF, COPD active smoker,  and Sjogren's syndrome presents today for progressive shortness of breath for four days.  She reports she has been experiencing increasing shortness of breath for the last several weeks, though notably worse in the last 4 days. She endorses orthopnea, increased cough (non-productive). She denied LE swelling, CP, Palpitations, fever, chills, recent illness, abdo pain, n/v/d, or diaphoresis.   She was seen in the ED two days ago for similar symptoms, CT Chest showed no PE but small pericardial effusion (no tamponade). She was discharged home. She reports speaking with Dr Sepulveda and him referring her to the ED for further evaluation.   Of Note: she has been following with Dr Althea Gu as an outpatient, she completed PTFs a couple weeks ago but doesn't know the results.    Of note. her cardiologist, Dr. Sepulveda, started her on Lasix po ~ 6 weeks ago as an outpatient. She reported some improved SOB but it ultimately returned 1-2 weeks later.   Treated with both lasix and prednisone in the ED with some improvement in her symptoms. ,     REVIEW OF SYSTEMS:  CONSTITUTIONAL:  No weakness, fevers, chills, night sweats, weight loss  EYES/ENT: No visual changes. No vertigo or dysphagia  NECK: No neck pain or stiffness  RESPIRATORY: + cough, no wheezing, hemoptysis. + shortness of breath  CARDIOVASCULAR: No chest pain or palpitations. No lower extremity edema. +orthopnea   GASTROINTESTINAL: No abdominal pain. No nausea, vomiting, diarrhea, or hematemesis  GENITOURINARY: No dysuria or hematuria   NEUROLOGICAL: No focal numbness or weakness  SKIN: No rashes or itching  HEMATOLOGIC: No easy bruising or prolonged bleeding.      PHYSICAL EXAM:  GENERAL: NAD, obese, Non-toxic, stated age   HEAD:  Atraumatic, Normocephalic  EYES: EOMI, Sclera White   NECK: Supple, No JVD  CHEST/LUNG: Clear to auscultation bilaterally; No wheezing, rhonchi, or crackles  HEART: Regular rate and rhythm; s1, s2, +2/6 systolic murmurs best heard at the L midaxillary line, no rubs, or gallops  ABDOMEN: Soft, Nontender, Nondistended; Bowel sounds present, No rebound or guarding noted   EXTREMITIES:  No lower extremity edema or calf tenderness to palpation.  No clubbing or cyanosis  PSYCH: AAOx3, pleasant, cooperative, not anxious  NEUROLOGY: non-focal  SKIN: No rashes or lesions      ASSESSMENT AND PLAN:  Dyspnea + Orthopnea: acute  COPD exacerbation vs HFw/PEF exacerbation + valvular heart disease vs Progressive pulmonary HTN  -Cont with PO prednisone, (no longer wheezing), ICS, spiriva, and Albuterol PRN.   -Cont with PO lasix 40mg qD. She doesn't appear grossly fluid overloaded on exam.   -Check 2d Echo, trend trops  -Pulm eval with Dr Gu, f/u PFT testing   -Cardio Eval with Dr Sepulveda     Hyponatremia: appears euvolemic on exam. Trend Na in the morning. Urine studies will be inaccurate due to her being on lasix. If Na trending down or Bicarb trending up then stop lasix, may be secondary to diuresis (small met. alkalosis noted, ?contraction).     COPD +/- exacerbation: cont with prednisone 40mg, ICS, spiriva, and albuterol.   -Active smoker, counselled extensively on cessation.     CAD s/p PCI / HTN: cont home medications    Chronic atrial fibrillation: s/p PPM for bradycardia. Cont with eliquis. Currently rate controlled     Trigeminal neuralgia: on lyrica     Sjogren's / ?SLE: no longer taking medications     Anxiety: cont home medications     Chronic lower back pain: cont home medications    DVT ppx: eliquis  GI ppx: famotidine  GOC: Full code.    My note supersedes the residents in the event of a discrepancy.

## 2021-05-08 NOTE — H&P ADULT - NSHPPHYSICALEXAM_GEN_ALL_CORE
GENERAL: NAD, lying in bed comfortably  HEAD:  Atraumatic, Normocephalic  CHEST/LUNG: Basilar crackles  HEART: Regular rate and rhythm; No murmurs, rubs, or gallops  ABDOMEN: Bowel sounds present; Soft, Nontender  EXTREMITIES: +1 peripheral edema  NERVOUS SYSTEM:  Alert & Oriented X3, speech clear. No deficits   MSK: FROM all 4 extremities, full and equal strength  SKIN: No rashes or lesions

## 2021-05-08 NOTE — H&P ADULT - ASSESSMENT
75 year old female, with a past medical history of aortic valve stenosis s/p TAVR,  Afib, CAD stent x4 6/15, HTN, GERD, SLE, trigeminal neuralgia, breast CA s/p rodrick mastectomy, HFpEF, COPD and Sjogren's syndrome presents today for shortness of breath.    # SOB likely 2/2 acute exacerbation of HFpEF  - Patient not clinically fluid overloaded; no JVD, crackles, peripheral edema  - 50+ pack-yr history of smoking, wheezing + dry cough  - Continue Prednisone 40mg for 5 days ; Albuterol q6h Symbicort q12h, Spiriva daily  - O2 sat 97% room air  - Can consult Pulmonary if no improvement  - Keep I<O, daily weights, fluid restriction 1L/d  - BNP >2000  - Echo shows normal EF with with severe aortic root stenosis  - VA duplex lower extremities ordered  - xray negative for acute cardiopulmonary disease  - Trop negative x 2, no acute changes on EKG    # Chronic Afib s/p Pacemaker  - Continue    # Severe Aortic Stenosis s/p TAVR  - Severe Aortic root stenosis on the echo  - TAVR planned for 07/22  - CTS following the patient    # CAD s/p PCI to LAD, Prox Circ, LM  - Recent PCI on 06/15  - Continue Plavix, statin  - Follow Cardiology    # Sjogren's disease:  - c/w Cevimeline daily  - patient has to take her own pills from home    # SLE:  - c/w hydroxychloroquine    # Trigeminal neuralgia:  - Continue Lyrica  - Lyrica was held at some point due to concerns of SIADH(?), restarted today    # Anxiety  - Continue Alprazolam  - Atarax PRN  - Continue Zoloft    # HTN:  - Continue Toprol XL 50  - Add ACE/ARB if still uncontrolled  - Will hold Amlodipine for now  - Monitor BMP daily    Diet: DASH with 1.2L fluid restriction  DVT PPX: Lovenox 40 mg daily  GI ppx: Protonix 40 mg daily  Activity: Ambulate as tolerated  Dispo: From home, lives alone  Code status: FULL   75 year old female, with a past medical history of aortic valve stenosis s/p TAVR,  Afib, CAD stent x4 6/15, HTN, GERD, SLE, trigeminal neuralgia, breast CA s/p rodrick mastectomy, HFpEF, COPD and Sjogren's syndrome presents today for shortness of breath.    Patient has some wheezing and crackles on exam. Will give Lasix and Prednisone and follow for improvement.    # SOB likely 2/2 acute exacerbation of HFpEF and possible COPD exacerbation  # H/O HFpEF  # H/O COPD with active smoking  - Patient has orthopnea, elevated BNP>5000, basilar crackles and some wheezing  - Active smoker with 50 pack year history, CT Chest shows small effusions  - Continue Prednisone 40mg for 5 days ; Albuterol q6h Symbicort q12h, Spiriva daily  - Willl give Lasix 40 IV twice daily  - Continue Montelukast  - Keep I<O, daily weights, fluid restriction 1L/d  - Will repeat echo  - Cardio eval (Dr. Sepulveda)    # Chronic Afib s/p Pacemaker  - Pacemaker placed due to bradycardia  - Continue Eliquis    # Severe Aortic Stenosis s/p TAVR  - Continue Plavix and Eliquis    # breast CA s/p rodrick mastectomy  - Outpatient follow up    # CAD s/p PCI to LAD, Prox Circ, LM  - Recent PCI on 06/15  - Continue Plavix, statin  - Follow Cardiology    # Sjogren's disease:  # SLE:  - Off all medications now    # Trigeminal neuralgia:  - Continue Lyrica    # Anxiety  - Continue Alprazolam 1  - Continue Lexapro 10  - Continue Mirtazepine 30  - Continue Trazodone 100    # HTN:  - Continue   - Monitor BMP daily    # Lower Back Pain  - Continue Oxycodone  - Takes Fioricet and Oxycodone at home    Diet: DASH with 1.2L fluid restriction  DVT PPX: Eliquis  GI ppx: Pepcid  Activity: Ambulate as tolerated  Dispo: From home, lives alone  Code status: FULL

## 2021-05-08 NOTE — H&P ADULT - HISTORY OF PRESENT ILLNESS
75 year old female, with a past medical history of aortic valve stenosis s/p TAVR,  Afib, CAD stent x4 6/15, HTN, GERD, SLE, trigeminal neuralgia, breast CA s/p rodrick mastectomy, HFpEF, COPD and Sjogren's syndrome presents today for shortness of breath.    Of note patient seen two days ago for similar complaints and was discharged home after CCTA coronaries was done.    Patient since the last discharge has been having progressive shortness of breath to a point where she can not lay flat in the bed. She endorse medication compliance and denies any chest pain, diarrhea,. nausea, vomiting or any other complaints.    In ED patient hemodynamically stable afebrile, CT Chest showing bilateral effusions, BNP>5000, s/p 60mg Lasix. 75 year old female, with a past medical history of aortic valve stenosis s/p TAVR,  Afib, CAD stent x4 6/15, HTN, GERD, SLE, trigeminal neuralgia, breast CA s/p rodrick mastectomy, HFpEF, COPD and Sjogren's syndrome presents today for shortness of breath for four days.    Of note patient seen two days ago for similar complaints and was discharged home after CTA Chest was done which showed small pericardial fluid but no PE.    Patient since the last discharge has been having progressive shortness of breath to a point where she can not lay flat in the bed. She endorse medication compliance and denies any chest pain, diarrhea, nausea, vomiting or any other complaints.    In ED patient hemodynamically stable afebrile, CT Chest showing bilateral effusions, BNP>5000, s/p 60mg Lasix and 60 of Prednisone. Patient at time of interview endorses improvement in her symptoms

## 2021-05-08 NOTE — CONSULT NOTE ADULT - ASSESSMENT
Diastolic HF  mild moderate pulmonary HTN  post TAVR, adequate parameters  COPD, emphysema, parenchymal changes, COPD exacerbation, still actively smoking a PPD?  all of the above are the cause of her dyspnea,     i had a lengthy talk with her, explained the nature of her dyspnea  strongly advised to stop smoking, possibly we should start from the hospital, possible Nicotine patch is good to start up  Lasix 40 mg daily, i agree with the current doses  Steroid iv 40 mg tid for 2 days to assess the response  pulmonary consult, she needs to optimize pulmonary management ad bronchodilators as well.  will be f/b dr landaverde team

## 2021-05-08 NOTE — H&P ADULT - NSHPREVIEWOFSYSTEMS_GEN_ALL_CORE
CONSTITUTIONAL: No weakness, fevers or chills, no weight loss   EYES/ENT: No visual changes;  No vertigo or throat pain   NECK: No pain or stiffness  RESPIRATORY: As above  CARDIOVASCULAR: No chest pain or palpitations  GASTROINTESTINAL: No abdominal or epigastric pain. No nausea, vomiting, or hematemesis; No diarrhea or constipation. No melena or hematochezia.  GENITOURINARY: No dysuria, frequency or hematuria  NEUROLOGICAL: No numbness or weakness  All other review of systems is negative unless indicated above.

## 2021-05-08 NOTE — H&P ADULT - NSHPLABSRESULTS_GEN_ALL_CORE
12.0   8.90  )-----------( 173      ( 07 May 2021 16:43 )             35.6       05-07    125<L>  |  88<L>  |  13  ----------------------------<  112<H>  4.5   |  27  |  0.6<L>    Ca    9.3      07 May 2021 16:43  Mg     1.8     05-07                        CARDIAC MARKERS ( 07 May 2021 16:43 )  x     / <0.01 ng/mL / x     / x     / x            CAPILLARY BLOOD GLUCOSE

## 2021-05-09 NOTE — DISCHARGE NOTE NURSING/CASE MANAGEMENT/SOCIAL WORK - PATIENT PORTAL LINK FT
You can access the FollowMyHealth Patient Portal offered by [facility] by registering at the following website: [email or web address]. By joining [facility]’s FollowMyHealth portal, you will also be able to view your health information using other applications (apps) compatible with our system.

## 2021-05-09 NOTE — DISCHARGE NOTE PROVIDER - NSDCCPCAREPLAN_GEN_ALL_CORE_FT
PRINCIPAL DISCHARGE DIAGNOSIS  Diagnosis: CHF exacerbation  Assessment and Plan of Treatment: improved, fluid restriction 1.5L/day

## 2021-05-09 NOTE — DISCHARGE NOTE PROVIDER - PROVIDER TOKENS
PROVIDER:[TOKEN:[[id number]],FOLLOWUP:[2 weeks]],PROVIDER:[TOKEN:[[id number]],FOLLOWUP:[1-3 days]]

## 2021-05-09 NOTE — DISCHARGE NOTE PROVIDER - HOSPITAL COURSE
76 yo female with PMH of HTN, CAD s/p PCI, HFpEF, AS s/p TAVR, Bradycardia s/p Pacemaker, Afib, COPD, SLE,  Sjogren's syndrome, trigeminal neuralgia, breast CA s/p rodrick mastectomy came to ED for progressive shortness of breath for four days. She endorses orthopnea, increased cough (non-productive). She denied LE swelling, CP, Palpitations, fever, chills. She visited the ED two days ago, chest CT negative for PE. In ED patient hemodynamically stable afebrile, CT Chest showing bilateral effusions, BNP>5000, s/p 60mg Lasix and 60 of Prednisone. On exam, she looks euvolemic, lungs are clear. She was admitted to the hospital, continued with Lasix PO, cardiology evaluated the patient, recommended to continue with diuresis. She felt better today. She will discharge home and follow up with her cardiologist and pulmonologist.     PHYSICAL EXAM:  GENERAL: NAD, well-developed.  HEAD:  Atraumatic, Normocephalic.  EYES: EOMI, PERRLA, conjunctiva and sclera clear.  NECK: Supple, No JVD.  CHEST/LUNG: Clear to auscultation bilaterally; No wheeze.  HEART: Regular rate and rhythm; S1 S2. SM 2/6 on right sternum,  ABDOMEN: Soft, Nontender, Nondistended; Bowel sounds present.  EXTREMITIES:  2+ Peripheral Pulses, No clubbing, cyanosis, or edema.  PSYCH: AAOx3.  NEUROLOGY: non-focal.  SKIN: No rashes or lesions.    A/P:   Mild acute HFpEF:   Aortic Stenosis s/p TAVR  Elevated Pro-BNP, SOB, pleural effusion.  s/p Lasix IV, continue Lasix 40mg PO, fluid restriction.   Continue Metoprolol.     COPD: no wheezing on exam  Prednisone 40mg to complete 5 days.   Follow up with pulmonary outpatient.   Active Smoker: Counseled for smoking cessation,     Paroxysmal Atrial fibrillation:   EKG showed Sinus rhythm, old LBBB.   Continue Eliquis.     CAD s/p PCI continue Plavix, Crestor.   Trigeminal neuralgia: Continue Lyrica    Anxiety: Continue Alprazolam 1mg, Lexapro 10mg, Mirtazepine 30mg and Trazodone 100

## 2021-05-09 NOTE — DISCHARGE NOTE PROVIDER - NSDCFUSCHEDAPPT_GEN_ALL_CORE_FT
[facility] ; 05/09/2021 ; [facility] ; 05/10/2021 ; NPP Cardio [address] [facility] ; 07/12/2021 ; NPP Cardio [address] [facility] ; 07/22/2021 ; NP Cardio [address] [facility] ; 07/22/2021 ; NPP Cardio [address] [facility]

## 2021-05-09 NOTE — DISCHARGE NOTE PROVIDER - CARE PROVIDER_API CALL
Alexys Sepulveda)  Cardiovascular Disease  [address], [address]  [place] [address]  Phone: [phone]  Fax: [phone]  Follow Up Time: 2 weeks    Althea Gu)  Internal Medicine  [address] [place] [address]  Phone: [phone]  Fax: [phone]  Follow Up Time: 1-3 days

## 2021-05-09 NOTE — DISCHARGE NOTE PROVIDER - NSDCMRMEDTOKEN_GEN_ALL_CORE_FT
ALPRAZolam 0.5 mg oral tablet: 2 tab(s) orally once a day (at bedtime), As Needed  amLODIPine 10 mg oral tablet: 1 tab(s) orally once a day  apixaban 5 mg oral tablet: 1 tab(s) orally every 12 hours  clopidogrel 75 mg oral tablet: 1 tab(s) orally once a day  famotidine 40 mg oral tablet: 1 tab(s) orally once a day (at bedtime)  Fioricet oral capsule: 4 cap(s) orally once a day  furosemide 20 mg oral tablet: 2 tab(s) orally once a day   Lexapro 10 mg oral tablet: 1 tab(s) orally once a day  mirtazapine 30 mg oral tablet: 1 tab(s) orally once a day (at bedtime)  montelukast 10 mg oral tablet: 1 tab(s) orally once a day  ocular lubricant ophthalmic solution: 1 drop(s) to each affected eye every 2 hours, As needed, Dry Eyes  oxyCODONE 15 mg oral tablet: 1 tab(s) orally every 6 hours, As Needed -for moderate pain MDD:4   predniSONE 20 mg oral tablet: 2 tab(s) orally once a day  pregabalin 150 mg oral capsule: 1 cap(s) orally every 12 hours  rosuvastatin 40 mg oral tablet: 1 tab(s) orally once a day  Spiriva Respimat 1.25 mcg/inh inhalation aerosol: 2 puff(s) inhaled once a day   Symbicort 160 mcg-4.5 mcg/inh inhalation aerosol: 2 puff(s) inhaled 2 times a day   traZODone 100 mg oral tablet: 1 tab(s) orally once a day (at bedtime)  Ventolin HFA 90 mcg/inh inhalation aerosol: 2 puff(s) inhaled prn MDD:maximum 6 puffs a day

## 2021-05-10 PROBLEM — Z98.890 S/P BALLOON AORTIC VALVULOPLASTY: Status: RESOLVED | Noted: 2020-07-02 | Resolved: 2021-01-01

## 2021-05-10 PROBLEM — I45.5 SINUS PAUSE: Status: ACTIVE | Noted: 2021-01-01

## 2021-05-10 PROBLEM — I25.10 CORONARY ARTERY DISEASE: Status: ACTIVE | Noted: 2020-07-16

## 2021-05-10 PROBLEM — E78.5 HYPERLIPIDEMIA: Status: ACTIVE | Noted: 2021-01-01

## 2021-05-10 PROBLEM — I48.92 ATRIAL FLUTTER, PAROXYSMAL: Status: ACTIVE | Noted: 2020-01-01

## 2021-05-10 PROBLEM — Z86.79 HISTORY OF AORTIC VALVE STENOSIS: Status: RESOLVED | Noted: 2020-02-14 | Resolved: 2021-01-01

## 2021-05-10 PROBLEM — Z45.018 ENCOUNTER FOR INTERROGATION OF CARDIAC PACEMAKER: Status: ACTIVE | Noted: 2020-01-01

## 2021-05-10 PROBLEM — Z87.898 HISTORY OF FATIGUE: Status: RESOLVED | Noted: 2020-02-14 | Resolved: 2021-01-01

## 2021-05-10 NOTE — PROCEDURE
[See Device Printout] : See device printout [No] : not [NSR] : normal sinus rhythm [Pacemaker] : pacemaker [Longevity: ___ months] : The estimated remaining battery life is [unfilled] months [Threshold Testing Performed] : Threshold testing was performed [Lead Imp:  ___ohms] : lead impedance was [unfilled] ohms [Sensing Amplitude ___mv] : sensing amplitude was [unfilled] mv [___V @] : [unfilled] V [___ ms] : [unfilled] ms [Programmed for Longevity] : output reprogrammed for improved battery longevity [de-identified] : Medtronic Micki XT DR IGLESIAS [de-identified] : [id number] [de-identified] : [id number] [de-identified] : 8/4/2020 [de-identified] : AAI<=>DDD [de-identified] : 60/130 [de-identified] :  < 0.1%\par AP 4.6%

## 2021-05-10 NOTE — HISTORY OF PRESENT ILLNESS
[de-identified] : \par Cardiologist: Dr. Sepulveda\par \par 76 yo F with history of paroxysmal AFib on Eliquis, CAD s/p PCI, HTN, HL, SLE, Sjogren's severe AS s/p TAVR (7/22/20) with 5 sec pause noted on post op MCOT s/p DC PPM implant on 8/4/21. She is here for routine follow up of her device. Of note, she was in the hospital for mild CHF exacerbation and COPD over the weekend from 5/7-5/9. She states dyspnea improved with steroids and Lasix. She denies chest pain. No syncope.

## 2021-05-10 NOTE — PHYSICAL EXAM
[General Appearance - Well Developed] : well developed [Normal Appearance] : normal appearance [Well Groomed] : well groomed [General Appearance - Well Nourished] : well nourished [No Deformities] : no deformities [General Appearance - In No Acute Distress] : no acute distress [Heart Rate And Rhythm] : heart rate and rhythm were normal [Heart Sounds] : normal S1 and S2 [Edema] : no peripheral edema present [FreeTextEntry1] : systolic 2/6 murmur [] : no respiratory distress [Respiration, Rhythm And Depth] : normal respiratory rhythm and effort [Exaggerated Use Of Accessory Muscles For Inspiration] : no accessory muscle use [Auscultation Breath Sounds / Voice Sounds] : lungs were clear to auscultation bilaterally [Left Infraclavicular] : left infraclavicular area [Clean] : clean [Dry] : dry [Well-Healed] : well-healed [Abdomen Soft] : soft [Nail Clubbing] : no clubbing of the fingernails

## 2021-07-19 NOTE — PHYSICAL EXAM
[No Acute Distress] : no acute distress [Normal Oropharynx] : normal oropharynx [III] : Mallampati Class: III [Normal Appearance] : normal appearance [No Neck Mass] : no neck mass [Normal Rate/Rhythm] : normal rate/rhythm Post-Care Instructions: I reviewed with the patient in detail post-care instructions. Patient is to keep the biopsy site dry overnight, and then apply bacitracin twice daily until healed. Patient may apply hydrogen peroxide soaks to remove any crusting. [Normal S1, S2] : normal s1, s2 [No Murmurs] : no murmurs [No Resp Distress] : no resp distress [Clear to Auscultation Bilaterally] : clear to auscultation bilaterally [No Abnormalities] : no abnormalities [Normal Gait] : normal gait [No Clubbing] : no clubbing [No Cyanosis] : no cyanosis [No Edema] : no edema [FROM] : FROM [No Focal Deficits] : no focal deficits [Oriented x3] : oriented x3 [Normal Affect] : normal affect

## 2021-07-19 NOTE — REVIEW OF SYSTEMS
[Dyspnea] : dyspnea [Wheezing] : wheezing [SOB on Exertion] : sob on exertion [Edema] : edema [Orthopnea] : orthopnea [Negative] : Endocrine

## 2021-07-19 NOTE — HISTORY OF PRESENT ILLNESS
[Initial Eval - Existing Diagnosis] : an initial evaluation of an existing diagnosis of [Dyspnea] : dyspnea [Dyspnea on Exertion] : dyspnea on exertion [Wheezing] : wheezing [Productive Cough] : productive cough [Clear Sputum] : clear sputum production [Currently Experiencing] : The patient is currently experiencing symptoms. [General Malaise] : no general malaise [Hemoptysis] : no hemoptysis [Orthopnea] : orthopnea [Chest Pain] : no chest pain [Altered Mental Status] : no altered mental status [Anticholinergics (Inhalation)] : inhaled anticholinergics

## 2021-08-04 NOTE — H&P ADULT - ASSESSMENT
75 year old female, with a past medical history of aortic valve stenosis s/p TAVR,  Afib, CAD stent x4 6/15, HTN, GERD, SLE, trigeminal neuralgia, breast CA s/p rodrick mastectomy, HFrEF, COPD and Sjogren's syndrome presents today for cough and SOB for 1 day.     # SOB / cough  - PNA vs COPD vs CHF exacerbation  - CXR noted for bilateral opacities on imaging (pending read) but looks similar to CXR on previous admission  - antibiotics ceftriaxone 1 gm daily plus doxycycline 100 bid  - check urine strep and legionella  - check Procal  - c/w oxygen therapy PRN, target SaO2 88-92%      # HFrEF  - last echo noted for 37%  - lasix 40 mg iv daily  - daily weights  - low salt diet, fluid intake < 1 L    # Hyponatremia  - chronic hyponatremia  - can be due to SIADH from COPD vs volume overload  - c/w lasix  - restrict fluid intake < 1L  - check serum osmolality, urine osmolality, urine Na  - trend Na    DVT: Eliquis, check duplex  GI: Pantoprazole  Diet: REGI  Activity: increase as tolerated  Dispo: Acute for now

## 2021-08-04 NOTE — H&P ADULT - NSHPPHYSICALEXAM_GEN_ALL_CORE
T(F): 98.6 (08-04-21 @ 21:06), Max: 98.8 (08-04-21 @ 16:29)  HR: 91 (08-04-21 @ 21:06) (88 - 91)  BP: 137/67 (08-04-21 @ 21:06) (122/78 - 137/67)  RR: 18 (08-04-21 @ 21:06) (18 - 20)  SpO2: 97% (08-04-21 @ 21:06) (87% - 97%)        PHYSICAL EXAM:  GENERAL: NAD, well-developed  CHEST/LUNG: decreased BS over right lung base  HEART: RRR; No murmurs, rubs, or gallops  ABDOMEN: Soft, NT/ND; BS present  EXTREMITIES:  No cyanosis, or edema  NEUROLOGY: AAOx3  SKIN: No rashes or lesions

## 2021-08-04 NOTE — ED PROVIDER NOTE - PHYSICAL EXAMINATION
CONSTITUTIONAL: Well-developed; well-nourished; in no acute distress, nontoxic appearing  SKIN: skin exam is warm and dry,  HEAD: Normocephalic; atraumatic.  EYES: PERRL, 3 mm bilateral, no nystagmus, EOM intact; conjunctiva and sclera clear.  ENT: MMM, no nasal congestion  NECK: Supple; non tender.  ROM intact.  CARD: S1, S2 normal, AS murmur   RESP: scant expiratory wheezing bilaterally + coughing, no rhonchi  ABD: soft; non-distended; non-tender. No Rebound, No guarding  EXT: Normal ROM. No cyanosis +1 edema in feet  NEURO: awake, alert, following commands, oriented, grossly unremarkable. No Focal deficits. GCS 15.   PSYCH: Cooperative, appropriate.

## 2021-08-04 NOTE — H&P ADULT - HISTORY OF PRESENT ILLNESS
75 year old female, with a past medical history of aortic valve stenosis s/p TAVR,  Afib, CAD stent x4 6/15, HTN, GERD, SLE, trigeminal neuralgia, breast CA s/p rodrick mastectomy, HFpEF, COPD and Sjogren's syndrome presents today for cough and SOB for 1 day. Patient reports that this morning she went for shower and she started feeling sob, cough with whitish sputum and choking sensation. Patient has been recently eating salty foot. Smokes 1 PPD. Denies any fever, chest pain, n/v/d, leg swelling or pain, urinary symptoms.    In ED: Temp 98; HR = 88; BP = 122/78; RR 18; SaO2 = 95% on 3 L. Patient received Azithromycin 500 x 1, Cefepime 2000 mg x 1, Duonebs x 2. work up noted for hyponatremia, CXR bilateral opacities on imaging, pending read.  Patient also received nebs, steroids and mag with EMS.

## 2021-08-04 NOTE — ED PROVIDER NOTE - NS ED ROS FT
Constitutional:  no fevers, no chills, no malaise  Eyes:  No visual changes  ENMT: No neck pain or stiffness, no nasal congestion, no ear pain, no throat pain  Cardiac:  No chest pain  Respiratory:  +cough and sob  GI:  No nausea, vomiting, diarrhea or abdominal pain.  :  No dysuria, frequency or burning.  MS:  No back pain, no joint pain.  Neuro:  No headache, no dizziness, no change in mental status  Skin:  No skin rash  Except as documented in the HPI,  all other systems are negative

## 2021-08-04 NOTE — ED PROVIDER NOTE - CLINICAL SUMMARY MEDICAL DECISION MAKING FREE TEXT BOX
evaluated for hypoxia symptoms likely 2/2 to copd exacerbation and multifocal pna. started on iv abx, steroids mg and nebs were given with ems. nebs were continued here. she required supplemental oxygen .

## 2021-08-04 NOTE — H&P ADULT - ATTENDING COMMENTS
Patient seen and examines on 08/04/2021 at 21: 46    HPI:  75 year old woman with a past medical history of aortic valve stenosis s/p TAVR,  Afib, CAD stent x4 6/15, HTN, GERD, SLE, trigeminal neuralgia, breast CA s/p rodrick mastectomy, HFpEF, COPD, Sjogren's syndrome, active smoker, presents today for cough and SOB for 1 day. She reports it worsened when she got in the shower today and noted a productive cough (clear sputum). No fevers, chills, weight gain, orthopnea, CP, palpitations, abdo pain, n/v/d, dysphagia, choking while eating.   She missed her morning dose of lasix (ran out) and notes she has been eating chinese food for the last two days.   Cardio: Dr Sepulveda evaluated her in the ED, suspected pneumonia.     REVIEW OF SYSTEMS:  CONSTITUTIONAL:  No weakness, fevers, chills, night sweats, weight loss  EYES/ENT: No visual changes. No vertigo or dysphagia  NECK: No neck pain or stiffness  RESPIRATORY: + cough, no wheezing, hemoptysis. + shortness of breath  CARDIOVASCULAR: No chest pain or palpitations. No lower extremity edema  GASTROINTESTINAL: No abdominal pain. No nausea, vomiting, diarrhea, or hematemesis  GENITOURINARY: No dysuria or hematuria   NEUROLOGICAL: No focal numbness or weakness  SKIN: No rashes or itching  HEMATOLOGIC: No easy bruising or prolonged bleeding.      PHYSICAL EXAM:  GENERAL: NAD, well-developed, Non-toxic, stated age   HEAD:  Atraumatic, Normocephalic  EYES: EOMI, Sclera White   NECK: Supple, No JVD  CHEST/LUNG: reduced RML and RLL breath sounds, No wheezing, rhonchi, or crackles. Speaking and breathing comfortably on 3L NC   HEART: Regular rate and rhythm; s1, s2, No murmurs, rubs, or gallops  ABDOMEN: Soft, Nontender, Nondistended; Bowel sounds present, No rebound or guarding noted   EXTREMITIES:  No lower extremity edema or calf tenderness to palpation.  No clubbing or cyanosis  PSYCH: AAOx3, pleasant, cooperative, not anxious  NEUROLOGY: non-focal  SKIN: No rashes or lesions      ASSESSMENT AND PLAN:  Acute hypoxic respiratory failure  ?Pneumonia (suspected gram negative): SIRS not present on admission  Leukocytosis   COPD: possible mild exacerbation   -Cont with rocephin and doxycycline for now  -solumderol 40mg qD for now, if not wheezing can transition to oral prednisone taper   -Symbicort, montelukast  and albuterol PRN   -Check blood, urine, sputum cultures, urine legionella and strep  -If procal negative then stop abx   -Pulm consult as requested by cardiology     HFw/REF (EF 37%, 5/2021)   AS s/p TAVR   Mod - Severe MR   CAD s/p PCI   HTN  -Lasix 40mg IV x1, then can transition back to home dosing.  -Cardio following: suspected pneumonia, requesting pulm eval   -Cont with amlodipine, lipitor, plavix    Hyponatremia: appears euvolemic on exam  -Check serum osmo, urine osmo, urine Na, urine urea  -Currently on lasix   -Trend Na, if worsening then hold lasix, and get Nephro evaluation     Paroxysmal atrial fibrillation /sp PPM: cont with eliquis   -Currently rate and rhythm controlled     SLE / Sjogren's: out patient rheum follow up. No longer om medications     Trigeminal neuralgia: cont with pregabalin and carbamazepine    Anxiety: baljinder home medications     Breast Ca s/p mastectomy: out patient follow up     DVT ppx: Lovenox/Heparin  GI ppx: Not indicated  GOC: Full code.    My note supersedes the residents in the event of a discrepancy.

## 2021-08-04 NOTE — ED PROVIDER NOTE - OBJECTIVE STATEMENT
aburpt onset cough that is non productive with sob without chest pain that is moderate severe intensity that occurred today when she stepped in the shower. triggering symptom was smoking today. no fever. aggravating factor is walking. alleviating factor is nebulizer. tried one neb prior to arrival. with ems received nebs, steroids and mag. pulse ox on ra was 84

## 2021-08-04 NOTE — CONSULT NOTE ADULT - SUBJECTIVE AND OBJECTIVE BOX
CLAYTON VELAZQUEZ 76y (1945) Female    Daily Height in cm: 167.64 (04 Aug 2021 16:07)    Daily     Patient is a 76y old  Female who presents with a chief complaint of shortness of breath and cough.    HPI:  75 year old female, with a past medical history of aortic valve stenosis s/p TAVR,  Afib, CAD stent x4 6/15, HTN, GERD, SLE, trigeminal neuralgia, breast CA s/p rodrick mastectomy, HFpEF, COPD and Sjogren's syndrome presents today for cough and SOB for 1 day. Patient reports that this morning she went for shower and she started feeling sob, cough with whitish sputum and choking sensation. Patient has been recently eating salty foot. Smokes 1 PPD. Denies any fever, chest pain, n/v/d, leg swelling or pain, urinary symptoms.    In ED: Temp 98; HR = 88; BP = 122/78; RR 18; SaO2 = 95% on 3 L. Patient received Azithromycin 500 x 1, Cefepime 2000 mg x 1, Duonebs x 2. work up noted for hyponatremia, CXR bilateral opacities on imaging, pending read.  Patient also received nebs, steroids and mag with EMS.   (04 Aug 2021 21:24)    Cardiology addendum  Above reviewed. pt interviewed and examined in ED  agree with above history which is the same that patient gave to me although it is hard to believe that symptoms started all of a sudden this morning.   no cp. no palpitations. but has a cough which also started this am as per pt.   no bleeding. no n/v/d. no abdominal pain. making urine.     PAST MEDICAL & SURGICAL HISTORY:  Malignant neoplasm of lower-outer quadrant of left breast of female, estrogen receptor positive    Hypertension, unspecified type    Rectal prolapse    Constipation, unspecified constipation type    Vitamin D deficiency    GERD without esophagitis    Sjogren&#x27;s syndrome, with unspecified organ involvement    Trigeminal neuralgia    Lupus (systemic lupus erythematosus)    Aortic valve stenosis, etiology of cardiac valve disease unspecified    Atrial fibrillation/flutter    Atherosclerotic heart disease    History of partial colectomy    H/O bilateral mastectomy    History of appendectomy    History of percutaneous coronary intervention        FAMILY HISTORY:  Family history of coronary artery disease (Mother, Sibling)        Home Medications:  ALPRAZolam 0.5 mg oral tablet: 2 tab(s) orally once a day (at bedtime), As Needed (08 May 2021 02:52)  amLODIPine 10 mg oral tablet: 1 tab(s) orally once a day (08 May 2021 02:52)  carBAMazepine 200 mg oral tablet: 1 tab(s) orally 2 times a day (04 Aug 2021 21:55)  famotidine 40 mg oral tablet: 1 tab(s) orally once a day (at bedtime) (08 May 2021 02:52)  Fioricet oral capsule: 4 cap(s) orally once a day (08 May 2021 02:52)  Lexapro 10 mg oral tablet: 1 tab(s) orally once a day (08 May 2021 02:52)  mirtazapine 30 mg oral tablet: 1 tab(s) orally once a day (at bedtime) (08 May 2021 02:52)  montelukast 10 mg oral tablet: 1 tab(s) orally once a day (08 May 2021 02:52)  ocular lubricant ophthalmic solution: 1 drop(s) to each affected eye every 2 hours, As needed, Dry Eyes (08 May 2021 02:52)  oxyCODONE 15 mg oral tablet: 1 tab(s) orally every 6 hours, As Needed -for moderate pain MDD:4  (08 May 2021 02:52)  pregabalin 150 mg oral capsule: 1 cap(s) orally every 12 hours (08 May 2021 02:52)  rosuvastatin 40 mg oral tablet: 1 tab(s) orally once a day (08 May 2021 02:52)  traZODone 100 mg oral tablet: 1 tab(s) orally once a day (at bedtime) (08 May 2021 02:52)      REVIEW OF SYSTEMS:    CONSTITUTIONAL: ++eakness, no fevers or chills  EYES/ENT: No visual changes;  No vertigo or throat pain   NECK: No pain or stiffness  RESPIRATORY: see HPI  CARDIOVASCULAR: see HPI  GASTROINTESTINAL: No abdominal or epigastric pain. No nausea, vomiting, or hematemesis; No diarrhea or constipation. No melena or hematochezia.  GENITOURINARY: No dysuria, frequency or hematuria  NEUROLOGICAL: No numbness or weakness  SKIN: No itching, rashes    ON EXAM:  Vital Signs Last 24 Hrs  T(C): 37 (04 Aug 2021 21:06), Max: 37.1 (04 Aug 2021 16:29)  T(F): 98.6 (04 Aug 2021 21:06), Max: 98.8 (04 Aug 2021 16:29)  HR: 91 (04 Aug 2021 21:06) (88 - 91)  BP: 137/67 (04 Aug 2021 21:06) (122/78 - 137/67)  BP(mean): --  RR: 18 (04 Aug 2021 21:06) (18 - 20)  SpO2: 97% (04 Aug 2021 21:06) (87% - 97%)    GENERAL: lethargic. but responds to questions appropriately.   SKIN: No rashes or lesions  HEAD:  Atraumatic, Normocephalic  EYES:  conjunctiva and sclera clear  ENMT: Moist mucous membranes  NECK: Supple, No JVD  CHEST/LUNG: decreased air entry b/l with coarse bronchial breathing b/l.   HEART: S1, S2 appreciated. Rate and Rythm are regular.  ABDOMEN/GI: Soft, Nontender, Nondistended; Bowel sounds present  EXTREMITIES:  No edema  NERVOUS SYSTEM:  lethargic but wakes when spoken to.    LABS:    CARDIAC MARKERS ( 04 Aug 2021 16:35 )  x     / <0.01 ng/mL / x     / x     / x                             13.1   13.75 )-----------( 204      ( 04 Aug 2021 16:35 )             40.5       CBC Full  -  ( 04 Aug 2021 16:35 )  WBC Count : 13.75 K/uL  RBC Count : 4.71 M/uL  Hemoglobin : 13.1 g/dL  Hematocrit : 40.5 %  Platelet Count - Automated : 204 K/uL  Mean Cell Volume : 86.0 fL  Mean Cell Hemoglobin : 27.8 pg  Mean Cell Hemoglobin Concentration : 32.3 g/dL  Auto Neutrophil # : 10.38 K/uL  Auto Lymphocyte # : 2.02 K/uL  Auto Monocyte # : 0.90 K/uL  Auto Eosinophil # : 0.20 K/uL  Auto Basophil # : 0.09 K/uL  Auto Neutrophil % : 75.4 %  Auto Lymphocyte % : 14.7 %  Auto Monocyte % : 6.5 %  Auto Eosinophil % : 1.5 %  Auto Basophil % : 0.7 %      08-04    122<L>  |  93<L>  |  11  ----------------------------<  131<H>  5.6<H>   |  18  |  0.6<L>    Ca    8.3<L>      04 Aug 2021 16:35    TPro  6.8  /  Alb  4.0  /  TBili  0.2  /  DBili  x   /  AST  56<H>  /  ALT  22  /  AlkPhos  87  08-04      MEDICATIONS  (STANDING):  amLODIPine   Tablet 10 milliGRAM(s) Oral daily  apixaban 5 milliGRAM(s) Oral two times a day  atorvastatin 80 milliGRAM(s) Oral at bedtime  budesonide 160 MICROgram(s)/formoterol 4.5 MICROgram(s) Inhaler 2 Puff(s) Inhalation two times a day  carBAMazepine 200 milliGRAM(s) Oral two times a day  cefTRIAXone   IVPB 1000 milliGRAM(s) IV Intermittent every 24 hours  clopidogrel Tablet 75 milliGRAM(s) Oral daily  doxycycline hyclate Capsule 100 milliGRAM(s) Oral every 12 hours  escitalopram 10 milliGRAM(s) Oral daily  furosemide   Injectable 40 milliGRAM(s) IV Push daily  methylPREDNISolone sodium succinate Injectable 40 milliGRAM(s) IV Push two times a day  mirtazapine 30 milliGRAM(s) Oral at bedtime  montelukast 10 milliGRAM(s) Oral daily  pantoprazole    Tablet 40 milliGRAM(s) Oral before breakfast  pregabalin 150 milliGRAM(s) Oral every 12 hours  traZODone 100 milliGRAM(s) Oral at bedtime    MEDICATIONS  (PRN):  ALBUTerol    90 MICROgram(s) HFA Inhaler 1 Puff(s) Inhalation daily PRN Shortness of Breath and/or Wheezing  ALPRAZolam 1 milliGRAM(s) Oral at bedtime PRN anxiety      08-04-21 @ 22:39

## 2021-08-04 NOTE — CONSULT NOTE ADULT - ASSESSMENT
1] sob and cough likely secondary to pna b/l with severe hyponatremia  continue antibiotics.   pulmonary evaluation.   send investigations for legionella if applicable.     2] PAF currently in sinus [ekg sr with lbbb, qtc 540 ms due to lbbb]  continue eliquis 5 mg bid.     3]h/o cad and stents  continue aspirin 81 mg once daily and statins.    4] h/o tavr  continue aspirin 81 mg daily  please give ppis since pt is on eliquis as well    5] active smoking and copd  continue nebs    multiple other medical issues.     please recall us prn

## 2021-08-04 NOTE — ED ADULT TRIAGE NOTE - NS ED NURSE AMBULANCES
Progress Note      Patient Name: Med Forbes   Patient ID: [id number]   Sex: Male   YOB: 1955    Primary Care Provider: Latasha RALPH   Referring Provider: Austen Sadler PA-C    Visit Date: April 16, 2021    Provider: Domonique Selby PA-C   Location: [facility] [place]   Location Address: [address] [place] [address]  [id number]   Location Phone: [phone]          Chief Complaint     Follow up with lumbar spine x-rays done at [facility].       History Of Present Illness     F/U for his L1 compression fracture with transverse process fractures.  Pain is around a 3-5/10.  Tolerating the TLSO brace.  Has been off work since last office visit.  Denies bowel/bladder incontinence.  His lumbar spine xrays showed stable L1 and old L3 compression deformities.       Past Medical History  Arthritis; Heart attack; High blood pressure; High cholesterol; Leg pain; Leg swelling; Low back pain; Thyroid Disease         Past Surgical History  heart surgery         Medication List  aspirin 325 mg oral tablet; gabapentin 300 mg oral capsule; levothyroxine 150 mcg oral tablet; lisinopril 20 mg oral tablet; methocarbamol 500 mg oral tablet; sertraline 100 mg oral tablet         Allergy List  NO KNOWN DRUG ALLERGIES       Allergies Reconciled  Family Medical History  Heart Disease; Arthrtis         Social History  Tobacco (Former)         Review of Systems  · Constitutional  o Admits  o : fatigue, weight gain  o Denies  o : chills, excessive sweating, fever, sycope/passing out, weight loss  · Eyes  o Admits  o : changes in vision, blurry vision  o Denies  o : double vision  · HENT  o Admits  o : nasal congestion, sinus pain, seasonal allergies  o Denies  o : loss of hearing, ringing in the ears, ear aches, sore throat, nose bleeds  · Cardiovascular  o Denies  o : blood clots, swollen legs, anemia, easy burising or bleeding, transfusions  · Respiratory  o Admits  o :  "shortness of breath, productive cough  o Denies  o : dry cough, pneumonia, COPD  · Gastrointestinal  o Denies  o : difficulty swallowing, reflux  · Genitourinary  o Denies  o : incontinence  · Neurologic  o Admits  o : stroke, loss of balance, falls, dizziness/vertigo, difficulty with sleep, difficulty with coordination  o Denies  o : headache, seizure, tremor, numbness/tingling/paresthesia , difficulty with dexterity, weakness  · Musculoskeletal  o Admits  o : muscle aches, joint pain, weakness, spasms, sciatica, pain radiating in arm, pain radiating in leg, low back pain  o Denies  o : neck stiffness/pain, swollen lymph nodes  · Endocrine  o Admits  o : thyroid disorder  o Denies  o : diabetes  · Psychiatric  o Admits  o : anxiety, depression  · All Others Negative      Vitals  Date Time BP Position Site L\R Cuff Size HR RR TEMP (F) WT  HT  BMI kg/m2 BSA m2 O2 Sat FR L/min FiO2 HC       04/16/2021 10:55 AM        97.2 210lbs 2oz 5'  9\" 31.03 2.15             Physical Examination     Wearing TLSO brace.    No clonus  gait and station are wnl           Assessment  · Lumbago/low back pain     724.3/M54.40  · Lumbar compression fracture     805.4/S32.000A  L1 new   · Lumbar transverse process fracture, closed, initial encounter     805.4/S32.009A  left L1  · Rib fracture     807.00/S22.39XA  left 11th lateral  · Fall     E888.9/W19.XXXA      Plan  · Orders  o Lumbar Spine xray AP and Lateral [facility] ([address]) - 724.3/M54.40, 805.4/S32.009A, 805.4/S32.000A - 05/14/2021  · Medications  o Medications have been Reconciled  o Transition of Care or Provider Policy  · Instructions  o He is doing better. F/U in 4 weeks with lumbar spine xrays. Off work until f/u visit ini 4 weeks. I anticipate him returning to work after his next f/u visit.             Electronically Signed by: LISA SinghC -Author on April 16, 2021 11:21:48 AM  " [facility]

## 2021-08-04 NOTE — ED ADULT NURSE NOTE - NSIMPLEMENTINTERV_GEN_ALL_ED
Implemented All Fall with Harm Risk Interventions:  [place] to call system. Call bell, personal items and telephone within reach. Instruct patient to call for assistance. Room bathroom lighting operational. Non-slip footwear when patient is off stretcher. Physically safe environment: no spills, clutter or unnecessary equipment. Stretcher in lowest position, wheels locked, appropriate side rails in place. Provide visual cue, wrist band, yellow gown, etc. Monitor gait and stability. Monitor for mental status changes and reorient to person, place, and time. Review medications for side effects contributing to fall risk. Reinforce activity limits and safety measures with patient and family. Provide visual clues: red socks.

## 2021-08-04 NOTE — H&P ADULT - NSHPLABSRESULTS_GEN_ALL_CORE
.                          13.1   13.75 )-----------( 204      ( 04 Aug 2021 16:35 )             40.5     08-04    122<L>  |  93<L>  |  11  ----------------------------<  131<H>  5.6<H>   |  18  |  0.6<L>    Ca    8.3<L>      04 Aug 2021 16:35    TPro  6.8  /  Alb  4.0  /  TBili  0.2  /  DBili  x   /  AST  56<H>  /  ALT  22  /  AlkPhos  87  08-04        CARDIAC MARKERS ( 04 Aug 2021 16:35 )  x     / <0.01 ng/mL / x     / x     / x        Serum Pro-Brain Natriuretic Peptide: 2245 pg/mL (08.04.21 @ 16:35)      LIVER FUNCTIONS - ( 04 Aug 2021 16:35 )  Alb: 4.0 g/dL / Pro: 6.8 g/dL / ALK PHOS: 87 U/L / ALT: 22 U/L / AST: 56 U/L / GGT: x             COVID-19 PCR: NotDetec (04 Aug 2021 16:15)  COVID-19 PCR: NotDetec (07 May 2021 20:40)  COVID-19 PCR: NotDetec (05 Mar 2021 18:13)    < from: 12 Lead ECG (08.04.21 @ 16:44) >    Diagnosis Line Normal sinus rhythm  Possible Left atrial enlargement  Left axis deviation  Left bundle branch block  Abnormal ECG  QTc 544    < end of copied text >      RADIOLOGY & ADDITIONAL STUDIES:  < from: TTE Echo Complete w/o Contrast w/ Doppler (05.09.21 @ 10:29) >    Summary:   1. Moderately to severely decreased global left ventricular systolic function.   2. Severely enlarged left atrium.   3. Multiple left ventricular regional wall motion abnormalities exist. See wall motion findings.   4. LV Ejection Fraction by Samson's Method with a biplane EF of 37 %.   5. Moderate eccentric left ventricular hypertrophy.   6. Moderate to severely increased left ventricular internal cavity size.   7. The mean global longitudinal peak strain by speckle tracking is -14.2% which is reduced.   8. Mild to moderately enlarged right atrium.   9. Moderate mitral annular calcification.  10. Moderate to severe mitral valve regurgitation.  11. Mild tricuspid regurgitation.  12. Mild aortic regurgitation.  13. TAVR in the aortic position.  14. Mild perivalvular AI is noted.  15. LA volume Index is 52.6 ml/m² ml/m2.  16. Peak transaortic gradient equals 26.7 mmHg, mean transaortic gradient equals 12.9 mmHg,the calculated aortic valve area equals 1.18 cm² by the continuity equation consistent with moderate aortic stenosis.  17. There is moderate aortic root calcification.    < end of copied text >

## 2021-08-04 NOTE — ED PROVIDER NOTE - ATTENDING CONTRIBUTION TO CARE
aburpt onset cough that is non productive with sob without chest pain that is moderate severe intensity that occurred today when she stepped in the shower. triggering symptom was smoking today. no fever. aggravating factor is walking. alleviating factor is nebulizer. tried one neb prior to arrival. with ems received nebs, steroids and mag. pulse ox on ra was 84. imp: copd exacerbation, less likely chf given lack of edema, will check labs, cxr, continue treatment with nebs.

## 2021-08-04 NOTE — ED PROVIDER NOTE - CARE PLAN
Principal Discharge DX:	Multifocal pneumonia   Principal Discharge DX:	Multifocal pneumonia  Secondary Diagnosis:	Hyponatremia  Secondary Diagnosis:	Hypochloremia   Principal Discharge DX:	Multifocal pneumonia  Secondary Diagnosis:	Hyponatremia  Secondary Diagnosis:	Hypochloremia  Secondary Diagnosis:	COPD exacerbation

## 2021-08-05 NOTE — PROGRESS NOTE ADULT - SUBJECTIVE AND OBJECTIVE BOX
CLAYTON VELAZQUEZ 76y Female      Patient is a 76y old  Female who presents with a chief complaint of sob. pna. (04 Aug 2021 22:38)        INTERVAL HPI/OVERNIGHT EVENTS: No acute events overnight. Patient was seen and evaluated at the bedside. The patient denies pain. Vitals stable. Patient denies fever/chills, chest pain, shortness of breath, abdominal pain, headaches, nausea/vomiting, and diarrhea/constipation.      PHYSICAL EXAM:  GENERAL: NAD  HEAD:  Normocephalic  EYES:  conjunctiva and sclera clear  ENMT: Moist mucous membranes  NECK: Supple  NERVOUS SYSTEM:  Alert, awake  CHEST/LUNG: Good air exchange bilaterally, no wheeze  HEART: Regular rate and rhythm        Vital Signs Last 24 Hrs  T(C): 36 (05 Aug 2021 05:31), Max: 37.1 (04 Aug 2021 16:29)  T(F): 96.8 (05 Aug 2021 05:31), Max: 98.8 (04 Aug 2021 16:29)  HR: 72 (05 Aug 2021 05:31) (72 - 91)  BP: 145/90 (05 Aug 2021 05:31) (122/78 - 145/90)  BP(mean): --  RR: 18 (05 Aug 2021 05:31) (18 - 20)  SpO2: 96% (05 Aug 2021 00:41) (87% - 97%)      Consultant(s) Notes Reviewed:  [X] YES  [ ] NO  Care Discussed with Consultants/Other Providers [X] YES  [ ] NO  Imaging Personally Reviewed:  [X] YES  [ ] NO      LABS:                        11.5   8.81  )-----------( 200      ( 05 Aug 2021 06:27 )             36.3     08-04    122<L>  |  93<L>  |  11  ----------------------------<  131<H>  5.6<H>   |  18  |  0.6<L>    Ca    8.3<L>      04 Aug 2021 16:35    TPro  6.8  /  Alb  4.0  /  TBili  0.2  /  DBili  x   /  AST  56<H>  /  ALT  22  /  AlkPhos  87  08-04          CAPILLARY BLOOD GLUCOSE               CLAYTON VELAZQUEZ 76y Female      Patient is a 76y old  Female who presents with a chief complaint of sob. pna. (04 Aug 2021 22:38)        INTERVAL HPI/OVERNIGHT EVENTS: No acute events overnight. Patient was seen and evaluated at the bedside. The patient denies pain. Vitals stable. Patient denies fever/chills, chest pain, shortness of breath, abdominal pain, headaches, nausea/vomiting, and diarrhea/constipation.      PHYSICAL EXAM:  GENERAL: NAD  HEAD:  Normocephalic  EYES:  conjunctiva and sclera clear  ENMT: Moist mucous membranes  NECK: Supple  NERVOUS SYSTEM:  Alert, awake  CHEST/LUNG: Diffuse bilat wheezing   HEART: Regular rate and rhythm  No LLE       Vital Signs Last 24 Hrs  T(C): 36 (05 Aug 2021 05:31), Max: 37.1 (04 Aug 2021 16:29)  T(F): 96.8 (05 Aug 2021 05:31), Max: 98.8 (04 Aug 2021 16:29)  HR: 72 (05 Aug 2021 05:31) (72 - 91)  BP: 145/90 (05 Aug 2021 05:31) (122/78 - 145/90)  BP(mean): --  RR: 18 (05 Aug 2021 05:31) (18 - 20)  SpO2: 96% (05 Aug 2021 00:41) (87% - 97%)      Consultant(s) Notes Reviewed:  [X] YES  [ ] NO  Care Discussed with Consultants/Other Providers [X] YES  [ ] NO  Imaging Personally Reviewed:  [X] YES  [ ] NO      LABS:                        11.5   8.81  )-----------( 200      ( 05 Aug 2021 06:27 )             36.3     08-04    122<L>  |  93<L>  |  11  ----------------------------<  131<H>  5.6<H>   |  18  |  0.6<L>    Ca    8.3<L>      04 Aug 2021 16:35    TPro  6.8  /  Alb  4.0  /  TBili  0.2  /  DBili  x   /  AST  56<H>  /  ALT  22  /  AlkPhos  87  08-04          CAPILLARY BLOOD GLUCOSE               CLAYTON VELAZQUEZ 76y Female      Patient is a 76y old  Female who presents with a chief complaint of sob. pna. (04 Aug 2021 22:38)        INTERVAL HPI/OVERNIGHT EVENTS: No acute events overnight. Patient was seen and evaluated at the bedside. Pt reports that her SOB has improved and she did not wake up at night with dyspnea. Pt otherwise denies any complaints.       PHYSICAL EXAM:  GENERAL: NAD  HEAD:  Normocephalic  EYES:  conjunctiva and sclera clear  ENMT: Moist mucous membranes  NECK: Supple  NERVOUS SYSTEM:  Alert, awake  CHEST/LUNG: Diffuse bilat wheezing   HEART: Regular rate and rhythm  No LLE       Vital Signs Last 24 Hrs  T(C): 36 (05 Aug 2021 05:31), Max: 37.1 (04 Aug 2021 16:29)  T(F): 96.8 (05 Aug 2021 05:31), Max: 98.8 (04 Aug 2021 16:29)  HR: 72 (05 Aug 2021 05:31) (72 - 91)  BP: 145/90 (05 Aug 2021 05:31) (122/78 - 145/90)  BP(mean): --  RR: 18 (05 Aug 2021 05:31) (18 - 20)  SpO2: 96% (05 Aug 2021 00:41) (87% - 97%)      Consultant(s) Notes Reviewed:  [X] YES  [ ] NO  Care Discussed with Consultants/Other Providers [X] YES  [ ] NO  Imaging Personally Reviewed:  [X] YES  [ ] NO      LABS:                        11.5   8.81  )-----------( 200      ( 05 Aug 2021 06:27 )             36.3     08-04    122<L>  |  93<L>  |  11  ----------------------------<  131<H>  5.6<H>   |  18  |  0.6<L>    Ca    8.3<L>      04 Aug 2021 16:35    TPro  6.8  /  Alb  4.0  /  TBili  0.2  /  DBili  x   /  AST  56<H>  /  ALT  22  /  AlkPhos  87  08-04          CAPILLARY BLOOD GLUCOSE               CLAYTON VELAZQUEZ 76y Female      Patient is a 76y old  Female who presents with a chief complaint of sob. pna. (04 Aug 2021 22:38)        INTERVAL HPI/OVERNIGHT EVENTS: No acute events overnight. Patient was seen and evaluated at the bedside. Pt reports that her SOB has improved and she did not wake up at night with dyspnea. Pt otherwise denies any complaints.   no cp, abd pain, fever  sob improving, no cough, orthopnea, pnd      PHYSICAL EXAM:  GENERAL: NAD  HEAD:  Normocephalic  EYES:  conjunctiva and sclera clear  ENMT: Moist mucous membranes  NECK: Supple  NERVOUS SYSTEM:  Alert, awake  CHEST/LUNG: Diffuse bilat wheezing   HEART: Regular rate and rhythm  No LLE       Vital Signs Last 24 Hrs  T(C): 36 (05 Aug 2021 05:31), Max: 37.1 (04 Aug 2021 16:29)  T(F): 96.8 (05 Aug 2021 05:31), Max: 98.8 (04 Aug 2021 16:29)  HR: 72 (05 Aug 2021 05:31) (72 - 91)  BP: 145/90 (05 Aug 2021 05:31) (122/78 - 145/90)  BP(mean): --  RR: 18 (05 Aug 2021 05:31) (18 - 20)  SpO2: 96% (05 Aug 2021 00:41) (87% - 97%)      Consultant(s) Notes Reviewed:  [X] YES  [ ] NO  Care Discussed with Consultants/Other Providers [X] YES  [ ] NO  Imaging Personally Reviewed:  [X] YES  [ ] NO      LABS:                        11.5   8.81  )-----------( 200      ( 05 Aug 2021 06:27 )             36.3     08-04    122<L>  |  93<L>  |  11  ----------------------------<  131<H>  5.6<H>   |  18  |  0.6<L>    Ca    8.3<L>      04 Aug 2021 16:35    TPro  6.8  /  Alb  4.0  /  TBili  0.2  /  DBili  x   /  AST  56<H>  /  ALT  22  /  AlkPhos  87  08-04          CAPILLARY BLOOD GLUCOSE

## 2021-08-05 NOTE — PROGRESS NOTE ADULT - ATTENDING COMMENTS
#Acute hypoxic resp failure  suspect due to volume overload +/- copd exacerbation  + active smoking  cont lasix 40 iv bid; home dose 40 po  cxr with bl opacities with much improvement on reepat 8/4; f/u official read  check tte  change solumedrol to prednsione, plan for 5 days  abx change to levaquin for 5 days  proventil prn  symbicort      #Progress Note Handoff:  Pending (specify):  Consults_________, Tests________, Test Results_______, Other____hypoxia, f/u cxr reads, tte_____  Family discussion: d/w pt at bedside re: treatment plan, primary dx  Disposition: Home__x_/SNF___/Other________/Unknown at this time________ #Acute hypoxic resp failure  suspect due to volume overload +/- copd exacerbation  + active smoking  cont lasix 40 iv bid; home dose 40 po  cxr with bl opacities with much improvement on repeat 8/4; f/u official read  check tte  change solumedrol to prednsione, plan for 5 days  abx change to levaquin for 5 days  proventil prn  symbicort  #Hyponatremia  presumed due to volume overload  improved too rapidly  repeat bmp in pm      #Progress Note Handoff:  Pending (specify):  Consults_________, Tests________, Test Results_______, Other____hypoxia, f/u cxr reads, tte_____  Family discussion: d/w pt at bedside re: treatment plan, primary dx  Disposition: Home__x_/SNF___/Other________/Unknown at this time________

## 2021-08-05 NOTE — PROGRESS NOTE ADULT - ASSESSMENT
75 year old female, with a past medical history of aortic valve stenosis s/p TAVR,  Afib, CAD stent x4 6/15, HTN, GERD, SLE, trigeminal neuralgia, breast CA s/p rodrick mastectomy, HFrEF, COPD and Sjogren's syndrome presents today for cough and SOB for 1 day. In ED: Temp 98; HR = 88; BP = 122/78; RR 18; SaO2 = 95% on 3 L. WBC 13.75    # SOB / cough  - PNA vs COPD vs CHF exacerbation  - CXR noted for bilateral opacities on imaging (pending read) but looks similar to CXR on previous admission  - antibiotics ceftriaxone 1 gm daily plus doxycycline 100 bid  - check urine strep and legionella  - check Procal  - c/w oxygen therapy PRN, target SaO2 88-92%      # HFrEF  - last echo noted for 37%  - lasix 40 mg iv daily  - daily weights  - low salt diet, fluid intake < 1 L    # Hyponatremia  - Na 122 on admission   - chronic hyponatremia  - can be due to SIADH on SSRI??? from COPD vs volume overload  - c/w lasix  - restrict fluid intake < 1L  - f/p serum osmolality, urine osmolality, urine Na  - trend Na      # Misc  DVT: Eliquis, check duplex  GI: Pantoprazole  Diet: REGI  Activity: increase as tolerated  Dispo: Acute for now       75 year old female, with a past medical history of aortic valve stenosis s/p TAVR,  Afib, CAD stent x4 6/15, HTN, GERD, SLE, trigeminal neuralgia, breast CA s/p rodrick mastectomy, HFrEF, COPD and Sjogren's syndrome presents today for cough and SOB for 1 day. In ED: Temp 98; HR = 88; BP = 122/78; RR 18; SaO2 = 95% on 3 L. WBC 13.75    # SOB / cough - CHF exacerbation with possible PNA   - CXR on admission noted for bilateral opacities on imaging (pending read)  - antibiotics ceftriaxone 1 gm daily plus doxycycline 100 bid switched to Levofloxacin 750mg daily on 8/5/2021 D/C   - f/u urine strep and legionella  - f/u Procal  - c/w oxygen therapy PRN, target SaO2 88-92%  - last echo 05/09/2021  noted for 37%  - BNP on admission 2245   - lasix 40 mg IV daily  - daily weights  - accurate in/out   - low salt diet, fluid intake < 1 L    # Hyponatremia  - Na 122 on admission   - chronic hyponatremia  - can be due to SIADH on SSRI??? from COPD vs volume overload  - c/w lasix  - restrict fluid intake < 1L  - f/p serum osmolality, urine osmolality, urine Na  - trend Na      # Misc  DVT: Eliquis, check duplex  GI: Pantoprazole  Diet: REGI  Activity: increase as tolerated  Dispo: Acute for now       75 year old female, with a past medical history of aortic valve stenosis s/p TAVR,  Afib, CAD stent x4 6/15, HTN, GERD, SLE, trigeminal neuralgia, breast CA s/p rodrick mastectomy, HFrEF, COPD and Sjogren's syndrome presents today for cough and SOB for 1 day. In ED: Temp 98; HR = 88; BP = 122/78; RR 18; SaO2 = 95% on 3 L. WBC 13.75    # SOB / cough - CHF exacerbation with possible PNA   - CXR on admission significant for congestion with possible underlying opacities (pending official report)   - CXR today (8/5/21) congestion improved (pending official read)   - less likely pt has PNA as pt is afebrile and WBC is normal today, CXR today significant improvement  - antibiotics ceftriaxone 1 gm daily plus doxycycline 100 bid switched to Levofloxacin 750mg daily on 8/5/2021 D/C in 7 days  - less likely COPD exacerbation steroids decreased from 40 IV BID to 40 PO daily  - f/u urine strep and legionella  - f/u Procal  - c/w oxygen therapy PRN, target SaO2 88-92%, currently stable on 3L NC   - last echo 05/09/2021  noted for 37%  - BNP on admission 2245   - lasix 40 mg IV daily  - daily weights  - accurate in/out   - low salt diet, fluid intake < 1 L    # Hyponatremia- due to volume overload (resolved)  - Na 122 on admission, Na today 8/5 135  - c/w lasix  - restrict fluid intake < 1L  - f/p serum osmolality, urine osmolality, urine Na  - trend Na      # Misc  DVT: Eliquis, check duplex  GI: Pantoprazole  Diet: REGI  Activity: increase as tolerated  Dispo: Acute for now

## 2021-08-06 NOTE — PROGRESS NOTE ADULT - SUBJECTIVE AND OBJECTIVE BOX
SUBJECTIVE:    Patient is a 76y old Female who presents with a chief complaint of sob. pna. (04 Aug 2021 22:38)    Currently admitted to medicine with the primary diagnosis of Multifocal pneumonia     Today is hospital day 2d. This morning she is resting in bed and reports no new issues or overnight events.     PAST MEDICAL & SURGICAL HISTORY  Malignant neoplasm of lower-outer quadrant of left breast of female, estrogen receptor positive    Hypertension, unspecified type    Rectal prolapse    Constipation, unspecified constipation type    Vitamin D deficiency    GERD without esophagitis    Sjogren&#x27;s syndrome, with unspecified organ involvement    Trigeminal neuralgia    Lupus (systemic lupus erythematosus)    Aortic valve stenosis, etiology of cardiac valve disease unspecified    Atrial fibrillation/flutter    Atherosclerotic heart disease    History of partial colectomy    H/O bilateral mastectomy    History of appendectomy    History of percutaneous coronary intervention      SOCIAL HISTORY:  Negative for smoking/alcohol/drug use.     ALLERGIES:  No Known Allergies    MEDICATIONS:  STANDING MEDICATIONS  apixaban 5 milliGRAM(s) Oral two times a day  artificial  tears Solution 2 Drop(s) Both EYES three times a day  atorvastatin 80 milliGRAM(s) Oral at bedtime  budesonide 160 MICROgram(s)/formoterol 4.5 MICROgram(s) Inhaler 2 Puff(s) Inhalation two times a day  carBAMazepine 200 milliGRAM(s) Oral two times a day  clopidogrel Tablet 75 milliGRAM(s) Oral daily  escitalopram 10 milliGRAM(s) Oral daily  furosemide   Injectable 40 milliGRAM(s) IV Push daily  metoprolol tartrate 25 milliGRAM(s) Oral two times a day  mirtazapine 30 milliGRAM(s) Oral at bedtime  montelukast 10 milliGRAM(s) Oral daily  pantoprazole    Tablet 40 milliGRAM(s) Oral before breakfast  predniSONE   Tablet 40 milliGRAM(s) Oral daily  pregabalin 150 milliGRAM(s) Oral every 12 hours  traZODone 100 milliGRAM(s) Oral at bedtime    PRN MEDICATIONS  ALBUTerol    90 MICROgram(s) HFA Inhaler 1 Puff(s) Inhalation daily PRN  ALPRAZolam 1 milliGRAM(s) Oral at bedtime PRN    VITALS:   T(F): 96.2  HR: 81  BP: 153/67  RR: 18  SpO2: 95%    LABS:                        11.5   8.98  )-----------( 193      ( 06 Aug 2021 04:30 )             36.3     08-06    130<L>  |  97<L>  |  15  ----------------------------<  99  4.5   |  20  |  0.5<L>    Ca    8.3<L>      06 Aug 2021 04:30  Mg     2.0     08-05    TPro  6.3  /  Alb  4.0  /  TBili  0.4  /  DBili  x   /  AST  22  /  ALT  20  /  AlkPhos  82  08-06        Culture - Blood (collected 04 Aug 2021 17:48)  Source: .Blood Blood-Peripheral  Preliminary Report (06 Aug 2021 01:02):    No growth to date.    Culture - Blood (collected 04 Aug 2021 17:48)  Source: .Blood Blood-Peripheral  Preliminary Report (06 Aug 2021 01:02):    No growth to date.      RADIOLOGY:  Xray Chest 1 View- PORTABLE-Routine (Xray Chest 1 View- PORTABLE-Routine in AM.) (08.06.21): Decreased bilateral mid to lower lung opacities. Increased right upper lung opacities.      PHYSICAL EXAM:  GEN: No acute distress  LUNGS: Clear to auscultation bilaterally   HEART: S1/S2 present. RRR.   ABD: Soft, non-tender, non-distended. Bowel sounds present  EXT: NC/NC/NE/2+PP/GALLEGOS  NEURO: AAOX3

## 2021-08-06 NOTE — DISCHARGE NOTE PROVIDER - HOSPITAL COURSE
75 year old female, with a past medical history of aortic valve stenosis s/p TAVR,  Afib, CAD stent x4 6/15, HTN, GERD, SLE, trigeminal neuralgia, breast CA s/p rodrick mastectomy, HFrEF, COPD and Sjogren's syndrome presents today for cough and SOB for 1 day. In ED: Temp 98; HR = 88; BP = 122/78; RR 18; SaO2 = 95% on 3 L. WBC 13.75      # Acute Hypoxic Resp Failure secondary to HFrEF exacerbation   - initially placed on 3L NC then weaned off on RA sat 96%   - XRAY admission: Cardiomegaly with CHF, worsening.  - Xray Chest (08.06.21): Decreased bilateral mid to lower lung opacities. Increased right upper lung opacities.  - started on IV Lasix 40 daily and metoprolol   - amlodipine switched to losartan        # COPD  - Nicotine Abuse   -  started on steroids and Duoneb  - counselled to quit smoking   - started on nicotine patch     # Hx of CAD s/p stents  # Hx of TAVR  - on ASA and statin     # Paroxsymal Atrial Fibrillation   - rate controlled  - on metoprolol and Eliquis    75 year old female, with a past medical history of aortic valve stenosis s/p TAVR,  Afib, CAD stent x4 6/15, HTN, GERD, SLE, trigeminal neuralgia, breast CA s/p rodrick mastectomy, HFrEF, COPD and Sjogren's syndrome presents today for cough and SOB for 1 day. In ED: Temp 98; HR = 88; BP = 122/78; RR 18; SaO2 = 95% on 3 L. WBC 13.75      # Acute Hypoxic Resp Failure secondary to HFrEF exacerbation   - initially placed on 3L NC then weaned off on RA sat 96%   - XRAY admission: Cardiomegaly with CHF, worsening.  - Xray Chest (08.06.21): Decreased bilateral mid to lower lung opacities. Increased right upper lung opacities.  - started lasix 40 PO and metoprolol   - amlodipine switched to losartan        # COPD  - Nicotine Abuse   -  started on steroids and Duoneb  - counselled to quit smoking   - started on nicotine patch     # Hx of CAD s/p stents  # Hx of TAVR  - on ASA and statin     # Paroxsymal Atrial Fibrillation   - rate controlled  - on metoprolol and Eliquis    75 year old female, with a past medical history of aortic valve stenosis s/p TAVR,  Afib, CAD stent x4 6/15, HTN, GERD, SLE, trigeminal neuralgia, breast CA s/p rodrick mastectomy, HFrEF, COPD and Sjogren's syndrome presents today for cough and SOB for 1 day. In ED: Temp 98; HR = 88; BP = 122/78; RR 18; SaO2 = 95% on 3 L. WBC 13.75      # Acute Hypoxic Resp Failure secondary to HFrEF exacerbation   - initially placed on 3L NC then weaned off on RA sat 96%   - pt desat to 88% on ambulation on RA --> home O2 setup  - XRAY admission: Cardiomegaly with CHF, worsening.  - Xray Chest (08.06.21): Decreased bilateral mid to lower lung opacities. Increased right upper lung opacities.  - c/w lasix 40 PO and metoprolol     # COPD  - stable   - c/w home meds  - counselled to quit smoking     # Hx of CAD s/p stents  # Hx of TAVR  - on ASA, metoprolol and statin     # Paroxsymal Atrial Fibrillation   - rate controlled  - on metoprolol and Eliquis

## 2021-08-06 NOTE — DISCHARGE NOTE NURSING/CASE MANAGEMENT/SOCIAL WORK - PATIENT PORTAL LINK FT
You can access the FollowMyHealth Patient Portal offered by [facility] by registering at the following website: [email or web address]. By joining [facility]’s FollowMyHealth portal, you will also be able to view your health information using other applications (apps) compatible with our system.

## 2021-08-06 NOTE — PROGRESS NOTE ADULT - ASSESSMENT
# Acute Hypoxic Resp Failure secondary to HFrEF exacerbation   - sat 96% on RA  - Xray Chest (08.06.21): Decreased bilateral mid to lower lung opacities. Increased right upper lung opacities.  - repeat CXR in AM  - d/c Abx   - c/w IV Lasix and metoprolol   - check O2 on ambulation     # COPD  # Nicotine Abuse   - c/w steroid and Duoneb  - counselled to quit smoking   - start nicotine patch     # Hx of CAD s/p stents  # Hx of TAVR  - c/w ASA and statin     # Paroxsymal Atrial Fibrillation   - rate controlled  - c/w metoprolol and Eliquis     # DVT  # DASH diet  # Ambulate as tolerated  # Full code

## 2021-08-06 NOTE — DISCHARGE NOTE PROVIDER - PROVIDER TOKENS
PROVIDER:[TOKEN:[[id number]]] PROVIDER:[TOKEN:[[id number]],ESTABLISHEDPATIENT:[T]],PROVIDER:[TOKEN:[[id number]],FOLLOWUP:[1 week],ESTABLISHEDPATIENT:[T]]

## 2021-08-06 NOTE — DISCHARGE NOTE PROVIDER - NSDCFUSCHEDAPPT_GEN_ALL_CORE_FT
CLAYTON VELAZQUEZ ; 08/30/2021 ; [facility]  CLAYTON VELAZQUEZ ; 10/11/2021 ; [facility] [address] [facility]

## 2021-08-06 NOTE — PROGRESS NOTE ADULT - SUBJECTIVE AND OBJECTIVE BOX
75 year old female, with a past medical history of aortic valve stenosis s/p TAVR,  Afib, CAD stent x4 6/15, HTN, GERD, SLE, trigeminal neuralgia, breast CA s/p rodrick mastectomy, HFrEF, COPD and Sjogren's syndrome presents today for cough and SOB for 1 day. In ED: Temp 98; HR = 88; BP = 122/78; RR 18; SaO2 = 95% on 3 L. WBC 13.75    #Acute hypoxic resp failure  suspect due to volume overload +/- copd exacerbation  + act  #Hyponatremia  presumed due to volume overload  improved too rapidly  repeat bmp in pm  # Acute hypoxic resp failure; CHF exacerbation +/- COPD exacerbation   - CXR on admission: Cardiomegaly with CHF, worsening  - CXR today (8/6/21): Decreased bilateral mid to lower lung opacities. Increased right upper lung opacities.  - less likely pt has PNA as pt is afebrile, WBC is normal, urine legionella negative, Procal 0.16 --> dc Abx today (08/06/2021)  - pt active smoker, with wheezing on exam--> possible COPD exacerbation on prednisone 40 PO daily D3/5, on symbicort and proventil prn   - currently stable on RA was on NC before    - last echo 05/09/2021  noted for 37%  - TTE ordered  - BNP on admission 2245   - cw lasix 40 mg IV daily (home dose 40 PO)   - daily weights  - accurate in/out   - low salt diet, fluid intake < 1 L    # Hyponatremia  - Na 122 on admission, Na today 8/6 130   - U osm 328, U Na 75--> goes with SIADH --> consider stopping SSRI if further drop       # Misc  DVT: Eliquis, check duplex  GI: Pantoprazole  Diet: REGI  Activity: increase as tolerated  Dispo: Acute for now   75 year old female, with a past medical history of aortic valve stenosis s/p TAVR,  Afib, CAD stent x4 6/15, HTN, GERD, SLE, trigeminal neuralgia, breast CA s/p rodrick mastectomy, HFrEF, COPD and Sjogren's syndrome presents today for cough and SOB for 1 day. In ED: Temp 98; HR = 88; BP = 122/78; RR 18; SaO2 = 95% on 3 L. WBC 13.75    #Acute hypoxic resp failure  suspect due to volume overload +/- copd exacerbation  + act  #Hyponatremia  presumed due to volume overload  improved too rapidly  repeat bmp in pm  # Acute hypoxic resp failure; CHF exacerbation +/- COPD exacerbation   - CXR on admission: Cardiomegaly with CHF, worsening  - CXR today (8/6/21): Decreased bilateral mid to lower lung opacities. Increased right upper lung opacities.  - less likely pt has PNA as pt is afebrile, WBC is normal, urine legionella negative, Procal 0.16 --> dc Abx today (08/06/2021)  - pt active smoker, with wheezing on exam--> possible COPD exacerbation on prednisone 40 PO daily D3/5, on symbicort and proventil prn   - currently stable on RA was on NC before    - last echo 05/09/2021  noted for 37%  - TTE ordered  - BNP on admission 2245   - cw lasix 40 mg IV daily (home dose 40 PO)   - daily weights  - accurate in/out   - low salt diet, fluid intake < 1 L  - started on metoprolol   - amlodipine switched to losartan     # Hyponatremia  - Na 122 on admission, Na today 8/6 130   - U osm 328, U Na 75--> goes with SIADH --> consider stopping SSRI if further drop       # Misc  DVT: Eliquis, check duplex  GI: Pantoprazole  Diet: REGI  Activity: increase as tolerated  Dispo: Acute for now

## 2021-08-06 NOTE — DISCHARGE NOTE PROVIDER - NSDCMRMEDTOKEN_GEN_ALL_CORE_FT
ALPRAZolam 0.5 mg oral tablet: 2 tab(s) orally once a day (at bedtime), As Needed  apixaban 5 mg oral tablet: 1 tab(s) orally every 12 hours  carBAMazepine 200 mg oral tablet: 1 tab(s) orally 2 times a day  clopidogrel 75 mg oral tablet: 1 tab(s) orally once a day  famotidine 40 mg oral tablet: 1 tab(s) orally once a day (at bedtime)  Fioricet oral capsule: 4 cap(s) orally once a day  furosemide 20 mg oral tablet: 2 tab(s) orally once a day   Lexapro 10 mg oral tablet: 1 tab(s) orally once a day  losartan 50 mg oral tablet: 1 tab(s) orally once a day  metoprolol tartrate 25 mg oral tablet: 1 tab(s) orally 2 times a day  mirtazapine 30 mg oral tablet: 1 tab(s) orally once a day (at bedtime)  montelukast 10 mg oral tablet: 1 tab(s) orally once a day  ocular lubricant ophthalmic solution: 1 drop(s) to each affected eye every 2 hours, As needed, Dry Eyes  oxyCODONE 15 mg oral tablet: 1 tab(s) orally every 6 hours, As Needed -for moderate pain MDD:4   predniSONE 20 mg oral tablet: 2 tab(s) orally once a day  pregabalin 150 mg oral capsule: 1 cap(s) orally every 12 hours  rosuvastatin 40 mg oral tablet: 1 tab(s) orally once a day  Spiriva Respimat 1.25 mcg/inh inhalation aerosol: 2 puff(s) inhaled once a day   Symbicort 160 mcg-4.5 mcg/inh inhalation aerosol: 2 puff(s) inhaled 2 times a day   traZODone 100 mg oral tablet: 1 tab(s) orally once a day (at bedtime)  Ventolin HFA 90 mcg/inh inhalation aerosol: 2 puff(s) inhaled prn MDD:maximum 6 puffs a day   ALPRAZolam 0.5 mg oral tablet: 2 tab(s) orally once a day (at bedtime), As Needed  apixaban 5 mg oral tablet: 1 tab(s) orally every 12 hours  carBAMazepine 200 mg oral tablet: 1 tab(s) orally 2 times a day  clopidogrel 75 mg oral tablet: 1 tab(s) orally once a day  famotidine 40 mg oral tablet: 1 tab(s) orally once a day (at bedtime)  furosemide 20 mg oral tablet: 2 tab(s) orally once a day   Lexapro 10 mg oral tablet: 1 tab(s) orally once a day  losartan 50 mg oral tablet: 1 tab(s) orally once a day  metoprolol tartrate 25 mg oral tablet: 1 tab(s) orally 2 times a day  mirtazapine 30 mg oral tablet: 1 tab(s) orally once a day (at bedtime)  montelukast 10 mg oral tablet: 1 tab(s) orally once a day  Nicotine System Kit transdermal film, extended release: 1 each transdermally once a day   ocular lubricant ophthalmic solution: 1 drop(s) to each affected eye every 2 hours, As needed, Dry Eyes  pantoprazole 40 mg oral delayed release tablet: 1 tab(s) orally once a day (before a meal)  predniSONE 20 mg oral tablet: 2 tab(s) orally once a day  pregabalin 150 mg oral capsule: 1 cap(s) orally every 12 hours  rosuvastatin 40 mg oral tablet: 1 tab(s) orally once a day  Spiriva Respimat 1.25 mcg/inh inhalation aerosol: 2 puff(s) inhaled once a day   Symbicort 160 mcg-4.5 mcg/inh inhalation aerosol: 2 puff(s) inhaled 2 times a day   traZODone 100 mg oral tablet: 1 tab(s) orally once a day (at bedtime)  Ventolin HFA 90 mcg/inh inhalation aerosol: 2 puff(s) inhaled prn MDD:maximum 6 puffs a day   ALPRAZolam 0.5 mg oral tablet: 2 tab(s) orally once a day (at bedtime), As Needed  apixaban 5 mg oral tablet: 1 tab(s) orally every 12 hours  carBAMazepine 200 mg oral tablet: 1 tab(s) orally 2 times a day  clopidogrel 75 mg oral tablet: 1 tab(s) orally once a day  famotidine 40 mg oral tablet: 1 tab(s) orally once a day (at bedtime)  furosemide 40 mg oral tablet: 1 tab(s) orally once a day  Lexapro 10 mg oral tablet: 1 tab(s) orally once a day  losartan 50 mg oral tablet: 1 tab(s) orally once a day  metoprolol tartrate 25 mg oral tablet: 1 tab(s) orally 2 times a day  mirtazapine 30 mg oral tablet: 1 tab(s) orally once a day (at bedtime)  montelukast 10 mg oral tablet: 1 tab(s) orally once a day  Nicotine System Kit transdermal film, extended release: 1 each transdermally once a day   ocular lubricant ophthalmic solution: 1 drop(s) to each affected eye every 2 hours, As needed, Dry Eyes  pantoprazole 40 mg oral delayed release tablet: 1 tab(s) orally once a day (before a meal)  predniSONE 20 mg oral tablet: 2 tab(s) orally once a day   pregabalin 150 mg oral capsule: 1 cap(s) orally every 12 hours  rosuvastatin 40 mg oral tablet: 1 tab(s) orally once a day  Spiriva Respimat 1.25 mcg/inh inhalation aerosol: 2 puff(s) inhaled once a day   Symbicort 160 mcg-4.5 mcg/inh inhalation aerosol: 2 puff(s) inhaled 2 times a day   traZODone 100 mg oral tablet: 1 tab(s) orally once a day (at bedtime)  Ventolin HFA 90 mcg/inh inhalation aerosol: 2 puff(s) inhaled prn MDD:maximum 6 puffs a day

## 2021-08-06 NOTE — DISCHARGE NOTE PROVIDER - CARE PROVIDER_API CALL
Lita Dubois  INTERNAL MEDICINE  [facility] [place] [address]  Phone: [phone]  Fax: [phone]  Follow Up Time:    Lita Dubois  INTERNAL MEDICINE  [facility] [place] [address]  Phone: [phone]  Fax: [phone]  Established Patient  Follow Up Time:     Alexys Sepulveda)  Cardiovascular Disease  [address] [facility], [address]  [place] [address]  Phone: [phone]  Fax: [phone]  Established Patient  Follow Up Time: 1 week

## 2021-08-06 NOTE — DISCHARGE NOTE PROVIDER - NSDCCPCAREPLAN_GEN_ALL_CORE_FT
PRINCIPAL DISCHARGE DIAGNOSIS  Diagnosis: CHF exacerbation  Assessment and Plan of Treatment: Congestive heart failure (CHF) is a chronic condition in which the heart has trouble pumping blood. In some cases of heart failure, fluid may back up into your lungs or you may have swelling (edema) in your lower legs. There are many causes of heart failure including high blood pressure, coronary artery disease, abnormal heart valves, heart muscle disease, lung disease, diabetes, etc. Symptoms include shortness of breath with activity or when lying flat, cough, swelling of the legs, fatigue, or increased urination during the night.   Treatment is aimed at managing the symptoms of heart failure and may include lifestyle changes, medications, or surgical procedures. Take medicines only as directed by your health care provider and do not stop unless instructed to do so. Eat heart-healthy foods with low or no trans/saturated fats, cholesterol and salt. Weigh yourself every day for early recognition of fluid accumulation.  SEEK IMMEDIATE MEDICAL CARE IF YOU HAVE ANY OF THE FOLLOWING SYMPTOMS: shortness of breath, change in mental status, chest pain, lightheadedness/dizziness/fainting, or worsening of symptoms including not being able to conduct normal physical activity.        SECONDARY DISCHARGE DIAGNOSES  Diagnosis: Hyponatremia  Assessment and Plan of Treatment:     Diagnosis: COPD exacerbation  Assessment and Plan of Treatment: Chronic obstructive pulmonary disease (COPD) is a lung condition in which airflow from the lungs is limited. Causes include smoking, secondhand smoke exposure, genetics, or recurrent infections. Take all medicines (inhaled or pills) as directed by your health care provider. Avoid exposure to irritants such as smoke, chemicals, and fumes that aggravate your breathing.  If you are a smoker, the most important thing that you can do is stop smoking. Continuing to smoke will cause further lung damage and breathing trouble. Ask your health care provider for help with quitting smoking.  SEEK IMMEDIATE MEDICAL CARE IF YOU HAVE ANY OF THE FOLLOWING SYMPTOMS: shortness of breath at rest or when talking, bluish discoloration of lips, skin, fever, worsening cough, unexplained chest pain, or lightheadedness/dizziness.

## 2021-08-07 NOTE — CHART NOTE - NSCHARTNOTEFT_GEN_A_CORE
<<<RESIDENT DISCHARGE NOTE>>>     CLAYTON VELAZQUEZ  MRN-[id number]    VITAL SIGNS:  T(F): 96.5 (08-07-21 @ 04:42), Max: 96.7 (08-06-21 @ 21:26)  HR: 70 (08-07-21 @ 04:42)  BP: 143/63 (08-07-21 @ 04:42)  SpO2: 88% (08-07-21 @ 08:58)      PHYSICAL EXAMINATION:  GENERAL: NAD, Resting in bed  HEENT: NCAT  CHEST/LUNG: Clear to auscultation bilaterally; No wheezing or rubs.   HEART: Regular rate and rhythm; No murmurs, rubs, or gallops  ABDOMEN: Bowel sounds present; Soft, Nontender, Nondistended.   EXTREMITIES:  No clubbing, cyanosis, or edema  NERVOUS SYSTEM:  Alert & Oriented X3      TEST RESULTS:                        12.0   12.89 )-----------( 217      ( 07 Aug 2021 08:41 )             38.2       08-07    130<L>  |  95<L>  |  14  ----------------------------<  101<H>  4.3   |  24  |  0.6<L>    Ca    8.3<L>      07 Aug 2021 08:41  Mg     1.8     08-07    TPro  6.5  /  Alb  4.2  /  TBili  0.6  /  DBili  x   /  AST  26  /  ALT  29  /  AlkPhos  99  08-07    the patient is sating 97% on room air and 88% on room air with ambulation. The patient's saturation improves to 91-93% with 2L O2 via NC. Despite treatment, the patient is hypoxic. The patient is aware and is going home on O2.       DISPOSITION:   [ x ] Home,    [  ] Home with Visiting Nursing Services,   [    ]  SNF/ NH,    [   ] Acute Rehab (4A),   [   ] Other (Specify:_________) <<<RESIDENT DISCHARGE NOTE>>>     CLAYTON VELAZQUEZ  MRN-[id number]    VITAL SIGNS:  T(F): 96.5 (08-07-21 @ 04:42), Max: 96.7 (08-06-21 @ 21:26)  HR: 70 (08-07-21 @ 04:42)  BP: 143/63 (08-07-21 @ 04:42)  SpO2: 88% (08-07-21 @ 08:58)      TEST RESULTS:                        12.0   12.89 )-----------( 217      ( 07 Aug 2021 08:41 )             38.2       08-07    130<L>  |  95<L>  |  14  ----------------------------<  101<H>  4.3   |  24  |  0.6<L>    Ca    8.3<L>      07 Aug 2021 08:41  Mg     1.8     08-07    TPro  6.5  /  Alb  4.2  /  TBili  0.6  /  DBili  x   /  AST  26  /  ALT  29  /  AlkPhos  99  08-07    Home O2: Pt has COPD and HFrEF. The patient is sating 94% on room air,  88% on room air with ambulation. The patient's saturation improves to 97% with 2L O2 via NC. Despite treatment, the patient is hypoxic. The patient is aware and is going home on O2.       DISPOSITION:   [ x ] Home,    [  ] Home with Visiting Nursing Services,   [    ]  SNF/ NH,    [   ] Acute Rehab (4A),   [   ] Other (Specify:_________)

## 2021-08-30 PROBLEM — I50.9 CHF (CONGESTIVE HEART FAILURE): Status: ACTIVE | Noted: 2021-01-01

## 2021-08-30 PROBLEM — J44.9 CHRONIC OBSTRUCTIVE PULMONARY DISEASE, UNSPECIFIED COPD TYPE: Status: ACTIVE | Noted: 2021-01-01

## 2021-08-30 NOTE — PHYSICAL EXAM
[No Acute Distress] : no acute distress [Normal Oropharynx] : normal oropharynx [III] : Mallampati Class: III [Normal Appearance] : normal appearance [No Neck Mass] : no neck mass [Normal Rate/Rhythm] : normal rate/rhythm [Normal S1, S2] : normal s1, s2 [No Murmurs] : no murmurs [No Resp Distress] : no resp distress [Clear to Auscultation Bilaterally] : clear to auscultation bilaterally [No Abnormalities] : no abnormalities [No Clubbing] : no clubbing [No Cyanosis] : no cyanosis [No Edema] : no edema [Oriented x3] : oriented x3 [No Focal Deficits] : no focal deficits [Normal Affect] : normal affect [TextBox_2] : c

## 2021-08-30 NOTE — HISTORY OF PRESENT ILLNESS
[Initial Eval - Existing Diagnosis] : an initial evaluation of an existing diagnosis of [Dyspnea] : dyspnea [Dyspnea on Exertion] : dyspnea on exertion [Currently Experiencing] : The patient is currently experiencing symptoms. [Orthopnea] : orthopnea [Anticholinergics (Inhalation)] : inhaled anticholinergics [Difficulty Breathing During Exertion] : stable dyspnea on exertion [Feelings Of Weakness On Exertion] : stable exercise intolerance [Cough] : stable coughing [Wheezing] : stable wheezing [Adherent] : the patient is adherent with ~his/her~ medication regimen [Goals--Doing Well] : the patient is doing well with ~his/her~ COPD goals [General Malaise] : no general malaise [Hemoptysis] : no hemoptysis [Chest Pain] : no chest pain [Altered Mental Status] : no altered mental status

## 2021-08-30 NOTE — END OF VISIT
[] : Fellow [FreeTextEntry3] : patient seen and examined agree with above note \par innogen ordered \par spiriva and symbicort or trelegy \par follow cardiology \par ct scan in dec or nov \par \par

## 2021-10-06 PROBLEM — I10 ESSENTIAL HYPERTENSION: Status: ACTIVE | Noted: 2021-01-01

## 2021-10-11 ENCOUNTER — APPOINTMENT (OUTPATIENT)
Dept: CARDIOLOGY | Facility: CLINIC | Age: 76
End: 2021-10-11

## 2021-11-08 ENCOUNTER — APPOINTMENT (OUTPATIENT)
Dept: CARDIOLOGY | Facility: CLINIC | Age: 76
End: 2021-11-08

## 2021-11-23 ENCOUNTER — APPOINTMENT (OUTPATIENT)
Age: 76
End: 2021-11-23

## 2021-12-08 ENCOUNTER — APPOINTMENT (OUTPATIENT)
Dept: NEUROLOGY | Facility: CLINIC | Age: 76
End: 2021-12-08

## 2022-01-26 NOTE — ED PROVIDER NOTE - BIRTH SEX
Female Doxepin Pregnancy And Lactation Text: This medication is Pregnancy Category C and it isn't known if it is safe during pregnancy. It is also excreted in breast milk and breast feeding isn't recommended.

## 2022-09-30 NOTE — DISCHARGE NOTE NURSING/CASE MANAGEMENT/SOCIAL WORK - NSDPDISTO_GEN_ALL_CORE
I spoke with the therapy department and  was informed  That the order to aquatic therapy is incorrect. It should be a Service to physical therapy  And in the comments it should notate aquatic therapy. So sorry for the confusion.    Home

## 2022-10-08 NOTE — DISCHARGE NOTE ADULT - PLAN OF CARE
Negative prevent future falls You were admitted for a fall. You did not sustain any lasting injuries from your fall but you should work with physical therapy and use a walker until cleared to walk without an assist device.  We believe you fell because you are unsteady on your feet. We decreased doses of your lyrica to see if your symptoms improved, you should continue to maintain the new dose of your lyrics and discuss medication changes with your doctors if your unsteadiness doesn't improve. recheck in 1-2 weeks Your low sodium could be related to your fall, lupus, sjogrens, or your infection. Please have your doctors check your sodium level in 1-2 weeks to confirm it has normalized. You were seen by psychiatry while admitted here and recommend that you take the follow medications and to follow up with your psychiatrist  zoloft 50mg daily  trazadone 50mg at night  Xanax 0.5 mg BID  and Vistril 50mg po every 6 hours as needed for anxiety  Please follow up with Dr Lemus/ your psychiatrist Take you medications as prescribed and follow up with your doctors. If you experience symptoms that concern you seek medical attention immediately.  Please limit your water intake to 1.5L daily. if you are having symptoms of dry mouth that make you want to drink water excessively please have your doctors recommend a mouthwash as your excess water intake can drop your sodium Take you medications as prescribed and follow up with your doctors. If you experience symptoms that concern you seek medical attention immediately. Take you medications as prescribed

## 2022-10-24 NOTE — ED ADULT NURSE NOTE - NSSUBSTANCEUSE_GEN_ALL_CORE_SD
10/25/22                            Toni Ramirez   [facility] [address]    To Whom It May Concern:    This is to certify Toni Ramirez was evaluated with Natasha Santos DO on 10/24/22.  Please excuse from work 10/20/22 through 10/26/22 and can return to regular work on 10/27/22.     RESTRICTIONS: None              Natasha Santos DO   [address] [address]  Dept Phone: [phone]      never used

## 2022-10-29 NOTE — ED PROVIDER NOTE - INPATIENT RESIDENT/ACP NOTIFIED
***GEN - NAD; well appearing; A+O x3 ***HEAD - NC/AT ***EYES/NOSE - PERRL, EOMI, mucous membranes moist, no discharge ***THROAT: Oral cavity and pharynx normal. No inflammation, swelling, exudate, or lesions.  ***NECK: Neck supple, non-tender without lymphadenopathy, no masses, no thyromegaly.   ***PULMONARY - CTA b/l, symmetric breath sounds. ***CARDIAC -s1s2, RRR, no M,G,R  ***ABDOMEN - +BS, ND, NT, soft, no guarding, no rebound, no masses   ***BACK - no CVA tenderness, Normal  spine ***EXTREMITIES - +ttp over left ankle and foot. + mild warmth and swelling of same area. no erythema. symmetric pulses, 2+ dp, capillary refill < 2 seconds, no clubbing, no cyanosis, no edema ***SKIN - no rash or bruising   ***NEUROLOGIC - alert, CN 2-12 intact Mandie

## 2022-12-06 NOTE — ED ADULT NURSE NOTE - NSSUHOSCREENINGYN_ED_ALL_ED
PHYSICAL EXAM DOCUMENT- FEMALE    Assessment and Plan:      1. Routine general medical examination at a health care facility    2. Screening for colon cancer          Normal wellness exam discussed.  Health maintenance and safety discussed.  Handout on low-fat low-cholesterol diet will be given as an attachment.  Labs reviewed.  Return in 1 year for wellness exam with fasting labs to be done prior.      Patient Instructions     Health Maintenance-   -BMI:  Body mass index is 24.21 kg/m².    Wt Readings from Last 4 Encounters:   12/06/22 68 kg (150 lb)   11/22/22 67.6 kg (149 lb 1.6 oz)   11/24/21 65.8 kg (145 lb)   06/17/19 68.9 kg (152 lb)      -Diet:  Healthy diet discussed.   -Exercise:  30 minutes, 4-5 times a week.   -Safety:  Seat belts, helmet, gun trigger lock. Avoid Texting while driving as well as avoiding cell phone use while driving.   -Advance Directive: Handout given if appropriate  -Preventative:  Colonoscopy/ Pap/ Mammogram- reviewed through rooming process and ordered  if indicated.    -Supplements:  Multivitamins, 1000 international units Vitamin D,   baby aspirin for women between the ages of 55-79 for cardioprotection, if able to tolerate aspirin products and you have multiple risk factors for cardiovascular disease.  Women in childbearing years should take   400 mcg of folic acid daily to prevent neural tube defects in baby.   -Immunizations:  Tetanus, Pneumovax, flu,     Varicella -reviewed through rooming process and updated as indicated     Follow-up:  Return in about 1 year (around 12/6/2023) for Wellness exam with fasting labs prior.    Labs and other orders to be done now or prior to next follow-up:     Orders Placed This Encounter   • OPEN ACCESS COLONOSCOPY       · Please arrive 15 minutes prior to future follow-up appointments and 30 minutes prior to your wellness exams  · Please complete survey if you receive one from us.  Thank you in advance.                  History:    Chief  Complaint:    Chief Complaint   Patient presents with   • Physical     No pap or breast exam done in GYN       History of Present Illness:  no concerns    Medications:    Current Outpatient Medications   Medication Sig Dispense Refill   • pseudoephedrine (SUDAFED 12 HOUR) 120 MG 12 hr tablet Take 1 tablet by mouth daily as needed for Congestion. 15 tablet 0   • Magnesium 500 MG CAPS Take  by mouth.       No current facility-administered medications for this visit.     Allergies/Latex Allergies:    Allergies as of 2022   • (No Known Allergies)     Past Medical History:    Cerebral aneurysm without rupture               2016       Basilar artery aneurysm (CMS/HCC)               4/10/2016       Comment: -- s/p Coil procedure. Managed by Neurology at                [facility] --Yadiel Shelley MD                10/24/2016.   Problem List:    Patient Active Problem List    Diagnosis Date Noted   • Osteopenia 2017     Priority: Low   • Palpitations 10/24/2016     Priority: Low   • Basilar artery aneurysm (CMS/HCC) 04/10/2016     Priority: Low     -- s/p Coil procedure. Managed by Neurology at [facility] --Yadiel Shelley MD 10/24/2016.       • Family hx osteoporosis 12/10/2015     Priority: Low     -- Mother.  --Yadiel Shelley MD 10/24/2016.       • Well woman exam with routine gynecological exam 2014     Priority: Low     Past Surgical History/Anesthesia Complications:    D AND C                                                        SECTION, LOW TRANSVERSE                              APPENDECTOMY                                                   SECTION, CLASSIC                                     IR CEREBRAL COILING                             2016     Family History:    Family History   Problem Relation Age of Onset   • Myocardial Infarction Mother    • Osteoporosis Mother    • High blood pressure Father    • Thyroid Father    • Thyroid Sister          X 2      Social History:    Social History     Socioeconomic History   • Marital status: /Civil Union     Spouse name: Not on file   • Number of children: Not on file   • Years of education: Not on file   • Highest education level: Not on file   Occupational History   • Not on file   Tobacco Use   • Smoking status: Never Smoker   • Smokeless tobacco: Never Used   • Tobacco comment: Patient is employed at [employer] as a    Substance and Sexual Activity   • Alcohol use: Yes     Alcohol/week: 5.0 standard drinks     Types: 5 Glasses of wine per week   • Drug use: No   • Sexual activity: Yes     Partners: Male   Other Topics Concern   • Not on file   Social History Narrative   • Not on file     Social Determinants of Health     Financial Resource Strain: Not on file   Food Insecurity: Not on file   Transportation Needs: Not on file   Physical Activity: Not on file   Stress: Not on file   Social Connections: Not on file   Intimate Partner Violence: Not on file      Health Maintenance:  Colonoscopy, Pap, Immunization:  Reviewed.    Review of Systems:    GENERAL:  The patient claims good health.  There are no fevers, chills or weakness.    HEENT:  There are no headaches, vision changes, ear pain, hearing changes, sore throat, nasal discharge or neck pain.  RESPIRATORY:  The patient denies cough, shortness of breath or wheezing.  CARDIOVASCULAR:  The patient denies chest pain, palpitations, orthopnea or peripheral swelling.  GASTROINTESTINAL:  The patient denies diarrhea, nausea, vomiting, abdominal pains or rectal bleeding.    GENITOURINARY:  The patient denies frequency, dysuria, hematuria or incontinence.  OB/GYN:  :  5, Para:  4, LMP:  Post-menopausal, contraception:  NA , Patient's last menstrual period was 2014.    OB History    Para Term  AB Living   4 4 4         SAB IAB Ectopic Molar Multiple Live Births                    # Outcome Date GA Lbr Fransico/2nd Weight Sex  Delivery Anes PTL Lv   4 Term      CS-LTranv      3 Term      Vag-Spont      2 Term      Vag-Spont      1 Term      Vag-Spont         post-menopausal bleed:  none  MUSCULOSKELETAL:  The patient denies any significant joint pains or muscle pains.  NEUROLOGICAL:  The patient denies any headaches, numbness, tingling or syncope.   SKIN:  The patient denies any significant rashes or lumps.  HEMATOLOGICAL:  The patient denies any bleeding problems.  PSYCHIATRIC:  The patient denies depression or anxiety symptoms.  ENDOCRINE:  The patient denies excessive thirst, hot or cold intolerance.    Physical Exam:  Height 5' 6\" (1.676 m), weight 68 kg (150 lb), last menstrual period 03/17/2014.      Wt Readings from Last 4 Encounters:   12/06/22 68 kg (150 lb)   11/22/22 67.6 kg (149 lb 1.6 oz)   11/24/21 65.8 kg (145 lb)   06/17/19 68.9 kg (152 lb)             GENERAL:  The patient is a well-developed, well-nourished female in no acute distress.  There is no jaundice, anemia, central / peripheral cyanosis or respiratory distress.    HEENT:  Head is atraumatic, normocephalic.  Eyes:  EOMI, PERRLA.   TMs are clear bilaterally.  External ears without deformities.  Nose without deformities.  There is moist mucosa.  Throat clear with moist mucous membranes.  NECK:  Without lymphadenopathy or thyroid enlargement.  There is full range of motion.  CHEST:  Without deformities.  BREASTS:  Deferred at this time. There is no axillary lymphadenopathy.  LYMPHATICS:  There is no supraclavicular or axillary lymphadenopathy noted.  LUNGS:  Clear to auscultation and percussion bilaterally.  CARDIOVASCULAR:  Reveals regular rate and rhythm with normal S1 plus S2, without S3 or S4.  Peripheral pulses are normal.    EXTREMITIES:  Extremities are without edema.  ABDOMEN:  Soft, nontender without any palpable masses or hepatosplenomegaly.  Bowel sounds are normal.  PELVIC:  Deferred at this time  MUSCULOSKELETAL:  There are no gross abnormalities in  major joints.  There is full range of motion in the upper extremities, lower extremities and back.  There is normal strength in the major joints tested.  NEUROLOGIC:  Reveals patient to be awake, alert and oriented x3.  Cranial nerves II-XII are intact.  There are no motor or sensory deficits.  Reflexes 2+ upper and lower extremities bilaterally.  Gait is normal.   SKIN:  Examination reveals no significant rashes or lesions.  PSYCHIATRIC:  Examination reveals the patient to be awake, alert and oriented.  Mood and affect are normal. There is normal recent and remote memory.    Labs:  If done prior to visit:    Lab Results   Component Value Date    SODIUM 142 11/29/2022    SODIUM 138 05/23/2017    POTASSIUM 4.4 11/29/2022    POTASSIUM 4.1 05/23/2017    CHLORIDE 103 11/29/2022    CHLORIDE 103 05/23/2017    CO2 27 11/29/2022    CO2 28 05/23/2017    BUN 11 11/29/2022    BUN 11 05/23/2017    CREATININE 0.84 11/29/2022    CREATININE 0.74 05/23/2017    GLUCOSE 97 11/29/2022    GLUCOSE 93 05/23/2017     Lab Results   Component Value Date    CHOLESTEROL 226 (H) 11/29/2022    HDL 73 11/29/2022    CALCLDL 137 (H) 11/29/2022    TRIGLYCERIDE 78 11/29/2022    labs reviewed with patient.  LDL is slightly elevated.  Patient work on low-fat low-cholesterol diet    The 10-year ASCVD risk score (Raz BROWN, et al., 2019) is: 2.7%    Values used to calculate the score:      Age: 62 years      Sex: Female      Is Non- : No      Diabetic: No      Tobacco smoker: No      Systolic Blood Pressure: 122 mmHg      Is BP treated: No      HDL Cholesterol: 73 mg/dL      Total Cholesterol: 226 mg/dL         Yes - the patient is able to be screened

## 2022-12-19 NOTE — PATIENT PROFILE ADULT - HOME ACCESSIBILITY CONCERNS
Pt states that she lives in private house. Pt occupies one floor and her daughter the other. Pt states that her daughter was her HHA for 7days/7 hrs/day. Pt has a RW. none

## 2023-03-16 NOTE — DISCHARGE NOTE PROVIDER - NSDCHHHOMEBOUND_GEN_ALL_CORE
Other, specify... Other, specify.../Chest pain/weakness during/after ambulation   greater than 20 feet Calcipotriene Pregnancy And Lactation Text: This medication has not been proven safe during pregnancy. It is unknown if this medication is excreted in breast milk.

## 2023-05-22 NOTE — ED ADULT TRIAGE NOTE - BP NONINVASIVE DIASTOLIC (MM HG)
[address] Cartwright   1972, 52 y.o. female   [id number]       Referring Provider: Blake Stratton MD  Referral Type: In an order in [address] [place] [address]    Reason for Visit: Evaluation of suspected change in hearing, tinnitus, or balance. ADULT AUDIOLOGIC EVALUATION      Florida Cartwright is a 52 y.o. female seen today, 5/22/2023 , for an initial audiologic evaluation. Patient was seen by Blake Stratton MD following today's evaluation. AUDIOLOGIC AND OTHER PERTINENT MEDICAL HISTORY:      Florida Cartwright reports lef pulsatile tinnitus that has been ongoing for the past couple of months. She feels like everything is louder in the left ear at the moment. She also has history of sinus issues and allergies which causes fullness sensation from the left side of her face to her ear. She notes having a \"plug\" in her left ear that was removed a while ago due to previous eustachian tube issues. Denies any pain. She does have family history of hearing loss in her cousins who she recalls had hearing difficulty as children. She denied otalgia, otorrhea, dizziness, and history of noise exposure    Date: 5/22/2023     IMPRESSIONS:      Today's results reveal mild sensorineural hearing loss in the right ear. Excellent speech understanding when in quiet. Tympanometry indicates normal middle ear function. Acoustic reflexes present bilaterally for both ipsilateral and contralateral stimuli. Discussed test results and implications with patient. Follow medical recommendations of Blake Stratton MD.     ASSESSMENT AND FINDINGS:     Otoscopy unremarkable. RIGHT EAR:  Hearing Sensitivity: Normal hearing sensitivity from 250-3000Hz, sloping to mild sensorineural hearing loss at 4000Hz and 8000Hz only. Speech Recognition Threshold: 5 dB HL  Word Recognition: Excellent 100%, based on NU-6 25-word list at 45 dBHL using recorded speech stimuli.     Tympanometry: Normal peak pressure and compliance, Type A tympanogram, consistent with normal

## 2023-06-19 NOTE — ED PROVIDER NOTE - CONSTITUTIONAL NEGATIVE STATEMENT, MLM
Chief Complaint   Patient presents with     Arthritis       Momo Clinton    
no fever and no chills.

## 2023-09-29 NOTE — ED PROVIDER NOTE - DISPOSITION TYPE
5 = Markedly ill - intrusive symptoms that distinctly impair social/occupational function or cause intrusive levels of distress OBSERVATION

## 2023-11-20 NOTE — ASSESSMENT
[FreeTextEntry1] : # Paroxysmal AFib/AFlutter with Sinus pause s/p DC PPM\par - Normal functioning PPM. \par - Remote monitor is set up and patient is transmitting.\par - No arrhythmias seen on PPM\par - Cont Eliquis for CHADS VASc of at least 5 (dCHF, HTN, Age > 65, F, CAD). No s/sx of bleeding. \par \par # CAD s/p PCI\par - Cont GDMT\par - Cont to follow up with Dr. Sepulveda\par \par # CHF and COPD with recent dyspnea\par - Rec pulm follow up (referral provided)\par - Currently appears euvolemic by exam\par \par # HTN\par - BP well controlled\par - 2g Na diet enforced\par \par I have also advised the patient to go to the nearest emergency room if she experiences any chest pain, dyspnea, syncope, or has any other compelling symptoms.\par \par Follow up in 4-6 mo. 
34.3

## 2024-05-17 NOTE — H&P ADULT - BIRTH SEX
ASSESSMENT  This is a 81 year old  female with PMH of COPD on home O2 and IBS is brought in due to confusion.     IMPRESSION  #Enterobacterials Bacteremia-Life threatening  #Metabolic encephalopathy   #chronic hypoxic resp failure, COPD on home O2.   #Chronically on steroids 40mg daily?? History of bronchiectasis.   #History of TIA s/p TPA 2022 (MRI was negative)  #History of IBS  #Functionally not very active.   #CKD 3  #Sacral ulcer Stage 2 present on admission  #Obesity BMI (kg/m2): 18.3    RECOMMENDATIONS  -Collect sputum cultures if possible. Collect AFB cultures (for NTM) No need for isolation precautions.  -Follow up with blood culture data.   -May need to consider CT abdomen to look for the source of bacteremia. Consider getting Kidney US.  -UA not impressive but was treated with oral antibiotics recently and Urine culture was negative (4/2024) Perhaps Macrobid or Bactrim  -Obtain wound care evaluation for the sacral decubitus.  -For now keep on IV cefepime 2 gram q 12 hours.   -May need to consider PJP ppx if planned for prolonged high dose steroids.   -Off loading to prevent pressure sores and preventive measures to avoid aspiration    If any questions, please send a message or call on [employer] Teams  Please continue to update ID with any pertinent new laboratory or radiographic findings.    Kumar Ng M.D  Infectious Diseases Attending/   [employer] [facility]   Female

## 2025-03-05 NOTE — DISCUSSION/SUMMARY
Anesthesia Post-op Note    Patient: Remedios Hickman  Procedure(s) Performed: COLONOSCOPY  ESOPHAGOGASTRODUODENOSCOPY (EGD)  Anesthesia type: MAC    Vitals Value Taken Time   Temp 37.1 °C (98.8 °F) 03/05/25 1152   Pulse 75 03/05/25 1152   Resp 17 03/05/25 1152   SpO2 93 % 03/05/25 1152   /65 03/05/25 1152         Patient Location: Phase II  Post-op Vital Signs:stable  Level of Consciousness: awake and alert  Respiratory Status: spontaneous ventilation  Cardiovascular stable  Hydration: euvolemic  Pain Management: adequately controlled  Handoff: Handoff to receiving clinician was performed and questions were answered  Vomiting: none  Nausea: None  Airway Patency:patent  Post-op Assessment: no complications and patient tolerated procedure well      No notable events documented.                      
[Pacemaker Function Normal] : normal pacemaker function
